# Patient Record
Sex: MALE | Race: WHITE | Employment: OTHER | URBAN - METROPOLITAN AREA
[De-identification: names, ages, dates, MRNs, and addresses within clinical notes are randomized per-mention and may not be internally consistent; named-entity substitution may affect disease eponyms.]

---

## 2017-06-20 ENCOUNTER — HOSPITAL ENCOUNTER (OUTPATIENT)
Age: 77
Discharge: HOME HEALTH | End: 2017-07-08
Attending: INTERNAL MEDICINE | Admitting: INTERNAL MEDICINE

## 2017-06-20 ENCOUNTER — APPOINTMENT (OUTPATIENT)
Dept: GENERAL RADIOLOGY | Age: 77
End: 2017-06-20
Attending: INTERNAL MEDICINE

## 2017-06-20 LAB
ALBUMIN SERPL BCP-MCNC: 3.2 G/DL (ref 3.2–4.6)
ALBUMIN/GLOB SERPL: 0.6 {RATIO} (ref 1.2–3.5)
ALP SERPL-CCNC: 116 U/L (ref 50–136)
ALT SERPL-CCNC: 62 U/L (ref 12–65)
ANION GAP BLD CALC-SCNC: 8 MMOL/L (ref 7–16)
AST SERPL W P-5'-P-CCNC: 54 U/L (ref 15–37)
BASOPHILS # BLD AUTO: 0.1 K/UL (ref 0–0.2)
BASOPHILS # BLD: 1 % (ref 0–2)
BILIRUB SERPL-MCNC: 0.4 MG/DL (ref 0.2–1.1)
BUN SERPL-MCNC: 24 MG/DL (ref 8–23)
CALCIUM SERPL-MCNC: 10.2 MG/DL (ref 8.3–10.4)
CHLORIDE SERPL-SCNC: 95 MMOL/L (ref 98–107)
CO2 SERPL-SCNC: 37 MMOL/L (ref 21–32)
CREAT SERPL-MCNC: 1.2 MG/DL (ref 0.8–1.5)
DIFFERENTIAL METHOD BLD: ABNORMAL
EOSINOPHIL # BLD: 0.2 K/UL (ref 0–0.8)
EOSINOPHIL NFR BLD: 2 % (ref 0.5–7.8)
ERYTHROCYTE [DISTWIDTH] IN BLOOD BY AUTOMATED COUNT: 13.8 % (ref 11.9–14.6)
EST. AVERAGE GLUCOSE BLD GHB EST-MCNC: 143 MG/DL
FOLATE SERPL-MCNC: 10.9 NG/ML (ref 3.1–17.5)
GLOBULIN SER CALC-MCNC: 5.2 G/DL (ref 2.3–3.5)
GLUCOSE SERPL-MCNC: 101 MG/DL (ref 65–100)
HBA1C MFR BLD: 6.6 % (ref 4.8–6)
HCT VFR BLD AUTO: 50.4 % (ref 41.1–50.3)
HGB BLD-MCNC: 16.6 G/DL (ref 13.6–17.2)
IMM GRANULOCYTES # BLD: 0 K/UL (ref 0–0.5)
IMM GRANULOCYTES NFR BLD AUTO: 0.3 % (ref 0–5)
INR PPP: 1.1 (ref 0.9–1.2)
LYMPHOCYTES # BLD AUTO: 9 % (ref 13–44)
LYMPHOCYTES # BLD: 1 K/UL (ref 0.5–4.6)
MAGNESIUM SERPL-MCNC: 2.5 MG/DL (ref 1.8–2.4)
MCH RBC QN AUTO: 30.2 PG (ref 26.1–32.9)
MCHC RBC AUTO-ENTMCNC: 32.9 G/DL (ref 31.4–35)
MCV RBC AUTO: 91.6 FL (ref 79.6–97.8)
MONOCYTES # BLD: 1.6 K/UL (ref 0.1–1.3)
MONOCYTES NFR BLD AUTO: 14 % (ref 4–12)
NEUTS SEG # BLD: 8.7 K/UL (ref 1.7–8.2)
NEUTS SEG NFR BLD AUTO: 74 % (ref 43–78)
PLATELET # BLD AUTO: 417 K/UL (ref 150–450)
PMV BLD AUTO: 11.5 FL (ref 10.8–14.1)
POTASSIUM SERPL-SCNC: 4.5 MMOL/L (ref 3.5–5.1)
PROT SERPL-MCNC: 8.4 G/DL (ref 6.3–8.2)
PROTHROMBIN TIME: 12.1 SEC (ref 9.6–12)
RBC # BLD AUTO: 5.5 M/UL (ref 4.23–5.67)
SODIUM SERPL-SCNC: 140 MMOL/L (ref 136–145)
T4 SERPL-MCNC: 13.6 UG/DL (ref 4.8–13.9)
TSH SERPL DL<=0.005 MIU/L-ACNC: 4.49 UIU/ML (ref 0.36–3.74)
VIT B12 SERPL-MCNC: 1067 PG/ML (ref 193–986)
WBC # BLD AUTO: 11.7 K/UL (ref 4.3–11.1)

## 2017-06-20 PROCEDURE — 83735 ASSAY OF MAGNESIUM: CPT | Performed by: INTERNAL MEDICINE

## 2017-06-20 PROCEDURE — 85025 COMPLETE CBC W/AUTO DIFF WBC: CPT | Performed by: INTERNAL MEDICINE

## 2017-06-20 PROCEDURE — 71010 XR CHEST PORT: CPT

## 2017-06-20 PROCEDURE — 84436 ASSAY OF TOTAL THYROXINE: CPT | Performed by: INTERNAL MEDICINE

## 2017-06-20 PROCEDURE — 36415 COLL VENOUS BLD VENIPUNCTURE: CPT | Performed by: INTERNAL MEDICINE

## 2017-06-20 PROCEDURE — 82607 VITAMIN B-12: CPT | Performed by: INTERNAL MEDICINE

## 2017-06-20 PROCEDURE — 80053 COMPREHEN METABOLIC PANEL: CPT | Performed by: INTERNAL MEDICINE

## 2017-06-20 PROCEDURE — 83036 HEMOGLOBIN GLYCOSYLATED A1C: CPT | Performed by: INTERNAL MEDICINE

## 2017-06-20 PROCEDURE — 85610 PROTHROMBIN TIME: CPT | Performed by: INTERNAL MEDICINE

## 2017-06-20 PROCEDURE — 84443 ASSAY THYROID STIM HORMONE: CPT | Performed by: INTERNAL MEDICINE

## 2017-06-20 PROCEDURE — 82746 ASSAY OF FOLIC ACID SERUM: CPT | Performed by: INTERNAL MEDICINE

## 2017-06-21 ENCOUNTER — HOSPITAL ENCOUNTER (OUTPATIENT)
Dept: CT IMAGING | Age: 77
Discharge: HOME | End: 2017-06-21
Attending: INTERNAL MEDICINE
Payer: MEDICARE

## 2017-06-21 PROCEDURE — 84165 PROTEIN E-PHORESIS SERUM: CPT | Performed by: INTERNAL MEDICINE

## 2017-06-21 PROCEDURE — 70450 CT HEAD/BRAIN W/O DYE: CPT

## 2017-06-21 PROCEDURE — 36415 COLL VENOUS BLD VENIPUNCTURE: CPT | Performed by: INTERNAL MEDICINE

## 2017-06-21 PROCEDURE — 86334 IMMUNOFIX E-PHORESIS SERUM: CPT | Performed by: INTERNAL MEDICINE

## 2017-06-22 LAB
EST. AVERAGE GLUCOSE BLD GHB EST-MCNC: 143 MG/DL
HBA1C MFR BLD: 6.6 % (ref 4.8–6)
INR PPP: 1.2 (ref 0.9–1.2)
PROTHROMBIN TIME: 12.6 SEC (ref 9.6–12)

## 2017-06-22 PROCEDURE — 85610 PROTHROMBIN TIME: CPT | Performed by: INTERNAL MEDICINE

## 2017-06-22 PROCEDURE — 36415 COLL VENOUS BLD VENIPUNCTURE: CPT | Performed by: INTERNAL MEDICINE

## 2017-06-22 PROCEDURE — 83036 HEMOGLOBIN GLYCOSYLATED A1C: CPT | Performed by: INTERNAL MEDICINE

## 2017-06-23 LAB
ALBUMIN SERPL BCP-MCNC: 2.9 G/DL (ref 3.2–4.6)
ALBUMIN SERPL ELPH-MCNC: 3.22 G/DL (ref 3.2–5.6)
ALBUMIN/GLOB SERPL: 0.6 {RATIO} (ref 1.2–3.5)
ALBUMIN/GLOB SERPL: 0.9 {RATIO}
ALP SERPL-CCNC: 117 U/L (ref 50–136)
ALPHA1 GLOB SERPL ELPH-MCNC: 0.31 G/DL (ref 0.1–0.4)
ALPHA2 GLOB SERPL ELPH-MCNC: 1.18 G/DL (ref 0.4–1.2)
ALT SERPL-CCNC: 58 U/L (ref 12–65)
ANION GAP BLD CALC-SCNC: 9 MMOL/L (ref 7–16)
AST SERPL W P-5'-P-CCNC: 56 U/L (ref 15–37)
B-GLOBULIN SERPL QL ELPH: 1.18 G/DL (ref 0.6–1.3)
BASOPHILS # BLD AUTO: 0.1 K/UL (ref 0–0.2)
BASOPHILS # BLD: 1 % (ref 0–2)
BILIRUB SERPL-MCNC: 0.4 MG/DL (ref 0.2–1.1)
BUN SERPL-MCNC: 28 MG/DL (ref 8–23)
CALCIUM SERPL-MCNC: 9.5 MG/DL (ref 8.3–10.4)
CHLORIDE SERPL-SCNC: 100 MMOL/L (ref 98–107)
CO2 SERPL-SCNC: 31 MMOL/L (ref 21–32)
CREAT SERPL-MCNC: 1.24 MG/DL (ref 0.8–1.5)
DIFFERENTIAL METHOD BLD: ABNORMAL
EOSINOPHIL # BLD: 0.2 K/UL (ref 0–0.8)
EOSINOPHIL NFR BLD: 2 % (ref 0.5–7.8)
ERYTHROCYTE [DISTWIDTH] IN BLOOD BY AUTOMATED COUNT: 13.9 % (ref 11.9–14.6)
GAMMA GLOB MFR SERPL ELPH: 1.11 G/DL (ref 0.5–1.6)
GLOBULIN SER CALC-MCNC: 4.5 G/DL (ref 2.3–3.5)
GLUCOSE SERPL-MCNC: 133 MG/DL (ref 65–100)
HCT VFR BLD AUTO: 46.7 % (ref 41.1–50.3)
HGB BLD-MCNC: 15.4 G/DL (ref 13.6–17.2)
IGA SERPL-MCNC: 424 MG/DL (ref 85–499)
IGG SERPL-MCNC: 1065 MG/DL (ref 610–1616)
IGM SERPL-MCNC: 115 MG/DL (ref 35–242)
IMM GRANULOCYTES # BLD: 0.1 K/UL (ref 0–0.5)
IMM GRANULOCYTES NFR BLD AUTO: 0.5 % (ref 0–5)
LYMPHOCYTES # BLD AUTO: 14 % (ref 13–44)
LYMPHOCYTES # BLD: 1.3 K/UL (ref 0.5–4.6)
M PROTEIN SERPL ELPH-MCNC: NORMAL G/DL
MCH RBC QN AUTO: 29.9 PG (ref 26.1–32.9)
MCHC RBC AUTO-ENTMCNC: 33 G/DL (ref 31.4–35)
MCV RBC AUTO: 90.7 FL (ref 79.6–97.8)
MONOCYTES # BLD: 1.4 K/UL (ref 0.1–1.3)
MONOCYTES NFR BLD AUTO: 15 % (ref 4–12)
NEUTS SEG # BLD: 6.3 K/UL (ref 1.7–8.2)
NEUTS SEG NFR BLD AUTO: 68 % (ref 43–78)
PLATELET # BLD AUTO: 454 K/UL (ref 150–450)
PMV BLD AUTO: 11.7 FL (ref 10.8–14.1)
POTASSIUM SERPL-SCNC: 5.2 MMOL/L (ref 3.5–5.1)
PROT PATTERN SERPL ELPH-IMP: NORMAL
PROT PATTERN SPEC IFE-IMP: NORMAL
PROT SERPL-MCNC: 7 G/DL (ref 6.3–8.2)
PROT SERPL-MCNC: 7.4 G/DL (ref 6.3–8.2)
RBC # BLD AUTO: 5.15 M/UL (ref 4.23–5.67)
SODIUM SERPL-SCNC: 140 MMOL/L (ref 136–145)
WBC # BLD AUTO: 9.3 K/UL (ref 4.3–11.1)

## 2017-06-23 PROCEDURE — 85025 COMPLETE CBC W/AUTO DIFF WBC: CPT | Performed by: INTERNAL MEDICINE

## 2017-06-23 PROCEDURE — 36415 COLL VENOUS BLD VENIPUNCTURE: CPT | Performed by: INTERNAL MEDICINE

## 2017-06-23 PROCEDURE — 80053 COMPREHEN METABOLIC PANEL: CPT | Performed by: INTERNAL MEDICINE

## 2017-06-24 LAB
INR PPP: 1.5 (ref 0.9–1.2)
POTASSIUM SERPL-SCNC: 5.3 MMOL/L (ref 3.5–5.1)
PROTHROMBIN TIME: 16.7 SEC (ref 9.6–12)

## 2017-06-24 PROCEDURE — 36415 COLL VENOUS BLD VENIPUNCTURE: CPT | Performed by: INTERNAL MEDICINE

## 2017-06-24 PROCEDURE — 84132 ASSAY OF SERUM POTASSIUM: CPT | Performed by: INTERNAL MEDICINE

## 2017-06-24 PROCEDURE — 85610 PROTHROMBIN TIME: CPT | Performed by: INTERNAL MEDICINE

## 2017-06-25 LAB
INR PPP: 1.9 (ref 0.9–1.2)
PROTHROMBIN TIME: 20.7 SEC (ref 9.6–12)

## 2017-06-25 PROCEDURE — 85610 PROTHROMBIN TIME: CPT | Performed by: INTERNAL MEDICINE

## 2017-06-25 PROCEDURE — 36415 COLL VENOUS BLD VENIPUNCTURE: CPT | Performed by: INTERNAL MEDICINE

## 2017-06-26 LAB
ALBUMIN SERPL BCP-MCNC: 2.7 G/DL (ref 3.2–4.6)
ALBUMIN/GLOB SERPL: 0.6 {RATIO} (ref 1.2–3.5)
ALP SERPL-CCNC: 113 U/L (ref 50–136)
ALT SERPL-CCNC: 48 U/L (ref 12–65)
ANION GAP BLD CALC-SCNC: 5 MMOL/L (ref 7–16)
AST SERPL W P-5'-P-CCNC: 35 U/L (ref 15–37)
BASOPHILS # BLD AUTO: 0.2 K/UL (ref 0–0.2)
BASOPHILS # BLD: 2 % (ref 0–2)
BILIRUB SERPL-MCNC: 0.2 MG/DL (ref 0.2–1.1)
BUN SERPL-MCNC: 17 MG/DL (ref 8–23)
CALCIUM SERPL-MCNC: 9 MG/DL (ref 8.3–10.4)
CHLORIDE SERPL-SCNC: 100 MMOL/L (ref 98–107)
CO2 SERPL-SCNC: 35 MMOL/L (ref 21–32)
CREAT SERPL-MCNC: 0.82 MG/DL (ref 0.8–1.5)
DIFFERENTIAL METHOD BLD: NORMAL
EOSINOPHIL # BLD: 0.3 K/UL (ref 0–0.8)
EOSINOPHIL NFR BLD: 3 % (ref 0.5–7.8)
ERYTHROCYTE [DISTWIDTH] IN BLOOD BY AUTOMATED COUNT: 13.9 % (ref 11.9–14.6)
GLOBULIN SER CALC-MCNC: 4.8 G/DL (ref 2.3–3.5)
GLUCOSE SERPL-MCNC: 110 MG/DL (ref 65–100)
HCT VFR BLD AUTO: 45 % (ref 41.1–50.3)
HGB BLD-MCNC: 14.9 G/DL (ref 13.6–17.2)
IMM GRANULOCYTES # BLD: 0 K/UL (ref 0–0.5)
IMM GRANULOCYTES NFR BLD AUTO: 0.4 % (ref 0–5)
INR PPP: 2.5 (ref 0.9–1.2)
LYMPHOCYTES # BLD AUTO: 16 % (ref 13–44)
LYMPHOCYTES # BLD: 1.3 K/UL (ref 0.5–4.6)
MCH RBC QN AUTO: 30 PG (ref 26.1–32.9)
MCHC RBC AUTO-ENTMCNC: 33.1 G/DL (ref 31.4–35)
MCV RBC AUTO: 90.7 FL (ref 79.6–97.8)
MONOCYTES # BLD: 0.9 K/UL (ref 0.1–1.3)
MONOCYTES NFR BLD AUTO: 11 % (ref 4–12)
NEUTS SEG # BLD: 5.3 K/UL (ref 1.7–8.2)
NEUTS SEG NFR BLD AUTO: 68 % (ref 43–78)
PLATELET # BLD AUTO: 447 K/UL (ref 150–450)
PMV BLD AUTO: 11.5 FL (ref 10.8–14.1)
POTASSIUM SERPL-SCNC: 4.2 MMOL/L (ref 3.5–5.1)
PROT SERPL-MCNC: 7.5 G/DL (ref 6.3–8.2)
PROTHROMBIN TIME: 27.4 SEC (ref 9.6–12)
RBC # BLD AUTO: 4.96 M/UL (ref 4.23–5.67)
SODIUM SERPL-SCNC: 140 MMOL/L (ref 136–145)
WBC # BLD AUTO: 7.8 K/UL (ref 4.3–11.1)

## 2017-06-26 PROCEDURE — 85025 COMPLETE CBC W/AUTO DIFF WBC: CPT | Performed by: INTERNAL MEDICINE

## 2017-06-26 PROCEDURE — 80053 COMPREHEN METABOLIC PANEL: CPT | Performed by: INTERNAL MEDICINE

## 2017-06-26 PROCEDURE — 85610 PROTHROMBIN TIME: CPT | Performed by: INTERNAL MEDICINE

## 2017-06-28 LAB
INR PPP: 3.9 (ref 0.9–1.2)
PROTHROMBIN TIME: 42.5 SEC (ref 9.6–12)

## 2017-06-28 PROCEDURE — 85610 PROTHROMBIN TIME: CPT | Performed by: INTERNAL MEDICINE

## 2017-06-29 ENCOUNTER — HOSPITAL ENCOUNTER (OUTPATIENT)
Dept: CT IMAGING | Age: 77
Discharge: HOME | End: 2017-06-29
Attending: INTERNAL MEDICINE
Payer: MEDICARE

## 2017-06-29 LAB
ANION GAP BLD CALC-SCNC: 6 MMOL/L (ref 7–16)
BUN SERPL-MCNC: 20 MG/DL (ref 8–23)
CALCIUM SERPL-MCNC: 9.5 MG/DL (ref 8.3–10.4)
CHLORIDE SERPL-SCNC: 99 MMOL/L (ref 98–107)
CO2 SERPL-SCNC: 35 MMOL/L (ref 21–32)
CREAT SERPL-MCNC: 0.91 MG/DL (ref 0.8–1.5)
GLUCOSE SERPL-MCNC: 200 MG/DL (ref 65–100)
INR PPP: 4.3 (ref 0.9–1.2)
POTASSIUM SERPL-SCNC: 4.6 MMOL/L (ref 3.5–5.1)
PROTHROMBIN TIME: 46.8 SEC (ref 9.6–12)
SODIUM SERPL-SCNC: 140 MMOL/L (ref 136–145)

## 2017-06-29 PROCEDURE — 70486 CT MAXILLOFACIAL W/O DYE: CPT

## 2017-06-29 PROCEDURE — 80048 BASIC METABOLIC PNL TOTAL CA: CPT | Performed by: INTERNAL MEDICINE

## 2017-06-29 PROCEDURE — 85610 PROTHROMBIN TIME: CPT | Performed by: INTERNAL MEDICINE

## 2017-06-30 LAB
INR PPP: 3.5 (ref 0.9–1.2)
PROTHROMBIN TIME: 39.7 SEC (ref 9.6–12)

## 2017-06-30 PROCEDURE — 85610 PROTHROMBIN TIME: CPT | Performed by: INTERNAL MEDICINE

## 2017-07-01 LAB
INR PPP: 2.5 (ref 0.9–1.2)
PROTHROMBIN TIME: 27.2 SEC (ref 9.6–12)

## 2017-07-01 PROCEDURE — 85610 PROTHROMBIN TIME: CPT | Performed by: INTERNAL MEDICINE

## 2017-07-01 PROCEDURE — 36415 COLL VENOUS BLD VENIPUNCTURE: CPT | Performed by: INTERNAL MEDICINE

## 2017-07-02 LAB — VANCOMYCIN SERPL-MCNC: <0.8 UG/ML

## 2017-07-02 PROCEDURE — 80202 ASSAY OF VANCOMYCIN: CPT | Performed by: INTERNAL MEDICINE

## 2017-07-02 PROCEDURE — 36415 COLL VENOUS BLD VENIPUNCTURE: CPT | Performed by: INTERNAL MEDICINE

## 2017-07-03 LAB
ALBUMIN SERPL BCP-MCNC: 3.1 G/DL (ref 3.2–4.6)
ALBUMIN/GLOB SERPL: 0.8 {RATIO} (ref 1.2–3.5)
ALP SERPL-CCNC: 124 U/L (ref 50–136)
ALT SERPL-CCNC: 99 U/L (ref 12–65)
ANION GAP BLD CALC-SCNC: 7 MMOL/L (ref 7–16)
AST SERPL W P-5'-P-CCNC: 60 U/L (ref 15–37)
BASOPHILS # BLD AUTO: 0.1 K/UL (ref 0–0.2)
BASOPHILS # BLD: 1 % (ref 0–2)
BILIRUB SERPL-MCNC: 0.3 MG/DL (ref 0.2–1.1)
BUN SERPL-MCNC: 18 MG/DL (ref 8–23)
CALCIUM SERPL-MCNC: 9.8 MG/DL (ref 8.3–10.4)
CHLORIDE SERPL-SCNC: 103 MMOL/L (ref 98–107)
CO2 SERPL-SCNC: 30 MMOL/L (ref 21–32)
CREAT SERPL-MCNC: 0.84 MG/DL (ref 0.8–1.5)
DIFFERENTIAL METHOD BLD: NORMAL
EOSINOPHIL # BLD: 0.2 K/UL (ref 0–0.8)
EOSINOPHIL NFR BLD: 3 % (ref 0.5–7.8)
ERYTHROCYTE [DISTWIDTH] IN BLOOD BY AUTOMATED COUNT: 14.2 % (ref 11.9–14.6)
GLOBULIN SER CALC-MCNC: 4 G/DL (ref 2.3–3.5)
GLUCOSE SERPL-MCNC: 131 MG/DL (ref 65–100)
HCT VFR BLD AUTO: 48 % (ref 41.1–50.3)
HGB BLD-MCNC: 15.9 G/DL (ref 13.6–17.2)
IMM GRANULOCYTES # BLD: 0 K/UL (ref 0–0.5)
IMM GRANULOCYTES NFR BLD AUTO: 0.3 % (ref 0–5)
INR PPP: 1.7 (ref 0.9–1.2)
LYMPHOCYTES # BLD AUTO: 16 % (ref 13–44)
LYMPHOCYTES # BLD: 1.2 K/UL (ref 0.5–4.6)
MCH RBC QN AUTO: 29.5 PG (ref 26.1–32.9)
MCHC RBC AUTO-ENTMCNC: 33.1 G/DL (ref 31.4–35)
MCV RBC AUTO: 89.1 FL (ref 79.6–97.8)
MONOCYTES # BLD: 0.6 K/UL (ref 0.1–1.3)
MONOCYTES NFR BLD AUTO: 8 % (ref 4–12)
NEUTS SEG # BLD: 5.4 K/UL (ref 1.7–8.2)
NEUTS SEG NFR BLD AUTO: 72 % (ref 43–78)
PLATELET # BLD AUTO: 348 K/UL (ref 150–450)
PMV BLD AUTO: 11.6 FL (ref 10.8–14.1)
POTASSIUM SERPL-SCNC: 4.6 MMOL/L (ref 3.5–5.1)
PROT SERPL-MCNC: 7.1 G/DL (ref 6.3–8.2)
PROTHROMBIN TIME: 18.5 SEC (ref 9.6–12)
RBC # BLD AUTO: 5.39 M/UL (ref 4.23–5.67)
SODIUM SERPL-SCNC: 140 MMOL/L (ref 136–145)
WBC # BLD AUTO: 7.6 K/UL (ref 4.3–11.1)

## 2017-07-03 PROCEDURE — 80053 COMPREHEN METABOLIC PANEL: CPT | Performed by: INTERNAL MEDICINE

## 2017-07-03 PROCEDURE — 85610 PROTHROMBIN TIME: CPT | Performed by: INTERNAL MEDICINE

## 2017-07-03 PROCEDURE — 85025 COMPLETE CBC W/AUTO DIFF WBC: CPT | Performed by: INTERNAL MEDICINE

## 2017-07-05 LAB
INR PPP: 2.1 (ref 0.9–1.2)
PROTHROMBIN TIME: 23.2 SEC (ref 9.6–12)

## 2017-07-05 PROCEDURE — 85610 PROTHROMBIN TIME: CPT | Performed by: INTERNAL MEDICINE

## 2017-07-06 LAB
INR PPP: 2.3 (ref 0.9–1.2)
PROTHROMBIN TIME: 25.4 SEC (ref 9.6–12)

## 2017-07-06 PROCEDURE — 85610 PROTHROMBIN TIME: CPT | Performed by: INTERNAL MEDICINE

## 2017-07-07 LAB
INR PPP: 2.1 (ref 0.9–1.2)
PROTHROMBIN TIME: 22.8 SEC (ref 9.6–12)

## 2017-07-07 PROCEDURE — 85610 PROTHROMBIN TIME: CPT | Performed by: INTERNAL MEDICINE

## 2017-08-22 ENCOUNTER — HOSPITAL ENCOUNTER (EMERGENCY)
Age: 77
Discharge: HOME | End: 2017-08-22
Attending: EMERGENCY MEDICINE
Payer: MEDICARE

## 2017-08-22 ENCOUNTER — HOSPITAL ENCOUNTER (OUTPATIENT)
Dept: GENERAL RADIOLOGY | Age: 77
Discharge: HOME | End: 2017-08-22
Attending: PHYSICIAN ASSISTANT
Payer: MEDICARE

## 2017-08-22 ENCOUNTER — APPOINTMENT (OUTPATIENT)
Dept: GENERAL RADIOLOGY | Age: 77
End: 2017-08-22
Attending: EMERGENCY MEDICINE
Payer: MEDICARE

## 2017-08-22 VITALS
DIASTOLIC BLOOD PRESSURE: 74 MMHG | HEIGHT: 72 IN | TEMPERATURE: 98.3 F | WEIGHT: 230 LBS | OXYGEN SATURATION: 90 % | RESPIRATION RATE: 15 BRPM | BODY MASS INDEX: 31.15 KG/M2 | HEART RATE: 66 BPM | SYSTOLIC BLOOD PRESSURE: 184 MMHG

## 2017-08-22 DIAGNOSIS — R09.02 HYPOXIA: ICD-10-CM

## 2017-08-22 DIAGNOSIS — R53.1 WEAKNESS: Primary | ICD-10-CM

## 2017-08-22 DIAGNOSIS — G47.33 OSA (OBSTRUCTIVE SLEEP APNEA): ICD-10-CM

## 2017-08-22 DIAGNOSIS — J96.20 ACUTE ON CHRONIC RESPIRATORY FAILURE, UNSPECIFIED WHETHER WITH HYPOXIA OR HYPERCAPNIA (HCC): ICD-10-CM

## 2017-08-22 LAB
ALBUMIN SERPL-MCNC: 3.5 G/DL (ref 3.2–4.6)
ALBUMIN/GLOB SERPL: 0.8 {RATIO} (ref 1.2–3.5)
ALP SERPL-CCNC: 115 U/L (ref 50–136)
ALT SERPL-CCNC: 39 U/L (ref 12–65)
ANION GAP SERPL CALC-SCNC: 7 MMOL/L (ref 7–16)
AST SERPL-CCNC: 30 U/L (ref 15–37)
BASOPHILS # BLD: 0.2 K/UL (ref 0–0.2)
BASOPHILS NFR BLD: 2 % (ref 0–2)
BILIRUB SERPL-MCNC: 0.4 MG/DL (ref 0.2–1.1)
BNP SERPL-MCNC: 187 PG/ML
BUN SERPL-MCNC: 13 MG/DL (ref 8–23)
CALCIUM SERPL-MCNC: 8.7 MG/DL (ref 8.3–10.4)
CHLORIDE SERPL-SCNC: 103 MMOL/L (ref 98–107)
CO2 SERPL-SCNC: 33 MMOL/L (ref 21–32)
CREAT SERPL-MCNC: 0.85 MG/DL (ref 0.8–1.5)
DIFFERENTIAL METHOD BLD: ABNORMAL
EOSINOPHIL # BLD: 0.2 K/UL (ref 0–0.8)
EOSINOPHIL NFR BLD: 3 % (ref 0.5–7.8)
ERYTHROCYTE [DISTWIDTH] IN BLOOD BY AUTOMATED COUNT: 16.2 % (ref 11.9–14.6)
GLOBULIN SER CALC-MCNC: 4.2 G/DL (ref 2.3–3.5)
GLUCOSE BLD STRIP.AUTO-MCNC: 88 MG/DL (ref 65–100)
GLUCOSE BLD STRIP.AUTO-MCNC: 89 MG/DL (ref 65–100)
GLUCOSE SERPL-MCNC: 93 MG/DL (ref 65–100)
HCT VFR BLD AUTO: 42.7 % (ref 41.1–50.3)
HGB BLD-MCNC: 14.2 G/DL (ref 13.6–17.2)
IMM GRANULOCYTES # BLD: 0.1 K/UL (ref 0–0.5)
IMM GRANULOCYTES NFR BLD: 0.9 % (ref 0–5)
INR PPP: 2.1 (ref 0.9–1.2)
LYMPHOCYTES # BLD: 1.3 K/UL (ref 0.5–4.6)
LYMPHOCYTES NFR BLD: 14 % (ref 13–44)
MCH RBC QN AUTO: 30.5 PG (ref 26.1–32.9)
MCHC RBC AUTO-ENTMCNC: 33.3 G/DL (ref 31.4–35)
MCV RBC AUTO: 91.8 FL (ref 79.6–97.8)
MONOCYTES # BLD: 0.8 K/UL (ref 0.1–1.3)
MONOCYTES NFR BLD: 9 % (ref 4–12)
NEUTS SEG # BLD: 6.8 K/UL (ref 1.7–8.2)
NEUTS SEG NFR BLD: 71 % (ref 43–78)
PLATELET # BLD AUTO: 345 K/UL (ref 150–450)
PMV BLD AUTO: 10.8 FL (ref 10.8–14.1)
POTASSIUM SERPL-SCNC: 4 MMOL/L (ref 3.5–5.1)
PROT SERPL-MCNC: 7.7 G/DL (ref 6.3–8.2)
PROTHROMBIN TIME: 23.1 SEC (ref 9.6–12)
RBC # BLD AUTO: 4.65 M/UL (ref 4.23–5.67)
SODIUM SERPL-SCNC: 143 MMOL/L (ref 136–145)
TROPONIN I BLD-MCNC: 0.01 NG/ML (ref 0–0.08)
TROPONIN I BLD-MCNC: 0.03 NG/ML (ref 0–0.08)
TROPONIN I SERPL-MCNC: <0.02 NG/ML (ref 0.02–0.05)
TSH SERPL DL<=0.005 MIU/L-ACNC: 3.47 UIU/ML (ref 0.36–3.74)
WBC # BLD AUTO: 9.4 K/UL (ref 4.3–11.1)

## 2017-08-22 PROCEDURE — 85610 PROTHROMBIN TIME: CPT | Performed by: EMERGENCY MEDICINE

## 2017-08-22 PROCEDURE — 84484 ASSAY OF TROPONIN QUANT: CPT

## 2017-08-22 PROCEDURE — 80053 COMPREHEN METABOLIC PANEL: CPT | Performed by: EMERGENCY MEDICINE

## 2017-08-22 PROCEDURE — 82962 GLUCOSE BLOOD TEST: CPT

## 2017-08-22 PROCEDURE — 85025 COMPLETE CBC W/AUTO DIFF WBC: CPT | Performed by: EMERGENCY MEDICINE

## 2017-08-22 PROCEDURE — 83880 ASSAY OF NATRIURETIC PEPTIDE: CPT | Performed by: EMERGENCY MEDICINE

## 2017-08-22 PROCEDURE — 84484 ASSAY OF TROPONIN QUANT: CPT | Performed by: EMERGENCY MEDICINE

## 2017-08-22 PROCEDURE — 84443 ASSAY THYROID STIM HORMONE: CPT | Performed by: EMERGENCY MEDICINE

## 2017-08-22 PROCEDURE — 99285 EMERGENCY DEPT VISIT HI MDM: CPT | Performed by: EMERGENCY MEDICINE

## 2017-08-22 PROCEDURE — 93005 ELECTROCARDIOGRAM TRACING: CPT | Performed by: EMERGENCY MEDICINE

## 2017-08-22 NOTE — ED TRIAGE NOTES
Pt was at pulmonary office for check up and felt weak and sweaty. Pt stated he had not eaten and thinks his sugar was low.

## 2017-08-22 NOTE — DISCHARGE INSTRUCTIONS
Weakness: Care Instructions  Your Care Instructions  Weakness is a lack of physical or muscle strength. You may feel that you need to make extra effort to move your arms, legs, or other muscles. Generalized weakness means that you feel weak in most areas of your body. Another type of weakness may affect just one muscle or group of muscles. You may feel weak and tired after you have done too much activity, such as taking an extra-long hike. This is not a serious problem. It often goes away on its own. Feeling weak can also be caused by medical conditions like thyroid problems, depression, or a virus. Sometimes the cause can be serious. Your doctor may want to do more tests to try to find the cause of the weakness. The doctor has checked you carefully, but problems can develop later. If you notice any problems or new symptoms, get medical treatment right away. Follow-up care is a key part of your treatment and safety. Be sure to make and go to all appointments, and call your doctor if you are having problems. It's also a good idea to know your test results and keep a list of the medicines you take. How can you care for yourself at home? · Rest when you feel tired. · Be safe with medicines. If your doctor prescribed medicine, take it exactly as prescribed. Call your doctor if you think you are having a problem with your medicine. You will get more details on the specific medicines your doctor prescribes. · Do not skip meals. Eating a balanced diet may increase your energy level. · Get some physical activity every day, but do not get too tired. When should you call for help? Call your doctor now or seek immediate medical care if:  · You have new or worse weakness. · You are dizzy or lightheaded, or you feel like you may faint. Watch closely for changes in your health, and be sure to contact your doctor if:  · You do not get better as expected. Where can you learn more?   Go to http://reji.info/. Enter [id number] in the search box to learn more about \"Weakness: Care Instructions. \"  Current as of: March 20, 2017  Content Version: 11.3  © 3801-2934 [employer]. Care instructions adapted under license by [employer] (which disclaims liability or warranty for this information). If you have questions about a medical condition or this instruction, always ask your healthcare professional. [address] any warranty or liability for your use of this information.

## 2017-08-22 NOTE — ED PROVIDER NOTES
HPI Comments: ppatient was here for a pulmonary office checkup. Was having some low oxygen saturation but was improved with some incentive spirometer. He was discharged going home and on check out he started to feel weak and diaphoretic. They sent him immediately down here. Patient has a history of diabetes and took his insulin today but did not eat very much and thinks his sugar dropped. He feels much better resting down here in the ER. Patient is a 65 y.o. male presenting with fatigue. The history is provided by the patient. No  was used. Fatigue   This is a new problem. The current episode started 1 to 2 hours ago. The problem has been gradually improving. There was no focality noted. Pertinent negatives include no focal weakness, no loss of sensation, no loss of balance, no slurred speech, no speech difficulty, no movement disorder, no mental status change, no unresponsiveness and no disorientation. There has been no fever. Associated symptoms include shortness of breath. Pertinent negatives include no chest pain, no vomiting, no altered mental status, no confusion, no headaches, no nausea, no bowel incontinence and no bladder incontinence. Past Medical History:   Diagnosis Date    Arrhythmia     afib    Arthritis     CAD (coronary artery disease) 2009    CABG, 3 vessel. No MI    Chronic pain     legs \"R especially\"    Diabetes ([facility] [address] 2009    insulin controlled, avg fbs 107, recognizes hypo at 87, unsure last Ha1C    GERD (gastroesophageal reflux disease)     controlled with omeprazole twice daily    Heart failure ([facility] [address]     echo 9/27/13:  EF 70%, Mild regurg mitral and tricuspid valve.     Hyperlipidemia     controlled with med    Hypertension     controlled with meds    Moderate aortic stenosis echo 9/27/13    aortic valve area 1.2 cm2    Psychiatric disorder     anxiety, depression, controlled with Venlafaxine    PUD (peptic ulcer disease) 1970's    Unspecified sleep apnea     wears cpap       Past Surgical History:   Procedure Laterality Date    ABDOMEN SURGERY PROC UNLISTED  2003    cholecystectomy    CARDIAC SURG PROCEDURE UNLIST  2009    CABG 3V    HX APPENDECTOMY      HX GI      heller myotomy with toupee wrap    HX HEART VALVE SURGERY  2011    HX HEENT Bilateral 2004    ptosis    HX HEENT      to have cataract surgery 2014    HX KNEE ARTHROSCOPY      bilateral    HX ORTHOPAEDIC Right 2002    bone spur    HX OTHER SURGICAL  2011    prostate surgery    HX PROSTATECTOMY  2011    TURP         Family History:   Problem Relation Age of Onset    Heart Disease Mother        Social History     Social History    Marital status:      Spouse name: N/A    Number of children: N/A    Years of education: N/A     Occupational History     Retired     Social History Main Topics    Smoking status: Never Smoker    Smokeless tobacco: Never Used    Alcohol use Yes      Comment: socially occasional--1-2 times per year    Drug use: No    Sexual activity: Not on file     Other Topics Concern    Not on file     Social History Narrative    lives with wife. Has 2 children. Retired . ALLERGIES: Review of patient's allergies indicates no known allergies. Review of Systems   Constitutional: Positive for diaphoresis and fatigue. Negative for chills and fever. HENT: Negative for rhinorrhea and sore throat. Eyes: Negative for pain and redness. Respiratory: Positive for shortness of breath. Negative for chest tightness and wheezing. Cardiovascular: Negative for chest pain and leg swelling. Gastrointestinal: Negative for abdominal pain, bowel incontinence, diarrhea, nausea and vomiting. Genitourinary: Negative for bladder incontinence, dysuria and hematuria. Musculoskeletal: Negative for back pain, gait problem, neck pain and neck stiffness. Skin: Negative for color change and rash. Neurological: Positive for weakness.  Negative for focal weakness, speech difficulty, numbness, headaches and loss of balance. Psychiatric/Behavioral: Negative for confusion. Vitals:    08/22/17 1447   BP: 152/64   Pulse: 64   Resp: 18   Temp: 98.3 °F (36.8 °C)   SpO2: 97%   Weight: 104.3 kg (230 lb)   Height: 6' (1.829 m)            Physical Exam   Constitutional: He is oriented to person, place, and time. He appears well-developed and well-nourished. HENT:   Head: Normocephalic and atraumatic. Neck: Normal range of motion. Neck supple. Cardiovascular: Normal rate and regular rhythm. No murmur heard. Pulmonary/Chest: Effort normal and breath sounds normal. He has no wheezes. Abdominal: Soft. Bowel sounds are normal. There is no tenderness. Musculoskeletal: Normal range of motion. He exhibits no edema. Neurological: He is alert and oriented to person, place, and time. Skin: Skin is warm. He is diaphoretic. Nursing note and vitals reviewed. MDM  Number of Diagnoses or Management Options  Diagnosis management comments: Two negative trops. No chest pain earlier or now. Feels much better. Will discharge. Amount and/or Complexity of Data Reviewed  Clinical lab tests: ordered and reviewed  Tests in the radiology section of CPT®: reviewed  Tests in the medicine section of CPT®: ordered and reviewed    Patient Progress  Patient progress: stable    ED Course       Procedures        EKG: normal sinus rhythm, nonspecific ST and T waves changes, LBBB. Rate 68. Final result (Exam End: 8/22/2017 12:39 PM) Open    Study Result   Chest X-ray     INDICATION:  Shortness of breath, chronic respiratory failure     PA and lateral views of the chest were obtained.     FINDINGS: There are stable infiltrate and effusion in the left lung base. The  right lung remains clear. There is stable cardiomegaly.   Sternotomy changes are  present.       IMPRESSION  IMPRESSION: Stable left lower lobe infiltrate and effusion             Results Include:    Recent Results (from the past 24 hour(s))   AMB POC SPIROMETRY    Collection Time: 08/22/17  1:25 PM   Result Value Ref Range    FEV1  L    FEV1 % Pred  %    FVC  L    FVC % Pred  %    FEV1/FVC  %   GLUCOSE, POC    Collection Time: 08/22/17  2:52 PM   Result Value Ref Range    Glucose (POC) 88 65 - 100 mg/dL   CBC WITH AUTOMATED DIFF    Collection Time: 08/22/17  2:58 PM   Result Value Ref Range    WBC 9.4 4.3 - 11.1 K/uL    RBC 4.65 4.23 - 5.67 M/uL    HGB 14.2 13.6 - 17.2 g/dL    HCT 42.7 41.1 - 50.3 %    MCV 91.8 79.6 - 97.8 FL    MCH 30.5 26.1 - 32.9 PG    MCHC 33.3 31.4 - 35.0 g/dL    RDW 16.2 (H) 11.9 - 14.6 %    PLATELET 311 697 - 791 K/uL    MPV 10.8 10.8 - 14.1 FL    DF AUTOMATED      NEUTROPHILS 71 43 - 78 %    LYMPHOCYTES 14 13 - 44 %    MONOCYTES 9 4.0 - 12.0 %    EOSINOPHILS 3 0.5 - 7.8 %    BASOPHILS 2 0.0 - 2.0 %    IMMATURE GRANULOCYTES 0.9 0.0 - 5.0 %    ABS. NEUTROPHILS 6.8 1.7 - 8.2 K/UL    ABS. LYMPHOCYTES 1.3 0.5 - 4.6 K/UL    ABS. MONOCYTES 0.8 0.1 - 1.3 K/UL    ABS. EOSINOPHILS 0.2 0.0 - 0.8 K/UL    ABS. BASOPHILS 0.2 0.0 - 0.2 K/UL    ABS. IMM. GRANS. 0.1 0.0 - 0.5 K/UL   METABOLIC PANEL, COMPREHENSIVE    Collection Time: 08/22/17  2:58 PM   Result Value Ref Range    Sodium 143 136 - 145 mmol/L    Potassium 4.0 3.5 - 5.1 mmol/L    Chloride 103 98 - 107 mmol/L    CO2 33 (H) 21 - 32 mmol/L    Anion gap 7 7 - 16 mmol/L    Glucose 93 65 - 100 mg/dL    BUN 13 8 - 23 MG/DL    Creatinine 0.85 0.8 - 1.5 MG/DL    GFR est AA >60 >60 ml/min/1.73m2    GFR est non-AA >60 >60 ml/min/1.73m2    Calcium 8.7 8.3 - 10.4 MG/DL    Bilirubin, total 0.4 0.2 - 1.1 MG/DL    ALT (SGPT) 39 12 - 65 U/L    AST (SGOT) 30 15 - 37 U/L    Alk.  phosphatase 115 50 - 136 U/L    Protein, total 7.7 6.3 - 8.2 g/dL    Albumin 3.5 3.2 - 4.6 g/dL    Globulin 4.2 (H) 2.3 - 3.5 g/dL    A-G Ratio 0.8 (L) 1.2 - 3.5     TROPONIN I    Collection Time: 08/22/17  2:58 PM   Result Value Ref Range    Troponin-I, Qt. <0.02 (L) 0.02 - 0.05 NG/ML   BNP    Collection Time: 08/22/17  2:58 PM   Result Value Ref Range     pg/mL   GLUCOSE, POC    Collection Time: 08/22/17  3:43 PM   Result Value Ref Range    Glucose (POC) 89 65 - 100 mg/dL   POC TROPONIN-I    Collection Time: 08/22/17  4:29 PM   Result Value Ref Range    Troponin-I (POC) 0.03 0.0 - 0.08 ng/ml   TSH 3RD GENERATION    Collection Time: 08/22/17  4:30 PM   Result Value Ref Range    TSH 3.470 0.358 - 3.740 uIU/mL   PROTHROMBIN TIME + INR    Collection Time: 08/22/17  4:30 PM   Result Value Ref Range    Prothrombin time 23.1 (H) 9.6 - 12.0 sec    INR 2.1 (H) 0.9 - 1.2     POC TROPONIN-I    Collection Time: 08/22/17  6:17 PM   Result Value Ref Range    Troponin-I (POC) 0.01 0.0 - 0.08 ng/ml

## 2017-08-23 LAB
ATRIAL RATE: 68 BPM
CALCULATED P AXIS, ECG09: 109 DEGREES
CALCULATED R AXIS, ECG10: 22 DEGREES
CALCULATED T AXIS, ECG11: 73 DEGREES
DIAGNOSIS, 93000: NORMAL
P-R INTERVAL, ECG05: 194 MS
Q-T INTERVAL, ECG07: 484 MS
QRS DURATION, ECG06: 154 MS
QTC CALCULATION (BEZET), ECG08: 514 MS
VENTRICULAR RATE, ECG03: 68 BPM

## 2017-09-08 ENCOUNTER — HOSPITAL ENCOUNTER (OUTPATIENT)
Dept: ULTRASOUND IMAGING | Age: 77
Discharge: HOME | End: 2017-09-08
Attending: INTERNAL MEDICINE
Payer: MEDICARE

## 2017-09-08 DIAGNOSIS — J96.20 ACUTE ON CHRONIC RESPIRATORY FAILURE, UNSPECIFIED WHETHER WITH HYPOXIA OR HYPERCAPNIA (HCC): ICD-10-CM

## 2017-09-08 DIAGNOSIS — G47.33 OSA (OBSTRUCTIVE SLEEP APNEA): ICD-10-CM

## 2017-09-08 DIAGNOSIS — I50.31 ACUTE DIASTOLIC HEART FAILURE (HCC): ICD-10-CM

## 2017-09-08 DIAGNOSIS — I48.91 ATRIAL FIBRILLATION, UNSPECIFIED TYPE (HCC): ICD-10-CM

## 2017-09-08 DIAGNOSIS — E78.5 DYSLIPIDEMIA: ICD-10-CM

## 2017-09-08 PROCEDURE — 93925 LOWER EXTREMITY STUDY: CPT

## 2017-09-25 ENCOUNTER — HOSPITAL ENCOUNTER (OUTPATIENT)
Dept: GENERAL RADIOLOGY | Age: 77
Discharge: HOME | End: 2017-09-25
Attending: NURSE PRACTITIONER
Payer: MEDICARE

## 2017-09-25 DIAGNOSIS — R06.02 SOB (SHORTNESS OF BREATH): ICD-10-CM

## 2017-09-25 PROCEDURE — 71020 XR CHEST PA LAT: CPT

## 2017-10-03 PROBLEM — Z51.5 PALLIATIVE CARE PATIENT: Status: ACTIVE | Noted: 2017-10-03

## 2017-10-03 PROBLEM — J98.4 RESTRICTIVE LUNG DISEASE: Status: ACTIVE | Noted: 2017-10-03

## 2017-10-25 ENCOUNTER — HOSPITAL ENCOUNTER (OUTPATIENT)
Dept: CT IMAGING | Age: 77
Discharge: HOME | End: 2017-10-25
Attending: NURSE PRACTITIONER
Payer: MEDICARE

## 2017-10-25 DIAGNOSIS — G47.33 OSA (OBSTRUCTIVE SLEEP APNEA): ICD-10-CM

## 2017-10-25 DIAGNOSIS — R93.89 ABNORMAL CXR: ICD-10-CM

## 2017-10-25 DIAGNOSIS — R09.02 HYPOXIA: ICD-10-CM

## 2017-10-25 LAB — CREAT BLD-MCNC: 1 MG/DL (ref 0.8–1.5)

## 2017-10-25 PROCEDURE — 82565 ASSAY OF CREATININE: CPT

## 2017-10-25 PROCEDURE — 74011636320 HC RX REV CODE- 636/320: Performed by: NURSE PRACTITIONER

## 2017-10-25 PROCEDURE — 74011000258 HC RX REV CODE- 258: Performed by: NURSE PRACTITIONER

## 2017-10-25 PROCEDURE — 71260 CT THORAX DX C+: CPT

## 2017-10-25 RX ORDER — SODIUM CHLORIDE 0.9 % (FLUSH) 0.9 %
10 SYRINGE (ML) INJECTION
Status: COMPLETED | OUTPATIENT
Start: 2017-10-25 | End: 2017-10-25

## 2017-10-25 RX ADMIN — SODIUM CHLORIDE 100 ML: 900 INJECTION, SOLUTION INTRAVENOUS at 15:58

## 2017-10-25 RX ADMIN — IOPAMIDOL 80 ML: 755 INJECTION, SOLUTION INTRAVENOUS at 15:58

## 2017-10-25 RX ADMIN — Medication 10 ML: at 15:58

## 2017-11-07 ENCOUNTER — HOSPITAL ENCOUNTER (OUTPATIENT)
Dept: GENERAL RADIOLOGY | Age: 77
Discharge: HOME | End: 2017-11-07
Payer: MEDICARE

## 2017-11-07 DIAGNOSIS — R00.1 BRADYCARDIA: ICD-10-CM

## 2017-11-07 DIAGNOSIS — J96.20 ACUTE ON CHRONIC RESPIRATORY FAILURE, UNSPECIFIED WHETHER WITH HYPOXIA OR HYPERCAPNIA (HCC): ICD-10-CM

## 2017-11-07 DIAGNOSIS — Z51.5 PALLIATIVE CARE PATIENT: ICD-10-CM

## 2017-11-07 DIAGNOSIS — J98.4 RESTRICTIVE LUNG DISEASE: ICD-10-CM

## 2017-11-07 DIAGNOSIS — G47.33 OSA (OBSTRUCTIVE SLEEP APNEA): ICD-10-CM

## 2017-11-07 PROCEDURE — 71020 XR CHEST PA LAT: CPT

## 2017-11-21 ENCOUNTER — HOSPITAL ENCOUNTER (OUTPATIENT)
Dept: GENERAL RADIOLOGY | Age: 77
Discharge: HOME | End: 2017-11-21
Payer: MEDICARE

## 2017-11-21 DIAGNOSIS — R06.02 SOB (SHORTNESS OF BREATH): ICD-10-CM

## 2017-11-21 PROCEDURE — 71020 XR CHEST PA LAT: CPT

## 2017-12-07 PROBLEM — F33.41 RECURRENT MAJOR DEPRESSIVE DISORDER, IN PARTIAL REMISSION (HCC): Chronic | Status: RESOLVED | Noted: 2017-12-07 | Resolved: 2017-12-07

## 2017-12-07 PROBLEM — F33.41 RECURRENT MAJOR DEPRESSIVE DISORDER, IN PARTIAL REMISSION (HCC): Chronic | Status: ACTIVE | Noted: 2017-12-07

## 2018-01-23 ENCOUNTER — HOSPITAL ENCOUNTER (OUTPATIENT)
Dept: GENERAL RADIOLOGY | Age: 78
Discharge: HOME | End: 2018-01-23
Payer: MEDICARE

## 2018-01-23 DIAGNOSIS — R06.02 SOB (SHORTNESS OF BREATH): ICD-10-CM

## 2018-01-23 PROCEDURE — 71046 X-RAY EXAM CHEST 2 VIEWS: CPT

## 2018-03-09 ENCOUNTER — HOSPITAL ENCOUNTER (OUTPATIENT)
Dept: LAB | Age: 78
Discharge: HOME | End: 2018-03-09
Payer: MEDICARE

## 2018-03-09 ENCOUNTER — HOSPITAL ENCOUNTER (OUTPATIENT)
Dept: GENERAL RADIOLOGY | Age: 78
Discharge: HOME | End: 2018-03-09
Payer: MEDICARE

## 2018-03-09 DIAGNOSIS — R06.02 SHORTNESS OF BREATH: ICD-10-CM

## 2018-03-09 LAB — BNP SERPL-MCNC: 568 PG/ML

## 2018-03-09 PROCEDURE — 36415 COLL VENOUS BLD VENIPUNCTURE: CPT | Performed by: INTERNAL MEDICINE

## 2018-03-09 PROCEDURE — 83880 ASSAY OF NATRIURETIC PEPTIDE: CPT | Performed by: INTERNAL MEDICINE

## 2018-03-09 PROCEDURE — 71046 X-RAY EXAM CHEST 2 VIEWS: CPT

## 2018-05-21 PROBLEM — Z79.01 LONG TERM (CURRENT) USE OF ANTICOAGULANTS: Status: ACTIVE | Noted: 2018-05-21

## 2018-06-01 ENCOUNTER — HOSPITAL ENCOUNTER (INPATIENT)
Age: 78
LOS: 5 days | Discharge: NURSING, REHAB OR LONG-TERM CARE | DRG: 293 | End: 2018-06-06
Attending: EMERGENCY MEDICINE | Admitting: INTERNAL MEDICINE
Payer: MEDICARE

## 2018-06-01 ENCOUNTER — HOSPITAL ENCOUNTER (OUTPATIENT)
Dept: LAB | Age: 78
Discharge: HOME | DRG: 293 | End: 2018-06-01
Payer: MEDICARE

## 2018-06-01 ENCOUNTER — HOSPITAL ENCOUNTER (OUTPATIENT)
Dept: GENERAL RADIOLOGY | Age: 78
Discharge: HOME | DRG: 293 | End: 2018-06-01
Payer: MEDICARE

## 2018-06-01 DIAGNOSIS — R06.02 SOB (SHORTNESS OF BREATH): ICD-10-CM

## 2018-06-01 DIAGNOSIS — I50.9 CONGESTIVE HEART FAILURE, UNSPECIFIED HF CHRONICITY, UNSPECIFIED HEART FAILURE TYPE (HCC): ICD-10-CM

## 2018-06-01 DIAGNOSIS — J90 PLEURAL EFFUSION: Primary | ICD-10-CM

## 2018-06-01 DIAGNOSIS — I50.813 ACUTE ON CHRONIC RIGHT-SIDED CONGESTIVE HEART FAILURE (HCC): ICD-10-CM

## 2018-06-01 LAB
ALBUMIN SERPL-MCNC: 3.2 G/DL (ref 3.2–4.6)
ALBUMIN/GLOB SERPL: 0.7 {RATIO} (ref 1.2–3.5)
ALP SERPL-CCNC: 156 U/L (ref 50–136)
ALT SERPL-CCNC: 41 U/L (ref 12–65)
ANION GAP SERPL CALC-SCNC: 7 MMOL/L (ref 7–16)
ANION GAP SERPL CALC-SCNC: 9 MMOL/L (ref 7–16)
AST SERPL-CCNC: 30 U/L (ref 15–37)
BASOPHILS # BLD: 0.1 K/UL (ref 0–0.2)
BASOPHILS NFR BLD: 1 % (ref 0–2)
BILIRUB SERPL-MCNC: 0.5 MG/DL (ref 0.2–1.1)
BNP SERPL-MCNC: 436 PG/ML
BNP SERPL-MCNC: 561 PG/ML
BUN SERPL-MCNC: 21 MG/DL (ref 8–23)
BUN SERPL-MCNC: 22 MG/DL (ref 8–23)
CALCIUM SERPL-MCNC: 8.4 MG/DL (ref 8.3–10.4)
CALCIUM SERPL-MCNC: 8.7 MG/DL (ref 8.3–10.4)
CHLORIDE SERPL-SCNC: 96 MMOL/L (ref 98–107)
CHLORIDE SERPL-SCNC: 96 MMOL/L (ref 98–107)
CO2 SERPL-SCNC: 37 MMOL/L (ref 21–32)
CO2 SERPL-SCNC: 38 MMOL/L (ref 21–32)
CREAT SERPL-MCNC: 1.22 MG/DL (ref 0.8–1.5)
CREAT SERPL-MCNC: 1.26 MG/DL (ref 0.8–1.5)
DIFFERENTIAL METHOD BLD: ABNORMAL
EOSINOPHIL # BLD: 0.2 K/UL (ref 0–0.8)
EOSINOPHIL NFR BLD: 3 % (ref 0.5–7.8)
ERYTHROCYTE [DISTWIDTH] IN BLOOD BY AUTOMATED COUNT: 15.2 % (ref 11.9–14.6)
GLOBULIN SER CALC-MCNC: 4.3 G/DL (ref 2.3–3.5)
GLUCOSE BLD STRIP.AUTO-MCNC: 77 MG/DL (ref 65–100)
GLUCOSE SERPL-MCNC: 127 MG/DL (ref 65–100)
GLUCOSE SERPL-MCNC: 60 MG/DL (ref 65–100)
HCT VFR BLD AUTO: 44.4 % (ref 41.1–50.3)
HGB BLD-MCNC: 14.2 G/DL (ref 13.6–17.2)
IMM GRANULOCYTES # BLD: 0 K/UL (ref 0–0.5)
IMM GRANULOCYTES NFR BLD AUTO: 0 % (ref 0–5)
LYMPHOCYTES # BLD: 1.2 K/UL (ref 0.5–4.6)
LYMPHOCYTES NFR BLD: 14 % (ref 13–44)
MAGNESIUM SERPL-MCNC: 1.8 MG/DL (ref 1.8–2.4)
MCH RBC QN AUTO: 27.4 PG (ref 26.1–32.9)
MCHC RBC AUTO-ENTMCNC: 32 G/DL (ref 31.4–35)
MCV RBC AUTO: 85.7 FL (ref 79.6–97.8)
MONOCYTES # BLD: 1.1 K/UL (ref 0.1–1.3)
MONOCYTES NFR BLD: 12 % (ref 4–12)
NEUTS SEG # BLD: 6.1 K/UL (ref 1.7–8.2)
NEUTS SEG NFR BLD: 70 % (ref 43–78)
PLATELET # BLD AUTO: 307 K/UL (ref 150–450)
PMV BLD AUTO: 11.4 FL (ref 10.8–14.1)
POTASSIUM SERPL-SCNC: 2.8 MMOL/L (ref 3.5–5.1)
POTASSIUM SERPL-SCNC: 3.6 MMOL/L (ref 3.5–5.1)
PROT SERPL-MCNC: 7.5 G/DL (ref 6.3–8.2)
RBC # BLD AUTO: 5.18 M/UL (ref 4.23–5.67)
SODIUM SERPL-SCNC: 140 MMOL/L (ref 136–145)
SODIUM SERPL-SCNC: 143 MMOL/L (ref 136–145)
WBC # BLD AUTO: 8.6 K/UL (ref 4.3–11.1)

## 2018-06-01 PROCEDURE — 83880 ASSAY OF NATRIURETIC PEPTIDE: CPT | Performed by: EMERGENCY MEDICINE

## 2018-06-01 PROCEDURE — 74011000250 HC RX REV CODE- 250: Performed by: INTERNAL MEDICINE

## 2018-06-01 PROCEDURE — 80053 COMPREHEN METABOLIC PANEL: CPT | Performed by: EMERGENCY MEDICINE

## 2018-06-01 PROCEDURE — 74011250636 HC RX REV CODE- 250/636: Performed by: INTERNAL MEDICINE

## 2018-06-01 PROCEDURE — 65270000029 HC RM PRIVATE

## 2018-06-01 PROCEDURE — 85025 COMPLETE CBC W/AUTO DIFF WBC: CPT | Performed by: EMERGENCY MEDICINE

## 2018-06-01 PROCEDURE — 93005 ELECTROCARDIOGRAM TRACING: CPT | Performed by: EMERGENCY MEDICINE

## 2018-06-01 PROCEDURE — 99285 EMERGENCY DEPT VISIT HI MDM: CPT | Performed by: EMERGENCY MEDICINE

## 2018-06-01 PROCEDURE — 71046 X-RAY EXAM CHEST 2 VIEWS: CPT

## 2018-06-01 PROCEDURE — 83735 ASSAY OF MAGNESIUM: CPT | Performed by: EMERGENCY MEDICINE

## 2018-06-01 PROCEDURE — 80048 BASIC METABOLIC PNL TOTAL CA: CPT | Performed by: NURSE PRACTITIONER

## 2018-06-01 PROCEDURE — 83880 ASSAY OF NATRIURETIC PEPTIDE: CPT | Performed by: NURSE PRACTITIONER

## 2018-06-01 PROCEDURE — 94640 AIRWAY INHALATION TREATMENT: CPT

## 2018-06-01 PROCEDURE — 74011250636 HC RX REV CODE- 250/636: Performed by: EMERGENCY MEDICINE

## 2018-06-01 PROCEDURE — 36415 COLL VENOUS BLD VENIPUNCTURE: CPT | Performed by: NURSE PRACTITIONER

## 2018-06-01 PROCEDURE — 74011250637 HC RX REV CODE- 250/637: Performed by: INTERNAL MEDICINE

## 2018-06-01 PROCEDURE — 82962 GLUCOSE BLOOD TEST: CPT

## 2018-06-01 PROCEDURE — 96374 THER/PROPH/DIAG INJ IV PUSH: CPT | Performed by: EMERGENCY MEDICINE

## 2018-06-01 PROCEDURE — 94760 N-INVAS EAR/PLS OXIMETRY 1: CPT

## 2018-06-01 RX ORDER — HYDRALAZINE HYDROCHLORIDE 10 MG/1
10 TABLET, FILM COATED ORAL 3 TIMES DAILY
Status: DISCONTINUED | OUTPATIENT
Start: 2018-06-01 | End: 2018-06-06 | Stop reason: HOSPADM

## 2018-06-01 RX ORDER — ALBUTEROL SULFATE 90 UG/1
1 AEROSOL, METERED RESPIRATORY (INHALATION)
Status: DISCONTINUED | OUTPATIENT
Start: 2018-06-01 | End: 2018-06-06 | Stop reason: HOSPADM

## 2018-06-01 RX ORDER — PANTOPRAZOLE SODIUM 40 MG/1
40 TABLET, DELAYED RELEASE ORAL
Status: DISCONTINUED | OUTPATIENT
Start: 2018-06-02 | End: 2018-06-06 | Stop reason: HOSPADM

## 2018-06-01 RX ORDER — MEMANTINE HYDROCHLORIDE 5 MG/1
10 TABLET ORAL 2 TIMES DAILY
Status: DISCONTINUED | OUTPATIENT
Start: 2018-06-01 | End: 2018-06-06 | Stop reason: HOSPADM

## 2018-06-01 RX ORDER — WARFARIN SODIUM 5 MG/1
5 TABLET ORAL
Status: DISCONTINUED | OUTPATIENT
Start: 2018-06-01 | End: 2018-06-03

## 2018-06-01 RX ORDER — ASPIRIN 81 MG/1
81 TABLET ORAL DAILY
Status: DISCONTINUED | OUTPATIENT
Start: 2018-06-02 | End: 2018-06-06 | Stop reason: HOSPADM

## 2018-06-01 RX ORDER — FUROSEMIDE 10 MG/ML
60 INJECTION INTRAMUSCULAR; INTRAVENOUS
Status: COMPLETED | OUTPATIENT
Start: 2018-06-01 | End: 2018-06-01

## 2018-06-01 RX ORDER — ALBUTEROL SULFATE 0.83 MG/ML
2.5 SOLUTION RESPIRATORY (INHALATION)
Status: DISCONTINUED | OUTPATIENT
Start: 2018-06-01 | End: 2018-06-06 | Stop reason: HOSPADM

## 2018-06-01 RX ORDER — BUDESONIDE 0.5 MG/2ML
500 INHALANT ORAL
Status: DISCONTINUED | OUTPATIENT
Start: 2018-06-01 | End: 2018-06-06 | Stop reason: HOSPADM

## 2018-06-01 RX ORDER — NITROGLYCERIN 0.4 MG/1
0.4 TABLET SUBLINGUAL
Status: DISCONTINUED | OUTPATIENT
Start: 2018-06-01 | End: 2018-06-06 | Stop reason: HOSPADM

## 2018-06-01 RX ORDER — INSULIN LISPRO 100 [IU]/ML
INJECTION, SOLUTION INTRAVENOUS; SUBCUTANEOUS
Status: DISCONTINUED | OUTPATIENT
Start: 2018-06-01 | End: 2018-06-06 | Stop reason: HOSPADM

## 2018-06-01 RX ORDER — SERTRALINE HYDROCHLORIDE 50 MG/1
75 TABLET, FILM COATED ORAL DAILY
Status: DISCONTINUED | OUTPATIENT
Start: 2018-06-02 | End: 2018-06-06 | Stop reason: HOSPADM

## 2018-06-01 RX ORDER — CARVEDILOL 12.5 MG/1
12.5 TABLET ORAL 2 TIMES DAILY
Status: DISCONTINUED | OUTPATIENT
Start: 2018-06-01 | End: 2018-06-06 | Stop reason: HOSPADM

## 2018-06-01 RX ORDER — ASCORBIC ACID 500 MG
500 TABLET ORAL 2 TIMES DAILY
Status: DISCONTINUED | OUTPATIENT
Start: 2018-06-01 | End: 2018-06-06 | Stop reason: HOSPADM

## 2018-06-01 RX ORDER — BACLOFEN 10 MG/1
10 TABLET ORAL 3 TIMES DAILY
Status: DISCONTINUED | OUTPATIENT
Start: 2018-06-01 | End: 2018-06-06 | Stop reason: HOSPADM

## 2018-06-01 RX ORDER — LEVOTHYROXINE SODIUM 75 UG/1
75 TABLET ORAL
Status: DISCONTINUED | OUTPATIENT
Start: 2018-06-02 | End: 2018-06-06 | Stop reason: HOSPADM

## 2018-06-01 RX ORDER — ATORVASTATIN CALCIUM 10 MG/1
20 TABLET, FILM COATED ORAL
Status: DISCONTINUED | OUTPATIENT
Start: 2018-06-01 | End: 2018-06-06 | Stop reason: HOSPADM

## 2018-06-01 RX ORDER — LISINOPRIL 20 MG/1
40 TABLET ORAL DAILY
Status: DISCONTINUED | OUTPATIENT
Start: 2018-06-02 | End: 2018-06-06 | Stop reason: HOSPADM

## 2018-06-01 RX ORDER — AMIODARONE HYDROCHLORIDE 200 MG/1
200 TABLET ORAL DAILY
Status: DISCONTINUED | OUTPATIENT
Start: 2018-06-02 | End: 2018-06-02

## 2018-06-01 RX ORDER — FUROSEMIDE 10 MG/ML
40 INJECTION INTRAMUSCULAR; INTRAVENOUS 2 TIMES DAILY
Status: DISCONTINUED | OUTPATIENT
Start: 2018-06-01 | End: 2018-06-03

## 2018-06-01 RX ADMIN — MEMANTINE HYDROCHLORIDE 10 MG: 5 TABLET ORAL at 23:37

## 2018-06-01 RX ADMIN — BACLOFEN 10 MG: 10 TABLET ORAL at 23:37

## 2018-06-01 RX ADMIN — WARFARIN SODIUM 5 MG: 5 TABLET ORAL at 23:37

## 2018-06-01 RX ADMIN — ALBUTEROL SULFATE 2.5 MG: 2.5 SOLUTION RESPIRATORY (INHALATION) at 23:50

## 2018-06-01 RX ADMIN — FUROSEMIDE 60 MG: 10 INJECTION, SOLUTION INTRAMUSCULAR; INTRAVENOUS at 19:14

## 2018-06-01 RX ADMIN — CARVEDILOL 12.5 MG: 12.5 TABLET, FILM COATED ORAL at 23:37

## 2018-06-01 RX ADMIN — OXYCODONE HYDROCHLORIDE AND ACETAMINOPHEN 500 MG: 500 TABLET ORAL at 23:37

## 2018-06-01 RX ADMIN — FUROSEMIDE 40 MG: 10 INJECTION, SOLUTION INTRAMUSCULAR; INTRAVENOUS at 23:38

## 2018-06-01 RX ADMIN — ATORVASTATIN CALCIUM 20 MG: 10 TABLET, FILM COATED ORAL at 23:37

## 2018-06-01 NOTE — ED TRIAGE NOTES
Pt states he is shob and has fluid on his lungs as well as low potassium. Pt sent from his pulmonary doctor. Pt states he has been feeling shob for months, as well as swelling in his legs, he is on Lasix. Pt wears 4L/O2 all the time. Sats are 100% in triage, pt is not working to breathe. Pt denies chest pain, denies abd pain.

## 2018-06-01 NOTE — ED PROVIDER NOTES
HPI Comments: 27-year-old gentleman presents with concerns about feeling sick and weak and having shortness of breath. Patient went to go see his pulmonary specialist today who sent him here for further evaluation. Patient does have a medically fragile wife who gets routine dialysis and has some dementia. A friend of the family was here and he informed me that they have arty been in touch with DSS to help with the wife for the next couple of days if the patient ends up needing to be admitted. Patient has been very fatigued and he feels like he can't get any air in. Patient had an x-ray that showed bilateral pleural effusions. Pulmonary edema. Elements of this note were created using speech recognition software. As such, errors of speech recognition may be present. Patient is a 77 y.o. male presenting with abnormal lab results. The history is provided by the patient. Abnormal Lab Results   Associated symptoms include shortness of breath. Pertinent negatives include no chest pain, no abdominal pain and no headaches. Past Medical History:   Diagnosis Date    Arrhythmia     afib    Arthritis     CAD (coronary artery disease) 2009    CABG, 3 vessel. No MI    Chronic pain     legs \"R especially\"    Diabetes ([address] 2009    insulin controlled, avg fbs 107, recognizes hypo at 87, unsure last Ha1C    GERD (gastroesophageal reflux disease)     controlled with omeprazole twice daily    Heart failure ([address]     echo 9/27/13:  EF 70%, Mild regurg mitral and tricuspid valve.     Hyperlipidemia     controlled with med    Hypertension     controlled with meds    Moderate aortic stenosis echo 9/27/13    aortic valve area 1.2 cm2    Psychiatric disorder     anxiety, depression, controlled with Venlafaxine    PUD (peptic ulcer disease) 1970's    Unspecified sleep apnea     wears cpap       Past Surgical History:   Procedure Laterality Date    ABDOMEN SURGERY PROC UNLISTED  2003    cholecystectomy    CARDIAC SURG PROCEDURE UNLIST  2009    CABG 3V    HX APPENDECTOMY      HX GI      heller myotomy with toupee wrap    HX HEART VALVE SURGERY  2011    HX HEENT Bilateral 2004    ptosis    HX HEENT      to have cataract surgery 2014    HX KNEE ARTHROSCOPY      bilateral    HX ORTHOPAEDIC Right 2002    bone spur    HX OTHER SURGICAL  2011    prostate surgery    HX PROSTATECTOMY  2011    TURP         Family History:   Problem Relation Age of Onset    Heart Disease Mother        Social History     Social History    Marital status:      Spouse name: N/A    Number of children: N/A    Years of education: N/A     Occupational History     Retired     Social History Main Topics    Smoking status: Never Smoker    Smokeless tobacco: Never Used    Alcohol use Yes      Comment: socially occasional--1-2 times per year    Drug use: No    Sexual activity: Not on file     Other Topics Concern    Not on file     Social History Narrative    lives with wife. Has 2 children. Retired . ALLERGIES: Review of patient's allergies indicates no known allergies. Review of Systems   Constitutional: Positive for activity change and fatigue. Negative for chills, diaphoresis and fever. HENT: Negative for congestion, rhinorrhea and sore throat. Eyes: Negative for redness and visual disturbance. Respiratory: Positive for shortness of breath. Negative for cough, chest tightness and wheezing. Cardiovascular: Negative for chest pain and palpitations. Gastrointestinal: Negative for abdominal pain, blood in stool, diarrhea, nausea and vomiting. Endocrine: Negative for polydipsia and polyuria. Genitourinary: Negative for dysuria and hematuria. Musculoskeletal: Negative for arthralgias, myalgias and neck stiffness. Skin: Negative for rash. Allergic/Immunologic: Negative for environmental allergies and food allergies. Neurological: Negative for dizziness, weakness and headaches. Hematological: Negative for adenopathy. Does not bruise/bleed easily. Psychiatric/Behavioral: Negative for confusion and sleep disturbance. The patient is not nervous/anxious. Vitals:    06/01/18 1646   BP: 125/80   Pulse: 69   Resp: 18   Temp: 97.5 °F (36.4 °C)   SpO2: 98%   Weight: 107 kg (236 lb)   Height: 5' 8\" (1.727 m)            Physical Exam   Constitutional: He is oriented to person, place, and time. He appears well-developed and well-nourished. HENT:   Head: Normocephalic and atraumatic. Eyes: Conjunctivae and EOM are normal. Pupils are equal, round, and reactive to light. Neck: Normal range of motion. Cardiovascular: Normal rate and regular rhythm. Pulmonary/Chest: Effort normal. No respiratory distress. He has no wheezes. He has rales. He exhibits no tenderness. Bilateral coarse breath sounds   Abdominal: Soft. Bowel sounds are normal. There is no rebound and no guarding. Musculoskeletal: Normal range of motion. He exhibits no edema or tenderness. Lymphadenopathy:     He has no cervical adenopathy. Neurological: He is alert and oriented to person, place, and time. Skin: Skin is warm and dry. Psychiatric: He has a normal mood and affect. Nursing note and vitals reviewed. MDM  Number of Diagnoses or Management Options  Diagnosis management comments: Given his increasing O2 needs and his abnormal chest x-ray I think he'll need to come in for further diuresis. I will give him a dose of Lasix here numerous department and then asked the hospitalist to see him.         ED Course       Procedures

## 2018-06-02 LAB
ALBUMIN SERPL-MCNC: 2.8 G/DL (ref 3.2–4.6)
ALBUMIN/GLOB SERPL: 0.8 {RATIO} (ref 1.2–3.5)
ALP SERPL-CCNC: 137 U/L (ref 50–136)
ALT SERPL-CCNC: 33 U/L (ref 12–65)
ANION GAP SERPL CALC-SCNC: 11 MMOL/L (ref 7–16)
AST SERPL-CCNC: 33 U/L (ref 15–37)
ATRIAL RATE: 267 BPM
ATRIAL RATE: 68 BPM
BILIRUB SERPL-MCNC: 0.4 MG/DL (ref 0.2–1.1)
BUN SERPL-MCNC: 20 MG/DL (ref 8–23)
CALCIUM SERPL-MCNC: 8.5 MG/DL (ref 8.3–10.4)
CALCULATED R AXIS, ECG10: 118 DEGREES
CALCULATED R AXIS, ECG10: 119 DEGREES
CALCULATED T AXIS, ECG11: -59 DEGREES
CALCULATED T AXIS, ECG11: -84 DEGREES
CHLORIDE SERPL-SCNC: 99 MMOL/L (ref 98–107)
CO2 SERPL-SCNC: 35 MMOL/L (ref 21–32)
CREAT SERPL-MCNC: 0.84 MG/DL (ref 0.8–1.5)
DIAGNOSIS, 93000: NORMAL
DIAGNOSIS, 93000: NORMAL
ERYTHROCYTE [DISTWIDTH] IN BLOOD BY AUTOMATED COUNT: 15.3 % (ref 11.9–14.6)
EST. AVERAGE GLUCOSE BLD GHB EST-MCNC: 137 MG/DL
GLOBULIN SER CALC-MCNC: 3.3 G/DL (ref 2.3–3.5)
GLUCOSE BLD STRIP.AUTO-MCNC: 115 MG/DL (ref 65–100)
GLUCOSE BLD STRIP.AUTO-MCNC: 120 MG/DL (ref 65–100)
GLUCOSE BLD STRIP.AUTO-MCNC: 143 MG/DL (ref 65–100)
GLUCOSE BLD STRIP.AUTO-MCNC: 91 MG/DL (ref 65–100)
GLUCOSE SERPL-MCNC: 79 MG/DL (ref 65–100)
HBA1C MFR BLD: 6.4 % (ref 4.8–6)
HCT VFR BLD AUTO: 40.2 % (ref 41.1–50.3)
HGB BLD-MCNC: 12.5 G/DL (ref 13.6–17.2)
INR PPP: 1.6
MCH RBC QN AUTO: 27 PG (ref 26.1–32.9)
MCHC RBC AUTO-ENTMCNC: 31.1 G/DL (ref 31.4–35)
MCV RBC AUTO: 86.8 FL (ref 79.6–97.8)
PLATELET # BLD AUTO: 244 K/UL (ref 150–450)
PMV BLD AUTO: 11.9 FL (ref 10.8–14.1)
POTASSIUM SERPL-SCNC: 3.5 MMOL/L (ref 3.5–5.1)
PROT SERPL-MCNC: 6.1 G/DL (ref 6.3–8.2)
PROTHROMBIN TIME: 18.2 SEC (ref 11.5–14.5)
Q-T INTERVAL, ECG07: 470 MS
Q-T INTERVAL, ECG07: 490 MS
QRS DURATION, ECG06: 156 MS
QRS DURATION, ECG06: 162 MS
QTC CALCULATION (BEZET), ECG08: 514 MS
QTC CALCULATION (BEZET), ECG08: 529 MS
RBC # BLD AUTO: 4.63 M/UL (ref 4.23–5.67)
SODIUM SERPL-SCNC: 145 MMOL/L (ref 136–145)
TROPONIN I SERPL-MCNC: <0.02 NG/ML (ref 0.02–0.05)
TSH SERPL DL<=0.005 MIU/L-ACNC: 2.56 UIU/ML (ref 0.36–3.74)
VENTRICULAR RATE, ECG03: 70 BPM
VENTRICULAR RATE, ECG03: 72 BPM
WBC # BLD AUTO: 7.2 K/UL (ref 4.3–11.1)

## 2018-06-02 PROCEDURE — 93005 ELECTROCARDIOGRAM TRACING: CPT | Performed by: NURSE PRACTITIONER

## 2018-06-02 PROCEDURE — 74011000302 HC RX REV CODE- 302: Performed by: HOSPITALIST

## 2018-06-02 PROCEDURE — 74011250637 HC RX REV CODE- 250/637: Performed by: HOSPITALIST

## 2018-06-02 PROCEDURE — 74011000250 HC RX REV CODE- 250: Performed by: INTERNAL MEDICINE

## 2018-06-02 PROCEDURE — 82962 GLUCOSE BLOOD TEST: CPT

## 2018-06-02 PROCEDURE — 94640 AIRWAY INHALATION TREATMENT: CPT

## 2018-06-02 PROCEDURE — 83036 HEMOGLOBIN GLYCOSYLATED A1C: CPT | Performed by: INTERNAL MEDICINE

## 2018-06-02 PROCEDURE — C8929 TTE W OR WO FOL WCON,DOPPLER: HCPCS

## 2018-06-02 PROCEDURE — 94760 N-INVAS EAR/PLS OXIMETRY 1: CPT

## 2018-06-02 PROCEDURE — 77010033678 HC OXYGEN DAILY

## 2018-06-02 PROCEDURE — 84484 ASSAY OF TROPONIN QUANT: CPT | Performed by: INTERNAL MEDICINE

## 2018-06-02 PROCEDURE — 74011250637 HC RX REV CODE- 250/637: Performed by: INTERNAL MEDICINE

## 2018-06-02 PROCEDURE — 84443 ASSAY THYROID STIM HORMONE: CPT | Performed by: HOSPITALIST

## 2018-06-02 PROCEDURE — 80053 COMPREHEN METABOLIC PANEL: CPT | Performed by: INTERNAL MEDICINE

## 2018-06-02 PROCEDURE — 85027 COMPLETE CBC AUTOMATED: CPT | Performed by: INTERNAL MEDICINE

## 2018-06-02 PROCEDURE — 86580 TB INTRADERMAL TEST: CPT | Performed by: HOSPITALIST

## 2018-06-02 PROCEDURE — 85610 PROTHROMBIN TIME: CPT | Performed by: INTERNAL MEDICINE

## 2018-06-02 PROCEDURE — 74011250636 HC RX REV CODE- 250/636: Performed by: INTERNAL MEDICINE

## 2018-06-02 PROCEDURE — 65660000000 HC RM CCU STEPDOWN

## 2018-06-02 PROCEDURE — 36415 COLL VENOUS BLD VENIPUNCTURE: CPT | Performed by: INTERNAL MEDICINE

## 2018-06-02 RX ORDER — POTASSIUM CHLORIDE 20 MEQ/1
20 TABLET, EXTENDED RELEASE ORAL DAILY
Status: DISCONTINUED | OUTPATIENT
Start: 2018-06-02 | End: 2018-06-06 | Stop reason: HOSPADM

## 2018-06-02 RX ADMIN — ALBUTEROL SULFATE 2.5 MG: 2.5 SOLUTION RESPIRATORY (INHALATION) at 19:20

## 2018-06-02 RX ADMIN — ALBUTEROL SULFATE 2.5 MG: 2.5 SOLUTION RESPIRATORY (INHALATION) at 13:51

## 2018-06-02 RX ADMIN — TUBERCULIN PURIFIED PROTEIN DERIVATIVE 5 UNITS: 5 INJECTION, SOLUTION INTRADERMAL at 14:24

## 2018-06-02 RX ADMIN — POTASSIUM CHLORIDE 20 MEQ: 1500 TABLET, EXTENDED RELEASE ORAL at 16:56

## 2018-06-02 RX ADMIN — WARFARIN SODIUM 5 MG: 5 TABLET ORAL at 21:50

## 2018-06-02 RX ADMIN — MEMANTINE HYDROCHLORIDE 10 MG: 5 TABLET ORAL at 16:57

## 2018-06-02 RX ADMIN — BACLOFEN 10 MG: 10 TABLET ORAL at 21:50

## 2018-06-02 RX ADMIN — CARVEDILOL 12.5 MG: 12.5 TABLET, FILM COATED ORAL at 16:56

## 2018-06-02 RX ADMIN — CARVEDILOL 12.5 MG: 12.5 TABLET, FILM COATED ORAL at 08:54

## 2018-06-02 RX ADMIN — MEMANTINE HYDROCHLORIDE 10 MG: 5 TABLET ORAL at 08:55

## 2018-06-02 RX ADMIN — LEVOTHYROXINE SODIUM 75 MCG: 75 TABLET ORAL at 06:02

## 2018-06-02 RX ADMIN — OXYCODONE HYDROCHLORIDE AND ACETAMINOPHEN 500 MG: 500 TABLET ORAL at 08:55

## 2018-06-02 RX ADMIN — HYDRALAZINE HYDROCHLORIDE 10 MG: 10 TABLET, FILM COATED ORAL at 21:50

## 2018-06-02 RX ADMIN — BUDESONIDE 500 MCG: 0.5 INHALANT RESPIRATORY (INHALATION) at 19:20

## 2018-06-02 RX ADMIN — ALBUTEROL SULFATE 2.5 MG: 2.5 SOLUTION RESPIRATORY (INHALATION) at 07:23

## 2018-06-02 RX ADMIN — LISINOPRIL 40 MG: 20 TABLET ORAL at 08:54

## 2018-06-02 RX ADMIN — PERFLUTREN 1 ML: 6.52 INJECTION, SUSPENSION INTRAVENOUS at 14:00

## 2018-06-02 RX ADMIN — FUROSEMIDE 40 MG: 10 INJECTION, SOLUTION INTRAMUSCULAR; INTRAVENOUS at 16:59

## 2018-06-02 RX ADMIN — BUDESONIDE 500 MCG: 0.5 INHALANT RESPIRATORY (INHALATION) at 07:23

## 2018-06-02 RX ADMIN — ATORVASTATIN CALCIUM 20 MG: 10 TABLET, FILM COATED ORAL at 21:50

## 2018-06-02 RX ADMIN — BACLOFEN 10 MG: 10 TABLET ORAL at 08:55

## 2018-06-02 RX ADMIN — FUROSEMIDE 40 MG: 10 INJECTION, SOLUTION INTRAMUSCULAR; INTRAVENOUS at 08:54

## 2018-06-02 RX ADMIN — ASPIRIN 81 MG: 81 TABLET, COATED ORAL at 08:55

## 2018-06-02 RX ADMIN — OXYCODONE HYDROCHLORIDE AND ACETAMINOPHEN 500 MG: 500 TABLET ORAL at 16:57

## 2018-06-02 RX ADMIN — PANTOPRAZOLE SODIUM 40 MG: 40 TABLET, DELAYED RELEASE ORAL at 06:03

## 2018-06-02 RX ADMIN — SERTRALINE HYDROCHLORIDE 75 MG: 50 TABLET ORAL at 08:54

## 2018-06-02 RX ADMIN — BACLOFEN 10 MG: 10 TABLET ORAL at 16:56

## 2018-06-02 RX ADMIN — AMIODARONE HYDROCHLORIDE 200 MG: 200 TABLET ORAL at 08:55

## 2018-06-02 NOTE — PROGRESS NOTES
Hospitalist Progress Note    Subjective:   Daily Progress Note: 6/2/2018 11:23 AM    Patient is a 78/M with PMH AFIB, CAD with hx of chronic diastolic heart failure, COPD, IDDM, PUD, HTN, HLD who was sent here from his Pulmonologist's office for signs of heart failure exacerbation with CXR findings indicative of bilateral effusions and pulmonary edema. Patient also reported worsening bilateral LE edema. His BNP is elevated at 436. He also complains of 3/10 chest pain and SOB. Patient is on 4L continuous O2 at home. Pt was admitted and started on IV lasix with over 1 liter diuresed so far. Pt laying in bed in bed - appears sleepy. States he doesn't feel well but no specific complaints. Denies chest pain. No n/v.  Tolerated breakfast.    Current Facility-Administered Medications   Medication Dose Route Frequency    furosemide (LASIX) injection 40 mg  40 mg IntraVENous BID    insulin lispro (HUMALOG) injection   SubCUTAneous AC&HS    albuterol (PROVENTIL HFA, VENTOLIN HFA, PROAIR HFA) inhaler 1 Puff  1 Puff Inhalation Q4H PRN    amiodarone (CORDARONE) tablet 200 mg  200 mg Oral DAILY    ascorbic acid (vitamin C) (VITAMIN C) tablet 500 mg  500 mg Oral BID    aspirin delayed-release tablet 81 mg  81 mg Oral DAILY    atorvastatin (LIPITOR) tablet 20 mg  20 mg Oral QHS    baclofen (LIORESAL) tablet 10 mg  10 mg Oral TID    carvedilol (COREG) tablet 12.5 mg  12.5 mg Oral BID    hydrALAZINE (APRESOLINE) tablet 10 mg  10 mg Oral TID    levothyroxine (SYNTHROID) tablet 75 mcg  75 mcg Oral ACB    lisinopril (PRINIVIL, ZESTRIL) tablet 40 mg  40 mg Oral DAILY    memantine (NAMENDA) tablet 10 mg  10 mg Oral BID    nitroglycerin (NITROSTAT) tablet 0.4 mg  0.4 mg SubLINGual Q5MIN PRN    pantoprazole (PROTONIX) tablet 40 mg  40 mg Oral ACB    sertraline (ZOLOFT) tablet 75 mg  75 mg Oral DAILY    warfarin (COUMADIN) tablet 5 mg  5 mg Oral QHS    budesonide (PULMICORT) 500 mcg/2 ml nebulizer suspension  500 mcg Nebulization BID RT    And    albuterol (PROVENTIL VENTOLIN) nebulizer solution 2.5 mg  2.5 mg Nebulization Q6HWA RT        Review of Systems  A comprehensive review of systems was negative except for that written in the HPI. Objective:     Visit Vitals    /72 (BP 1 Location: Left arm, BP Patient Position: At rest)    Pulse 74    Temp 98 °F (36.7 °C)    Resp 16    Ht 5' 8\" (1.727 m)    Wt 104.7 kg (230 lb 14.4 oz)    SpO2 99%    BMI 35.11 kg/m2    O2 Flow Rate (L/min): 4 l/min O2 Device: Nasal cannula    Temp (24hrs), Av °F (36.7 °C), Min:97.3 °F (36.3 °C), Max:98.5 °F (36.9 °C)      701 -  1900  In: 240 [P.O.:240]  Out: 225 [Urine:225]  1901 -  0700  In: 400 [P.O.:400]  Out: 925 [Urine:925]    Visit Vitals    /72 (BP 1 Location: Left arm, BP Patient Position: At rest)    Pulse 74    Temp 98 °F (36.7 °C)    Resp 16    Ht 5' 8\" (1.727 m)    Wt 104.7 kg (230 lb 14.4 oz)    SpO2 99%    BMI 35.11 kg/m2     General appearance: fatigued, cooperative, no distress, appears stated age  Lungs: rales bilaterally  Heart: irregularly irregular rhythm, S1, S2 normal  Abdomen: soft, non-tender. Bowel sounds normal. No masses,  no organomegaly  Extremities: extremities normal, atraumatic, no cyanosis. 2+edema  Psych: A+Ox3    Additional comments:I reviewed the patient's new clinical lab test results.  .    Data Review    Recent Results (from the past 24 hour(s))   METABOLIC PANEL, BASIC    Collection Time: 18  2:30 PM   Result Value Ref Range    Sodium 140 136 - 145 mmol/L    Potassium 2.8 (LL) 3.5 - 5.1 mmol/L    Chloride 96 (L) 98 - 107 mmol/L    CO2 37 (H) 21 - 32 mmol/L    Anion gap 7 7 - 16 mmol/L    Glucose 127 (H) 65 - 100 mg/dL    BUN 22 8 - 23 MG/DL    Creatinine 1.26 0.8 - 1.5 MG/DL    GFR est AA >60 >60 ml/min/1.73m2    GFR est non-AA 59 (L) >60 ml/min/1.73m2    Calcium 8.4 8.3 - 10.4 MG/DL   BNP    Collection Time: 18  2:30 PM   Result Value Ref Range     pg/mL   CBC WITH AUTOMATED DIFF    Collection Time: 06/01/18  4:51 PM   Result Value Ref Range    WBC 8.6 4.3 - 11.1 K/uL    RBC 5.18 4.23 - 5.67 M/uL    HGB 14.2 13.6 - 17.2 g/dL    HCT 44.4 41.1 - 50.3 %    MCV 85.7 79.6 - 97.8 FL    MCH 27.4 26.1 - 32.9 PG    MCHC 32.0 31.4 - 35.0 g/dL    RDW 15.2 (H) 11.9 - 14.6 %    PLATELET 440 291 - 699 K/uL    MPV 11.4 10.8 - 14.1 FL    DF AUTOMATED      NEUTROPHILS 70 43 - 78 %    LYMPHOCYTES 14 13 - 44 %    MONOCYTES 12 4.0 - 12.0 %    EOSINOPHILS 3 0.5 - 7.8 %    BASOPHILS 1 0.0 - 2.0 %    IMMATURE GRANULOCYTES 0 0.0 - 5.0 %    ABS. NEUTROPHILS 6.1 1.7 - 8.2 K/UL    ABS. LYMPHOCYTES 1.2 0.5 - 4.6 K/UL    ABS. MONOCYTES 1.1 0.1 - 1.3 K/UL    ABS. EOSINOPHILS 0.2 0.0 - 0.8 K/UL    ABS. BASOPHILS 0.1 0.0 - 0.2 K/UL    ABS. IMM. GRANS. 0.0 0.0 - 0.5 K/UL   METABOLIC PANEL, COMPREHENSIVE    Collection Time: 06/01/18  4:51 PM   Result Value Ref Range    Sodium 143 136 - 145 mmol/L    Potassium 3.6 3.5 - 5.1 mmol/L    Chloride 96 (L) 98 - 107 mmol/L    CO2 38 (H) 21 - 32 mmol/L    Anion gap 9 7 - 16 mmol/L    Glucose 60 (L) 65 - 100 mg/dL    BUN 21 8 - 23 MG/DL    Creatinine 1.22 0.8 - 1.5 MG/DL    GFR est AA >60 >60 ml/min/1.73m2    GFR est non-AA >60 >60 ml/min/1.73m2    Calcium 8.7 8.3 - 10.4 MG/DL    Bilirubin, total 0.5 0.2 - 1.1 MG/DL    ALT (SGPT) 41 12 - 65 U/L    AST (SGOT) 30 15 - 37 U/L    Alk.  phosphatase 156 (H) 50 - 136 U/L    Protein, total 7.5 6.3 - 8.2 g/dL    Albumin 3.2 3.2 - 4.6 g/dL    Globulin 4.3 (H) 2.3 - 3.5 g/dL    A-G Ratio 0.7 (L) 1.2 - 3.5     MAGNESIUM    Collection Time: 06/01/18  4:51 PM   Result Value Ref Range    Magnesium 1.8 1.8 - 2.4 mg/dL   BNP    Collection Time: 06/01/18  4:51 PM   Result Value Ref Range     pg/mL   EKG, 12 LEAD, INITIAL    Collection Time: 06/01/18  4:51 PM   Result Value Ref Range    Ventricular Rate 70 BPM    Atrial Rate 267 BPM    QRS Duration 162 ms    Q-T Interval 490 ms    QTC Calculation (Bezet) 529 ms    Calculated R Axis 118 degrees    Calculated T Axis -84 degrees    Diagnosis       Undetermined rhythm  Right axis deviation  Left bundle branch block  Abnormal ECG  When compared with ECG of 22-AUG-2017 15:48,  Current undetermined rhythm precludes rhythm comparison, needs review  QRS axis Shifted right  T wave inversion now evident in Inferior leads     GLUCOSE, POC    Collection Time: 06/01/18  8:53 PM   Result Value Ref Range    Glucose (POC) 77 65 - 100 mg/dL   GLUCOSE, POC    Collection Time: 06/02/18  5:20 AM   Result Value Ref Range    Glucose (POC) 91 65 - 100 mg/dL   HEMOGLOBIN A1C WITH EAG    Collection Time: 06/02/18  6:58 AM   Result Value Ref Range    Hemoglobin A1c 6.4 (H) 4.8 - 6.0 %    Est. average glucose 137 mg/dL   CBC W/O DIFF    Collection Time: 06/02/18  6:58 AM   Result Value Ref Range    WBC 7.2 4.3 - 11.1 K/uL    RBC 4.63 4.23 - 5.67 M/uL    HGB 12.5 (L) 13.6 - 17.2 g/dL    HCT 40.2 (L) 41.1 - 50.3 %    MCV 86.8 79.6 - 97.8 FL    MCH 27.0 26.1 - 32.9 PG    MCHC 31.1 (L) 31.4 - 35.0 g/dL    RDW 15.3 (H) 11.9 - 14.6 %    PLATELET 760 782 - 714 K/uL    MPV 11.9 10.8 - 22.6 FL   METABOLIC PANEL, COMPREHENSIVE    Collection Time: 06/02/18  6:58 AM   Result Value Ref Range    Sodium 145 136 - 145 mmol/L    Potassium 3.5 3.5 - 5.1 mmol/L    Chloride 99 98 - 107 mmol/L    CO2 35 (H) 21 - 32 mmol/L    Anion gap 11 7 - 16 mmol/L    Glucose 79 65 - 100 mg/dL    BUN 20 8 - 23 MG/DL    Creatinine 0.84 0.8 - 1.5 MG/DL    GFR est AA >60 >60 ml/min/1.73m2    GFR est non-AA >60 >60 ml/min/1.73m2    Calcium 8.5 8.3 - 10.4 MG/DL    Bilirubin, total 0.4 0.2 - 1.1 MG/DL    ALT (SGPT) 33 12 - 65 U/L    AST (SGOT) 33 15 - 37 U/L    Alk.  phosphatase 137 (H) 50 - 136 U/L    Protein, total 6.1 (L) 6.3 - 8.2 g/dL    Albumin 2.8 (L) 3.2 - 4.6 g/dL    Globulin 3.3 2.3 - 3.5 g/dL    A-G Ratio 0.8 (L) 1.2 - 3.5           Assessment/Plan:     Principal Problem:    CHF exacerbation (HCC) (6/1/2018)    Active Problems:    Atrial fibrillation ([facility] (9/27/2013)      Type 2 diabetes mellitus ([facility] (9/27/2013)      CAD (coronary artery disease) (9/27/2013)      Dyslipidemia (9/27/2013)      MONICA (obstructive sleep apnea) (12/16/2013)    plan:  Continue IV lasix since good diuresis. Follow up echo. Replete low K since on diuretics. Check TSH  Cardiology following - appreciate their input. Pt appears very debilitated so will get PT      Care Plan discussed with: Patient/Family    Total time spent with patient: 20 minutes.     Signed By: Rosita Terry MD     June 2, 2018

## 2018-06-02 NOTE — PROGRESS NOTES
Received bedside shift report from Pradeep Christine RN. Pt lying in bed. No apparent distress. Respirations even and unlabored. Instructed to call for assistance with needs, as they arise. Pt voiced understanding.

## 2018-06-02 NOTE — ED NOTES
TRANSFER - OUT REPORT:    Verbal report given to Kiya Koenig RN on Richie Duggan  being transferred to 5 Pulmonary/Renal for routine progression of care       Report consisted of patients Situation, Background, Assessment and   Recommendations(SBAR). Information from the following report(s) SBAR, ED Summary, Intake/Output, MAR and Recent Results was reviewed with the receiving nurse. Lines:   Peripheral IV 06/01/18 Right Antecubital (Active)   Site Assessment Clean, dry, & intact 6/1/2018  7:15 PM   Phlebitis Assessment 0 6/1/2018  7:15 PM   Infiltration Assessment 0 6/1/2018  7:15 PM   Dressing Status Clean, dry, & intact 6/1/2018  7:15 PM        Opportunity for questions and clarification was provided.       Patient transported with:   Transport

## 2018-06-02 NOTE — PROGRESS NOTES
Patient resting in bed with no complaints a this time. Patient is alert and orientated with no distress noted. IV Intact and patent with no s/s of infection noted. Respirations even and unlabored with heart rate regular. Patient unable to ambulate independently without assistance; needs x1 r/t weakness. Bed in low locked position with call light within reach. Patient instructed to call if assistance is needed. Will continue to monitor.

## 2018-06-02 NOTE — CONSULTS
[address] Cardiology Consult                Date of  Admission: 6/1/2018  5:20 PM     Primary Care Physician: Dr. Brooklynn Gaffney  Primary Cardiologist:  [address] [facility]  Referring Physician: Hospitalist   Consulting Physician: Dr. Gladys Dye    CC/Reason for consult: diastolic heart failure exacerbation      Sena Swanson is a 77 y.o. male with prior h/o CAD s/p CABG x3 in 2009 at [facility] (no records available for anatomy), PAF, HTN, COPD with home O2, DM, and HLP . Last echo 2013 with preserved LV function and mod AS. Patient presented to Ed at [facility] after seeing his Pulmonologist for s/sx heart failure. He reported worsening SOB and edema. BNP was 561. Labs in ED showed Cr 1.26 and repeat this am at .84, He was admitted by hospitalist and started on IV lasix. He diuresed 500 ml over night. Cardiology was consulted for diastolic HF exacerbation. Denies any recent weight gain or chest pain just increased edema and SOB over last 1-2 weeks. Currently remains with SOB. Diagnosis    Atrial fibrillation ([address]    Coronary atherosclerosis of artery bypass graft    Type 2 diabetes mellitus ([address]    Pulmonary edema    CAD (coronary artery disease)    Dyslipidemia    Hypoxia    Acute diastolic heart failure (HCC)    Acute-on-chronic respiratory failure (HCC)    MONICA (obstructive sleep apnea)    Primary central sleep apnea    Palliative care patient    Restrictive lung disease    Long term (current) use of anticoagulants    CHF exacerbation (HCC)       Past Medical History:   Diagnosis Date    Arrhythmia     afib    Arthritis     CAD (coronary artery disease) 2009    CABG, 3 vessel. No MI    Chronic pain     legs \"R especially\"    Diabetes ([address] 2009    insulin controlled, avg fbs 107, recognizes hypo at 87, unsure last Ha1C    GERD (gastroesophageal reflux disease)     controlled with omeprazole twice daily    Heart failure ([address]     echo 9/27/13:  EF 70%, Mild regurg mitral and tricuspid valve.     Hyperlipidemia controlled with med    Hypertension     controlled with meds    Moderate aortic stenosis echo 9/27/13    aortic valve area 1.2 cm2    Psychiatric disorder     anxiety, depression, controlled with Venlafaxine    PUD (peptic ulcer disease) 1970's    Unspecified sleep apnea     wears cpap      Past Surgical History:   Procedure Laterality Date    ABDOMEN SURGERY PROC UNLISTED  2003    cholecystectomy    CARDIAC SURG PROCEDURE UNLIST  2009    CABG 3V    HX APPENDECTOMY      HX GI      heller myotomy with toupee wrap    HX HEART VALVE SURGERY  2011    HX HEENT Bilateral 2004    ptosis    HX HEENT      to have cataract surgery 2014    HX KNEE ARTHROSCOPY      bilateral    HX ORTHOPAEDIC Right 2002    bone spur    HX OTHER SURGICAL  2011    prostate surgery    HX PROSTATECTOMY  2011    TURP     No Known Allergies   Family History   Problem Relation Age of Onset    Heart Disease Mother         Current Facility-Administered Medications   Medication Dose Route Frequency    furosemide (LASIX) injection 40 mg  40 mg IntraVENous BID    insulin lispro (HUMALOG) injection   SubCUTAneous AC&HS    albuterol (PROVENTIL HFA, VENTOLIN HFA, PROAIR HFA) inhaler 1 Puff  1 Puff Inhalation Q4H PRN    amiodarone (CORDARONE) tablet 200 mg  200 mg Oral DAILY    ascorbic acid (vitamin C) (VITAMIN C) tablet 500 mg  500 mg Oral BID    aspirin delayed-release tablet 81 mg  81 mg Oral DAILY    atorvastatin (LIPITOR) tablet 20 mg  20 mg Oral QHS    baclofen (LIORESAL) tablet 10 mg  10 mg Oral TID    carvedilol (COREG) tablet 12.5 mg  12.5 mg Oral BID    hydrALAZINE (APRESOLINE) tablet 10 mg  10 mg Oral TID    levothyroxine (SYNTHROID) tablet 75 mcg  75 mcg Oral ACB    lisinopril (PRINIVIL, ZESTRIL) tablet 40 mg  40 mg Oral DAILY    memantine (NAMENDA) tablet 10 mg  10 mg Oral BID    nitroglycerin (NITROSTAT) tablet 0.4 mg  0.4 mg SubLINGual Q5MIN PRN    pantoprazole (PROTONIX) tablet 40 mg  40 mg Oral ACB    sertraline (ZOLOFT) tablet 75 mg  75 mg Oral DAILY    warfarin (COUMADIN) tablet 5 mg  5 mg Oral QHS    budesonide (PULMICORT) 500 mcg/2 ml nebulizer suspension  500 mcg Nebulization BID RT    And    albuterol (PROVENTIL VENTOLIN) nebulizer solution 2.5 mg  2.5 mg Nebulization Q6HWA RT       Review of Systems   Constitution: Negative for diaphoresis, weakness and malaise/fatigue. HENT: Negative for congestion. Cardiovascular: Positive for dyspnea on exertion and leg swelling. Negative for chest pain, claudication, cyanosis, irregular heartbeat, near-syncope, orthopnea, palpitations, paroxysmal nocturnal dyspnea and syncope. Respiratory: Positive for shortness of breath. Negative for cough and wheezing. Endocrine: Negative for cold intolerance and heat intolerance. Hematologic/Lymphatic: Does not bruise/bleed easily. Skin: Negative for nail changes. Neurological: Negative for dizziness and headaches.         Physical Exam  Vitals:    06/02/18 0251 06/02/18 0314 06/02/18 0711 06/02/18 0825   BP: 102/63  127/72    Pulse: 76  74    Resp: 16  16    Temp: 98.5 °F (36.9 °C)  98 °F (36.7 °C)    SpO2: 90%  96% 99%   Weight:  104.7 kg (230 lb 14.4 oz)     Height:           Physical Exam:  General: Well Developed, Well Nourished, No Acute Distress  HEENT: pupils equal and round, no abnormalities noted  Neck: supple, no JVD, no carotid bruits  Heart: S1S2 with RRR without murmurs or gallops  Lungs: diminished in base   Abd: soft, nontender, nondistended, with good bowel sounds  Ext: warm, 1+ edema with chronic venous changes, calves supple/nontender, pulses 2+ bilaterally  Skin: warm and dry  Neurologic: Alert and oriented X 3 but sleepy    Cardiographics    Telemetry: add remote tele  ECG: ?SR  Echocardiogram: ordered     Labs:   Recent Labs      06/02/18   0658  06/01/18   1651   NA  145  143   K  3.5  3.6   MG   --   1.8   BUN  20  21   CREA  0.84  1.22   GLU  79  60*   WBC  7.2  8.6   HGB  12.5*  14.2 HCT  40.2*  44.4   PLT  244  307        Assessment/Plan:     Assessment:      Principal Problem:    CHF exacerbation, uncontrolled ([facility] (6/1/2018)- will continue IV lasix with daily labs, strict I&Os and daily weights. Continue ACE-I, and coreg. Update echo ordered. Further recommendations pending clinical course. Active Problems:    Atrial fibrillation, uncontrolled ([facility] (9/27/2013)- PAF per history. EKG poor tracing, will repeat today. Continue amiodarone, Coreg and coumadin. Add remote tele. Type 2 diabetes mellitus ([facility] (9/27/2013)      CAD (coronary artery disease) (9/27/2013)- prior CABG in 2009 at [facility] (no records available for anatomy); continue asa, statin, Coreg and ACE-I, check troponin today      Dyslipidemia (9/27/2013)- on statin      MONICA (obstructive sleep apnea) (12/16/2013)    Thank you very much for this referral. We appreciate the opportunity to participate in this patient's care. We will follow along with above stated plan. Jaycee Doe NP  Consulting MD: Dr. Pavel Sweet    Attending Addendum    Patient independently seen and examined by me. Agree with above note by physician extender with the following additions and exceptions: 79yo with above medical history presenting with SOB and ECG revealing afib    Key findings are:  No CP, currently SOB  CV- RRR without murmur  Lungs- decreased BS, mild crackles at bases  Ext- chronic KELLI    Plan: IV lasix, strict I and Os, daily weights       --check troponin today, echo ordered       --ECG with afib, will stop amiodarone as rates are controlled in AF       --check INR today, cont warfarin       --further plan as noted above and pending clinical course    Jj HUI  [address] [facility] Cardiology

## 2018-06-02 NOTE — H&P
HOSPITALIST H&P/CONSULT  NAME:  Tessa Trujillo   Age:  77 y.o.  :   1940   MRN:   [id number]  PCP: Ann-Marie Corona MD  Consulting MD:  Treatment Team: Attending Provider: Kevin Stout MD  HPI:   Patient is a 78/M with PMH AFIB, CAD s/p CABG, COPD, IDDM, PUD, HTN, HLD who was sent here from his Pulmonologist's office for signs of heart failure exacerbation with CXR findings indicative of bilateral effusions and pulmonary edema. Patient also states he has been experiencing worsening bilateral LE edema. His BNP is elevated at 436. He also complains of 3/10 chest pain and SOB. Patient is on 4L continuous O2 at home. Complete ROS done and is as stated in HPI or otherwise negative  Past Medical History:   Diagnosis Date    Arrhythmia     afib    Arthritis     CAD (coronary artery disease)     CABG, 3 vessel. No MI    Chronic pain     legs \"R especially\"    Diabetes ([facility] [facility] 2009    insulin controlled, avg fbs 107, recognizes hypo at 87, unsure last Ha1C    GERD (gastroesophageal reflux disease)     controlled with omeprazole twice daily    Heart failure ([facility] [address]     echo 13:  EF 70%, Mild regurg mitral and tricuspid valve.     Hyperlipidemia     controlled with med    Hypertension     controlled with meds    Moderate aortic stenosis echo 13    aortic valve area 1.2 cm2    Psychiatric disorder     anxiety, depression, controlled with Venlafaxine    PUD (peptic ulcer disease) 's    Unspecified sleep apnea     wears cpap      Past Surgical History:   Procedure Laterality Date    ABDOMEN SURGERY PROC UNLISTED      cholecystectomy    CARDIAC SURG PROCEDURE UNLIST      CABG 3V    HX APPENDECTOMY      HX GI      heller myotomy with toupee wrap    HX HEART VALVE SURGERY  2011    HX HEENT Bilateral 2004    ptosis    HX HEENT      to have cataract surgery 2014    HX KNEE ARTHROSCOPY      bilateral    HX ORTHOPAEDIC Right 2002    bone spur    HX OTHER SURGICAL      prostate surgery    HX PROSTATECTOMY      TURP      Prior to Admission Medications   Prescriptions Last Dose Informant Patient Reported? Taking? Oxygen 2018 at Unknown time  Yes Yes   Sig: 3-4 Liters prn   albuterol (PROVENTIL HFA, VENTOLIN HFA, PROAIR HFA) 90 mcg/actuation inhaler 2018 at Unknown time  No Yes   Sig: Take 1 Puff by inhalation every four (4) hours as needed for Wheezing. amiodarone (CORDARONE) 200 mg tablet 2018 at Unknown time  No Yes   Sig: Take 1 Tab by mouth daily. ascorbic acid, vitamin C, (VITAMIN C) 500 mg tablet 2018 at Unknown time  Yes Yes   Sig: Take 500 mg by mouth two (2) times a day. aspirin delayed-release 81 mg tablet 2018 at Unknown time  Yes Yes   Sig: Take  by mouth daily. atorvastatin (LIPITOR) 40 mg tablet 2018 at Unknown time  Yes Yes   Sig: Take 20 mg by mouth as needed. baclofen (LIORESAL) 10 mg tablet Unknown at Unknown time  Yes No   Sig: Take 10 mg by mouth three (3) times daily. carvedilol (COREG) 25 mg tablet 2018 at Unknown time  Yes Yes   Sig: Take 12.5 mg by mouth two (2) times a day. Indications: hypertension   cyanocobalamin (VITAMIN B-12) 1,000 mcg/mL injection 2018 at Unknown time  Yes Yes   Si,000 mcg by IntraMUSCular route every thirty (30) days. ferrous sulfate 324 mg (65 mg iron) tablet 2018 at Unknown time  Yes Yes   Sig: Take 324 mg by mouth two (2) times daily (with meals). fluticasone-vilanterol (BREO ELLIPTA) 100-25 mcg/dose inhaler 2018 at Unknown time  No Yes   Si inhalation daily, rinse mouth after use   furosemide (LASIX) 40 mg tablet 2018 at Unknown time  Yes Yes   Sig: Take 80 mg by mouth two (2) times a day. hydrALAZINE (APRESOLINE) 10 mg tablet 2018 at Unknown time  Yes Yes   Sig: Take 10 mg by mouth three (3) times daily.    levothyroxine (SYNTHROID) 75 mcg tablet 2018 at Unknown time  Yes Yes   Sig: Take 75 mcg by mouth Daily (before breakfast). lisinopril (PRINIVIL, ZESTRIL) 40 mg tablet 2018 at Unknown time  Yes Yes   Sig: Take 40 mg by mouth daily. meloxicam (MOBIC) 7.5 mg tablet 2018 at Unknown time  Yes Yes   Sig: Take  by mouth daily. memantine (NAMENDA) 10 mg tablet 2018 at Unknown time  Yes Yes   Sig: Take 10 mg by mouth two (2) times a day. metOLazone (ZAROXOLYN) 5 mg tablet 2018 at Unknown time  No Yes   Sig: Take 5 mg prior to lasix dose 15 minutes before   multivitamin (ONE A DAY) tablet 2018 at Unknown time  Yes Yes   Sig: Take 1 Tab by mouth daily. nitroglycerin (NITROSTAT) 0.4 mg SL tablet Unknown at Unknown time  Yes No   Si.4 mg by SubLINGual route every five (5) minutes as needed for Chest Pain. omeprazole (PRILOSEC) 20 mg capsule 2018 at Unknown time  Yes Yes   Sig: Take 20 mg by mouth daily. oxyCODONE-acetaminophen (PERCOCET 10)  mg per tablet 2018 at Unknown time  Yes Yes   Sig: Take 1 Tab by mouth every eight (8) hours as needed for Pain. promethazine (PHENERGAN) 25 mg tablet 2018 at Unknown time  Yes Yes   Sig: Take 25 mg by mouth every six (6) hours as needed for Nausea. sertraline (ZOLOFT) 50 mg tablet 2018 at Unknown time  Yes Yes   Sig: Take 75 mg by mouth daily.    warfarin (JANTOVEN) 5 mg tablet 2018 at Unknown time  No Yes   Sig: Take 0.5-1 tab every day or as directed      Facility-Administered Medications: None     No Known Allergies   Social History   Substance Use Topics    Smoking status: Never Smoker    Smokeless tobacco: Never Used    Alcohol use Yes      Comment: socially occasional--1-2 times per year      Family History   Problem Relation Age of Onset    Heart Disease Mother       Objective:     Visit Vitals    /72    Pulse 70    Temp 98.2 °F (36.8 °C)    Resp 18    Ht 5' 8\" (1.727 m)    Wt 107 kg (236 lb)    SpO2 96%    BMI 35.88 kg/m2      Temp (24hrs), Av.8 °F (36.6 °C), Min:97.3 °F (36.3 °C), Max:98.3 °F (36.8 °C)    Oxygen Therapy  O2 Sat (%): 96 % (06/01/18 2350)  Pulse via Oximetry: 70 beats per minute (06/01/18 2350)  O2 Device: Nasal cannula (06/01/18 2350)  O2 Flow Rate (L/min): 4 l/min (06/01/18 2350)     Physical Exam:  General:    Alert, cooperative, no distress, appears stated age. Head:   Normocephalic, without obvious abnormality, atraumatic. Nose:  Nares normal. No drainage or sinus tenderness. Lungs:   Coarse breath sounds bilaterally Heart: Irregular rhythm, no murmur, rub or gallop. Abdomen:   Soft, non-tender. Not distended. Bowel sounds normal.   Extremities: No cyanosis. 2+ pitting edema bilaterally   Skin:     Texture, turgor normal. No rashes or lesions. Not Jaundiced  Neurologic: Alert and oriented x 3, no focal deficits     Data Review:   Recent Results (from the past 24 hour(s))   METABOLIC PANEL, BASIC    Collection Time: 06/01/18  2:30 PM   Result Value Ref Range    Sodium 140 136 - 145 mmol/L    Potassium 2.8 (LL) 3.5 - 5.1 mmol/L    Chloride 96 (L) 98 - 107 mmol/L    CO2 37 (H) 21 - 32 mmol/L    Anion gap 7 7 - 16 mmol/L    Glucose 127 (H) 65 - 100 mg/dL    BUN 22 8 - 23 MG/DL    Creatinine 1.26 0.8 - 1.5 MG/DL    GFR est AA >60 >60 ml/min/1.73m2    GFR est non-AA 59 (L) >60 ml/min/1.73m2    Calcium 8.4 8.3 - 10.4 MG/DL   BNP    Collection Time: 06/01/18  2:30 PM   Result Value Ref Range     pg/mL   CBC WITH AUTOMATED DIFF    Collection Time: 06/01/18  4:51 PM   Result Value Ref Range    WBC 8.6 4.3 - 11.1 K/uL    RBC 5.18 4.23 - 5.67 M/uL    HGB 14.2 13.6 - 17.2 g/dL    HCT 44.4 41.1 - 50.3 %    MCV 85.7 79.6 - 97.8 FL    MCH 27.4 26.1 - 32.9 PG    MCHC 32.0 31.4 - 35.0 g/dL    RDW 15.2 (H) 11.9 - 14.6 %    PLATELET 214 767 - 794 K/uL    MPV 11.4 10.8 - 14.1 FL    DF AUTOMATED      NEUTROPHILS 70 43 - 78 %    LYMPHOCYTES 14 13 - 44 %    MONOCYTES 12 4.0 - 12.0 %    EOSINOPHILS 3 0.5 - 7.8 %    BASOPHILS 1 0.0 - 2.0 %    IMMATURE GRANULOCYTES 0 0.0 - 5.0 %    ABS.  NEUTROPHILS 6.1 1.7 - 8.2 K/UL    ABS. LYMPHOCYTES 1.2 0.5 - 4.6 K/UL    ABS. MONOCYTES 1.1 0.1 - 1.3 K/UL    ABS. EOSINOPHILS 0.2 0.0 - 0.8 K/UL    ABS. BASOPHILS 0.1 0.0 - 0.2 K/UL    ABS. IMM. GRANS. 0.0 0.0 - 0.5 K/UL   METABOLIC PANEL, COMPREHENSIVE    Collection Time: 06/01/18  4:51 PM   Result Value Ref Range    Sodium 143 136 - 145 mmol/L    Potassium 3.6 3.5 - 5.1 mmol/L    Chloride 96 (L) 98 - 107 mmol/L    CO2 38 (H) 21 - 32 mmol/L    Anion gap 9 7 - 16 mmol/L    Glucose 60 (L) 65 - 100 mg/dL    BUN 21 8 - 23 MG/DL    Creatinine 1.22 0.8 - 1.5 MG/DL    GFR est AA >60 >60 ml/min/1.73m2    GFR est non-AA >60 >60 ml/min/1.73m2    Calcium 8.7 8.3 - 10.4 MG/DL    Bilirubin, total 0.5 0.2 - 1.1 MG/DL    ALT (SGPT) 41 12 - 65 U/L    AST (SGOT) 30 15 - 37 U/L    Alk.  phosphatase 156 (H) 50 - 136 U/L    Protein, total 7.5 6.3 - 8.2 g/dL    Albumin 3.2 3.2 - 4.6 g/dL    Globulin 4.3 (H) 2.3 - 3.5 g/dL    A-G Ratio 0.7 (L) 1.2 - 3.5     MAGNESIUM    Collection Time: 06/01/18  4:51 PM   Result Value Ref Range    Magnesium 1.8 1.8 - 2.4 mg/dL   BNP    Collection Time: 06/01/18  4:51 PM   Result Value Ref Range     pg/mL   EKG, 12 LEAD, INITIAL    Collection Time: 06/01/18  4:51 PM   Result Value Ref Range    Ventricular Rate 70 BPM    Atrial Rate 267 BPM    QRS Duration 162 ms    Q-T Interval 490 ms    QTC Calculation (Bezet) 529 ms    Calculated R Axis 118 degrees    Calculated T Axis -84 degrees    Diagnosis       Undetermined rhythm  Right axis deviation  Left bundle branch block  Abnormal ECG  When compared with ECG of 22-AUG-2017 15:48,  Current undetermined rhythm precludes rhythm comparison, needs review  QRS axis Shifted right  T wave inversion now evident in Inferior leads     GLUCOSE, POC    Collection Time: 06/01/18  8:53 PM   Result Value Ref Range    Glucose (POC) 77 65 - 100 mg/dL     Imaging Daniels Harada /Studies       Final result (Exam End: 6/1/2018  3:00 PM) Reviewed        Study Result      EXAM:  XR CHEST PA LAT     INDICATION:   SOB     COMPARISON: 3/9/2018.     FINDINGS: PA and lateral radiographs of the chest demonstrate persistent small  bilateral pleural effusions and increased interstitial markings. Aortic valvular  replacement. The cardiac and mediastinal contours and pulmonary vascularity are  normal.  Median sternotomy.      IMPRESSION  IMPRESSION: Interstitial pulmonary edema and small bilateral pleural effusions  unchanged in the interval.             Assessment and Plan:      Active Hospital Problems    Diagnosis Date Noted    CHF exacerbation ([facility] 06/01/2018    MONICA (obstructive sleep apnea) 12/16/2013    Atrial fibrillation ([facility] 09/27/2013    Type 2 diabetes mellitus ([facility] 09/27/2013    CAD (coronary artery disease) 09/27/2013    Dyslipidemia 09/27/2013       PLAN  ·  Diastolic Heart Failure exacerbation- IV Lasix, repeat echo, cardiology consult, strict intake/output  ·  MONICA- CPAP at night  ·  AFIB- continue with Coumadin, rate control, Amiodarone  ·  CAD- continue home medications  ·  Dyslipidemia- continue statin  ·  DM- insulin correction scale  ·  DVT ppx- Coumadin    Code Status: Full Code  Anticipated discharge: 2-3 midnights      Signed By: Priya Mcelroy MD     June 2, 2018

## 2018-06-02 NOTE — PROGRESS NOTES
Very pleasant man   Calm  Resting in bed  No family present  Encouraged with presence and assurance of prayers    Afshin Aguilera, staff [address], [address] [facility]  /   [email or web address]

## 2018-06-02 NOTE — PROGRESS NOTES
Pt resting in bed, sleeping. Respirations present. No visual distress. Safety measures in place. Call light in reach.

## 2018-06-03 LAB
GLUCOSE BLD STRIP.AUTO-MCNC: 104 MG/DL (ref 65–100)
GLUCOSE BLD STRIP.AUTO-MCNC: 127 MG/DL (ref 65–100)
GLUCOSE BLD STRIP.AUTO-MCNC: 168 MG/DL (ref 65–100)
GLUCOSE BLD STRIP.AUTO-MCNC: 209 MG/DL (ref 65–100)
INR PPP: 1.8
MM INDURATION POC: NORMAL MM (ref 0–5)
PPD POC: NORMAL NEGATIVE
PROTHROMBIN TIME: 20.3 SEC (ref 11.5–14.5)

## 2018-06-03 PROCEDURE — 74011250637 HC RX REV CODE- 250/637: Performed by: INTERNAL MEDICINE

## 2018-06-03 PROCEDURE — 94640 AIRWAY INHALATION TREATMENT: CPT

## 2018-06-03 PROCEDURE — 65660000000 HC RM CCU STEPDOWN

## 2018-06-03 PROCEDURE — 82962 GLUCOSE BLOOD TEST: CPT

## 2018-06-03 PROCEDURE — 85610 PROTHROMBIN TIME: CPT | Performed by: INTERNAL MEDICINE

## 2018-06-03 PROCEDURE — 36415 COLL VENOUS BLD VENIPUNCTURE: CPT | Performed by: INTERNAL MEDICINE

## 2018-06-03 PROCEDURE — 74011250636 HC RX REV CODE- 250/636: Performed by: INTERNAL MEDICINE

## 2018-06-03 PROCEDURE — 94760 N-INVAS EAR/PLS OXIMETRY 1: CPT

## 2018-06-03 PROCEDURE — 97530 THERAPEUTIC ACTIVITIES: CPT

## 2018-06-03 PROCEDURE — 74011636637 HC RX REV CODE- 636/637: Performed by: INTERNAL MEDICINE

## 2018-06-03 PROCEDURE — 77010033678 HC OXYGEN DAILY

## 2018-06-03 PROCEDURE — 74011250637 HC RX REV CODE- 250/637: Performed by: HOSPITALIST

## 2018-06-03 PROCEDURE — 74011000250 HC RX REV CODE- 250: Performed by: INTERNAL MEDICINE

## 2018-06-03 PROCEDURE — 97161 PT EVAL LOW COMPLEX 20 MIN: CPT

## 2018-06-03 RX ORDER — ENOXAPARIN SODIUM 100 MG/ML
100 INJECTION SUBCUTANEOUS EVERY 12 HOURS
Status: DISCONTINUED | OUTPATIENT
Start: 2018-06-03 | End: 2018-06-05

## 2018-06-03 RX ORDER — FUROSEMIDE 10 MG/ML
60 INJECTION INTRAMUSCULAR; INTRAVENOUS 2 TIMES DAILY
Status: DISCONTINUED | OUTPATIENT
Start: 2018-06-03 | End: 2018-06-05

## 2018-06-03 RX ADMIN — INSULIN LISPRO 2 UNITS: 100 INJECTION, SOLUTION INTRAVENOUS; SUBCUTANEOUS at 22:41

## 2018-06-03 RX ADMIN — WARFARIN SODIUM 6 MG: 5 TABLET ORAL at 17:01

## 2018-06-03 RX ADMIN — ALBUTEROL SULFATE 2.5 MG: 2.5 SOLUTION RESPIRATORY (INHALATION) at 07:08

## 2018-06-03 RX ADMIN — OXYCODONE HYDROCHLORIDE AND ACETAMINOPHEN 500 MG: 500 TABLET ORAL at 08:28

## 2018-06-03 RX ADMIN — ASPIRIN 81 MG: 81 TABLET, COATED ORAL at 08:28

## 2018-06-03 RX ADMIN — HYDRALAZINE HYDROCHLORIDE 10 MG: 10 TABLET, FILM COATED ORAL at 08:28

## 2018-06-03 RX ADMIN — SERTRALINE HYDROCHLORIDE 75 MG: 50 TABLET ORAL at 08:28

## 2018-06-03 RX ADMIN — ENOXAPARIN SODIUM 100 MG: 100 INJECTION SUBCUTANEOUS at 12:03

## 2018-06-03 RX ADMIN — ALBUTEROL SULFATE 2.5 MG: 2.5 SOLUTION RESPIRATORY (INHALATION) at 19:18

## 2018-06-03 RX ADMIN — BUDESONIDE 500 MCG: 0.5 INHALANT RESPIRATORY (INHALATION) at 19:18

## 2018-06-03 RX ADMIN — CARVEDILOL 12.5 MG: 12.5 TABLET, FILM COATED ORAL at 17:01

## 2018-06-03 RX ADMIN — LEVOTHYROXINE SODIUM 75 MCG: 75 TABLET ORAL at 05:37

## 2018-06-03 RX ADMIN — OXYCODONE HYDROCHLORIDE AND ACETAMINOPHEN 500 MG: 500 TABLET ORAL at 17:00

## 2018-06-03 RX ADMIN — BACLOFEN 10 MG: 10 TABLET ORAL at 22:41

## 2018-06-03 RX ADMIN — ALBUTEROL SULFATE 2.5 MG: 2.5 SOLUTION RESPIRATORY (INHALATION) at 14:40

## 2018-06-03 RX ADMIN — MEMANTINE HYDROCHLORIDE 10 MG: 5 TABLET ORAL at 08:27

## 2018-06-03 RX ADMIN — ATORVASTATIN CALCIUM 20 MG: 10 TABLET, FILM COATED ORAL at 22:41

## 2018-06-03 RX ADMIN — FUROSEMIDE 40 MG: 10 INJECTION, SOLUTION INTRAMUSCULAR; INTRAVENOUS at 08:28

## 2018-06-03 RX ADMIN — HYDRALAZINE HYDROCHLORIDE 10 MG: 10 TABLET, FILM COATED ORAL at 22:42

## 2018-06-03 RX ADMIN — HYDRALAZINE HYDROCHLORIDE 10 MG: 10 TABLET, FILM COATED ORAL at 17:01

## 2018-06-03 RX ADMIN — FUROSEMIDE 60 MG: 10 INJECTION, SOLUTION INTRAMUSCULAR; INTRAVENOUS at 17:01

## 2018-06-03 RX ADMIN — CARVEDILOL 12.5 MG: 12.5 TABLET, FILM COATED ORAL at 08:28

## 2018-06-03 RX ADMIN — MEMANTINE HYDROCHLORIDE 10 MG: 5 TABLET ORAL at 17:00

## 2018-06-03 RX ADMIN — LISINOPRIL 40 MG: 20 TABLET ORAL at 08:28

## 2018-06-03 RX ADMIN — BACLOFEN 10 MG: 10 TABLET ORAL at 17:01

## 2018-06-03 RX ADMIN — PANTOPRAZOLE SODIUM 40 MG: 40 TABLET, DELAYED RELEASE ORAL at 05:37

## 2018-06-03 RX ADMIN — BACLOFEN 10 MG: 10 TABLET ORAL at 08:28

## 2018-06-03 RX ADMIN — POTASSIUM CHLORIDE 20 MEQ: 1500 TABLET, EXTENDED RELEASE ORAL at 08:27

## 2018-06-03 RX ADMIN — INSULIN LISPRO 4 UNITS: 100 INJECTION, SOLUTION INTRAVENOUS; SUBCUTANEOUS at 12:03

## 2018-06-03 RX ADMIN — BUDESONIDE 500 MCG: 0.5 INHALANT RESPIRATORY (INHALATION) at 07:08

## 2018-06-03 RX ADMIN — ENOXAPARIN SODIUM 100 MG: 100 INJECTION SUBCUTANEOUS at 22:41

## 2018-06-03 NOTE — PROGRESS NOTES
Problem: Mobility Impaired (Adult and Pediatric)  Goal: *Acute Goals and Plan of Care (Insert Text)  LTG:  (1.)Mr. Kit Pruitt will scoot up and down in bed with MINIMAL ASSIST within 7 treatment day(s). (2.)Mr. Kit Pruitt will transfer from bed to chair and chair to bed with STAND BY ASSIST using the least restrictive device within 7 treatment day(s). (3.)Mr. Kit Pruitt will ambulate with CONTACT GUARD ASSIST for 250 feet with the least restrictive device within 7 treatment day(s). ________________________________________________________________________________________________       PHYSICAL THERAPY: Initial Assessment, Treatment Day: Day of Assessment, AM 6/3/2018  INPATIENT: Hospital Day: 3  [place] / Product Type: Medicare /      NAME/AGE/GENDER: Lady Vazquez is a 77 y.o. male   PRIMARY DIAGNOSIS: CHF exacerbation ([facility] [address] CHF exacerbation (HCC) CHF exacerbation (HCC)        ICD-10: Treatment Diagnosis:    · Generalized Muscle Weakness (M62.81)  · Difficulty in walking, Not elsewhere classified (R26.2)   Precaution/Allergies:  Review of patient's allergies indicates no known allergies. ASSESSMENT:     Mr. Kit Pruitt is a 77 y.o male admitted with CHF exacerbation. Pt is supine in bed upon contact and is agreeable to initial PT evaluation after encouragement. Pt lives in a two story home home where he is independent with ADLs, on 4L O2 at all times, uses a RW for ambulatory purposes, and serves as the primary caregiver for his chronically ill wife. Pt performed bed mobility and supine to sit with SBA. Pt performed sit to stand with Cici and RW. Pt ambulated in Critical access hospital x 80ft with RW and CGA on 4L O2. Sats monitored throughout, measuring between 92-97%. Pt ambulated with slow pace and decreased B step length. Pt required 2 standing rest breaks throughout ambulatory efforts.  Pt returned to room and positioned to comfort supine in bed, all needs within reach, placed on 4L O2, and RN notified at PT exit. Good mobility efforts this session. Pt reports performing below his PLOF will benefit from skilled PT (medically necessary) to address his decreased strength, decreased balance, decreased functional tolerance, and decreased cardiopulmonary endurance affecting his participation in basic ADLs and functional tasks. Will follow pt throughout acute care stay. This section established at most recent assessment   PROBLEM LIST (Impairments causing functional limitations):  1. Decreased Strength  2. Decreased Ambulation Ability/Technique  3. Decreased Balance  4. Decreased Activity Tolerance  5. Increased Fatigue  6. Increased Shortness of Breath   INTERVENTIONS PLANNED: (Benefits and precautions of physical therapy have been discussed with the patient.)  1. Balance Exercise  2. Family Education  3. Gait Training  4. Neuromuscular Re-education/Strengthening  5. Range of Motion (ROM)  6. Therapeutic Activites  7. Therapeutic Exercise/Strengthening  8. Transfer Training  9. Group Therapy     TREATMENT PLAN: Frequency/Duration: 3 times a week for duration of hospital stay  Rehabilitation Potential For Stated Goals: Good     RECOMMENDED REHABILITATION/EQUIPMENT: (at time of discharge pending progress): Due to the probability of continued deficits (see above) this patient will likely need continued skilled physical therapy after discharge. Equipment:    None at this time              HISTORY:   History of Present Injury/Illness (Reason for Referral):  Patient is a 78/M with PMH AFIB, CAD with hx of chronic diastolic heart failure, COPD, IDDM, PUD, HTN, HLD who was sent here from his Pulmonologist's office for signs of heart failure exacerbation with CXR findings indicative of bilateral effusions and pulmonary edema.  Patient also reported worsening bilateral LE edema.  His BNP is elevated at 436.  He also complains of 3/10 chest pain and SOB.  Patient is on 4L continuous O2 at home.   Pt was admitted and started on IV lasix with over 1 liter diuresed so far. Past Medical History/Comorbidities:   Mr. Yao Heller  has a past medical history of Arrhythmia; Arthritis; CAD (coronary artery disease) (2009); Chronic pain; Diabetes ([address] (2009); GERD (gastroesophageal reflux disease); Heart failure ([address] Hyperlipidemia; Hypertension; Moderate aortic stenosis (echo 9/27/13); Psychiatric disorder; PUD (peptic ulcer disease) (1970's); and Unspecified sleep apnea. He also has no past medical history of Aneurysm ([address] Asthma; Autoimmune disease ([address] Cancer ([address] Chronic kidney disease; Chronic obstructive pulmonary disease ([address] Coagulation disorder ([address] Liver disease; Seizures ([address] Stroke [facility]); Thromboembolus ([facility] [address] or Thyroid disease. Mr. Yao Heller  has a past surgical history that includes pr cardiac surg procedure unlist (2009); pr abdomen surgery proc unlisted (2003); hx other surgical (2011); hx knee arthroscopy; hx orthopaedic (Right, 2002); hx heent (Bilateral, 2004); hx heent; hx prostatectomy (2011); hx gi; hx appendectomy; and hx heart valve surgery (2011).   Social History/Living Environment:   Home Environment: Private residence  # Steps to Enter: 3  Wheelchair Ramp: Yes  One/Two Story Residence: Two story  # of Interior Steps: 14  Height of Each Step (in): 8 inches  Interior Rails: Both  Lift Chair Available: No  Living Alone: No (Wife is chronically ill)  Support Systems: Spouse/Significant Other/Partner, Friends \ neighbors  Patient Expects to be Discharged to[de-identified] Private residence  Current DME Used/Available at Home: Walker, rolling, Shower chair, Oxygen, portable  Tub or Shower Type: Tub/Shower combination  Prior Level of Function/Work/Activity:  Independent      Number of Personal Factors/Comorbidities that affect the Plan of Care: 0: LOW COMPLEXITY   EXAMINATION:   Most Recent Physical Functioning:   Gross Assessment:  AROM: Generally decreased, functional  Strength: Generally decreased, functional  Coordination: Generally decreased, functional               Posture:  Posture (WDL): Exceptions to WDL  Posture Assessment: Forward head, Rounded shoulders, Trunk flexion  Balance:  Sitting: Intact  Standing: Impaired  Standing - Static: Fair  Standing - Dynamic : Fair Bed Mobility:  Rolling: Stand-by assistance  Supine to Sit: Stand-by assistance  Sit to Supine: Stand-by assistance  Scooting: Maximum assistance  Wheelchair Mobility:     Transfers:  Sit to Stand: Minimum assistance  Stand to Sit: Minimum assistance  Gait:     Base of Support: Narrowed  Speed/Lidia: Pace decreased (<100 feet/min); Slow  Step Length: Right shortened;Left shortened  Gait Abnormalities: Decreased step clearance; Path deviations  Distance (ft): 80 Feet (ft)  Assistive Device: Walker, rolling  Ambulation - Level of Assistance: Contact guard assistance      Body Structures Involved:  1. Heart  2. Lungs  3. Joints  4. Muscles Body Functions Affected:  1. Cardio  2. Respiratory  3. Neuromusculoskeletal  4. Movement Related Activities and Participation Affected:  1. General Tasks and Demands  2. Mobility  3. Self Care  4. Domestic Life  5. Community, Social and [place]   Number of elements that affect the Plan of Care: 4+: HIGH COMPLEXITY   CLINICAL PRESENTATION:   Presentation: Stable and uncomplicated: LOW COMPLEXITY   CLINICAL DECISION MAKIN [facility] Inpatient Short Form  How much difficulty does the patient currently have. .. Unable A Lot A Little None   1. Turning over in bed (including adjusting bedclothes, sheets and blankets)? [] 1   [] 2   [] 3   [x] 4   2. Sitting down on and standing up from a chair with arms ( e.g., wheelchair, bedside commode, etc.)   [] 1   [] 2   [x] 3   [] 4   3. Moving from lying on back to sitting on the side of the bed? [] 1   [] 2   [x] 3   [] 4   How much help from another person does the patient currently need. ..  Total A Lot A Little None   4. Moving to and from a bed to a chair (including a wheelchair)? [] 1   [] 2   [x] 3   [] 4   5. Need to walk in hospital room? [] 1   [] 2   [x] 3   [] 4   6. Climbing 3-5 steps with a railing? [] 1   [x] 2   [] 3   [] 4   © 2007, Trustees of [address] [place] [address] [address] [id number], under license to [facility]. All rights reserved      Score:  Initial: 18 Most Recent: X (Date: -- )    Interpretation of Tool:  Represents activities that are increasingly more difficult (i.e. Bed mobility, Transfers, Gait). Score 24 23 22-20 19-15 14-10 9-7 6     Modifier CH CI CJ CK CL CM CN      ? Mobility - Walking and Moving Around:     - CURRENT STATUS: CK - 40%-59% impaired, limited or restricted    - GOAL STATUS: CJ - 20%-39% impaired, limited or restricted    - D/C STATUS:  ---------------To be determined---------------  [place] / [place] / Product Type: Medicare /      Medical Necessity:     · Patient demonstrates good rehab potential due to higher previous functional level. Reason for Services/Other Comments:  · Patient continues to require modification of therapeutic interventions to increase complexity of exercises. Use of outcome tool(s) and clinical judgement create a POC that gives a: Clear prediction of patient's progress: LOW COMPLEXITY            TREATMENT:   (In addition to Assessment/Re-Assessment sessions the following treatments were rendered)   Pre-treatment Symptoms/Complaints:  \"I'm more tired than normal.\"  Pain: Initial: No number reported     Post Session:  No number reported      Therapeutic Activity: (    15 minutes): Therapeutic activities including Bed transfers and Ambulation on level ground to improve mobility, strength, balance and coordination. Required moderate verbal, visual, manual, and tactile cutes   to promote static and dynamic balance in standing.      Braces/Orthotics/Lines/Etc:   · O2 Device: Nasal cannula  Treatment/Session Assessment: · Response to Treatment:  Pt ambulated x 80ft with RW and CGA on 4L O2. Pt reports performing below his PLOF, as he normally can ambulate a lot farther before having to rest.   · Interdisciplinary Collaboration:   o Physical Therapist  o Registered Nurse  · After treatment position/precautions:   o Supine in bed  o Bed alarm/tab alert on  o Bed in low position  o Call light within reach  o RN notified   · Compliance with Program/Exercises: Will assess as treatment progresses. · Recommendations/Intent for next treatment session: \"Next visit will focus on advancements to more challenging activities and reduction in assistance provided\".   Total Treatment Duration:  PT Patient Time In/Time Out  Time In: 1056  Time Out: 7[address] [facility], [facility]

## 2018-06-03 NOTE — PROGRESS NOTES
Problem: Pressure Injury - Risk of  Goal: *Prevention of pressure injury  Document Elvis Scale and appropriate interventions in the flowsheet.    Outcome: Progressing Towards Goal  Pressure Injury Interventions:  Sensory Interventions: Assess changes in LOC, Check visual cues for pain, Discuss PT/OT consult with provider, Float heels, Keep linens dry and wrinkle-free, Maintain/enhance activity level, Minimize linen layers, Sit a 90-degree angle/use footstool if needed    Moisture Interventions: Absorbent underpads, Apply protective barrier, creams and emollients, Check for incontinence Q2 hours and as needed, Limit adult briefs, Maintain skin hydration (lotion/cream), Moisture barrier, Offer toileting Q_hr, Minimize layers    Activity Interventions: Increase time out of bed, PT/OT evaluation    Mobility Interventions: PT/OT evaluation    Nutrition Interventions: Document food/fluid/supplement intake, Offer support with meals,snacks and hydration    Friction and Shear Interventions: Apply protective barrier, creams and emollients, Foam dressings/transparent film/skin sealants, Minimize layers

## 2018-06-03 NOTE — PROGRESS NOTES
Patient is resting quietly in bed. Assessment completed. Respirations are even and unlabored. Bowel sounds are active in all quadrates. Patient denies any pain. O2 at 4lpm NC. No distress at this time. Bed is low, locked and call light within reach.

## 2018-06-03 NOTE — PROGRESS NOTES
[facility] CARDIOLOGY PROGRESS NOTE           6/3/2018 10:12 AM    Admit Date: 6/1/2018    Admit Diagnosis: CHF exacerbation (HCC)      Subjective:   No complaints this AM, no chest pain or shortness of breath    Interval History: (History of pertinent interval events obtained from nursing staff)  No events overnight    ROS:  GEN:  No fever or chills  Cardiovascular:  As noted above  Pulmonary:  As noted above  Neuro:  No new focal motor or sensory loss      Objective:     Vitals:    06/03/18 0003 06/03/18 0358 06/03/18 0708 06/03/18 0711   BP: 114/67 123/71  120/76   Pulse: 67 65  73   Resp: 17 18  18   Temp: 97.3 °F (36.3 °C) 97.7 °F (36.5 °C)  97.5 °F (36.4 °C)   SpO2: 94% 94% 94% 96%   Weight:  102.5 kg (226 lb)     Height:           Physical Exam:  General- sitting up in chair, O2 in place, NAD  Neck- supple, elevated JVP  CV- irreg irreg, distant S1, S2, +SM  Lung- bibasilar crackles  Abd- soft, nontender, nondistended  Ext- trace to 1+ edema bilaterally.   Skin- warm and dry    Current Facility-Administered Medications   Medication Dose Route Frequency    potassium chloride (K-DUR, KLOR-CON) SR tablet 20 mEq  20 mEq Oral DAILY    tuberculin injection 5 Units  5 Units IntraDERMal ONCE    furosemide (LASIX) injection 40 mg  40 mg IntraVENous BID    insulin lispro (HUMALOG) injection   SubCUTAneous AC&HS    albuterol (PROVENTIL HFA, VENTOLIN HFA, PROAIR HFA) inhaler 1 Puff  1 Puff Inhalation Q4H PRN    ascorbic acid (vitamin C) (VITAMIN C) tablet 500 mg  500 mg Oral BID    aspirin delayed-release tablet 81 mg  81 mg Oral DAILY    atorvastatin (LIPITOR) tablet 20 mg  20 mg Oral QHS    baclofen (LIORESAL) tablet 10 mg  10 mg Oral TID    carvedilol (COREG) tablet 12.5 mg  12.5 mg Oral BID    hydrALAZINE (APRESOLINE) tablet 10 mg  10 mg Oral TID    levothyroxine (SYNTHROID) tablet 75 mcg  75 mcg Oral ACB    lisinopril (PRINIVIL, ZESTRIL) tablet 40 mg  40 mg Oral DAILY    memantine [facility]) tablet 10 mg  10 mg Oral BID    nitroglycerin (NITROSTAT) tablet 0.4 mg  0.4 mg SubLINGual Q5MIN PRN    pantoprazole (PROTONIX) tablet 40 mg  40 mg Oral ACB    sertraline (ZOLOFT) tablet 75 mg  75 mg Oral DAILY    warfarin (COUMADIN) tablet 5 mg  5 mg Oral QHS    budesonide (PULMICORT) 500 mcg/2 ml nebulizer suspension  500 mcg Nebulization BID RT    And    albuterol (PROVENTIL VENTOLIN) nebulizer solution 2.5 mg  2.5 mg Nebulization Q6HWA RT     Data Review:   Recent Results (from the past 24 hour(s))   GLUCOSE, POC    Collection Time: 06/02/18 11:42 AM   Result Value Ref Range    Glucose (POC) 120 (H) 65 - 100 mg/dL   TSH 3RD GENERATION    Collection Time: 06/02/18 11:45 AM   Result Value Ref Range    TSH 2.560 0.358 - 3.740 uIU/mL   EKG, 12 LEAD, SUBSEQUENT    Collection Time: 06/02/18 12:11 PM   Result Value Ref Range    Ventricular Rate 72 BPM    Atrial Rate 68 BPM    QRS Duration 156 ms    Q-T Interval 470 ms    QTC Calculation (Bezet) 514 ms    Calculated R Axis 119 degrees    Calculated T Axis -59 degrees    Diagnosis       Atrial fibrillation with premature ventricular or aberrantly conducted   complexes  Right axis deviation  Non-specific intra-ventricular conduction block  Abnormal ECG  When compared with ECG of 01-JUN-2018 16:51,  probably no change  Confirmed by HECTOR CROOKS ([id number]) on 6/2/2018 7:38:00 PM     TROPONIN I    Collection Time: 06/02/18  1:07 PM   Result Value Ref Range    Troponin-I, Qt. <0.02 (L) 0.02 - 0.05 NG/ML   PROTHROMBIN TIME + INR    Collection Time: 06/02/18  1:07 PM   Result Value Ref Range    Prothrombin time 18.2 (H) 11.5 - 14.5 sec    INR 1.6     GLUCOSE, POC    Collection Time: 06/02/18  4:21 PM   Result Value Ref Range    Glucose (POC) 143 (H) 65 - 100 mg/dL   GLUCOSE, POC    Collection Time: 06/02/18  8:15 PM   Result Value Ref Range    Glucose (POC) 115 (H) 65 - 100 mg/dL   GLUCOSE, POC    Collection Time: 06/03/18  5:20 AM   Result Value Ref Range Glucose (POC) 104 (H) 65 - 100 mg/dL       EKG:  (EKG has been independently visualized by me with interpretation below)  Assessment:     Principal Problem:    CHF exacerbation ([facility] (6/1/2018)    Active Problems:    Atrial fibrillation ([facility] (9/27/2013)      Type 2 diabetes mellitus ([facility] (9/27/2013)      CAD (coronary artery disease) (9/27/2013)      Dyslipidemia (9/27/2013)      MONICA (obstructive sleep apnea) (12/16/2013)      Plan:   1. CHF exacerbation, uncontrolled: normal LV function, marked RV dilation with reduced RV function with at least moderate PHTN, poor diuresis yesterday, increase lasix to 60mg BID, strict I and Os, daily weights, cont OMT  2. Afib: appears to be in rate controlled afib, stop amiodarone in setting of PHTN and failure to maintain NSR, pursue rate control strategy at this point  3. CAD: no active chest pain, cont meds  4. MONICA: needs compliance with CPAP given PHTN  5. CVA protection, uncontrolled: INR subtherapeutic, will start lovenox and consult pharmacy for warfarin dosing    Darylene Hugh B. Sellers MD  Cardiology/Electrophysiology

## 2018-06-03 NOTE — PROGRESS NOTES
Patient is resting with  eyes closed. Respirations are even and unlabored. No distress at this time. Posey alarm is on. Bed is low and locked.

## 2018-06-03 NOTE — PROGRESS NOTES
Mr. Schneider Alpha in bed with [facility] elevated. Worked with therapy and sat in chair. Uneventful shift. Remains on 4 lpm NC and denies dyspnea. No pain or complaints. Bed exit alarm in place and door open.

## 2018-06-03 NOTE — PROGRESS NOTES
Warfarin Dosing - Pharmacy Consult    Yoselin Martinez is a 77 y.o. male. Height: 5' 8\" (172.7 cm)    Weight: 102.5 kg (226 lb)    Indication:  afib    Goal INR:  2-3    Home dose:  2.5 mg Tue; 5 mg all other days    Risk factors or significant drug interactions:  PTA med amiodarone has been stopped    Other anticoagulants:  Enoxaparin bridge    Daily Monitoring  Date  INR     Warfarin dose HGB              Notes  6/1  ---  5 mg  14.2  6/2  1.6  5 mg  12.5  6/3  1.8  6 mg  --     Pharmacy consulted to manage warfarin for Mr. Pancho Agrawal during this admission. INR to 1.8 today. Anticipate pt will require a higher dose of warfarin now that amiodarone has been stopped. Will increase to warfarin 6 mg tonight. Daily INR. Continue enoxaparin bridge while INR is subtherapeutic. Pharmacy will continue to follow. Please call with any questions.      Thank you,  Jonathan Angel, PharmD  Clinical Pharmacist  [phone]

## 2018-06-03 NOTE — PROGRESS NOTES
Mr. Flakito Ortiz assisted to bathroom and to sit in chair at this time. weak and leaning to right side with ambulation. Lung fields diminished with Course crackles in left posterior fields. 4 lpm Nc in place and denies dyspnea or cough. 1+ pitting edema noted to bilateral legs. Telemetry monitor in place with atrial fibrillation and PVCs with rate 85. Denies pain or needs. Bed/chair exit alarm in place for safety. Call light in lap and door open.

## 2018-06-03 NOTE — PROGRESS NOTES
Hospitalist Progress Note    Subjective:   Daily Progress Note: 6/3/2018 08:20 AM    Patient is a 78/M with PMH AFIB, CAD with hx of chronic diastolic heart failure, COPD, IDDM, PUD, HTN, HLD who was sent here from his Pulmonologist's office for signs of heart failure exacerbation with CXR findings indicative of bilateral effusions and pulmonary edema. Patient also reported worsening bilateral LE edema. His BNP was elevated. He also complains of 3/10 chest pain and SOB but cardiac enzymes are negative. Patient is on 4L continuous O2 at home without increased hypoxia here. Pt was admitted decompensated CHF and started on IV lasix with good diuresis so far. Cardiology following. Pt sitting up chair - appears much more alert and interactive. Looked acutely ill yesterday but appearance is much improved today. States he is urinating a lot.   Nurse states pt is very weak and unsteady when pt ambulated for BM earlier this AM.      Current Facility-Administered Medications   Medication Dose Route Frequency    potassium chloride (K-DUR, KLOR-CON) SR tablet 20 mEq  20 mEq Oral DAILY    tuberculin injection 5 Units  5 Units IntraDERMal ONCE    furosemide (LASIX) injection 40 mg  40 mg IntraVENous BID    insulin lispro (HUMALOG) injection   SubCUTAneous AC&HS    albuterol (PROVENTIL HFA, VENTOLIN HFA, PROAIR HFA) inhaler 1 Puff  1 Puff Inhalation Q4H PRN    ascorbic acid (vitamin C) (VITAMIN C) tablet 500 mg  500 mg Oral BID    aspirin delayed-release tablet 81 mg  81 mg Oral DAILY    atorvastatin (LIPITOR) tablet 20 mg  20 mg Oral QHS    baclofen (LIORESAL) tablet 10 mg  10 mg Oral TID    carvedilol (COREG) tablet 12.5 mg  12.5 mg Oral BID    hydrALAZINE (APRESOLINE) tablet 10 mg  10 mg Oral TID    levothyroxine (SYNTHROID) tablet 75 mcg  75 mcg Oral ACB    lisinopril (PRINIVIL, ZESTRIL) tablet 40 mg  40 mg Oral DAILY    memantine (NAMENDA) tablet 10 mg  10 mg Oral BID    nitroglycerin (NITROSTAT) tablet 0.4 mg  0.4 mg SubLINGual Q5MIN PRN    pantoprazole (PROTONIX) tablet 40 mg  40 mg Oral ACB    sertraline (ZOLOFT) tablet 75 mg  75 mg Oral DAILY    warfarin (COUMADIN) tablet 5 mg  5 mg Oral QHS    budesonide (PULMICORT) 500 mcg/2 ml nebulizer suspension  500 mcg Nebulization BID RT    And    albuterol (PROVENTIL VENTOLIN) nebulizer solution 2.5 mg  2.5 mg Nebulization Q6HWA RT        Review of Systems  A comprehensive review of systems was negative except for that written in the HPI. Objective:     Visit Vitals    /76    Pulse 73    Temp 97.5 °F (36.4 °C)    Resp 18    Ht 5' 8\" (1.727 m)    Wt 102.5 kg (226 lb)    SpO2 96%    BMI 34.36 kg/m2    O2 Flow Rate (L/min): 4 l/min O2 Device: Nasal cannula    Temp (24hrs), Av.7 °F (36.5 °C), Min:97.3 °F (36.3 °C), Max:98.9 °F (37.2 °C)          1901 -  0700  In: 880 [P.O.:880]  Out: 1400 [Urine:1400]    Visit Vitals    /76    Pulse 73    Temp 97.5 °F (36.4 °C)    Resp 18    Ht 5' 8\" (1.727 m)    Wt 102.5 kg (226 lb)    SpO2 96%    BMI 34.36 kg/m2     General appearance: more alert, cooperative, no distress on 4 liters NC, appears stated age  Lungs: rales bilaterally but improved  Heart: irregularly irregular rhythm, S1, S2 normal  Abdomen: soft, non-tender. Bowel sounds normal. No masses,  no organomegaly  Extremities: extremities normal, atraumatic, no cyanosis. 1+edema  Psych: A+Ox3    Additional comments:I reviewed the patient's new clinical lab test results.  .    Data Review    Recent Results (from the past 24 hour(s))   GLUCOSE, POC    Collection Time: 18 11:42 AM   Result Value Ref Range    Glucose (POC) 120 (H) 65 - 100 mg/dL   TSH 3RD GENERATION    Collection Time: 18 11:45 AM   Result Value Ref Range    TSH 2.560 0.358 - 3.740 uIU/mL   EKG, 12 LEAD, SUBSEQUENT    Collection Time: 18 12:11 PM   Result Value Ref Range    Ventricular Rate 72 BPM    Atrial Rate 68 BPM    QRS Duration 156 ms Q-T Interval 470 ms    QTC Calculation (Bezet) 514 ms    Calculated R Axis 119 degrees    Calculated T Axis -59 degrees    Diagnosis       Atrial fibrillation with premature ventricular or aberrantly conducted   complexes  Right axis deviation  Non-specific intra-ventricular conduction block  Abnormal ECG  When compared with ECG of 01-JUN-2018 16:51,  probably no change  Confirmed by HECTOR CROOKS ([id number]) on 6/2/2018 7:38:00 PM     TROPONIN I    Collection Time: 06/02/18  1:07 PM   Result Value Ref Range    Troponin-I, Qt. <0.02 (L) 0.02 - 0.05 NG/ML   PROTHROMBIN TIME + INR    Collection Time: 06/02/18  1:07 PM   Result Value Ref Range    Prothrombin time 18.2 (H) 11.5 - 14.5 sec    INR 1.6     GLUCOSE, POC    Collection Time: 06/02/18  4:21 PM   Result Value Ref Range    Glucose (POC) 143 (H) 65 - 100 mg/dL   GLUCOSE, POC    Collection Time: 06/02/18  8:15 PM   Result Value Ref Range    Glucose (POC) 115 (H) 65 - 100 mg/dL   GLUCOSE, POC    Collection Time: 06/03/18  5:20 AM   Result Value Ref Range    Glucose (POC) 104 (H) 65 - 100 mg/dL     Echo:     SUMMARY:    -  Left ventricle: Systolic function was at the lower limits of normal.  Ejection fraction was estimated in the range of 50 % to 55 %. This study was  inadequate for the evaluation of regional wall motion. Wall thickness was  mildly increased. -  Right ventricle: The ventricle was dilated. Systolic function was reduced. Estimated peak pressure was in the range of 60-65 mmHg. There is septal  flattening on the parasternal short axis views suggestive of pressure   overload. -  Left atrium: The atrium was moderately to markedly dilated. -  Mitral valve: There was mild annular calcification. There was mild to  moderate regurgitation. -  Tricuspid valve: There was mild to moderate regurgitation.     Assessment/Plan:     Principal Problem:    CHF exacerbation ([facility] (6/1/2018)    Active Problems:    Atrial fibrillation ([facility] (9/27/2013) Type 2 diabetes mellitus ([facility] (9/27/2013)      CAD (coronary artery disease) (9/27/2013)      Dyslipidemia (9/27/2013)      MONICA (obstructive sleep apnea) (12/16/2013)    plan:  Continue IV lasix since good diuresis. Echo shows EF of 50-55%. Follow up labs in the AM.  TSH w/in normal range. Cardiology following - appreciate their input. Pt appears very debilitated so consult PT/OT yesterday. Will place PPD and have SW eval for rehab. Care Plan discussed with: Patient/Family and nurse. Total time spent with patient: 20 minutes.     Signed By: Dana Castro MD     Mary 3, 2018

## 2018-06-04 LAB
ANION GAP SERPL CALC-SCNC: 9 MMOL/L (ref 7–16)
BUN SERPL-MCNC: 16 MG/DL (ref 8–23)
CALCIUM SERPL-MCNC: 8.8 MG/DL (ref 8.3–10.4)
CHLORIDE SERPL-SCNC: 97 MMOL/L (ref 98–107)
CO2 SERPL-SCNC: 39 MMOL/L (ref 21–32)
CREAT SERPL-MCNC: 0.77 MG/DL (ref 0.8–1.5)
ERYTHROCYTE [DISTWIDTH] IN BLOOD BY AUTOMATED COUNT: 15.3 % (ref 11.9–14.6)
GLUCOSE BLD STRIP.AUTO-MCNC: 113 MG/DL (ref 65–100)
GLUCOSE BLD STRIP.AUTO-MCNC: 154 MG/DL (ref 65–100)
GLUCOSE BLD STRIP.AUTO-MCNC: 163 MG/DL (ref 65–100)
GLUCOSE BLD STRIP.AUTO-MCNC: 89 MG/DL (ref 65–100)
GLUCOSE BLD STRIP.AUTO-MCNC: 90 MG/DL (ref 65–100)
GLUCOSE SERPL-MCNC: 96 MG/DL (ref 65–100)
HCT VFR BLD AUTO: 42.1 % (ref 41.1–50.3)
HGB BLD-MCNC: 12.9 G/DL (ref 13.6–17.2)
INR PPP: 1.9
MAGNESIUM SERPL-MCNC: 1.8 MG/DL (ref 1.8–2.4)
MCH RBC QN AUTO: 26.9 PG (ref 26.1–32.9)
MCHC RBC AUTO-ENTMCNC: 30.6 G/DL (ref 31.4–35)
MCV RBC AUTO: 87.7 FL (ref 79.6–97.8)
MM INDURATION POC: NORMAL MM (ref 0–5)
PLATELET # BLD AUTO: 293 K/UL (ref 150–450)
PMV BLD AUTO: 11.8 FL (ref 10.8–14.1)
POTASSIUM SERPL-SCNC: 3.2 MMOL/L (ref 3.5–5.1)
PPD POC: NORMAL NEGATIVE
PROTHROMBIN TIME: 21.5 SEC (ref 11.5–14.5)
RBC # BLD AUTO: 4.8 M/UL (ref 4.23–5.67)
SODIUM SERPL-SCNC: 145 MMOL/L (ref 136–145)
WBC # BLD AUTO: 11.5 K/UL (ref 4.3–11.1)

## 2018-06-04 PROCEDURE — 74011636637 HC RX REV CODE- 636/637: Performed by: INTERNAL MEDICINE

## 2018-06-04 PROCEDURE — 74011250636 HC RX REV CODE- 250/636: Performed by: INTERNAL MEDICINE

## 2018-06-04 PROCEDURE — 74011250637 HC RX REV CODE- 250/637: Performed by: INTERNAL MEDICINE

## 2018-06-04 PROCEDURE — 65660000000 HC RM CCU STEPDOWN

## 2018-06-04 PROCEDURE — 94640 AIRWAY INHALATION TREATMENT: CPT

## 2018-06-04 PROCEDURE — 80048 BASIC METABOLIC PNL TOTAL CA: CPT | Performed by: HOSPITALIST

## 2018-06-04 PROCEDURE — 82962 GLUCOSE BLOOD TEST: CPT

## 2018-06-04 PROCEDURE — 83735 ASSAY OF MAGNESIUM: CPT | Performed by: INTERNAL MEDICINE

## 2018-06-04 PROCEDURE — 97530 THERAPEUTIC ACTIVITIES: CPT

## 2018-06-04 PROCEDURE — 97165 OT EVAL LOW COMPLEX 30 MIN: CPT

## 2018-06-04 PROCEDURE — 94760 N-INVAS EAR/PLS OXIMETRY 1: CPT

## 2018-06-04 PROCEDURE — 77030020263 HC SOL INJ SOD CL0.9% LFCR 1000ML

## 2018-06-04 PROCEDURE — 74011250637 HC RX REV CODE- 250/637: Performed by: HOSPITALIST

## 2018-06-04 PROCEDURE — 85610 PROTHROMBIN TIME: CPT | Performed by: HOSPITALIST

## 2018-06-04 PROCEDURE — 36415 COLL VENOUS BLD VENIPUNCTURE: CPT | Performed by: HOSPITALIST

## 2018-06-04 PROCEDURE — 85027 COMPLETE CBC AUTOMATED: CPT | Performed by: HOSPITALIST

## 2018-06-04 PROCEDURE — 77010033678 HC OXYGEN DAILY

## 2018-06-04 PROCEDURE — 74011000250 HC RX REV CODE- 250: Performed by: INTERNAL MEDICINE

## 2018-06-04 RX ORDER — METOLAZONE 5 MG/1
5 TABLET ORAL DAILY
Status: DISCONTINUED | OUTPATIENT
Start: 2018-06-04 | End: 2018-06-06 | Stop reason: HOSPADM

## 2018-06-04 RX ORDER — POTASSIUM CHLORIDE 14.9 MG/ML
20 INJECTION INTRAVENOUS
Status: COMPLETED | OUTPATIENT
Start: 2018-06-04 | End: 2018-06-04

## 2018-06-04 RX ADMIN — OXYCODONE HYDROCHLORIDE AND ACETAMINOPHEN 500 MG: 500 TABLET ORAL at 08:59

## 2018-06-04 RX ADMIN — WARFARIN SODIUM 6 MG: 5 TABLET ORAL at 16:34

## 2018-06-04 RX ADMIN — ALBUTEROL SULFATE 2.5 MG: 2.5 SOLUTION RESPIRATORY (INHALATION) at 07:15

## 2018-06-04 RX ADMIN — HYDRALAZINE HYDROCHLORIDE 10 MG: 10 TABLET, FILM COATED ORAL at 09:00

## 2018-06-04 RX ADMIN — POTASSIUM CHLORIDE 20 MEQ: 200 INJECTION, SOLUTION INTRAVENOUS at 11:07

## 2018-06-04 RX ADMIN — INSULIN LISPRO 2 UNITS: 100 INJECTION, SOLUTION INTRAVENOUS; SUBCUTANEOUS at 22:57

## 2018-06-04 RX ADMIN — BUDESONIDE 500 MCG: 0.5 INHALANT RESPIRATORY (INHALATION) at 19:16

## 2018-06-04 RX ADMIN — POTASSIUM CHLORIDE 20 MEQ: 1500 TABLET, EXTENDED RELEASE ORAL at 08:59

## 2018-06-04 RX ADMIN — MEMANTINE HYDROCHLORIDE 10 MG: 5 TABLET ORAL at 16:34

## 2018-06-04 RX ADMIN — BACLOFEN 10 MG: 10 TABLET ORAL at 09:00

## 2018-06-04 RX ADMIN — SERTRALINE HYDROCHLORIDE 75 MG: 50 TABLET ORAL at 08:59

## 2018-06-04 RX ADMIN — BACLOFEN 10 MG: 10 TABLET ORAL at 16:35

## 2018-06-04 RX ADMIN — ATORVASTATIN CALCIUM 20 MG: 10 TABLET, FILM COATED ORAL at 22:57

## 2018-06-04 RX ADMIN — OXYCODONE HYDROCHLORIDE AND ACETAMINOPHEN 500 MG: 500 TABLET ORAL at 16:35

## 2018-06-04 RX ADMIN — ALBUTEROL SULFATE 2.5 MG: 2.5 SOLUTION RESPIRATORY (INHALATION) at 13:51

## 2018-06-04 RX ADMIN — ENOXAPARIN SODIUM 100 MG: 100 INJECTION SUBCUTANEOUS at 12:00

## 2018-06-04 RX ADMIN — LEVOTHYROXINE SODIUM 75 MCG: 75 TABLET ORAL at 05:35

## 2018-06-04 RX ADMIN — ALBUTEROL SULFATE 2.5 MG: 2.5 SOLUTION RESPIRATORY (INHALATION) at 19:16

## 2018-06-04 RX ADMIN — ASPIRIN 81 MG: 81 TABLET, COATED ORAL at 09:00

## 2018-06-04 RX ADMIN — NITROGLYCERIN 0.4 MG: 0.4 TABLET SUBLINGUAL at 06:41

## 2018-06-04 RX ADMIN — BUDESONIDE 500 MCG: 0.5 INHALANT RESPIRATORY (INHALATION) at 07:15

## 2018-06-04 RX ADMIN — POTASSIUM CHLORIDE 20 MEQ: 200 INJECTION, SOLUTION INTRAVENOUS at 13:00

## 2018-06-04 RX ADMIN — CARVEDILOL 12.5 MG: 12.5 TABLET, FILM COATED ORAL at 09:00

## 2018-06-04 RX ADMIN — LISINOPRIL 40 MG: 20 TABLET ORAL at 09:00

## 2018-06-04 RX ADMIN — MEMANTINE HYDROCHLORIDE 10 MG: 5 TABLET ORAL at 08:59

## 2018-06-04 RX ADMIN — HYDRALAZINE HYDROCHLORIDE 10 MG: 10 TABLET, FILM COATED ORAL at 22:57

## 2018-06-04 RX ADMIN — ENOXAPARIN SODIUM 100 MG: 100 INJECTION SUBCUTANEOUS at 22:57

## 2018-06-04 RX ADMIN — FUROSEMIDE 60 MG: 10 INJECTION, SOLUTION INTRAMUSCULAR; INTRAVENOUS at 16:24

## 2018-06-04 RX ADMIN — FUROSEMIDE 60 MG: 10 INJECTION, SOLUTION INTRAMUSCULAR; INTRAVENOUS at 09:00

## 2018-06-04 RX ADMIN — METOLAZONE 5 MG: 5 TABLET ORAL at 09:00

## 2018-06-04 RX ADMIN — HYDRALAZINE HYDROCHLORIDE 10 MG: 10 TABLET, FILM COATED ORAL at 16:35

## 2018-06-04 RX ADMIN — PANTOPRAZOLE SODIUM 40 MG: 40 TABLET, DELAYED RELEASE ORAL at 05:35

## 2018-06-04 RX ADMIN — CARVEDILOL 12.5 MG: 12.5 TABLET, FILM COATED ORAL at 16:35

## 2018-06-04 RX ADMIN — BACLOFEN 10 MG: 10 TABLET ORAL at 22:57

## 2018-06-04 NOTE — PROGRESS NOTES
Patient stable with no complaints at this time. Patient is resting in bed with eyes closed. Patient appears comfortable and pain free. Call light within reach and patient instructed to call if assistance is needed.   Report to be given to oncoming RN 7p-7a

## 2018-06-04 NOTE — PROGRESS NOTES
Problem: Interdisciplinary Rounds  Goal: Interdisciplinary Rounds  Interdisciplinary team rounds were held 6/4/2018 with the following team members:Care Management, Physician and Clinical Coordinator and the patient. Plan of care discussed. See clinical pathway and/or care plan for interventions and desired outcomes.

## 2018-06-04 NOTE — PROGRESS NOTES
[facility] CARDIOLOGY PROGRESS NOTE           6/4/2018 7:35 AM    Admit Date: 6/1/2018      Subjective:   Short of breath this morning around 5 am, improved with nebulizer therapy. Modest diuresis on IV lasix, weight is down 10 lbs over the last 3 days, he estimates his total weight gain has been 30+ lbs. ROS:  Cardiovascular:  As noted above    Objective:      Vitals:    06/03/18 2020 06/03/18 2208 06/04/18 0446 06/04/18 0716   BP:  110/68 137/77 141/68   Pulse:  90 90 86   Resp:  18 19 18   Temp:  97.6 °F (36.4 °C) 98.2 °F (36.8 °C) 98.1 °F (36.7 °C)   SpO2: 93% 92% 93% 96%   Weight:   226 lb 9.6 oz (102.8 kg)    Height:           Physical Exam:  General-No Acute Distress  Neck- supple, with patient at 45 degrees there is JVD to mandible with prominent V wave  CV- irregular  Rhythm, rate controlled, 2/6 hsm LLSB and apex, RV heave  Lung- bi basilar rales  Abd- soft, nontender, nondistended  Ext- 1+ pitting edema bilaterally with chronic venous stasis changes. Skin- warm and dry    Data Review:   Recent Labs      06/04/18   0654  06/03/18   1027  06/02/18   1307   06/02/18   0658  06/01/18   1651   NA   --    --    --    --   145  143   K   --    --    --    --   3.5  3.6   MG   --    --    --    --    --   1.8   BUN   --    --    --    --   20  21   CREA   --    --    --    --   0.84  1.22   GLU   --    --    --    --   79  60*   WBC   --    --    --    --   7.2  8.6   HGB   --    --    --    --   12.5*  14.2   HCT   --    --    --    --   40.2*  44.4   PLT   --    --    --    --   244  307   INR  1.9  1.8  1.6   < >   --    --    TROIQ   --    --   <0.02*   --    --    --     < > = values in this interval not displayed. Morning labs pending    Echo - preserved EF, dilated, hypokinetic RV with RVSP 60-65, normal bioprosthetic AVR    Assessment/Plan:     Principal Problem:    CHF exacerbation ([address] (6/1/2018)    Acute on chronic diastolic HF with right heart failure.  Diuresing slowly. Renal function normal 2 days ago, morning labs pending. Add metolazone, continue daily weights, I/O's     Active Problems:    Atrial fibrillation (HCC) (9/27/2013)    Amiodarone stopped in face of recurrent afib and pulmonary HTN. Rate control strategy. INR 1.8 yesterday, being dosed by pharmacy, on lovenox until therapeutic      Type 2 diabetes mellitus ([facility] (9/27/2013)    Primary team      CAD (coronary artery disease) (9/27/2013)    No angina, continue current meds      Dyslipidemia (9/27/2013)    Statin therapy      MONICA (obstructive sleep apnea) (12/16/2013)    Patient reports he has never worn CPAP and only wears oxygen at home. This might be worth looking at again in future, history limited, is followed by pulmonary.             Katharine Chance MD  6/4/2018 7:35 AM

## 2018-06-04 NOTE — PROGRESS NOTES
C/o midsternal chest pain. SpO2 89%. Coached in pursed lip breathing. SpO2 increased to 91%. Continues to c/o chest pain. 1 tablet SL nitro given.   Will monitor

## 2018-06-04 NOTE — PROGRESS NOTES
Received bedside shift report from Bro Augustin RN. Pt lying in bed. No apparent distress. Respirations even and unlabored. Instructed to call for assistance with needs, as they arise. Pt voiced understanding.

## 2018-06-04 NOTE — PROGRESS NOTES
Hospitalist Progress Note    Subjective:   Daily Progress Note: 6/4/2018 11:05 AM    Mr. Obinna Ventura is a 78 yo WM, with a pmh of CoPD, CAD, atrial fibrillation and chronic diastolic chf, who presented 6/1 from outpatient pulmonary office for shortness of breath, pulmonary edema and lower extremity edema and is being treated for an acute diastolic chf exacerbation. Cardiology following. Lasix increased to 60 mg IV BID yesterday and today metolazone was added. Cardiac enzymes negative. On 6 liters NC currently and wears 4L NC at baseline. Amiodarone stopped this admission by cards as he is still in afib and thus rate control the goal. Has been working with PT, is very weak and cannot ambulate without assistance. He endorses orthopnea, TORRE. No current chest pain.      Current Facility-Administered Medications   Medication Dose Route Frequency    metOLazone (ZAROXOLYN) tablet 5 mg  5 mg Oral DAILY    potassium chloride 20 mEq in 100 ml IVPB  20 mEq IntraVENous Q2H    furosemide (LASIX) injection 60 mg  60 mg IntraVENous BID    enoxaparin (LOVENOX) injection 100 mg  100 mg SubCUTAneous Q12H    warfarin (COUMADIN) tablet 6 mg - Rx dosing  6 mg Oral QPM    potassium chloride (K-DUR, KLOR-CON) SR tablet 20 mEq  20 mEq Oral DAILY    insulin lispro (HUMALOG) injection   SubCUTAneous AC&HS    albuterol (PROVENTIL HFA, VENTOLIN HFA, PROAIR HFA) inhaler 1 Puff  1 Puff Inhalation Q4H PRN    ascorbic acid (vitamin C) (VITAMIN C) tablet 500 mg  500 mg Oral BID    aspirin delayed-release tablet 81 mg  81 mg Oral DAILY    atorvastatin (LIPITOR) tablet 20 mg  20 mg Oral QHS    baclofen (LIORESAL) tablet 10 mg  10 mg Oral TID    carvedilol (COREG) tablet 12.5 mg  12.5 mg Oral BID    hydrALAZINE (APRESOLINE) tablet 10 mg  10 mg Oral TID    levothyroxine (SYNTHROID) tablet 75 mcg  75 mcg Oral ACB    lisinopril (PRINIVIL, ZESTRIL) tablet 40 mg  40 mg Oral DAILY    memantine (NAMENDA) tablet 10 mg  10 mg Oral BID    nitroglycerin (NITROSTAT) tablet 0.4 mg  0.4 mg SubLINGual Q5MIN PRN    pantoprazole (PROTONIX) tablet 40 mg  40 mg Oral ACB    sertraline (ZOLOFT) tablet 75 mg  75 mg Oral DAILY    budesonide (PULMICORT) 500 mcg/2 ml nebulizer suspension  500 mcg Nebulization BID RT    And    albuterol (PROVENTIL VENTOLIN) nebulizer solution 2.5 mg  2.5 mg Nebulization Q6HWA RT        Review of Systems  A comprehensive review of systems was negative except for that written in the HPI.     Objective:     Visit Vitals    /76    Pulse 76    Temp 98.4 °F (36.9 °C)    Resp 18    Ht 5' 8\" (1.727 m)    Wt 102.8 kg (226 lb 9.6 oz)    SpO2 (!) 86%    BMI 34.45 kg/m2    O2 Flow Rate (L/min): 5 l/min (increased to 5 L; pt complaining of SOB ) O2 Device: Nasal cannula    Temp (24hrs), Av °F (36.7 °C), Min:97.6 °F (36.4 °C), Max:98.4 °F (36.9 °C)          1901 -  0700  In: 720 [P.O.:720]  Out: 975 [Urine:975]    General: awake, alert, oriented, weak appearing  Eyes; non icteric, EOMI  Neck; supple  CV: IRIR, normal rate  Pulm; diminished in bases, mild crackles, no accessory muscle use  Abd; soft, non tender, active BS  Ext: trace LE pitting edema    Additional comments:I reviewed the patient's new clinical lab test results. j    Data Review    Recent Results (from the past 24 hour(s))   GLUCOSE, POC    Collection Time: 18 11:32 AM   Result Value Ref Range    Glucose (POC) 209 (H) 65 - 100 mg/dL   PLEASE READ & DOCUMENT PPD TEST IN 24 HRS    Collection Time: 18  2:00 PM   Result Value Ref Range    PPD  Negative    mm Induration  0 mm   GLUCOSE, POC    Collection Time: 18  3:51 PM   Result Value Ref Range    Glucose (POC) 127 (H) 65 - 100 mg/dL   GLUCOSE, POC    Collection Time: 18  9:13 PM   Result Value Ref Range    Glucose (POC) 168 (H) 65 - 100 mg/dL   GLUCOSE, POC    Collection Time: 18  5:36 AM   Result Value Ref Range    Glucose (POC) 90 65 - 100 mg/dL   CBC W/O DIFF Collection Time: 06/04/18  6:54 AM   Result Value Ref Range    WBC 11.5 (H) 4.3 - 11.1 K/uL    RBC 4.80 4.23 - 5.67 M/uL    HGB 12.9 (L) 13.6 - 17.2 g/dL    HCT 42.1 41.1 - 50.3 %    MCV 87.7 79.6 - 97.8 FL    MCH 26.9 26.1 - 32.9 PG    MCHC 30.6 (L) 31.4 - 35.0 g/dL    RDW 15.3 (H) 11.9 - 14.6 %    PLATELET 955 174 - 174 K/uL    MPV 11.8 10.8 - 84.3 FL   METABOLIC PANEL, BASIC    Collection Time: 06/04/18  6:54 AM   Result Value Ref Range    Sodium 145 136 - 145 mmol/L    Potassium 3.2 (L) 3.5 - 5.1 mmol/L    Chloride 97 (L) 98 - 107 mmol/L    CO2 39 (H) 21 - 32 mmol/L    Anion gap 9 7 - 16 mmol/L    Glucose 96 65 - 100 mg/dL    BUN 16 8 - 23 MG/DL    Creatinine 0.77 (L) 0.8 - 1.5 MG/DL    GFR est AA >60 >60 ml/min/1.73m2    GFR est non-AA >60 >60 ml/min/1.73m2    Calcium 8.8 8.3 - 10.4 MG/DL   PROTHROMBIN TIME + INR    Collection Time: 06/04/18  6:54 AM   Result Value Ref Range    Prothrombin time 21.5 (H) 11.5 - 14.5 sec    INR 1.9     MAGNESIUM    Collection Time: 06/04/18  6:54 AM   Result Value Ref Range    Magnesium 1.8 1.8 - 2.4 mg/dL   GLUCOSE, POC    Collection Time: 06/04/18  7:29 AM   Result Value Ref Range    Glucose (POC) 89 65 - 100 mg/dL         Assessment/Plan:     Principal Problem:    CHF exacerbation (HCC) (6/1/2018)    Active Problems:    Atrial fibrillation (HCC) (9/27/2013)      Type 2 diabetes mellitus ([facility] (9/27/2013)      CAD (coronary artery disease) (9/27/2013)      Dyslipidemia (9/27/2013)      MONICA (obstructive sleep apnea) (12/16/2013)    continue lasix 60 iv BID and metolazine 5 mg po daily. Daily weights, ins/outs. Replace potassium. Wean oxygen as tolerated to his baseline 4 liters NC. May benefit from outpatient sleep study. Currently rate controlled afib. Discontinue cardiac monitoring. Needs STR at SNF, case management made aware  Hopefully dc mid week?     Care Plan discussed with: Patient/Family and     Signed By: Rusty Rolle MD     June 4, 2018

## 2018-06-04 NOTE — PROGRESS NOTES
Problem: Self Care Deficits Care Plan (Adult)  Goal: *Acute Goals and Plan of Care (Insert Text)  1. Pt will toilet with SBA   2. Pt will complete functional mobility for ADLs with SBA  3. Pt will complete lower body dressing with SBA using AE as needed  4. Pt will complete grooming and hygiene at sink with SBA  5. Pt will demonstrate independence with HEP to promote increased BUE strength and functional use for ADLs  6. Pt will tolerate 23 minutes functional activity with one or fewer rest breaks to promote increased endurance for ADLs    Timeframe: 7 days      OCCUPATIONAL THERAPY: Initial Assessment and PM 6/4/2018  INPATIENT: Hospital Day: 4  [place] / Product Type: Medicare /      NAME/AGE/GENDER: Valerio Rios is a 77 y.o. male   PRIMARY DIAGNOSIS:  CHF exacerbation ([facility] [address] CHF exacerbation (HCC) CHF exacerbation (HCC)        ICD-10: Treatment Diagnosis:    · Generalized Muscle Weakness (M62.81)   Precautions/Allergies:    falls Review of patient's allergies indicates no known allergies. ASSESSMENT:     Mr. Rebecca Montano was admitted with CHF exacerbation. Pt lives with his wife in a one level house with a tub shower, a shower chair, and grab bars and was independent with ADLs at baseline, uses a RW for mobility. This session, pt presented seated in chair, pleasant and agreeable to OT session. Pt demonstrated deficits in endurance, mobility, and balance impacting ADLs. Pt stood with min assist with extra time to rise and and completed mobility in room with min assist using RW. Pt demonstrated decreased safety awareness and problem solving and required min to mod assistance for safe use of RW for overall use and positioning and to navigate environmental obstacles. Pt washed face at sink with CGA, required min assist to doff brief and to don new brief. Pt fatigued quickly and was unable to tolerate standing longer than ~4 minutes.  At the end of the session, pt returned to chair with needs in reach, alarm set, and lap belt in place. Pt presented below his functional baseline and would benefit from skilled OT services to address deficits in this setting as well as post d/c. This section established at most recent assessment   PROBLEM LIST (Impairments causing functional limitations):  1. Decreased Strength  2. Decreased ADL/Functional Activities  3. Decreased Balance  4. Decreased Activity Tolerance  5. Increased Shortness of Breath  6. Decreased Cognition   INTERVENTIONS PLANNED: (Benefits and precautions of occupational therapy have been discussed with the patient.)  1. Activities of daily living training  2. Adaptive equipment training  3. Therapeutic activity  4. Therapeutic exercise     TREATMENT PLAN: Frequency/Duration: Follow patient 3 to address above goals. Rehabilitation Potential For Stated Goals: Good     RECOMMENDED REHABILITATION/EQUIPMENT: (at time of discharge pending progress): Due to the probability of continued deficits (see above) this patient will likely need continued skilled occupational therapy after discharge. Equipment:    None at this time              OCCUPATIONAL PROFILE AND HISTORY:   History of Present Injury/Illness (Reason for Referral):  CHF exacerbation, SOB, fatigue (see H&P)  Past Medical History/Comorbidities:   Mr. Sebas Christopher  has a past medical history of Arrhythmia; Arthritis; CAD (coronary artery disease) (2009); Chronic pain; Diabetes ([address] (2009); GERD (gastroesophageal reflux disease); Heart failure ([address] Hyperlipidemia; Hypertension; Moderate aortic stenosis (echo 9/27/13); Psychiatric disorder; PUD (peptic ulcer disease) (1970's); and Unspecified sleep apnea. He also has no past medical history of Aneurysm ([address] Asthma; Autoimmune disease ([address] Cancer ([address] Chronic kidney disease; Chronic obstructive pulmonary disease ([address] Coagulation disorder ([address] Liver disease; Seizures ([address] Stroke [facility]);  Thromboembolus [facility]); or Thyroid disease. Mr. Shelley Muir  has a past surgical history that includes pr cardiac surg procedure unlist (2009); pr abdomen surgery proc unlisted (2003); hx other surgical (2011); hx knee arthroscopy; hx orthopaedic (Right, 2002); hx heent (Bilateral, 2004); hx heent; hx prostatectomy (2011); hx gi; hx appendectomy; and hx heart valve surgery (2011). Social History/Living Environment:   Home Environment: Private residence  # Steps to Enter: 3  Wheelchair Ramp: Yes  One/Two Story Residence: Two story  # of Interior Steps: 14  Height of Each Step (in): 8 inches  Interior Rails: Both  Lift Chair Available: No  Living Alone: No (Wife is chronically ill)  Support Systems: Spouse/Significant Other/Partner  Patient Expects to be Discharged to[de-identified] Private residence  Current DME Used/Available at Home: Walker, rolling, Grab bars, Shower chair  Tub or Shower Type: Tub/Shower combination  Prior Level of Function/Work/Activity:  Independent with ADLs, uses a RW, lives with wife     Number of Personal Factors/Comorbidities that affect the Plan of Care: Brief history (0):  LOW COMPLEXITY   ASSESSMENT OF OCCUPATIONAL PERFORMANCE[de-identified]   Activities of Daily Living:   Basic ADLs (From Assessment) Complex ADLs (From Assessment)   Feeding: Independent  Oral Facial Hygiene/Grooming: Contact guard assistance  Bathing: Contact guard assistance  Upper Body Dressing: Setup  Lower Body Dressing: Minimum assistance  Toileting: Contact guard assistance Instrumental ADL  Meal Preparation: Moderate assistance  Homemaking:  Moderate assistance  Medication Management: Stand-by assistance   Grooming/Bathing/Dressing Activities of Daily Living     Cognitive Retraining  Safety/Judgement: Fall prevention                       Bed/Mat Mobility  Sit to Stand: Minimum assistance  Bed to Chair: Minimum assistance       Most Recent Physical Functioning:   Gross Assessment:  AROM: Within functional limits  Strength: Generally decreased, functional  Coordination: Within functional limits               Posture:  Posture (WDL): Exceptions to WDL  Posture Assessment: Forward head, Rounded shoulders, Trunk flexion  Balance:  Sitting: Intact  Standing: Impaired  Standing - Static: Fair  Standing - Dynamic : Fair Bed Mobility:     Wheelchair Mobility:     Transfers:  Sit to Stand: Minimum assistance  Stand to Sit: Minimum assistance  Bed to Chair: Minimum assistance            Patient Vitals for the past 6 hrs:   BP BP Patient Position SpO2 O2 Flow Rate (L/min) Pulse   18 1057 137/76 At rest 95 % - 91   18 1350 - - 95 % 5 l/min -       Mental Status  Neurologic State: Alert  Orientation Level: Oriented X4  Cognition: Follows commands  Perception: Appears intact  Perseveration: No perseveration noted  Safety/Judgement: Fall prevention                          Physical Skills Involved:  1. Balance  2. Strength  3. Activity Tolerance Cognitive Skills Affected (resulting in the inability to perform in a timely and safe manner):  1. Executive Function  2. Sustained Attention  3. Divided Attention Psychosocial Skills Affected:  1. Habits/Routines  2. Environmental Adaptation   Number of elements that affect the Plan of Care: 3-5:  MODERATE COMPLEXITY   CLINICAL DECISION MAKIN [facility] [address] [address] AM-PAC 6 Clicks   Daily Activity Inpatient Short Form  How much help from another person does the patient currently need. .. Total A Lot A Little None   1. Putting on and taking off regular lower body clothing? [] 1   [] 2   [x] 3   [] 4   2. Bathing (including washing, rinsing, drying)? [] 1   [] 2   [x] 3   [] 4   3. Toileting, which includes using toilet, bedpan or urinal?   [] 1   [] 2   [x] 3   [] 4   4. Putting on and taking off regular upper body clothing? [] 1   [] 2   [] 3   [x] 4   5. Taking care of personal grooming such as brushing teeth? [] 1   [] 2   [] 3   [x] 4   6. Eating meals?    [] 1   [] 2   [] 3   [x] 4   © 2007, Trustees of [address] [place] [address] [address] [address], under license to [employer]. All rights reserved      Score:  Initial: 21 Most Recent: X (Date: -- )    Interpretation of Tool:  Represents activities that are increasingly more difficult (i.e. Bed mobility, Transfers, Gait). Score 24 23 22-20 19-15 14-10 9-7 6     Modifier CH CI CJ CK CL CM CN      ? Self Care:     - CURRENT STATUS: CJ - 20%-39% impaired, limited or restricted    - GOAL STATUS: CI - 1%-19% impaired, limited or restricted    - D/C STATUS:  ---------------To be determined---------------  [place] / Product Type: Medicare /      Medical Necessity:     · Patient is expected to demonstrate progress in strength, balance and functional technique to decrease assistance required with ADLs. Reason for Services/Other Comments:  · Patient continues to require present interventions due to patient's inability to independently complete ADLs. Use of outcome tool(s) and clinical judgement create a POC that gives a: LOW COMPLEXITY         TREATMENT:   (In addition to Assessment/Re-Assessment sessions the following treatments were rendered)     Pre-treatment Symptoms/Complaints:    Pain: Initial:   Pain Intensity 1: 0  Post Session:  0     Assessment/Reassessment only, no treatment provided today    Braces/Orthotics/Lines/Etc:   · O2 Device: Nasal cannula, Humidifier  Treatment/Session Assessment:    · Response to Treatment:  Fatigue  · Interdisciplinary Collaboration:   o Occupational Therapist  o Registered Nurse  · After treatment position/precautions:   o Up in chair  o Bed alarm/tab alert on  o Bed/Chair-wheels locked  o Call light within reach  o RN notified   · Compliance with Program/Exercises: Will assess as treatment progresses. · Recommendations/Intent for next treatment session: \"Next visit will focus on advancements to more challenging activities and reduction in assistance provided\".   Total Treatment Duration:  OT Patient Time In/Time Out  Time In: 4028  Time Out: [address] Radha Christine

## 2018-06-04 NOTE — PROGRESS NOTES
Problem: Falls - Risk of  Goal: *Absence of Falls  Document Toby Fall Risk and appropriate interventions in the flowsheet. Outcome: Progressing Towards Goal  Fall Risk Interventions:  Mobility Interventions: Assess mobility with egress test, OT consult for ADLs, Strengthening exercises (ROM-active/passive), Patient to call before getting OOB, PT Consult for mobility concerns, PT Consult for assist device competence, Communicate number of staff needed for ambulation/transfer         Medication Interventions: Evaluate medications/consider consulting pharmacy    Elimination Interventions: Patient to call for help with toileting needs, Call light in reach    History of Falls Interventions: Bed/chair exit alarm, Investigate reason for fall, Consult care management for discharge planning, Door open when patient unattended, Evaluate medications/consider consulting pharmacy        Problem: Pressure Injury - Risk of  Goal: *Prevention of pressure injury  Document Elvis Scale and appropriate interventions in the flowsheet.    Outcome: Progressing Towards Goal  Pressure Injury Interventions:  Sensory Interventions: Assess changes in LOC    Moisture Interventions: Absorbent underpads    Activity Interventions: Increase time out of bed, Pressure redistribution bed/mattress(bed type), PT/OT evaluation    Mobility Interventions: HOB 30 degrees or less, PT/OT evaluation, Pressure redistribution bed/mattress (bed type)    Nutrition Interventions: Document food/fluid/supplement intake, Discuss nutritional consult with provider, Offer support with meals,snacks and hydration    Friction and Shear Interventions: Apply protective barrier, creams and emollients

## 2018-06-04 NOTE — PROGRESS NOTES
Increased FIO2; pt complaining of SOB and trouble breathing; no wheezing; BS were clear; SpO2 of 93% on 5 L NC; pt seemed anxious; RN notified

## 2018-06-04 NOTE — PROGRESS NOTES
End of shift report given to Luis Landon RN. In bed, resting quietly, NAD. Pt safety maintained throughout shift.

## 2018-06-04 NOTE — PROGRESS NOTES
SpO2 88% on 5L HiFlo NC. Increased O2 to 8L NC. Coached again in pursed lip breathing. Pt reports Nitro was effective in relieving chest pain. SpO2 increased to 91%. Will continue to monitor.

## 2018-06-04 NOTE — PROGRESS NOTES
Problem: Mobility Impaired (Adult and Pediatric)  Goal: *Acute Goals and Plan of Care (Insert Text)  LTG:  (1.)Mr. Winifred Bruce will scoot up and down in bed with MINIMAL ASSIST within 7 treatment day(s). (2.)Mr. Winifred Bruce will transfer from bed to chair and chair to bed with STAND BY ASSIST using the least restrictive device within 7 treatment day(s). (3.)Mr. Winifred Bruce will ambulate with CONTACT GUARD ASSIST for 250 feet with the least restrictive device within 7 treatment day(s). ________________________________________________________________________________________________       PHYSICAL THERAPY: Daily Note, Treatment Day: 1st, PM 6/4/2018  INPATIENT: Hospital Day: 4  [place] / Product Type: Medicare /      NAME/AGE/GENDER: Janeen Salinas is a 77 y.o. male   PRIMARY DIAGNOSIS: CHF exacerbation ([facility] [address] CHF exacerbation (HCC) CHF exacerbation (HCC)        ICD-10: Treatment Diagnosis:    · Generalized Muscle Weakness (M62.81)  · Difficulty in walking, Not elsewhere classified (R26.2)   Precaution/Allergies:  Review of patient's allergies indicates no known allergies. ASSESSMENT:     Mr. Winifred Bruce is sitting up in the chair on 5L with sats in the mid 90's with a lap belt and happy to walk. He stood with CGA and was able to increase his gait distance some and was fatigued upon return. Some progress noted, would benefit from a rehab stay to increase his strength. sats in mid 80's upon return from walk on 5L. Increased quickly    This section established at most recent assessment   PROBLEM LIST (Impairments causing functional limitations):  1. Decreased Strength  2. Decreased Ambulation Ability/Technique  3. Decreased Balance  4. Decreased Activity Tolerance  5. Increased Fatigue  6. Increased Shortness of Breath   INTERVENTIONS PLANNED: (Benefits and precautions of physical therapy have been discussed with the patient.)  1. Balance Exercise  2. Family Education  3.  Gait Training  4. Neuromuscular Re-education/Strengthening  5. Range of Motion (ROM)  6. Therapeutic Activites  7. Therapeutic Exercise/Strengthening  8. Transfer Training  9. Group Therapy     TREATMENT PLAN: Frequency/Duration: 3 times a week for duration of hospital stay  Rehabilitation Potential For Stated Goals: Good     RECOMMENDED REHABILITATION/EQUIPMENT: (at time of discharge pending progress): Due to the probability of continued deficits (see above) this patient will likely need continued skilled physical therapy after discharge. Equipment:    None at this time              HISTORY:   History of Present Injury/Illness (Reason for Referral):  Patient is a 78/M with PMH AFIB, CAD with hx of chronic diastolic heart failure, COPD, IDDM, PUD, HTN, HLD who was sent here from his Pulmonologist's office for signs of heart failure exacerbation with CXR findings indicative of bilateral effusions and pulmonary edema.  Patient also reported worsening bilateral LE edema.  His BNP is elevated at 436.  He also complains of 3/10 chest pain and SOB.  Patient is on 4L continuous O2 at home. Pt was admitted and started on IV lasix with over 1 liter diuresed so far. Past Medical History/Comorbidities:   Mr. Duncan Chaparor  has a past medical history of Arrhythmia; Arthritis; CAD (coronary artery disease) (2009); Chronic pain; Diabetes ([address] (2009); GERD (gastroesophageal reflux disease); Heart failure ([address] Hyperlipidemia; Hypertension; Moderate aortic stenosis (echo 9/27/13); Psychiatric disorder; PUD (peptic ulcer disease) (1970's); and Unspecified sleep apnea. He also has no past medical history of Aneurysm ([address] Asthma; Autoimmune disease ([address] Cancer ([address] Chronic kidney disease; Chronic obstructive pulmonary disease ([address] Coagulation disorder ([address] Liver disease; Seizures ([address] Stroke [facility]); Thromboembolus ([address] or Thyroid disease.   Mr. Duncan Chaparro  has a past surgical history that includes pr cardiac surg procedure unlist (2009); pr abdomen surgery proc unlisted (2003); hx other surgical (2011); hx knee arthroscopy; hx orthopaedic (Right, 2002); hx heent (Bilateral, 2004); hx heent; hx prostatectomy (2011); hx gi; hx appendectomy; and hx heart valve surgery (2011). Social History/Living Environment:   Home Environment: Private residence  # Steps to Enter: 3  Wheelchair Ramp: Yes  One/Two Story Residence: Two story  # of Interior Steps: 14  Height of Each Step (in): 8 inches  Interior Rails: Both  Lift Chair Available: No  Living Alone: No (Wife is chronically ill)  Support Systems: Spouse/Significant Other/Partner, Friends \ neighbors  Patient Expects to be Discharged to[de-identified] Private residence  Current DME Used/Available at Home: [address], rolling, Shower chair, Oxygen, portable  Tub or Shower Type: Tub/Shower combination  Prior Level of Function/Work/Activity:  Independent      Number of Personal Factors/Comorbidities that affect the Plan of Care: 0: LOW COMPLEXITY   EXAMINATION:   Most Recent Physical Functioning:   Gross Assessment:                  Posture:     Balance:    Bed Mobility:     Wheelchair Mobility:     Transfers:  Sit to Stand: Contact guard assistance  Stand to Sit: Contact guard assistance  Gait:     Speed/Lidia: Shuffled; Slow  Step Length: Left shortened;Right shortened  Gait Abnormalities: Decreased step clearance;Shuffling gait; Steppage gait  Distance (ft): 100 Feet (ft)  Assistive Device: Walker, rolling  Ambulation - Level of Assistance: Contact guard assistance      Body Structures Involved:  1. Heart  2. Lungs  3. Joints  4. Muscles Body Functions Affected:  1. Cardio  2. Respiratory  3. Neuromusculoskeletal  4. Movement Related Activities and Participation Affected:  1. General Tasks and Demands  2. Mobility  3. Self Care  4. Domestic Life  5.  Community, Social and [place]   Number of elements that affect the Plan of Care: 4+: HIGH COMPLEXITY   CLINICAL PRESENTATION:   Presentation: Stable and uncomplicated: LOW COMPLEXITY   CLINICAL DECISION MAKIN[address] [place] [id number] AM-PAC 6 Clicks   Basic Mobility Inpatient Short Form  How much difficulty does the patient currently have. .. Unable A Lot A Little None   1. Turning over in bed (including adjusting bedclothes, sheets and blankets)? [] 1   [] 2   [] 3   [x] 4   2. Sitting down on and standing up from a chair with arms ( e.g., wheelchair, bedside commode, etc.)   [] 1   [] 2   [x] 3   [] 4   3. Moving from lying on back to sitting on the side of the bed? [] 1   [] 2   [x] 3   [] 4   How much help from another person does the patient currently need. .. Total A Lot A Little None   4. Moving to and from a bed to a chair (including a wheelchair)? [] 1   [] 2   [x] 3   [] 4   5. Need to walk in hospital room? [] 1   [] 2   [x] 3   [] 4   6. Climbing 3-5 steps with a railing? [] 1   [x] 2   [] 3   [] 4   © 2007, Trustees of [address] [place] [address] [address] [address], under license to [employer]. All rights reserved      Score:  Initial: 18 Most Recent: X (Date: -- )    Interpretation of Tool:  Represents activities that are increasingly more difficult (i.e. Bed mobility, Transfers, Gait). Score 24 23 22-20 19-15 14-10 9-7 6     Modifier CH CI CJ CK CL CM CN      ? Mobility - Walking and Moving Around:     - CURRENT STATUS: CK - 40%-59% impaired, limited or restricted    - GOAL STATUS: CJ - 20%-39% impaired, limited or restricted    - D/C STATUS:  ---------------To be determined---------------  [place] / Product Type: Medicare /      Medical Necessity:     · Patient demonstrates good rehab potential due to higher previous functional level. Reason for Services/Other Comments:  · Patient continues to require modification of therapeutic interventions to increase complexity of exercises.    Use of outcome tool(s) and clinical judgement create a POC that gives a: Clear prediction of patient's progress: LOW COMPLEXITY            TREATMENT:   (In addition to Assessment/Re-Assessment sessions the following treatments were rendered)   Pre-treatment Symptoms/Complaints:  \"I need help to fix my wife's back. \"  Pain: Initial: No number reported  Pain Intensity 1: 0  Post Session:  No number reported      Therapeutic Activity: (    15 minutes): Therapeutic activities including chair transfers and Ambulation on level ground to improve mobility, strength, balance and coordination. Required moderate verbal, visual, manual, and tactile cutes   to promote static and dynamic balance in standing. Braces/Orthotics/Lines/Etc:   · O2 Device: Nasal cannula  Treatment/Session Assessment:    · Response to Treatment:  Some decrease in sats  · Interdisciplinary Collaboration:   o Physical Therapy Assistant  o Registered Nurse  · After treatment position/precautions:   o Up in chair  o Bed alarm/tab alert on  o Bed/Chair-wheels locked  o Call light within reach  o RN notified   · Compliance with Program/Exercises: good  · Recommendations/Intent for next treatment session: \"Next visit will focus on advancements to more challenging activities and reduction in assistance provided\".   Total Treatment Duration:  PT Patient Time In/Time Out  Time In: 1420  Time Out: 1435    Vicente Vázquez PTA

## 2018-06-04 NOTE — PROGRESS NOTES
Warfarin Dosing - Pharmacy Consult    Paul Cid is a 77 y.o. male. Height: 5' 8\" (172.7 cm)    Weight: 102.8 kg (226 lb 9.6 oz)    Indication:  afib    Goal INR:  2-3    Home dose:  2.5 mg Tue; 5 mg all other days    Risk factors or significant drug interactions:  PTA med amiodarone has been stopped this admission--now removed from PTA medlist    Other anticoagulants:  Enoxaparin bridge    Daily Monitoring  Date  INR     Warfarin dose HGB              Notes  6/1  ---  5 mg  14.2  6/2  1.6  5 mg  12.5  6/3  1.8  6 mg  --   6/4  1.9  6 mg     Pharmacy consulted to manage warfarin for Mr. Obinna Ventura during this admission. INR trending up today. Anticipate that a higher dose of warfarin will eventually be needed in setting of amiodarone being discontinued. Based on amiodarone half-life elimination, likely will not see full effect of stopping medication during this admission, and outpatient follow up with dose adjustment will be necessary. Daily INR. Continue enoxaparin bridge while INR is subtherapeutic. Pharmacy will continue to follow. Please call with any questions. Thank you,  Jodee Nielsen.  Angelica Dotson, PharmD  Clinical Pharmacist  [phone]

## 2018-06-05 LAB
ANION GAP SERPL CALC-SCNC: 13 MMOL/L (ref 7–16)
BASOPHILS # BLD: 0.1 K/UL (ref 0–0.2)
BASOPHILS NFR BLD: 1 % (ref 0–2)
BUN SERPL-MCNC: 15 MG/DL (ref 8–23)
CALCIUM SERPL-MCNC: 9 MG/DL (ref 8.3–10.4)
CHLORIDE SERPL-SCNC: 94 MMOL/L (ref 98–107)
CO2 SERPL-SCNC: 38 MMOL/L (ref 21–32)
CREAT SERPL-MCNC: 0.76 MG/DL (ref 0.8–1.5)
DIFFERENTIAL METHOD BLD: ABNORMAL
EOSINOPHIL # BLD: 0.3 K/UL (ref 0–0.8)
EOSINOPHIL NFR BLD: 2 % (ref 0.5–7.8)
ERYTHROCYTE [DISTWIDTH] IN BLOOD BY AUTOMATED COUNT: 15.6 % (ref 11.9–14.6)
GLUCOSE BLD STRIP.AUTO-MCNC: 112 MG/DL (ref 65–100)
GLUCOSE BLD STRIP.AUTO-MCNC: 156 MG/DL (ref 65–100)
GLUCOSE BLD STRIP.AUTO-MCNC: 158 MG/DL (ref 65–100)
GLUCOSE BLD STRIP.AUTO-MCNC: 89 MG/DL (ref 65–100)
GLUCOSE SERPL-MCNC: 86 MG/DL (ref 65–100)
HCT VFR BLD AUTO: 43.4 % (ref 41.1–50.3)
HGB BLD-MCNC: 13.4 G/DL (ref 13.6–17.2)
IMM GRANULOCYTES # BLD: 0.1 K/UL (ref 0–0.5)
IMM GRANULOCYTES NFR BLD AUTO: 0 % (ref 0–5)
INR PPP: 2.1
LYMPHOCYTES # BLD: 1.6 K/UL (ref 0.5–4.6)
LYMPHOCYTES NFR BLD: 14 % (ref 13–44)
MCH RBC QN AUTO: 27 PG (ref 26.1–32.9)
MCHC RBC AUTO-ENTMCNC: 30.9 G/DL (ref 31.4–35)
MCV RBC AUTO: 87.3 FL (ref 79.6–97.8)
MM INDURATION POC: NORMAL MM (ref 0–5)
MONOCYTES # BLD: 0.8 K/UL (ref 0.1–1.3)
MONOCYTES NFR BLD: 7 % (ref 4–12)
NEUTS SEG # BLD: 9 K/UL (ref 1.7–8.2)
NEUTS SEG NFR BLD: 76 % (ref 43–78)
PHOSPHATE SERPL-MCNC: 2.1 MG/DL (ref 2.3–3.7)
PLATELET # BLD AUTO: 285 K/UL (ref 150–450)
PMV BLD AUTO: 12.2 FL (ref 10.8–14.1)
POTASSIUM SERPL-SCNC: 3.3 MMOL/L (ref 3.5–5.1)
PPD POC: NORMAL NEGATIVE
PROTHROMBIN TIME: 23.5 SEC (ref 11.5–14.5)
RBC # BLD AUTO: 4.97 M/UL (ref 4.23–5.67)
SODIUM SERPL-SCNC: 145 MMOL/L (ref 136–145)
WBC # BLD AUTO: 11.7 K/UL (ref 4.3–11.1)

## 2018-06-05 PROCEDURE — 74011250637 HC RX REV CODE- 250/637: Performed by: INTERNAL MEDICINE

## 2018-06-05 PROCEDURE — 74011250637 HC RX REV CODE- 250/637: Performed by: HOSPITALIST

## 2018-06-05 PROCEDURE — 85025 COMPLETE CBC W/AUTO DIFF WBC: CPT | Performed by: HOSPITALIST

## 2018-06-05 PROCEDURE — 74011636637 HC RX REV CODE- 636/637: Performed by: INTERNAL MEDICINE

## 2018-06-05 PROCEDURE — 94640 AIRWAY INHALATION TREATMENT: CPT

## 2018-06-05 PROCEDURE — 97530 THERAPEUTIC ACTIVITIES: CPT

## 2018-06-05 PROCEDURE — 74011250636 HC RX REV CODE- 250/636: Performed by: INTERNAL MEDICINE

## 2018-06-05 PROCEDURE — 84100 ASSAY OF PHOSPHORUS: CPT | Performed by: HOSPITALIST

## 2018-06-05 PROCEDURE — 97110 THERAPEUTIC EXERCISES: CPT

## 2018-06-05 PROCEDURE — 74011000250 HC RX REV CODE- 250: Performed by: INTERNAL MEDICINE

## 2018-06-05 PROCEDURE — 85610 PROTHROMBIN TIME: CPT | Performed by: HOSPITALIST

## 2018-06-05 PROCEDURE — 80048 BASIC METABOLIC PNL TOTAL CA: CPT | Performed by: HOSPITALIST

## 2018-06-05 PROCEDURE — 82962 GLUCOSE BLOOD TEST: CPT

## 2018-06-05 PROCEDURE — 77010033678 HC OXYGEN DAILY

## 2018-06-05 PROCEDURE — 36415 COLL VENOUS BLD VENIPUNCTURE: CPT | Performed by: HOSPITALIST

## 2018-06-05 PROCEDURE — 77030020263 HC SOL INJ SOD CL0.9% LFCR 1000ML

## 2018-06-05 PROCEDURE — 65660000000 HC RM CCU STEPDOWN

## 2018-06-05 PROCEDURE — 94760 N-INVAS EAR/PLS OXIMETRY 1: CPT

## 2018-06-05 RX ORDER — ONDANSETRON 2 MG/ML
6 INJECTION INTRAMUSCULAR; INTRAVENOUS
Status: DISCONTINUED | OUTPATIENT
Start: 2018-06-05 | End: 2018-06-06 | Stop reason: HOSPADM

## 2018-06-05 RX ORDER — POTASSIUM CHLORIDE 14.9 MG/ML
20 INJECTION INTRAVENOUS
Status: ACTIVE | OUTPATIENT
Start: 2018-06-05 | End: 2018-06-05

## 2018-06-05 RX ORDER — ACETAMINOPHEN 500 MG
500 TABLET ORAL
Status: DISCONTINUED | OUTPATIENT
Start: 2018-06-05 | End: 2018-06-06 | Stop reason: HOSPADM

## 2018-06-05 RX ORDER — POTASSIUM CHLORIDE 20 MEQ/1
40 TABLET, EXTENDED RELEASE ORAL
Status: COMPLETED | OUTPATIENT
Start: 2018-06-05 | End: 2018-06-05

## 2018-06-05 RX ORDER — POTASSIUM CHLORIDE 14.9 MG/ML
20 INJECTION INTRAVENOUS
Status: COMPLETED | OUTPATIENT
Start: 2018-06-05 | End: 2018-06-06

## 2018-06-05 RX ORDER — BUMETANIDE 0.25 MG/ML
2 INJECTION INTRAMUSCULAR; INTRAVENOUS 2 TIMES DAILY
Status: DISCONTINUED | OUTPATIENT
Start: 2018-06-05 | End: 2018-06-06

## 2018-06-05 RX ADMIN — ALBUTEROL SULFATE 2.5 MG: 2.5 SOLUTION RESPIRATORY (INHALATION) at 13:19

## 2018-06-05 RX ADMIN — OXYCODONE HYDROCHLORIDE AND ACETAMINOPHEN 500 MG: 500 TABLET ORAL at 16:52

## 2018-06-05 RX ADMIN — METOLAZONE 5 MG: 5 TABLET ORAL at 10:08

## 2018-06-05 RX ADMIN — POTASSIUM CHLORIDE 20 MEQ: 200 INJECTION, SOLUTION INTRAVENOUS at 22:32

## 2018-06-05 RX ADMIN — BACLOFEN 10 MG: 10 TABLET ORAL at 22:33

## 2018-06-05 RX ADMIN — ALBUTEROL SULFATE 2.5 MG: 2.5 SOLUTION RESPIRATORY (INHALATION) at 07:16

## 2018-06-05 RX ADMIN — BUDESONIDE 500 MCG: 0.5 INHALANT RESPIRATORY (INHALATION) at 19:20

## 2018-06-05 RX ADMIN — MEMANTINE HYDROCHLORIDE 10 MG: 5 TABLET ORAL at 16:52

## 2018-06-05 RX ADMIN — POTASSIUM CHLORIDE 20 MEQ: 1500 TABLET, EXTENDED RELEASE ORAL at 10:14

## 2018-06-05 RX ADMIN — ATORVASTATIN CALCIUM 20 MG: 10 TABLET, FILM COATED ORAL at 22:33

## 2018-06-05 RX ADMIN — PANTOPRAZOLE SODIUM 40 MG: 40 TABLET, DELAYED RELEASE ORAL at 05:55

## 2018-06-05 RX ADMIN — SERTRALINE HYDROCHLORIDE 75 MG: 50 TABLET ORAL at 10:14

## 2018-06-05 RX ADMIN — CARVEDILOL 12.5 MG: 12.5 TABLET, FILM COATED ORAL at 10:07

## 2018-06-05 RX ADMIN — ASPIRIN 81 MG: 81 TABLET, COATED ORAL at 10:06

## 2018-06-05 RX ADMIN — CARVEDILOL 12.5 MG: 12.5 TABLET, FILM COATED ORAL at 16:52

## 2018-06-05 RX ADMIN — LISINOPRIL 40 MG: 20 TABLET ORAL at 10:14

## 2018-06-05 RX ADMIN — ALBUTEROL SULFATE 2.5 MG: 2.5 SOLUTION RESPIRATORY (INHALATION) at 19:20

## 2018-06-05 RX ADMIN — LEVOTHYROXINE SODIUM 75 MCG: 75 TABLET ORAL at 05:55

## 2018-06-05 RX ADMIN — OXYCODONE HYDROCHLORIDE AND ACETAMINOPHEN 500 MG: 500 TABLET ORAL at 10:05

## 2018-06-05 RX ADMIN — BACLOFEN 10 MG: 10 TABLET ORAL at 16:52

## 2018-06-05 RX ADMIN — MEMANTINE HYDROCHLORIDE 10 MG: 5 TABLET ORAL at 10:13

## 2018-06-05 RX ADMIN — WARFARIN SODIUM 6 MG: 5 TABLET ORAL at 16:52

## 2018-06-05 RX ADMIN — HYDRALAZINE HYDROCHLORIDE 10 MG: 10 TABLET, FILM COATED ORAL at 16:52

## 2018-06-05 RX ADMIN — HYDRALAZINE HYDROCHLORIDE 10 MG: 10 TABLET, FILM COATED ORAL at 10:19

## 2018-06-05 RX ADMIN — HYDRALAZINE HYDROCHLORIDE 10 MG: 10 TABLET, FILM COATED ORAL at 22:33

## 2018-06-05 RX ADMIN — FUROSEMIDE 100 MG: 40 TABLET ORAL at 16:52

## 2018-06-05 RX ADMIN — POTASSIUM CHLORIDE 40 MEQ: 1500 TABLET, EXTENDED RELEASE ORAL at 18:16

## 2018-06-05 RX ADMIN — BACLOFEN 10 MG: 10 TABLET ORAL at 10:07

## 2018-06-05 RX ADMIN — BUDESONIDE 500 MCG: 0.5 INHALANT RESPIRATORY (INHALATION) at 07:16

## 2018-06-05 RX ADMIN — INSULIN LISPRO 2 UNITS: 100 INJECTION, SOLUTION INTRAVENOUS; SUBCUTANEOUS at 22:33

## 2018-06-05 RX ADMIN — FUROSEMIDE 100 MG: 40 TABLET ORAL at 10:09

## 2018-06-05 RX ADMIN — POTASSIUM CHLORIDE 20 MEQ: 200 INJECTION, SOLUTION INTRAVENOUS at 18:16

## 2018-06-05 RX ADMIN — BUMETANIDE 2 MG: 0.25 INJECTION INTRAMUSCULAR; INTRAVENOUS at 22:32

## 2018-06-05 NOTE — PROGRESS NOTES
Problem: Mobility Impaired (Adult and Pediatric)  Goal: *Acute Goals and Plan of Care (Insert Text)  LTG:  (1.)Mr. Jayce Templeton will scoot up and down in bed with MINIMAL ASSIST within 7 treatment day(s). (2.)Mr. Jayce Templeton will transfer from bed to chair and chair to bed with STAND BY ASSIST using the least restrictive device within 7 treatment day(s). (3.)Mr. Jayce Templeton will ambulate with CONTACT GUARD ASSIST for 250 feet with the least restrictive device within 7 treatment day(s). ________________________________________________________________________________________________       PHYSICAL THERAPY: Daily Note, Treatment Day: 2nd, PM 6/5/2018  INPATIENT: Hospital Day: 5  [place] / [place] / Product Type: Medicare /      NAME/AGE/GENDER: Elin Severin is a 77 y.o. male   PRIMARY DIAGNOSIS: CHF exacerbation ([facility] Acute diastolic heart failure (HCC) Acute diastolic heart failure (HCC)        ICD-10: Treatment Diagnosis:    · Generalized Muscle Weakness (M62.81)  · Difficulty in walking, Not elsewhere classified (R26.2)   Precaution/Allergies:  Review of patient's allergies indicates no known allergies. ASSESSMENT:     Mr. Jayce Templeton is sitting up in bedside chair upon contact and agreeable to PT treatment this afternoon. Pt requested to use bathroom. Pt performed STS with CGA-Cici to RW with VC for ease and safety of transfer. Pt ambulated 8 ft to bathroom with RW and CGA. Pt required Cici for walker management into bathroom secondary to it being such a small space. Pt voided and required total A from PT for toilet hygiene. Pt requested to return to bed. Pt ambulated another 8 ft with RW and CGA. Pt ambulates with narrowed ANABELLA and shuffling gait pattern. Pt required VC again for walker management and processing through mobility and safety. Pt stood at EOB to allow PT to francis clean brief.  Pt returned to sitting and performed exercises and then assisted back to supine requiring min-modA. Pt continued performing exercises while in supine and tolerate well. Pt with O2 sat at 97% following activity on O2 NC. Pt left supine in bed with all needs met and within reach. Pt is making slow steady progress towards goals. Will continue efforts. This section established at most recent assessment   PROBLEM LIST (Impairments causing functional limitations):  1. Decreased Strength  2. Decreased Ambulation Ability/Technique  3. Decreased Balance  4. Decreased Activity Tolerance  5. Increased Fatigue  6. Increased Shortness of Breath   INTERVENTIONS PLANNED: (Benefits and precautions of physical therapy have been discussed with the patient.)  1. Balance Exercise  2. Family Education  3. Gait Training  4. Neuromuscular Re-education/Strengthening  5. Range of Motion (ROM)  6. Therapeutic Activites  7. Therapeutic Exercise/Strengthening  8. Transfer Training  9. Group Therapy     TREATMENT PLAN: Frequency/Duration: 3 times a week for duration of hospital stay  Rehabilitation Potential For Stated Goals: Good     RECOMMENDED REHABILITATION/EQUIPMENT: (at time of discharge pending progress): Due to the probability of continued deficits (see above) this patient will likely need continued skilled physical therapy after discharge. Equipment:    None at this time              HISTORY:   History of Present Injury/Illness (Reason for Referral):  Patient is a 78/M with PMH AFIB, CAD with hx of chronic diastolic heart failure, COPD, IDDM, PUD, HTN, HLD who was sent here from his Pulmonologist's office for signs of heart failure exacerbation with CXR findings indicative of bilateral effusions and pulmonary edema.  Patient also reported worsening bilateral LE edema.  His BNP is elevated at 436.  He also complains of 3/10 chest pain and SOB.  Patient is on 4L continuous O2 at home. Pt was admitted and started on IV lasix with over 1 liter diuresed so far.   Past Medical History/Comorbidities:   Mr. Celena Hilton  has a past medical history of Arrhythmia; Arthritis; CAD (coronary artery disease) (2009); Chronic pain; Diabetes ([address] (2009); GERD (gastroesophageal reflux disease); Heart failure ([address] Hyperlipidemia; Hypertension; Moderate aortic stenosis (echo 9/27/13); Psychiatric disorder; PUD (peptic ulcer disease) (1970's); and Unspecified sleep apnea. He also has no past medical history of Aneurysm ([address] Asthma; Autoimmune disease ([address] Cancer ([address] Chronic kidney disease; Chronic obstructive pulmonary disease ([address] Coagulation disorder ([facility] [facility] Liver disease; Seizures ([facility] [address] Stroke [facility]); Thromboembolus ([facility] [address] or Thyroid disease. Mr. Yao Heller  has a past surgical history that includes pr cardiac surg procedure unlist (2009); pr abdomen surgery proc unlisted (2003); hx other surgical (2011); hx knee arthroscopy; hx orthopaedic (Right, 2002); hx heent (Bilateral, 2004); hx heent; hx prostatectomy (2011); hx gi; hx appendectomy; and hx heart valve surgery (2011).   Social History/Living Environment:   Home Environment: Private residence  # Steps to Enter: 3  Wheelchair Ramp: Yes  One/Two Story Residence: Two story  # of Interior Steps: 14  Height of Each Step (in): 8 inches  Interior Rails: Both  Lift Chair Available: No  Living Alone: No (Wife is chronically ill)  Support Systems: Spouse/Significant Other/Partner  Patient Expects to be Discharged to[de-identified] Private residence  Current DME Used/Available at Home: Amedeo Grave, rolling, Grab bars, Shower chair  Tub or Shower Type: Tub/Shower combination  Prior Level of Function/Work/Activity:  Independent      Number of Personal Factors/Comorbidities that affect the Plan of Care: 0: LOW COMPLEXITY   EXAMINATION:   Most Recent Physical Functioning:   Gross Assessment:                  Posture:     Balance:  Standing - Static: Fair;Constant support  Standing - Dynamic : Fair Bed Mobility:  Sit to Supine: Minimum assistance  Wheelchair Mobility:     Transfers:  Sit to Stand: Contact guard assistance;Minimum assistance  Stand to Sit: Contact guard assistance;Minimum assistance  Gait:     Base of Support: Narrowed  Speed/Lidia: Shuffled; Slow  Step Length: Left shortened;Right shortened  Gait Abnormalities: Decreased step clearance;Shuffling gait  Distance (ft): 8 Feet (ft) (X2)  Assistive Device: Walker, rolling  Ambulation - Level of Assistance: Contact guard assistance  Interventions: Safety awareness training; Tactile cues; Verbal cues      Body Structures Involved:  1. Heart  2. Lungs  3. Joints  4. Muscles Body Functions Affected:  1. Cardio  2. Respiratory  3. Neuromusculoskeletal  4. Movement Related Activities and Participation Affected:  1. General Tasks and Demands  2. Mobility  3. Self Care  4. Domestic Life  5. Community, Social and [place]   Number of elements that affect the Plan of Care: 4+: HIGH COMPLEXITY   CLINICAL PRESENTATION:   Presentation: Stable and uncomplicated: LOW COMPLEXITY   CLINICAL DECISION MAKIN [facility] Mobility Inpatient Short Form  How much difficulty does the patient currently have. .. Unable A Lot A Little None   1. Turning over in bed (including adjusting bedclothes, sheets and blankets)? [] 1   [] 2   [] 3   [x] 4   2. Sitting down on and standing up from a chair with arms ( e.g., wheelchair, bedside commode, etc.)   [] 1   [] 2   [x] 3   [] 4   3. Moving from lying on back to sitting on the side of the bed? [] 1   [] 2   [x] 3   [] 4   How much help from another person does the patient currently need. .. Total A Lot A Little None   4. Moving to and from a bed to a chair (including a wheelchair)? [] 1   [] 2   [x] 3   [] 4   5. Need to walk in hospital room? [] 1   [] 2   [x] 3   [] 4   6. Climbing 3-5 steps with a railing? [] 1   [x] 2   [] 3   [] 4   © , Trustees of [address] [place] [address] [address] [id number], under license to Jaco Solarsi.  All rights reserved      Score:  Initial: 18 Most Recent: X (Date: -- ) Interpretation of Tool:  Represents activities that are increasingly more difficult (i.e. Bed mobility, Transfers, Gait). Score 24 23 22-20 19-15 14-10 9-7 6     Modifier CH CI CJ CK CL CM CN      ? Mobility - Walking and Moving Around:     - CURRENT STATUS: CK - 40%-59% impaired, limited or restricted    - GOAL STATUS: CJ - 20%-39% impaired, limited or restricted    - D/C STATUS:  ---------------To be determined---------------  [place] / [place] / Product Type: Medicare /      Medical Necessity:     · Patient demonstrates good rehab potential due to higher previous functional level. Reason for Services/Other Comments:  · Patient continues to require modification of therapeutic interventions to increase complexity of exercises. Use of outcome tool(s) and clinical judgement create a POC that gives a: Clear prediction of patient's progress: LOW COMPLEXITY            TREATMENT:   (In addition to Assessment/Re-Assessment sessions the following treatments were rendered)   Pre-treatment Symptoms/Complaints: \"I need to use bathroom\"  Pain: Initial:   Pain Intensity 1: 0  Post Session:  0/10, fatigued     Therapeutic Activity: (    14 minutes): Therapeutic activities including chair transfers and Ambulation on level ground,and toilet transfers to improve mobility, strength, balance and coordination. Required moderate verbal, visual, manual, and tactile cutes Safety awareness training; Tactile cues; Verbal cues to promote static and dynamic balance in standing. Therapeutic Exercise: (  10 minutes):  Exercises per grid below to improve mobility and strength. Required minimal visual, verbal and tactile cues to promote proper body alignment, promote proper body posture and promote proper body mechanics. Progressed range, repetitions and complexity of movement as indicated.      Date:  6/5/18 Date:   Date:     Activity/Exercise Parameters Parameters Parameters   LAQ 10 X B      Alt marches 10 X B      Ankle pumps 10 X B      Quad set 10 X B      Glute set 10 X      Heel slides 10 X B      Hip abduction 10 X B           Braces/Orthotics/Lines/Etc:   · O2 Device: Nasal cannula  Treatment/Session Assessment:    · Response to Treatment:  Ambulated 8 ft X 2 with RW and CGA  · Interdisciplinary Collaboration:   o Physical Therapist  o Registered Nurse  · After treatment position/precautions:   o Supine in bed  o Bed/Chair-wheels locked  o Bed in low position  o Call light within reach   · Compliance with Program/Exercises: good  · Recommendations/Intent for next treatment session: \"Next visit will focus on advancements to more challenging activities and reduction in assistance provided\".   Total Treatment Duration:  PT Patient Time In/Time Out  Time In: 8311  Time Out: Mathew

## 2018-06-05 NOTE — PROGRESS NOTES
Received report from Gardenia Cheng RN. Patient resting in bed. RR present and chest expansion symmetrical.  AxO x4. S1 and S2 noted. Radial and pedal pulses noted. AxO x3. Bed low, locked, and side rails x3. Call light within reach. Will continue to monitor.

## 2018-06-05 NOTE — ROUTINE PROCESS
CHF started to pt/spouse via phone (mail box full).   Aware of CHF dx. emphasis to report worsening dyspnea,/Planner @ BS and scale @ home, will follow: 10 mins  Serena Valente Spouse [phone]       FR  Palliative are: RATT 18, none

## 2018-06-05 NOTE — PROGRESS NOTES
Patient resting well in bed. Patient slept throughout the night. RR present and chest expansion symmetrical.  Bed low, locked, and side rails x2. Call light within reach. Gave report to on-coming RNLeni.

## 2018-06-05 NOTE — PROGRESS NOTES
Patient accepted for short-term rehab at [facility] when ready for discharge. Case Management will continue to follow.     Care Management Interventions  Transition of Care Consult (CM Consult): Discharge Planning  Discharge Durable Medical Equipment: No  Physical Therapy Consult: Yes  Occupational Therapy Consult: Yes  Speech Therapy Consult: No  Current Support Network: Lives with Spouse, Own Home  Confirm Follow Up Transport: Family  Plan discussed with Pt/Family/Caregiver: Yes  Freedom of Choice Offered: Yes  Discharge Location  Discharge Placement: Rehab Unit Subacute

## 2018-06-05 NOTE — PROGRESS NOTES
Met with patient regarding discharge planning. At baseline, patient lives with his wife in their own home. He is independent in all ADLs and is ordinarily the caregiver for his wife, who has a BKA and needs assistance. After discussion, patient states that he recognizes that he is weaker than his baseline and he is interested in going to short-term rehab prior to return home. Initially, patient requested [facility], but after speaking with his wife, patient would now like to request placement at [facility] for short-term rehab. Bed request placed. Case Management will continue to follow.     Care Management Interventions  Transition of Care Consult (CM Consult): Discharge Planning  Discharge Durable Medical Equipment: No  Physical Therapy Consult: Yes  Occupational Therapy Consult: Yes  Speech Therapy Consult: No  Current Support Network: Lives with Spouse, Own Home  Confirm Follow Up Transport: Family  Plan discussed with Pt/Family/Caregiver: Yes  Freedom of Choice Offered: Yes  Discharge Location  Discharge Placement: Rehab Unit Subacute

## 2018-06-05 NOTE — PROGRESS NOTES
Warfarin Dosing - Pharmacy Consult    Cass Fabian is a 77 y.o. male. Height: 5' 8\" (172.7 cm)    Weight: 104.8 kg (231 lb 1.6 oz)    Indication:  afib    Goal INR:  2-3    Home dose:  2.5 mg Tue; 5 mg all other days    Risk factors or significant drug interactions:  PTA med amiodarone has been stopped this admission--now removed from PTA medlist    Other anticoagulants:  Enoxaparin bridge (stopped 6/5)    Daily Monitoring  Date  INR     Warfarin dose HGB              Notes  6/1  ---  5 mg  14.2  6/2  1.6  5 mg  12.5  6/3  1.8  6 mg  --   6/4  1.9  6 mg   6/5  2.1  6 mg  Pharmacy consulted to manage warfarin for Mr. Tricia Bonilla during this admission. INR trending up today. Anticipate that a higher dose of warfarin will eventually be needed in setting of amiodarone being discontinued. Based on amiodarone half-life elimination, likely will not see full effect of stopping medication during this admission, and outpatient follow up with dose adjustment will be necessary. INR therapeutic today. Will discontinue Lovenox (discussed with SHARLA Singh)  Continue warfarin 6 mg nightly. Pharmacy will continue to follow. Please call with any questions.      Thank you,  Shaheed Garcia, PharmD

## 2018-06-05 NOTE — PROGRESS NOTES
Patient voices no concerns at this time. Patient is relaxing in recliner with no complaints at this time. Patients wife called and has been updated on plan by CM. Call light within reach and patient instructed to call if assistance is needed. Will continue to monitor.

## 2018-06-05 NOTE — PROGRESS NOTES
Patient stable with no complaints at this time. Patient has sat in recliner for most of the day and tolerated it very well. Patient is now in bed resting watching TV. Call light within reach and patient instructed to call if assistance is needed.   Report to be given to oncoming RN 7p-7a

## 2018-06-05 NOTE — PROGRESS NOTES
Problem: Interdisciplinary Rounds  Goal: Interdisciplinary Rounds  Interdisciplinary team rounds were held 6/5/2018 with the following team members:Care Management, Physical Therapy, Physician and Clinical Coordinator and the patient. Plan of care discussed. See clinical pathway and/or care plan for interventions and desired outcomes.

## 2018-06-05 NOTE — PROGRESS NOTES
Hospitalist Progress Note    Subjective:   Daily Progress Note: 6/5/2018 10:45 AM    Mr. Rockey Cranker is a 76 yo WM, with a pmh of CoPD, CAD, atrial fibrillation and chronic diastolic chf, who presented 6/1 from outpatient pulmonary office for shortness of breath, pulmonary edema and lower extremity edema and is being treated for an acute diastolic chf exacerbation. Cardiology following. Lasix increased to 60 mg IV BID on admission and metolazone continued. Cardiac enzymes negative. On 6 liters NC initially and wears 4L NC at baseline, currently 95% on 5L. Amiodarone stopped this admission by cards as he is still in afib and thus rate control the goal. Has been working with PT, is very weak and cannot ambulate without assistance. He endorses orthopnea, TORRE but states he is feeling much better from a few days ago. No current chest pain or sob at rest, still with TORRE.     Current Facility-Administered Medications   Medication Dose Route Frequency    potassium chloride 20 mEq in 100 ml IVPB  20 mEq IntraVENous Q2H    acetaminophen (TYLENOL) tablet 500 mg  500 mg Oral Q6H PRN    furosemide (LASIX) tablet 100 mg  100 mg Oral ACB&D    ondansetron (ZOFRAN) injection 6 mg  6 mg IntraVENous Q6H PRN    metOLazone (ZAROXOLYN) tablet 5 mg  5 mg Oral DAILY    enoxaparin (LOVENOX) injection 100 mg  100 mg SubCUTAneous Q12H    warfarin (COUMADIN) tablet 6 mg - Rx dosing  6 mg Oral QPM    potassium chloride (K-DUR, KLOR-CON) SR tablet 20 mEq  20 mEq Oral DAILY    insulin lispro (HUMALOG) injection   SubCUTAneous AC&HS    albuterol (PROVENTIL HFA, VENTOLIN HFA, PROAIR HFA) inhaler 1 Puff  1 Puff Inhalation Q4H PRN    ascorbic acid (vitamin C) (VITAMIN C) tablet 500 mg  500 mg Oral BID    aspirin delayed-release tablet 81 mg  81 mg Oral DAILY    atorvastatin (LIPITOR) tablet 20 mg  20 mg Oral QHS    baclofen (LIORESAL) tablet 10 mg  10 mg Oral TID    carvedilol (COREG) tablet 12.5 mg  12.5 mg Oral BID    hydrALAZINE (APRESOLINE) tablet 10 mg  10 mg Oral TID    levothyroxine (SYNTHROID) tablet 75 mcg  75 mcg Oral ACB    lisinopril (PRINIVIL, ZESTRIL) tablet 40 mg  40 mg Oral DAILY    memantine (NAMENDA) tablet 10 mg  10 mg Oral BID    nitroglycerin (NITROSTAT) tablet 0.4 mg  0.4 mg SubLINGual Q5MIN PRN    pantoprazole (PROTONIX) tablet 40 mg  40 mg Oral ACB    sertraline (ZOLOFT) tablet 75 mg  75 mg Oral DAILY    budesonide (PULMICORT) 500 mcg/2 ml nebulizer suspension  500 mcg Nebulization BID RT    And    albuterol (PROVENTIL VENTOLIN) nebulizer solution 2.5 mg  2.5 mg Nebulization Q6HWA RT        Review of Systems  A comprehensive review of systems was negative except for that written in the HPI.     Objective:     Visit Vitals    /84    Pulse 83    Temp 97.3 °F (36.3 °C)    Resp 18    Ht 5' 8\" (1.727 m)    Wt 104.8 kg (231 lb 1.6 oz)    SpO2 95%    BMI 35.14 kg/m2    O2 Flow Rate (L/min): 5 l/min O2 Device: Nasal cannula    Temp (24hrs), Av.1 °F (36.7 °C), Min:97.3 °F (36.3 °C), Max:98.4 °F (36.9 °C)      701 -  190  In: 118 [P.O.:118]  Out: -    190 -  0700  In: 2080 [P.O.:1080; I.V.:1000]  Out: 500 [Urine:500]    General: awake, alert, oriented  Eyes; non icteric, EOMI  Neck; supple  CV: IRIR, normal rate  Pulm; diminished in bases, no wheezing nor crackles  Abd; soft, non tender  Ext: warm, dry, no obvious rashes nor lesions    Additional comments:I reviewed the patient's new clinical lab test results. j    Data Review    Recent Results (from the past 24 hour(s))   GLUCOSE, POC    Collection Time: 18 11:25 AM   Result Value Ref Range    Glucose (POC) 163 (H) 65 - 100 mg/dL   PLEASE READ & DOCUMENT PPD TEST IN 48 HRS    Collection Time: 18  2:22 PM   Result Value Ref Range    PPD  0 Negative    mm Induration  0 mm   GLUCOSE, POC    Collection Time: 18  3:35 PM   Result Value Ref Range    Glucose (POC) 113 (H) 65 - 100 mg/dL   GLUCOSE, POC    Collection Time: 06/04/18  8:38 PM   Result Value Ref Range    Glucose (POC) 154 (H) 65 - 100 mg/dL   GLUCOSE, POC    Collection Time: 06/05/18  5:08 AM   Result Value Ref Range    Glucose (POC) 89 65 - 649 mg/dL   METABOLIC PANEL, BASIC    Collection Time: 06/05/18  6:15 AM   Result Value Ref Range    Sodium 145 136 - 145 mmol/L    Potassium 3.3 (L) 3.5 - 5.1 mmol/L    Chloride 94 (L) 98 - 107 mmol/L    CO2 38 (H) 21 - 32 mmol/L    Anion gap 13 7 - 16 mmol/L    Glucose 86 65 - 100 mg/dL    BUN 15 8 - 23 MG/DL    Creatinine 0.76 (L) 0.8 - 1.5 MG/DL    GFR est AA >60 >60 ml/min/1.73m2    GFR est non-AA >60 >60 ml/min/1.73m2    Calcium 9.0 8.3 - 10.4 MG/DL   PROTHROMBIN TIME + INR    Collection Time: 06/05/18  6:15 AM   Result Value Ref Range    Prothrombin time 23.5 (H) 11.5 - 14.5 sec    INR 2.1     CBC WITH AUTOMATED DIFF    Collection Time: 06/05/18  6:15 AM   Result Value Ref Range    WBC 11.7 (H) 4.3 - 11.1 K/uL    RBC 4.97 4.23 - 5.67 M/uL    HGB 13.4 (L) 13.6 - 17.2 g/dL    HCT 43.4 41.1 - 50.3 %    MCV 87.3 79.6 - 97.8 FL    MCH 27.0 26.1 - 32.9 PG    MCHC 30.9 (L) 31.4 - 35.0 g/dL    RDW 15.6 (H) 11.9 - 14.6 %    PLATELET 234 731 - 935 K/uL    MPV 12.2 10.8 - 14.1 FL    DF AUTOMATED      NEUTROPHILS 76 43 - 78 %    LYMPHOCYTES 14 13 - 44 %    MONOCYTES 7 4.0 - 12.0 %    EOSINOPHILS 2 0.5 - 7.8 %    BASOPHILS 1 0.0 - 2.0 %    IMMATURE GRANULOCYTES 0 0.0 - 5.0 %    ABS. NEUTROPHILS 9.0 (H) 1.7 - 8.2 K/UL    ABS. LYMPHOCYTES 1.6 0.5 - 4.6 K/UL    ABS. MONOCYTES 0.8 0.1 - 1.3 K/UL    ABS. EOSINOPHILS 0.3 0.0 - 0.8 K/UL    ABS. BASOPHILS 0.1 0.0 - 0.2 K/UL    ABS. IMM.  GRANS. 0.1 0.0 - 0.5 K/UL   PHOSPHORUS    Collection Time: 06/05/18  6:15 AM   Result Value Ref Range    Phosphorus 2.1 (L) 2.3 - 3.7 MG/DL         Assessment/Plan:     Principal Problem:    Acute diastolic heart failure ([facility] (9/30/2013)    Active Problems:    Atrial fibrillation ([facility] (9/27/2013)      Type 2 diabetes mellitus ([facility] (9/27/2013)      CAD (coronary artery disease) (9/27/2013)      Dyslipidemia (9/27/2013)      MONICA (obstructive sleep apnea) (12/16/2013)    change lasix to 100 mg po BID and continue metolazone. Accurate ins/outs not recorded. Symptomatically improving but still needs further diuresis. Will place on 1.5 liter fluid restriction. Replace potassium. Wean oxygen to keep sats >90%. Continue PT. Needs STR at SNF.     Would benefit from outpatient MONICA eval/sleep study    Care Plan discussed with: Patient/Family    Signed By: Navneet Salazar MD     June 5, 2018

## 2018-06-05 NOTE — PROGRESS NOTES
[facility] CARDIOLOGY PROGRESS NOTE           6/5/2018 10:19 AM    Admit Date: 6/1/2018      Subjective:     Pt is feeling better today but still SOB. Appears reported 30# wt gain but review of weights over the last year shows weights charted ~104-106 Kg with outpt f/u. Some confusion at baseline. On 4L NC at baseline    ROS:  Cardiovascular:  As noted above    Objective:      Vitals:    06/04/18 2236 06/05/18 0402 06/05/18 0710 06/05/18 0717   BP: 123/73 121/71 134/84    Pulse: 88 82 83    Resp: 18 19 18    Temp: 98.2 °F (36.8 °C) 98.4 °F (36.9 °C) 97.3 °F (36.3 °C)    SpO2: 97% 98% 94% 95%   Weight:  104.8 kg (231 lb 1.6 oz)     Height:           Physical Exam:  General-No Acute Distress  Neck- supple, CV waves  CV- irregular rate and rhythm no MRG  Lung- bibasilar rales. Abd- soft, nontender, nondistended  Ext- 1+ edema bilaterally. Skin- warm and dry    Data Review:   Recent Labs      06/05/18   0615  06/04/18   0654   06/02/18   1307   NA  145  145   --    --    K  3.3*  3.2*   --    --    MG   --   1.8   --    --    BUN  15  16   --    --    CREA  0.76*  0.77*   --    --    GLU  86  96   --    --    WBC  11.7*  11.5*   --    --    HGB  13.4*  12.9*   --    --    HCT  43.4  42.1   --    --    PLT  285  293   --    --    INR  2.1  1.9   < >  1.6   TROIQ   --    --    --   <0.02*    < > = values in this interval not displayed. Assessment/Plan:     Principal Problem:    Acute diastolic heart failure ([facility] (9/30/2013) Pt was on IV lasix 60 mg BID but was switched to PO. Implement strict  I/O. Overall Volume status +2.7 L but uncertain regarding accuracy    Active Problems:    Atrial fibrillation ([facility] (9/27/2013) Currently Rate controled on coreg, INR therapeutic 2.1 today.       Type 2 diabetes mellitus ([facility] (9/27/2013) Hospital Medicine       CAD (coronary artery disease) (9/27/2013) No angina continue meds      Dyslipidemia (9/27/2013) Lipitor      MONICA (obstructive sleep apnea) (12/16/2013)   Patient reports he has never worn CPAP and only wears oxygen at home. This might be worth looking at again in future, history limited, is followed by pulmonary. CARMITA Cheng  6/5/2018 10:19 AM      I have personally seen and examined patient and agree with above assessment. I agree and confirm with findings with additional details/exceptions as listed below:    Exam still consistent with volume overload with likely predominant component of RHF (severely elevated RVSP/RV dysfunction); was switched to po diuresis by primary team earlier today (100mg po bid lasix); unlikely to have good response until more euvolemic with right sided failure. Change to IV and long term, plan switch to bumex with better bioavailability with noted hypoalbuminemia. Has been off amiodarone with controlled rates and noted lung dz. INR therapeutic.  Uncertain etiology of severely elevated RVSP/RV dysfn but likely multifactorial with underlying chronic lung dz/untreated MONICA (was prior on amiodarone but doubt related)    Maribel Garcia MD  6/5/2018

## 2018-06-06 VITALS
HEIGHT: 68 IN | SYSTOLIC BLOOD PRESSURE: 95 MMHG | DIASTOLIC BLOOD PRESSURE: 63 MMHG | RESPIRATION RATE: 18 BRPM | WEIGHT: 216.6 LBS | HEART RATE: 77 BPM | TEMPERATURE: 98 F | BODY MASS INDEX: 32.83 KG/M2 | OXYGEN SATURATION: 98 %

## 2018-06-06 LAB
ANION GAP SERPL CALC-SCNC: 6 MMOL/L (ref 7–16)
BASOPHILS # BLD: 0.1 K/UL (ref 0–0.2)
BASOPHILS NFR BLD: 1 % (ref 0–2)
BUN SERPL-MCNC: 16 MG/DL (ref 8–23)
CALCIUM SERPL-MCNC: 9.1 MG/DL (ref 8.3–10.4)
CHLORIDE SERPL-SCNC: 93 MMOL/L (ref 98–107)
CO2 SERPL-SCNC: 43 MMOL/L (ref 21–32)
CREAT SERPL-MCNC: 0.88 MG/DL (ref 0.8–1.5)
DIFFERENTIAL METHOD BLD: ABNORMAL
EOSINOPHIL # BLD: 0.3 K/UL (ref 0–0.8)
EOSINOPHIL NFR BLD: 3 % (ref 0.5–7.8)
ERYTHROCYTE [DISTWIDTH] IN BLOOD BY AUTOMATED COUNT: 15.2 % (ref 11.9–14.6)
GLUCOSE BLD STRIP.AUTO-MCNC: 167 MG/DL (ref 65–100)
GLUCOSE BLD STRIP.AUTO-MCNC: 89 MG/DL (ref 65–100)
GLUCOSE SERPL-MCNC: 94 MG/DL (ref 65–100)
HCT VFR BLD AUTO: 41 % (ref 41.1–50.3)
HGB BLD-MCNC: 12.8 G/DL (ref 13.6–17.2)
IMM GRANULOCYTES # BLD: 0 K/UL (ref 0–0.5)
IMM GRANULOCYTES NFR BLD AUTO: 0 % (ref 0–5)
INR PPP: 2.5
LYMPHOCYTES # BLD: 0.8 K/UL (ref 0.5–4.6)
LYMPHOCYTES NFR BLD: 9 % (ref 13–44)
MAGNESIUM SERPL-MCNC: 1.7 MG/DL (ref 1.8–2.4)
MCH RBC QN AUTO: 26.9 PG (ref 26.1–32.9)
MCHC RBC AUTO-ENTMCNC: 31.2 G/DL (ref 31.4–35)
MCV RBC AUTO: 86.3 FL (ref 79.6–97.8)
MONOCYTES # BLD: 0.9 K/UL (ref 0.1–1.3)
MONOCYTES NFR BLD: 10 % (ref 4–12)
NEUTS SEG # BLD: 7.2 K/UL (ref 1.7–8.2)
NEUTS SEG NFR BLD: 77 % (ref 43–78)
PHOSPHATE SERPL-MCNC: 3.2 MG/DL (ref 2.3–3.7)
PLATELET # BLD AUTO: 297 K/UL (ref 150–450)
PMV BLD AUTO: 12.2 FL (ref 10.8–14.1)
POTASSIUM SERPL-SCNC: 3.5 MMOL/L (ref 3.5–5.1)
PROTHROMBIN TIME: 26.7 SEC (ref 11.5–14.5)
RBC # BLD AUTO: 4.75 M/UL (ref 4.23–5.67)
SODIUM SERPL-SCNC: 142 MMOL/L (ref 136–145)
WBC # BLD AUTO: 9.3 K/UL (ref 4.3–11.1)

## 2018-06-06 PROCEDURE — 74011000250 HC RX REV CODE- 250: Performed by: INTERNAL MEDICINE

## 2018-06-06 PROCEDURE — 74011250637 HC RX REV CODE- 250/637: Performed by: HOSPITALIST

## 2018-06-06 PROCEDURE — 84100 ASSAY OF PHOSPHORUS: CPT | Performed by: HOSPITALIST

## 2018-06-06 PROCEDURE — 85025 COMPLETE CBC W/AUTO DIFF WBC: CPT | Performed by: HOSPITALIST

## 2018-06-06 PROCEDURE — 74011250637 HC RX REV CODE- 250/637: Performed by: INTERNAL MEDICINE

## 2018-06-06 PROCEDURE — 85610 PROTHROMBIN TIME: CPT | Performed by: HOSPITALIST

## 2018-06-06 PROCEDURE — 94760 N-INVAS EAR/PLS OXIMETRY 1: CPT

## 2018-06-06 PROCEDURE — 82962 GLUCOSE BLOOD TEST: CPT

## 2018-06-06 PROCEDURE — 36415 COLL VENOUS BLD VENIPUNCTURE: CPT | Performed by: HOSPITALIST

## 2018-06-06 PROCEDURE — 80048 BASIC METABOLIC PNL TOTAL CA: CPT | Performed by: HOSPITALIST

## 2018-06-06 PROCEDURE — 94640 AIRWAY INHALATION TREATMENT: CPT

## 2018-06-06 PROCEDURE — 83735 ASSAY OF MAGNESIUM: CPT | Performed by: HOSPITALIST

## 2018-06-06 PROCEDURE — 77010033678 HC OXYGEN DAILY

## 2018-06-06 RX ORDER — BUMETANIDE 1 MG/1
1 TABLET ORAL 2 TIMES DAILY
Qty: 60 TAB | Refills: 0 | Status: SHIPPED
Start: 2018-06-06 | End: 2018-11-06 | Stop reason: DRUGHIGH

## 2018-06-06 RX ORDER — BUMETANIDE 1 MG/1
1 TABLET ORAL 2 TIMES DAILY
Status: DISCONTINUED | OUTPATIENT
Start: 2018-06-06 | End: 2018-06-06 | Stop reason: HOSPADM

## 2018-06-06 RX ORDER — POTASSIUM CHLORIDE 20 MEQ/1
20 TABLET, EXTENDED RELEASE ORAL DAILY
Qty: 30 TAB | Refills: 0 | Status: SHIPPED
Start: 2018-06-06

## 2018-06-06 RX ORDER — FUROSEMIDE 40 MG/1
100 TABLET ORAL 2 TIMES DAILY
Qty: 60 TAB | Refills: 0 | Status: SHIPPED
Start: 2018-06-06 | End: 2018-06-06

## 2018-06-06 RX ORDER — ATORVASTATIN CALCIUM 20 MG/1
20 TABLET, FILM COATED ORAL DAILY
Qty: 30 TAB | Refills: 0 | Status: SHIPPED
Start: 2018-06-06 | End: 2020-01-01 | Stop reason: DRUGHIGH

## 2018-06-06 RX ORDER — WARFARIN SODIUM 5 MG/1
5 TABLET ORAL EVERY EVENING
Status: DISCONTINUED | OUTPATIENT
Start: 2018-06-06 | End: 2018-06-06 | Stop reason: HOSPADM

## 2018-06-06 RX ORDER — GLIPIZIDE 10 MG/1
10 TABLET ORAL DAILY
COMMUNITY
End: 2019-05-15 | Stop reason: ALTCHOICE

## 2018-06-06 RX ORDER — WARFARIN 6 MG/1
6 TABLET ORAL DAILY
Qty: 30 TAB | Refills: 0 | Status: SHIPPED
Start: 2018-06-06 | End: 2018-08-01 | Stop reason: DRUGHIGH

## 2018-06-06 RX ADMIN — HYDRALAZINE HYDROCHLORIDE 10 MG: 10 TABLET, FILM COATED ORAL at 09:29

## 2018-06-06 RX ADMIN — BACLOFEN 10 MG: 10 TABLET ORAL at 09:29

## 2018-06-06 RX ADMIN — MEMANTINE HYDROCHLORIDE 10 MG: 5 TABLET ORAL at 09:29

## 2018-06-06 RX ADMIN — BUMETANIDE 2 MG: 0.25 INJECTION INTRAMUSCULAR; INTRAVENOUS at 09:38

## 2018-06-06 RX ADMIN — LEVOTHYROXINE SODIUM 75 MCG: 75 TABLET ORAL at 05:50

## 2018-06-06 RX ADMIN — POTASSIUM CHLORIDE 20 MEQ: 1500 TABLET, EXTENDED RELEASE ORAL at 09:29

## 2018-06-06 RX ADMIN — PANTOPRAZOLE SODIUM 40 MG: 40 TABLET, DELAYED RELEASE ORAL at 05:50

## 2018-06-06 RX ADMIN — OXYCODONE HYDROCHLORIDE AND ACETAMINOPHEN 500 MG: 500 TABLET ORAL at 09:29

## 2018-06-06 RX ADMIN — ASPIRIN 81 MG: 81 TABLET, COATED ORAL at 09:29

## 2018-06-06 RX ADMIN — SERTRALINE HYDROCHLORIDE 75 MG: 50 TABLET ORAL at 09:29

## 2018-06-06 RX ADMIN — METOLAZONE 5 MG: 5 TABLET ORAL at 09:35

## 2018-06-06 RX ADMIN — ALBUTEROL SULFATE 2.5 MG: 2.5 SOLUTION RESPIRATORY (INHALATION) at 07:13

## 2018-06-06 RX ADMIN — CARVEDILOL 12.5 MG: 12.5 TABLET, FILM COATED ORAL at 09:28

## 2018-06-06 RX ADMIN — LISINOPRIL 40 MG: 20 TABLET ORAL at 09:29

## 2018-06-06 RX ADMIN — BUDESONIDE 500 MCG: 0.5 INHALANT RESPIRATORY (INHALATION) at 07:13

## 2018-06-06 NOTE — ROUTINE PROCESS
CHF teaching started post introduction to pt/family; aware of diagnosis. Planner/scale @ BS and will follow. Smoking/ ETOH/Illicit drug use cessation covered. Pt/family aware that I cannot prescribe nor adjust  medications: 15mins  Palliative Care score:  Start 2 Liter Fluid Restriction  CHF teaching continues to pt/family. Emphasis on taking prescription meds as ordered, to keep F/U appoinments and  maintain healthy weight , to call MD STAT. CHF teaching completed, verbalize emphasis on monitoring self and report to MD:   If you gain 2 lbs in one day or 5 lbs in a week, and short of breath.  If you can not lay flat without developing short of breath or rapid breathing at night; or if it wakes you up. Develop a cough or wheezing.  If you notice swollen hands/feet/ankles or stomach with a bloated/ full feeling.  If you become confused or mentally fuzzy or dizzy.  If you notice a rapid or change in your heart rate.  If you become more exhausted all the time and unable to do the same level of activity without stopping to catch your breath. Drink no more than 8 cups a day in 8 oz. cups. Limit Cola Drinks. Your Heart can not handle any more. Stay away from salt (limit anything with salt or sodium in it). Limit to 250mg per serving. Exercise needs to be started with your Doctors approval.  Reduce stress, call your Doctor or myself if concerned  Pass post test via teach back, will make self available post DC ,if an questions arise. Diabetic teaching completed.  Planner/scale @ BS:  60 mins total

## 2018-06-06 NOTE — DIABETES MGMT
Patient admitted with CHF, seen by diabetes educator. Patient states he has a positive family history of diabetes and he was diagnosed \"years ago. \" Patient states he was taking 70/30 insulin at one time, but \"it got too much for me so they scaled me way back to the pill. \" Patient reports he was taking glipizide 10 mg daily at home and checks his blood glucose daily. HgA1c 6.4 (eA). Patient reports he does not think he ever experiences hypoglycemia on his current regimen. Blood glucose 127 on admission. Blood glucose ranged  with patient receiving Humalog 2 units. Blood glucose 94 this morning. Reviewed with patient. Patient voices no questions regarding diabetes at this time.

## 2018-06-06 NOTE — PROGRESS NOTES
6/6/2018 10:03 AM    Admit Date: 6/1/2018        Subjective:     Brett Nowak reports less SOB and less edema Up in a chair Has chronioc lung disease on 4 l at home Has nl EF and pulm HTN .             Objective:      Visit Vitals    /74    Pulse 91    Temp 97.9 °F (36.6 °C)    Resp 19    Ht 5' 8\" (1.727 m)    Wt 98.2 kg (216 lb 9.6 oz)    SpO2 94%    BMI 32.93 kg/m2       Physical Exam:  Heart: irregularly irregular rhythm  Lungs: clear to auscultation bilaterally  Extremities: venous stasis dermatitis noted    Data Review:   Labs:    Recent Results (from the past 24 hour(s))   GLUCOSE, POC    Collection Time: 06/05/18 11:22 AM   Result Value Ref Range    Glucose (POC) 112 (H) 65 - 100 mg/dL   PLEASE READ & DOCUMENT PPD TEST IN 72 HRS    Collection Time: 06/05/18  2:45 PM   Result Value Ref Range    PPD  0 Negative    mm Induration  0 mm   GLUCOSE, POC    Collection Time: 06/05/18  3:51 PM   Result Value Ref Range    Glucose (POC) 158 (H) 65 - 100 mg/dL   GLUCOSE, POC    Collection Time: 06/05/18  8:32 PM   Result Value Ref Range    Glucose (POC) 156 (H) 65 - 100 mg/dL   GLUCOSE, POC    Collection Time: 06/06/18  5:10 AM   Result Value Ref Range    Glucose (POC) 89 65 - 051 mg/dL   METABOLIC PANEL, BASIC    Collection Time: 06/06/18  6:12 AM   Result Value Ref Range    Sodium 142 136 - 145 mmol/L    Potassium 3.5 3.5 - 5.1 mmol/L    Chloride 93 (L) 98 - 107 mmol/L    CO2 43 (HH) 21 - 32 mmol/L    Anion gap 6 (L) 7 - 16 mmol/L    Glucose 94 65 - 100 mg/dL    BUN 16 8 - 23 MG/DL    Creatinine 0.88 0.8 - 1.5 MG/DL    GFR est AA >60 >60 ml/min/1.73m2    GFR est non-AA >60 >60 ml/min/1.73m2    Calcium 9.1 8.3 - 10.4 MG/DL   PROTHROMBIN TIME + INR    Collection Time: 06/06/18  6:12 AM   Result Value Ref Range    Prothrombin time 26.7 (H) 11.5 - 14.5 sec    INR 2.5     CBC WITH AUTOMATED DIFF    Collection Time: 06/06/18  6:12 AM   Result Value Ref Range    WBC 9.3 4.3 - 11.1 K/uL    RBC 4.75 4.23 - 5.67 M/uL    HGB 12.8 (L) 13.6 - 17.2 g/dL    HCT 41.0 (L) 41.1 - 50.3 %    MCV 86.3 79.6 - 97.8 FL    MCH 26.9 26.1 - 32.9 PG    MCHC 31.2 (L) 31.4 - 35.0 g/dL    RDW 15.2 (H) 11.9 - 14.6 %    PLATELET 668 281 - 997 K/uL    MPV 12.2 10.8 - 14.1 FL    DF AUTOMATED      NEUTROPHILS 77 43 - 78 %    LYMPHOCYTES 9 (L) 13 - 44 %    MONOCYTES 10 4.0 - 12.0 %    EOSINOPHILS 3 0.5 - 7.8 %    BASOPHILS 1 0.0 - 2.0 %    IMMATURE GRANULOCYTES 0 0.0 - 5.0 %    ABS. NEUTROPHILS 7.2 1.7 - 8.2 K/UL    ABS. LYMPHOCYTES 0.8 0.5 - 4.6 K/UL    ABS. MONOCYTES 0.9 0.1 - 1.3 K/UL    ABS. EOSINOPHILS 0.3 0.0 - 0.8 K/UL    ABS. BASOPHILS 0.1 0.0 - 0.2 K/UL    ABS. IMM.  GRANS. 0.0 0.0 - 0.5 K/UL   MAGNESIUM    Collection Time: 06/06/18  6:12 AM   Result Value Ref Range    Magnesium 1.7 (L) 1.8 - 2.4 mg/dL   PHOSPHORUS    Collection Time: 06/06/18  6:12 AM   Result Value Ref Range    Phosphorus 3.2 2.3 - 3.7 MG/DL          ECHO:LV nl EF RVE pulm HTN      Assessment:     Patient Active Problem List    Diagnosis Date Noted    Long term (current) use of anticoagulants continue 05/21/2018    Palliative care patient 10/03/2017    Restrictive lung disease 10/03/2017    MONICA (obstructive sleep apnea) 12/16/2013    Primary central sleep apnea 12/16/2013    Hypoxia 09/30/2013    Acute diastolic heart failure ([address] he has more rhf with soig pulm HTN chronic lung disease with swelling Can change to POP bumex now 09/30/2013    Acute-on-chronic respiratory failure ([address] 09/30/2013    Atrial fibrillation ([address] amio off control rate 09/27/2013    Coronary atherosclerosis of artery bypass graft 09/27/2013    Type 2 diabetes mellitus ([facility] [address] [address] 09/27/2013    Pulmonary edema 09/27/2013    CAD (coronary artery disease) no angina med rx best option 09/27/2013    Dyslipidemia 09/27/2013       Plan:   Change to PO bumex

## 2018-06-06 NOTE — ROUTINE PROCESS
CHF teaching held; on phone. REHAB sent CHF teaching materials, will follow. Leyda @ BS. Message left on family vm.

## 2018-06-06 NOTE — PROGRESS NOTES
Following for CHF - 30 days. RRAT- 18       Will monitor patients progression at Rehab; [facility] through weekly care coordination. Expected LOS of 17 days.  Will assist with transition from rehab to home, continue follow up visits in the home post discharge from SNF.   Srini Odonnell [address], [address]   [phone]

## 2018-06-06 NOTE — PROGRESS NOTES
Warfarin Dosing - Pharmacy Consult    Gerhard Call is a 77 y.o. male. Height: 5' 8\" (172.7 cm)    Weight: 98.2 kg (216 lb 9.6 oz)    Indication:  afib    Goal INR:  2-3    Home dose:  2.5 mg Tue; 5 mg all other days    Risk factors or significant drug interactions:  PTA med amiodarone has been stopped this admission--now removed from PTA medlist    Other anticoagulants:  Enoxaparin bridge (stopped 6/5)    Daily Monitoring  Date  INR     Warfarin dose HGB              Notes  6/1  ---  5 mg  14.2  6/2  1.6  5 mg  12.5  6/3  1.8  6 mg  --   6/4  1.9  6 mg   6/5  2.1  6 mg    6/6  2.5  5 mg  12.8     Pharmacy consulted to manage warfarin for Mr. Richie Wiggins during this admission. INR trending up today. Anticipate that a higher dose of warfarin will eventually be needed in setting of amiodarone being discontinued. Based on amiodarone half-life elimination, likely will not see full effect of stopping medication during this admission, and outpatient follow up with dose adjustment will be necessary. INR to 2.5 after steady climb. Will decrease back to 5 mg this evening. Following daily INR. Pharmacy will continue to follow. Please call with any questions. Thank you,  Matthew Morris, Pharm. D.   Clinical Pharmacist  [phone]

## 2018-06-06 NOTE — ROUTINE PROCESS
CHF teaching continues to pt/ family via VM. Emphasis to report worsening WTs daily and dyspnea, Cardiac diet/ FR; 15 mins.     May DC today, will follow

## 2018-06-06 NOTE — PROGRESS NOTES
Report called to [facility], LPN at [facility]. Time was given for questions and answers along with name and number left if questions should arise. [facility] to transport patient to facility. IV out without difficulty. Will monitor till .

## 2018-06-06 NOTE — DISCHARGE SUMMARY
Physician Discharge Summary       Patient: Cyrus Pelayo MRN: [id number]  SSN: [id number]    YOB: 1940  Age: 77 y.o. Sex: male    PCP: Teri Mello MD    Allergies: Review of patient's allergies indicates no known allergies. Admit date: 6/1/2018  Admitting Provider: Britney Keenan MD    Discharge date: 6/6/2018  Discharging Provider: Nilton Ferris MD    * Admission Diagnoses: CHF exacerbation [facility])    * Discharge Diagnoses:    Hospital Problems as of 6/6/2018  Date Reviewed: 6/1/2018          Codes Class Noted - Resolved POA    MONICA (obstructive sleep apnea) ICD-10-CM: G47.33  ICD-9-CM: 327.23  12/16/2013 - Present Yes        * (Principal)Acute diastolic heart failure ([facility] ICD-10-CM: I50.31  ICD-9-CM: 428.31  9/30/2013 - Present Yes        Atrial fibrillation ([facility] ICD-10-CM: I48.91  ICD-9-CM: 427.31  9/27/2013 - Present Yes        Type 2 diabetes mellitus ([facility] ICD-10-CM: E11.9  ICD-9-CM: 250.00  9/27/2013 - Present Yes        CAD (coronary artery disease) (Chronic) ICD-10-CM: I25.10  ICD-9-CM: 414.00  9/27/2013 - Present Yes        Dyslipidemia (Chronic) ICD-10-CM: E78.5  ICD-9-CM: 272.4  9/27/2013 - Present Yes              * Hospital Course:     Mr. Dariusz Kuo is a 76 yo WM, with a pmh of CoPD, CAD, atrial fibrillation and chronic diastolic chf, who presented 6/1 from outpatient pulmonary office for shortness of breath, pulmonary edema and lower extremity edema and is being treated for an acute diastolic chf exacerbation.  Cardiology following.  Lasix increased to 60 mg IV BID on admission and metolazone continued. Cardiac enzymes negative.  Initially on 6 liters NC initially and wears 4L NC at baseline, currently 95% on 4L.  Amiodarone stopped this admission by cards as he is still in afib and thus rate control the goal. Has been working with PT, is very weak and cannot ambulate without assistance but is steadily improving.  He is feeling much better from a few days ago.  No current chest pain or sob at rest, still with TORRE. He has been changed to BID oral bumex by cardiology and is now medically stable for discharge to [facility]. Consults: Cardiology    Significant Diagnostic Studies:     Transthoracic Echocardiogram  2D, M-mode, Doppler, and Color Doppler    Patient: Bossman Villa  MR #: [id number]  : 10-Miguel-1940  Age: 77 years  Gender: Male  Study date: 2018  Account #: [de-identified]  Height: 67.7 in  Weight: 229.5 lb  BSA: 2.16 mï¾²  Status:Routine  Location: 812  BP: 102/ 63    Allergies: NO KNOWN ALLERGIES    Sonographer: Genevieve Escalera RDCS  Group:  [facility]  Referring Physician: Geena Parsons MD  Reading Physician: Sheila Elliott. MD Kathy    INDICATIONS: Diastolic Heart Failure. PROCEDURE: This was a routine study. A transthoracic echocardiogram was  performed. The study included complete 2D imaging, M-mode, complete spectral  Doppler, and color Doppler. Intravenous contrast (Definity) was administered. Echocardiographic views were limited by poor acoustic window availability. Image quality was adequate. LEFT VENTRICLE: Size was normal. Systolic function was at the lower limits of  normal. Ejection fraction was estimated in the range of 50 % to 55 %. This  study was inadequate for the evaluation of regional wall motion. Wall   thickness  was mildly increased. The study was not technically sufficient to allow  evaluation of LV diastolic function. RIGHT VENTRICLE: The ventricle was dilated. Systolic function was reduced. Estimated peak pressure was in the range of 60-65 mmHg. There is septal  flattening on the parasternal short axis views suggestive of pressure   overload. LEFT ATRIUM: The atrium was moderately to markedly dilated. RIGHT ATRIUM: Not well visualized. SYSTEMIC VEINS: IVC: The inferior vena cava was not well visualized. AORTIC VALVE: A bioprosthesis was present.  The peak velocity was 2.32 m/s. The  mean pressure gradient was 14 mmHg. The peak pressure gradient was 21 mmHg. The  DI=0.35. The SVi=25. The AT=98.3ms. There was no evidence for stenosis. There  was no insufficiency. MITRAL VALVE: There was mild annular calcification. The peak velocity was   1.57  m/s. The mean pressure gradient was 3 mmHg. The peak pressure gradient was 10  mmHg. There was mild to moderate regurgitation. TRICUSPID VALVE: The valve structure was normal. There was no evidence for  stenosis. There was mild to moderate regurgitation. PULMONIC VALVE: Not well visualized. There was no evidence for stenosis. There  was trivial regurgitation. PERICARDIUM: There was no pericardial effusion. AORTA: The root exhibited normal size. SUMMARY:    -  Left ventricle: Systolic function was at the lower limits of normal.  Ejection fraction was estimated in the range of 50 % to 55 %. This study was  inadequate for the evaluation of regional wall motion. Wall thickness was  mildly increased. -  Right ventricle: The ventricle was dilated. Systolic function was reduced. Estimated peak pressure was in the range of 60-65 mmHg. There is septal  flattening on the parasternal short axis views suggestive of pressure   overload. -  Left atrium: The atrium was moderately to markedly dilated. -  Mitral valve: There was mild annular calcification. There was mild to  moderate regurgitation. -  Tricuspid valve: There was mild to moderate regurgitation. SYSTEM MEASUREMENT TABLES    2D mode  AoR Diam (2D): 3 cm  LA Dimension (2D): 5.1 cm  Left Atrium Systolic Volume Index; Method of Disks, Biplane; 2D mode;: 46.1  ml/m2  IVS/LVPW (2D): 1  IVSd (2D): 1.2 cm  LVIDd (2D): 5.1 cm  LVIDs (2D): 3.8 cm  LVOT Area (2D): 3.1 cm2  LVPWd (2D): 1.2 cm  RVIDd (2D): 4.4 cm    Unspecified Scan Mode  Peak Grad; Mean; Antegrade Flow: 20 mm[Hg]  Vmax; Antegrade Flow: 232 cm/s  LVOT Diam: 2 cm  Peak Grad; Mean;  Antegrade Flow: 10 mm[Hg]  Vmax; Antegrade Flow: 157 cm/s    Prepared and signed by    Clarence Casillas. Danna Garza MD  Signed 02-Jun-2018 18:46:07    Discharge Exam:    General: awake, alert, oriented, cooperative  Eyes; non icteric, EOMI  Neck; supple  CV: IRIR, normal rate  Pulm; diminished in bases, no crackles nor wheezes  Abd; soft, non tender  Ext: 1+ pitting edema of calves    * Discharge Condition: stable  * Disposition: [facility])    Discharge Medications:  Current Discharge Medication List      START taking these medications    Details   potassium chloride (K-DUR, KLOR-CON) 20 mEq tablet Take 1 Tab by mouth daily. Qty: 30 Tab, Refills: 0      bumetanide (BUMEX) 1 mg tablet Take 1 Tab by mouth two (2) times a day. Qty: 60 Tab, Refills: 0         CONTINUE these medications which have CHANGED    Details   atorvastatin (LIPITOR) 20 mg tablet Take 1 Tab by mouth daily. Qty: 30 Tab, Refills: 0      warfarin (COUMADIN) 6 mg tablet Take 1 Tab by mouth daily. Qty: 30 Tab, Refills: 0         CONTINUE these medications which have NOT CHANGED    Details   glipiZIDE (GLUCOTROL) 10 mg tablet Take 10 mg by mouth daily. fluticasone-vilanterol (BREO ELLIPTA) 100-25 mcg/dose inhaler 1 inhalation daily, rinse mouth after use  Qty: 1 Inhaler, Refills: 11      metOLazone (ZAROXOLYN) 5 mg tablet Take 5 mg prior to lasix dose 15 minutes before  Qty: 30 Tab, Refills: 0      aspirin delayed-release 81 mg tablet Take  by mouth daily. sertraline (ZOLOFT) 50 mg tablet Take 75 mg by mouth daily. albuterol (PROVENTIL HFA, VENTOLIN HFA, PROAIR HFA) 90 mcg/actuation inhaler Take 1 Puff by inhalation every four (4) hours as needed for Wheezing. Qty: 1 Inhaler, Refills: 11      levothyroxine (SYNTHROID) 75 mcg tablet Take 75 mcg by mouth Daily (before breakfast). hydrALAZINE (APRESOLINE) 10 mg tablet Take 10 mg by mouth three (3) times daily. memantine (NAMENDA) 10 mg tablet Take 10 mg by mouth two (2) times a day. ferrous sulfate 324 mg (65 mg iron) tablet Take 324 mg by mouth two (2) times daily (with meals). promethazine (PHENERGAN) 25 mg tablet Take 25 mg by mouth every six (6) hours as needed for Nausea. ascorbic acid, vitamin C, (VITAMIN C) 500 mg tablet Take 500 mg by mouth two (2) times a day. cyanocobalamin (VITAMIN B-12) 1,000 mcg/mL injection 1,000 mcg by IntraMUSCular route every thirty (30) days. Oxygen 3-4 Liters prn      multivitamin (ONE A DAY) tablet Take 1 Tab by mouth daily. lisinopril (PRINIVIL, ZESTRIL) 40 mg tablet Take 40 mg by mouth daily. omeprazole (PRILOSEC) 20 mg capsule Take 20 mg by mouth daily. carvedilol (COREG) 25 mg tablet Take 12.5 mg by mouth two (2) times a day. Indications: hypertension      baclofen (LIORESAL) 10 mg tablet Take 10 mg by mouth three (3) times daily. nitroglycerin (NITROSTAT) 0.4 mg SL tablet 0.4 mg by SubLINGual route every five (5) minutes as needed for Chest Pain. STOP taking these medications       meloxicam (MOBIC) 7.5 mg tablet Comments:   Reason for Stopping:         oxyCODONE-acetaminophen (PERCOCET 10)  mg per tablet Comments:   Reason for Stopping:         amiodarone (CORDARONE) 200 mg tablet Comments:   Reason for Stopping:         furosemide (LASIX) 40 mg tablet Comments:   Reason for Stopping:               * Follow-up Care/Patient Instructions: Activity: as directed by PT  Diet: diabetic/cardiac    Follow-up Information     Follow up With Details Comments [address] [facility]   [place]/ Suzanna Hopkins [address]  [phone]      Damari Waters MD Schedule an appointment as soon as possible for a visit in 1 week  [address] [facility],[address]Th Floor [address], [address] [facility] [address] [place] [address],First Floor  [phone]          35 minutes spent in discharge planning and coordination of care.     Signed:  Jose Alfredo Benitez Marisa Foote MD  6/6/2018  10:25 AM

## 2018-06-06 NOTE — PROGRESS NOTES
Please note that patient has had several sleep studies, PSG, CPAP titration and ASV titration. It was recommended for patient to use Adaptive Servo-ventilation  EPAP 14 Pressure Support 3-10 cmH2O with heated humidity and overnight of these settings to assess for oxygen needs.  per Dr. Terri Esteban

## 2018-06-06 NOTE — PROGRESS NOTES
Patient slept periodically throughout the night. RR present and chest expansion symmetrical.  Bed low, locked, and side rails x3. Call light within reach. Gave report to on-coming RN, [facility].

## 2018-06-06 NOTE — PROGRESS NOTES
Patient is discharging to [facility] for short-term rehab today. He will transport via [facility] at 12:30 (next available transport time). Patient and his wife voice agreement with the discharge plan. Case Management will remain available to assist as needed.     Care Management Interventions  Transition of Care Consult (CM Consult): Discharge Planning  Discharge Durable Medical Equipment: No  Physical Therapy Consult: Yes  Occupational Therapy Consult: Yes  Speech Therapy Consult: No  Current Support Network: Lives with Spouse, Own Home  Confirm Follow Up Transport: Family  Plan discussed with Pt/Family/Caregiver: Yes  Freedom of Choice Offered: Yes  Discharge Location  Discharge Placement: Rehab Unit Subacute

## 2018-06-07 ENCOUNTER — PATIENT OUTREACH (OUTPATIENT)
Dept: CASE MANAGEMENT | Age: 78
End: 2018-06-07

## 2018-06-07 NOTE — PROGRESS NOTES
This note will not be viewable in [address] Patient discharged to [facility] on 6/6/18. Patient discharged to a SNF Preferred Provider Network facility. Patient will be included in weekly care coordination calls. Patient will be followed by [facility] as well. .Information forwarded to [address] [place] [address] Manjeet RN, [facility] RN Care Manager.

## 2018-06-07 NOTE — Clinical Note
Patient discharged 6/6/18 to [facility]. Dx CHF exacerbation. Patient is bundle patient and is being followed by [facility], health  as well. Thanks

## 2018-06-12 ENCOUNTER — HOSPITAL ENCOUNTER (OUTPATIENT)
Dept: LAB | Age: 78
Discharge: HOME | End: 2018-06-12

## 2018-06-12 LAB
INR PPP: 1.4
PROTHROMBIN TIME: 16.1 SEC (ref 11.5–14.5)

## 2018-06-12 PROCEDURE — 85610 PROTHROMBIN TIME: CPT | Performed by: FAMILY MEDICINE

## 2018-06-13 ENCOUNTER — PATIENT OUTREACH (OUTPATIENT)
Dept: CASE MANAGEMENT | Age: 78
End: 2018-06-13

## 2018-06-13 NOTE — PROGRESS NOTES
SNF- [address] [facility] [employer]. Current LOS  Pt's current stay is 7 days     Expected discharge date 6-. LOS expected for CHF is 17 days. Is patient OnTrack for LOS goal: Y     Follow up: Will follow up, next week for updates and improvements with therapy.

## 2018-06-16 ENCOUNTER — HOSPITAL ENCOUNTER (OUTPATIENT)
Dept: LAB | Age: 78
Discharge: HOME | End: 2018-06-16

## 2018-06-16 LAB
INR PPP: 1.9
PROTHROMBIN TIME: 21.2 SEC (ref 11.5–14.5)

## 2018-06-16 PROCEDURE — 85610 PROTHROMBIN TIME: CPT | Performed by: FAMILY MEDICINE

## 2018-06-27 ENCOUNTER — PATIENT OUTREACH (OUTPATIENT)
Dept: CASE MANAGEMENT | Age: 78
End: 2018-06-27

## 2018-11-06 ENCOUNTER — HOSPITAL ENCOUNTER (OUTPATIENT)
Dept: GENERAL RADIOLOGY | Age: 78
Discharge: HOME | End: 2018-11-06
Payer: MEDICARE

## 2018-11-06 ENCOUNTER — HOSPITAL ENCOUNTER (OUTPATIENT)
Dept: LAB | Age: 78
Discharge: HOME | End: 2018-11-06
Payer: MEDICARE

## 2018-11-06 DIAGNOSIS — R06.02 SOB (SHORTNESS OF BREATH): ICD-10-CM

## 2018-11-06 LAB
ANION GAP SERPL CALC-SCNC: 5 MMOL/L (ref 7–16)
BNP SERPL-MCNC: 383 PG/ML
BUN SERPL-MCNC: 14 MG/DL (ref 8–23)
CALCIUM SERPL-MCNC: 8.8 MG/DL (ref 8.3–10.4)
CHLORIDE SERPL-SCNC: 103 MMOL/L (ref 98–107)
CO2 SERPL-SCNC: 33 MMOL/L (ref 21–32)
CREAT SERPL-MCNC: 1.15 MG/DL (ref 0.8–1.5)
GLUCOSE SERPL-MCNC: 89 MG/DL (ref 65–100)
POTASSIUM SERPL-SCNC: 4.4 MMOL/L (ref 3.5–5.1)
SODIUM SERPL-SCNC: 141 MMOL/L (ref 136–145)

## 2018-11-06 PROCEDURE — 83880 ASSAY OF NATRIURETIC PEPTIDE: CPT

## 2018-11-06 PROCEDURE — 80048 BASIC METABOLIC PNL TOTAL CA: CPT

## 2018-11-06 PROCEDURE — 36415 COLL VENOUS BLD VENIPUNCTURE: CPT

## 2018-11-06 PROCEDURE — 71046 X-RAY EXAM CHEST 2 VIEWS: CPT

## 2019-01-01 ENCOUNTER — HOME CARE VISIT (OUTPATIENT)
Dept: SCHEDULING | Facility: HOME HEALTH | Age: 79
End: 2019-01-01
Payer: MEDICARE

## 2019-01-01 ENCOUNTER — APPOINTMENT (OUTPATIENT)
Dept: GENERAL RADIOLOGY | Age: 79
DRG: 291 | End: 2019-01-01
Attending: FAMILY MEDICINE
Payer: MEDICARE

## 2019-01-01 ENCOUNTER — HOME CARE VISIT (OUTPATIENT)
Dept: HOME HEALTH SERVICES | Facility: HOME HEALTH | Age: 79
End: 2019-01-01
Payer: MEDICARE

## 2019-01-01 ENCOUNTER — TELEPHONE (OUTPATIENT)
Dept: HOME HEALTH SERVICES | Facility: HOME HEALTH | Age: 79
End: 2019-01-01

## 2019-01-01 ENCOUNTER — APPOINTMENT (OUTPATIENT)
Dept: GENERAL RADIOLOGY | Age: 79
DRG: 291 | End: 2019-01-01
Attending: EMERGENCY MEDICINE
Payer: MEDICARE

## 2019-01-01 ENCOUNTER — HOSPITAL ENCOUNTER (INPATIENT)
Age: 79
LOS: 3 days | Discharge: HOME HEALTH | DRG: 291 | End: 2019-10-31
Attending: EMERGENCY MEDICINE | Admitting: FAMILY MEDICINE
Payer: MEDICARE

## 2019-01-01 ENCOUNTER — PATIENT OUTREACH (OUTPATIENT)
Dept: CASE MANAGEMENT | Age: 79
End: 2019-01-01

## 2019-01-01 ENCOUNTER — HOME HEALTH ADMISSION (OUTPATIENT)
Dept: HOME HEALTH SERVICES | Facility: HOME HEALTH | Age: 79
End: 2019-01-01
Payer: MEDICARE

## 2019-01-01 VITALS
HEART RATE: 83 BPM | WEIGHT: 215.39 LBS | RESPIRATION RATE: 17 BRPM | TEMPERATURE: 97.6 F | HEIGHT: 68 IN | OXYGEN SATURATION: 97 % | SYSTOLIC BLOOD PRESSURE: 113 MMHG | BODY MASS INDEX: 32.64 KG/M2 | DIASTOLIC BLOOD PRESSURE: 63 MMHG

## 2019-01-01 VITALS
DIASTOLIC BLOOD PRESSURE: 78 MMHG | SYSTOLIC BLOOD PRESSURE: 115 MMHG | TEMPERATURE: 97.1 F | HEART RATE: 70 BPM | RESPIRATION RATE: 18 BRPM

## 2019-01-01 VITALS
RESPIRATION RATE: 14 BRPM | DIASTOLIC BLOOD PRESSURE: 88 MMHG | TEMPERATURE: 98.6 F | SYSTOLIC BLOOD PRESSURE: 132 MMHG | HEART RATE: 88 BPM

## 2019-01-01 VITALS
HEART RATE: 90 BPM | RESPIRATION RATE: 18 BRPM | OXYGEN SATURATION: 93 % | SYSTOLIC BLOOD PRESSURE: 140 MMHG | DIASTOLIC BLOOD PRESSURE: 88 MMHG | TEMPERATURE: 98.2 F

## 2019-01-01 VITALS
RESPIRATION RATE: 18 BRPM | DIASTOLIC BLOOD PRESSURE: 70 MMHG | TEMPERATURE: 98.9 F | HEART RATE: 72 BPM | OXYGEN SATURATION: 95 % | SYSTOLIC BLOOD PRESSURE: 132 MMHG

## 2019-01-01 VITALS
RESPIRATION RATE: 20 BRPM | TEMPERATURE: 99.3 F | DIASTOLIC BLOOD PRESSURE: 62 MMHG | SYSTOLIC BLOOD PRESSURE: 128 MMHG | OXYGEN SATURATION: 96 % | HEART RATE: 82 BPM

## 2019-01-01 VITALS
SYSTOLIC BLOOD PRESSURE: 140 MMHG | HEART RATE: 67 BPM | RESPIRATION RATE: 18 BRPM | DIASTOLIC BLOOD PRESSURE: 61 MMHG | TEMPERATURE: 98.8 F

## 2019-01-01 DIAGNOSIS — I50.33 DIASTOLIC CHF, ACUTE ON CHRONIC (HCC): Primary | ICD-10-CM

## 2019-01-01 DIAGNOSIS — I50.9 ACUTE ON CHRONIC CONGESTIVE HEART FAILURE, UNSPECIFIED HEART FAILURE TYPE (HCC): ICD-10-CM

## 2019-01-01 DIAGNOSIS — J44.1 ACUTE EXACERBATION OF CHRONIC OBSTRUCTIVE PULMONARY DISEASE (COPD) (HCC): Primary | ICD-10-CM

## 2019-01-01 DIAGNOSIS — R06.00 DYSPNEA, UNSPECIFIED TYPE: ICD-10-CM

## 2019-01-01 DIAGNOSIS — R11.0 NAUSEA: ICD-10-CM

## 2019-01-01 DIAGNOSIS — Z71.89 COUNSELING AND COORDINATION OF CARE: ICD-10-CM

## 2019-01-01 DIAGNOSIS — Z51.5 ENCOUNTER FOR PALLIATIVE CARE: ICD-10-CM

## 2019-01-01 DIAGNOSIS — R53.81 DEBILITY: ICD-10-CM

## 2019-01-01 LAB
ALBUMIN SERPL-MCNC: 3.4 G/DL (ref 3.2–4.6)
ALBUMIN/GLOB SERPL: 0.8 {RATIO} (ref 1.2–3.5)
ALP SERPL-CCNC: 193 U/L (ref 50–136)
ALT SERPL-CCNC: 20 U/L (ref 12–65)
ANION GAP SERPL CALC-SCNC: 2 MMOL/L (ref 7–16)
ANION GAP SERPL CALC-SCNC: 5 MMOL/L (ref 7–16)
ANION GAP SERPL CALC-SCNC: 6 MMOL/L (ref 7–16)
ANION GAP SERPL CALC-SCNC: ABNORMAL MMOL/L (ref 7–16)
ARTERIAL PATENCY WRIST A: YES
AST SERPL-CCNC: 24 U/L (ref 15–37)
ATRIAL RATE: 110 BPM
BASE EXCESS BLD CALC-SCNC: 5 MMOL/L
BASOPHILS # BLD: 0 K/UL (ref 0–0.2)
BASOPHILS # BLD: 0.1 K/UL (ref 0–0.2)
BASOPHILS NFR BLD: 0 % (ref 0–2)
BASOPHILS NFR BLD: 1 % (ref 0–2)
BDY SITE: ABNORMAL
BILIRUB SERPL-MCNC: 1 MG/DL (ref 0.2–1.1)
BNP SERPL-MCNC: 486 PG/ML
BODY TEMPERATURE: 98.6
BUN SERPL-MCNC: 12 MG/DL (ref 8–23)
BUN SERPL-MCNC: 15 MG/DL (ref 8–23)
BUN SERPL-MCNC: 25 MG/DL (ref 8–23)
BUN SERPL-MCNC: 32 MG/DL (ref 8–23)
CALCIUM SERPL-MCNC: 9.2 MG/DL (ref 8.3–10.4)
CALCIUM SERPL-MCNC: 9.2 MG/DL (ref 8.3–10.4)
CALCIUM SERPL-MCNC: 9.3 MG/DL (ref 8.3–10.4)
CALCIUM SERPL-MCNC: 9.4 MG/DL (ref 8.3–10.4)
CALCULATED R AXIS, ECG10: 74 DEGREES
CALCULATED T AXIS, ECG11: 149 DEGREES
CHLORIDE SERPL-SCNC: 101 MMOL/L (ref 98–107)
CHLORIDE SERPL-SCNC: 87 MMOL/L (ref 98–107)
CHLORIDE SERPL-SCNC: 90 MMOL/L (ref 98–107)
CHLORIDE SERPL-SCNC: 99 MMOL/L (ref 98–107)
CO2 BLD-SCNC: 33 MMOL/L
CO2 SERPL-SCNC: 33 MMOL/L (ref 21–32)
CO2 SERPL-SCNC: 36 MMOL/L (ref 21–32)
CO2 SERPL-SCNC: 45 MMOL/L (ref 21–32)
CO2 SERPL-SCNC: >45 MMOL/L (ref 21–32)
COLLECT TIME,HTIME: 1552
CREAT SERPL-MCNC: 0.7 MG/DL (ref 0.8–1.5)
CREAT SERPL-MCNC: 0.71 MG/DL (ref 0.8–1.5)
CREAT SERPL-MCNC: 0.86 MG/DL (ref 0.8–1.5)
CREAT SERPL-MCNC: 0.92 MG/DL (ref 0.8–1.5)
DIAGNOSIS, 93000: NORMAL
DIFFERENTIAL METHOD BLD: ABNORMAL
DIFFERENTIAL METHOD BLD: ABNORMAL
EOSINOPHIL # BLD: 0 K/UL (ref 0–0.8)
EOSINOPHIL # BLD: 0.1 K/UL (ref 0–0.8)
EOSINOPHIL NFR BLD: 0 % (ref 0.5–7.8)
EOSINOPHIL NFR BLD: 1 % (ref 0.5–7.8)
ERYTHROCYTE [DISTWIDTH] IN BLOOD BY AUTOMATED COUNT: 14.7 % (ref 11.9–14.6)
ERYTHROCYTE [DISTWIDTH] IN BLOOD BY AUTOMATED COUNT: 14.8 % (ref 11.9–14.6)
EST. AVERAGE GLUCOSE BLD GHB EST-MCNC: 154 MG/DL
FLOW RATE ISTAT,IFRATE: 5 L/MIN
GAS FLOW.O2 O2 DELIVERY SYS: ABNORMAL L/MIN
GLOBULIN SER CALC-MCNC: 4.5 G/DL (ref 2.3–3.5)
GLUCOSE BLD STRIP.AUTO-MCNC: 111 MG/DL (ref 65–100)
GLUCOSE BLD STRIP.AUTO-MCNC: 136 MG/DL (ref 65–100)
GLUCOSE BLD STRIP.AUTO-MCNC: 149 MG/DL (ref 65–100)
GLUCOSE BLD STRIP.AUTO-MCNC: 150 MG/DL (ref 65–100)
GLUCOSE BLD STRIP.AUTO-MCNC: 155 MG/DL (ref 65–100)
GLUCOSE BLD STRIP.AUTO-MCNC: 162 MG/DL (ref 65–100)
GLUCOSE BLD STRIP.AUTO-MCNC: 172 MG/DL (ref 65–100)
GLUCOSE BLD STRIP.AUTO-MCNC: 179 MG/DL (ref 65–100)
GLUCOSE BLD STRIP.AUTO-MCNC: 182 MG/DL (ref 65–100)
GLUCOSE BLD STRIP.AUTO-MCNC: 200 MG/DL (ref 65–100)
GLUCOSE BLD STRIP.AUTO-MCNC: 218 MG/DL (ref 65–100)
GLUCOSE BLD STRIP.AUTO-MCNC: 365 MG/DL (ref 65–100)
GLUCOSE SERPL-MCNC: 116 MG/DL (ref 65–100)
GLUCOSE SERPL-MCNC: 135 MG/DL (ref 65–100)
GLUCOSE SERPL-MCNC: 151 MG/DL (ref 65–100)
GLUCOSE SERPL-MCNC: 184 MG/DL (ref 65–100)
HBA1C MFR BLD: 7 % (ref 4.8–6)
HCO3 BLD-SCNC: 31.7 MMOL/L (ref 22–26)
HCT VFR BLD AUTO: 44.1 % (ref 41.1–50.3)
HCT VFR BLD AUTO: 48.6 % (ref 41.1–50.3)
HGB BLD-MCNC: 13.7 G/DL (ref 13.6–17.2)
HGB BLD-MCNC: 14.9 G/DL (ref 13.6–17.2)
IMM GRANULOCYTES # BLD AUTO: 0 K/UL (ref 0–0.5)
IMM GRANULOCYTES # BLD AUTO: 0 K/UL (ref 0–0.5)
IMM GRANULOCYTES NFR BLD AUTO: 0 % (ref 0–5)
IMM GRANULOCYTES NFR BLD AUTO: 0 % (ref 0–5)
INR PPP: 1.8
INR PPP: 2
INR PPP: 2.1
INR PPP: 2.2
LYMPHOCYTES # BLD: 0.5 K/UL (ref 0.5–4.6)
LYMPHOCYTES # BLD: 0.7 K/UL (ref 0.5–4.6)
LYMPHOCYTES NFR BLD: 8 % (ref 13–44)
LYMPHOCYTES NFR BLD: 9 % (ref 13–44)
MAGNESIUM SERPL-MCNC: 1.9 MG/DL (ref 1.8–2.4)
MCH RBC QN AUTO: 28.4 PG (ref 26.1–32.9)
MCH RBC QN AUTO: 29.1 PG (ref 26.1–32.9)
MCHC RBC AUTO-ENTMCNC: 30.7 G/DL (ref 31.4–35)
MCHC RBC AUTO-ENTMCNC: 31.1 G/DL (ref 31.4–35)
MCV RBC AUTO: 91.3 FL (ref 79.6–97.8)
MCV RBC AUTO: 94.9 FL (ref 79.6–97.8)
MM INDURATION POC: 0 MM (ref 0–5)
MM INDURATION POC: 0 MM (ref 0–5)
MONOCYTES # BLD: 0.3 K/UL (ref 0.1–1.3)
MONOCYTES # BLD: 0.6 K/UL (ref 0.1–1.3)
MONOCYTES NFR BLD: 6 % (ref 4–12)
MONOCYTES NFR BLD: 7 % (ref 4–12)
NEUTS SEG # BLD: 4.1 K/UL (ref 1.7–8.2)
NEUTS SEG # BLD: 7.6 K/UL (ref 1.7–8.2)
NEUTS SEG NFR BLD: 83 % (ref 43–78)
NEUTS SEG NFR BLD: 84 % (ref 43–78)
NRBC # BLD: 0 K/UL (ref 0–0.2)
NRBC # BLD: 0 K/UL (ref 0–0.2)
PCO2 BLD: 52.2 MMHG (ref 35–45)
PH BLD: 7.39 [PH] (ref 7.35–7.45)
PLATELET # BLD AUTO: 213 K/UL (ref 150–450)
PLATELET # BLD AUTO: 252 K/UL (ref 150–450)
PMV BLD AUTO: 12.1 FL (ref 9.4–12.3)
PMV BLD AUTO: 12.6 FL (ref 9.4–12.3)
PO2 BLD: 62 MMHG (ref 75–100)
POTASSIUM SERPL-SCNC: 3.4 MMOL/L (ref 3.5–5.1)
POTASSIUM SERPL-SCNC: 3.6 MMOL/L (ref 3.5–5.1)
POTASSIUM SERPL-SCNC: 3.9 MMOL/L (ref 3.5–5.1)
POTASSIUM SERPL-SCNC: 4.5 MMOL/L (ref 3.5–5.1)
PPD POC: NEGATIVE NEGATIVE
PPD POC: NEGATIVE NEGATIVE
PROT SERPL-MCNC: 7.9 G/DL (ref 6.3–8.2)
PROTHROMBIN TIME: 21.3 SEC (ref 11.7–14.5)
PROTHROMBIN TIME: 22.8 SEC (ref 11.7–14.5)
PROTHROMBIN TIME: 24.4 SEC (ref 11.7–14.5)
PROTHROMBIN TIME: 24.9 SEC (ref 11.7–14.5)
Q-T INTERVAL, ECG07: 370 MS
QRS DURATION, ECG06: 136 MS
QTC CALCULATION (BEZET), ECG08: 472 MS
RBC # BLD AUTO: 4.83 M/UL (ref 4.23–5.6)
RBC # BLD AUTO: 5.12 M/UL (ref 4.23–5.6)
SAO2 % BLD: 91 % (ref 95–98)
SERVICE CMNT-IMP: ABNORMAL
SODIUM SERPL-SCNC: 136 MMOL/L (ref 136–145)
SODIUM SERPL-SCNC: 137 MMOL/L (ref 136–145)
SODIUM SERPL-SCNC: 140 MMOL/L (ref 136–145)
SODIUM SERPL-SCNC: 140 MMOL/L (ref 136–145)
SPECIMEN TYPE: ABNORMAL
TROPONIN I SERPL-MCNC: <0.02 NG/ML (ref 0.02–0.05)
VENTRICULAR RATE, ECG03: 98 BPM
WBC # BLD AUTO: 4.9 K/UL (ref 4.3–11.1)
WBC # BLD AUTO: 9.2 K/UL (ref 4.3–11.1)

## 2019-01-01 PROCEDURE — G0299 HHS/HOSPICE OF RN EA 15 MIN: HCPCS

## 2019-01-01 PROCEDURE — 3331090002 HH PPS REVENUE DEBIT

## 2019-01-01 PROCEDURE — 65270000029 HC RM PRIVATE

## 2019-01-01 PROCEDURE — 36600 WITHDRAWAL OF ARTERIAL BLOOD: CPT

## 2019-01-01 PROCEDURE — 74011250636 HC RX REV CODE- 250/636: Performed by: EMERGENCY MEDICINE

## 2019-01-01 PROCEDURE — 77030040830 HC CATH URETH FOL MDII -A

## 2019-01-01 PROCEDURE — 3331090001 HH PPS REVENUE CREDIT

## 2019-01-01 PROCEDURE — 74011250637 HC RX REV CODE- 250/637: Performed by: FAMILY MEDICINE

## 2019-01-01 PROCEDURE — 74011636637 HC RX REV CODE- 636/637: Performed by: FAMILY MEDICINE

## 2019-01-01 PROCEDURE — 99222 1ST HOSP IP/OBS MODERATE 55: CPT | Performed by: INTERNAL MEDICINE

## 2019-01-01 PROCEDURE — 74011250636 HC RX REV CODE- 250/636: Performed by: FAMILY MEDICINE

## 2019-01-01 PROCEDURE — 400013 HH SOC

## 2019-01-01 PROCEDURE — G0152 HHCP-SERV OF OT,EA 15 MIN: HCPCS

## 2019-01-01 PROCEDURE — 74011000302 HC RX REV CODE- 302: Performed by: FAMILY MEDICINE

## 2019-01-01 PROCEDURE — 74011000250 HC RX REV CODE- 250: Performed by: FAMILY MEDICINE

## 2019-01-01 PROCEDURE — 80053 COMPREHEN METABOLIC PANEL: CPT

## 2019-01-01 PROCEDURE — 36415 COLL VENOUS BLD VENIPUNCTURE: CPT

## 2019-01-01 PROCEDURE — 84484 ASSAY OF TROPONIN QUANT: CPT

## 2019-01-01 PROCEDURE — 94640 AIRWAY INHALATION TREATMENT: CPT

## 2019-01-01 PROCEDURE — 74011250637 HC RX REV CODE- 250/637: Performed by: INTERNAL MEDICINE

## 2019-01-01 PROCEDURE — 93005 ELECTROCARDIOGRAM TRACING: CPT | Performed by: EMERGENCY MEDICINE

## 2019-01-01 PROCEDURE — C8929 TTE W OR WO FOL WCON,DOPPLER: HCPCS

## 2019-01-01 PROCEDURE — 82962 GLUCOSE BLOOD TEST: CPT

## 2019-01-01 PROCEDURE — 96374 THER/PROPH/DIAG INJ IV PUSH: CPT | Performed by: EMERGENCY MEDICINE

## 2019-01-01 PROCEDURE — G0156 HHCP-SVS OF AIDE,EA 15 MIN: HCPCS

## 2019-01-01 PROCEDURE — 97161 PT EVAL LOW COMPLEX 20 MIN: CPT

## 2019-01-01 PROCEDURE — 83036 HEMOGLOBIN GLYCOSYLATED A1C: CPT

## 2019-01-01 PROCEDURE — 85610 PROTHROMBIN TIME: CPT

## 2019-01-01 PROCEDURE — 71045 X-RAY EXAM CHEST 1 VIEW: CPT

## 2019-01-01 PROCEDURE — G0153 HHCP-SVS OF S/L PATH,EA 15MN: HCPCS

## 2019-01-01 PROCEDURE — 86580 TB INTRADERMAL TEST: CPT | Performed by: FAMILY MEDICINE

## 2019-01-01 PROCEDURE — G0155 HHCP-SVS OF CSW,EA 15 MIN: HCPCS

## 2019-01-01 PROCEDURE — 99232 SBSQ HOSP IP/OBS MODERATE 35: CPT | Performed by: NURSE PRACTITIONER

## 2019-01-01 PROCEDURE — 80048 BASIC METABOLIC PNL TOTAL CA: CPT

## 2019-01-01 PROCEDURE — 99285 EMERGENCY DEPT VISIT HI MDM: CPT | Performed by: EMERGENCY MEDICINE

## 2019-01-01 PROCEDURE — 77030012341 HC CHMB SPCR OPTC MDI VYRM -A

## 2019-01-01 PROCEDURE — 74011000250 HC RX REV CODE- 250: Performed by: EMERGENCY MEDICINE

## 2019-01-01 PROCEDURE — 83880 ASSAY OF NATRIURETIC PEPTIDE: CPT

## 2019-01-01 PROCEDURE — 74011250636 HC RX REV CODE- 250/636: Performed by: INTERNAL MEDICINE

## 2019-01-01 PROCEDURE — 94760 N-INVAS EAR/PLS OXIMETRY 1: CPT

## 2019-01-01 PROCEDURE — 71046 X-RAY EXAM CHEST 2 VIEWS: CPT

## 2019-01-01 PROCEDURE — 97166 OT EVAL MOD COMPLEX 45 MIN: CPT

## 2019-01-01 PROCEDURE — 83735 ASSAY OF MAGNESIUM: CPT

## 2019-01-01 PROCEDURE — 97530 THERAPEUTIC ACTIVITIES: CPT

## 2019-01-01 PROCEDURE — 96375 TX/PRO/DX INJ NEW DRUG ADDON: CPT | Performed by: EMERGENCY MEDICINE

## 2019-01-01 PROCEDURE — 85025 COMPLETE CBC W/AUTO DIFF WBC: CPT

## 2019-01-01 PROCEDURE — 82803 BLOOD GASES ANY COMBINATION: CPT

## 2019-01-01 PROCEDURE — 76450000000

## 2019-01-01 RX ORDER — POTASSIUM CHLORIDE 20 MEQ/1
40 TABLET, EXTENDED RELEASE ORAL
Status: COMPLETED | OUTPATIENT
Start: 2019-01-01 | End: 2019-01-01

## 2019-01-01 RX ORDER — MEMANTINE HYDROCHLORIDE 5 MG/1
10 TABLET ORAL 2 TIMES DAILY
Status: DISCONTINUED | OUTPATIENT
Start: 2019-01-01 | End: 2019-01-01 | Stop reason: HOSPADM

## 2019-01-01 RX ORDER — LEVOTHYROXINE SODIUM 75 UG/1
75 TABLET ORAL
Status: DISCONTINUED | OUTPATIENT
Start: 2019-01-01 | End: 2019-01-01 | Stop reason: HOSPADM

## 2019-01-01 RX ORDER — DIPHENHYDRAMINE HCL 25 MG
25 CAPSULE ORAL
Status: DISCONTINUED | OUTPATIENT
Start: 2019-01-01 | End: 2019-01-01 | Stop reason: HOSPADM

## 2019-01-01 RX ORDER — FUROSEMIDE 40 MG/1
80 TABLET ORAL EVERY 12 HOURS
Status: DISCONTINUED | OUTPATIENT
Start: 2019-01-01 | End: 2019-01-01 | Stop reason: HOSPADM

## 2019-01-01 RX ORDER — NITROGLYCERIN 0.4 MG/1
0.4 TABLET SUBLINGUAL
Status: DISCONTINUED | OUTPATIENT
Start: 2019-01-01 | End: 2019-01-01 | Stop reason: HOSPADM

## 2019-01-01 RX ORDER — METOLAZONE 5 MG/1
5 TABLET ORAL DAILY
Status: DISCONTINUED | OUTPATIENT
Start: 2019-01-01 | End: 2019-01-01 | Stop reason: HOSPADM

## 2019-01-01 RX ORDER — ONDANSETRON 2 MG/ML
4 INJECTION INTRAMUSCULAR; INTRAVENOUS
Status: DISCONTINUED | OUTPATIENT
Start: 2019-01-01 | End: 2019-01-01 | Stop reason: HOSPADM

## 2019-01-01 RX ORDER — ALBUTEROL SULFATE 0.83 MG/ML
2.5 SOLUTION RESPIRATORY (INHALATION)
Status: DISCONTINUED | OUTPATIENT
Start: 2019-01-01 | End: 2019-01-01 | Stop reason: HOSPADM

## 2019-01-01 RX ORDER — OXYCODONE HYDROCHLORIDE 5 MG/1
10 TABLET ORAL
Status: DISCONTINUED | OUTPATIENT
Start: 2019-01-01 | End: 2019-01-01 | Stop reason: HOSPADM

## 2019-01-01 RX ORDER — CARVEDILOL 12.5 MG/1
12.5 TABLET ORAL 2 TIMES DAILY WITH MEALS
Status: DISCONTINUED | OUTPATIENT
Start: 2019-01-01 | End: 2019-01-01 | Stop reason: HOSPADM

## 2019-01-01 RX ORDER — FUROSEMIDE 10 MG/ML
80 INJECTION INTRAMUSCULAR; INTRAVENOUS EVERY 12 HOURS
Status: COMPLETED | OUTPATIENT
Start: 2019-01-01 | End: 2019-01-01

## 2019-01-01 RX ORDER — PANTOPRAZOLE SODIUM 40 MG/1
40 TABLET, DELAYED RELEASE ORAL
Status: DISCONTINUED | OUTPATIENT
Start: 2019-01-01 | End: 2019-01-01 | Stop reason: HOSPADM

## 2019-01-01 RX ORDER — INSULIN LISPRO 100 [IU]/ML
12 INJECTION, SOLUTION INTRAVENOUS; SUBCUTANEOUS ONCE
Status: COMPLETED | OUTPATIENT
Start: 2019-01-01 | End: 2019-01-01

## 2019-01-01 RX ORDER — LABETALOL HYDROCHLORIDE 5 MG/ML
20 INJECTION, SOLUTION INTRAVENOUS
Status: DISCONTINUED | OUTPATIENT
Start: 2019-01-01 | End: 2019-01-01 | Stop reason: HOSPADM

## 2019-01-01 RX ORDER — FUROSEMIDE 40 MG/1
80 TABLET ORAL
Status: DISCONTINUED | OUTPATIENT
Start: 2019-01-01 | End: 2019-01-01

## 2019-01-01 RX ORDER — SODIUM CHLORIDE 0.9 % (FLUSH) 0.9 %
5-40 SYRINGE (ML) INJECTION AS NEEDED
Status: DISCONTINUED | OUTPATIENT
Start: 2019-01-01 | End: 2019-01-01 | Stop reason: HOSPADM

## 2019-01-01 RX ORDER — FUROSEMIDE 10 MG/ML
40 INJECTION INTRAMUSCULAR; INTRAVENOUS
Status: COMPLETED | OUTPATIENT
Start: 2019-01-01 | End: 2019-01-01

## 2019-01-01 RX ORDER — LANOLIN ALCOHOL/MO/W.PET/CERES
1 CREAM (GRAM) TOPICAL
Status: DISCONTINUED | OUTPATIENT
Start: 2019-01-01 | End: 2019-01-01 | Stop reason: HOSPADM

## 2019-01-01 RX ORDER — SODIUM CHLORIDE 0.9 % (FLUSH) 0.9 %
5-40 SYRINGE (ML) INJECTION EVERY 8 HOURS
Status: DISCONTINUED | OUTPATIENT
Start: 2019-01-01 | End: 2019-01-01 | Stop reason: HOSPADM

## 2019-01-01 RX ORDER — WARFARIN SODIUM 5 MG/1
5 TABLET ORAL
Status: DISCONTINUED | OUTPATIENT
Start: 2019-01-01 | End: 2019-01-01 | Stop reason: HOSPADM

## 2019-01-01 RX ORDER — ALBUTEROL SULFATE 0.83 MG/ML
5 SOLUTION RESPIRATORY (INHALATION)
Status: COMPLETED | OUTPATIENT
Start: 2019-01-01 | End: 2019-01-01

## 2019-01-01 RX ORDER — FUROSEMIDE 10 MG/ML
80 INJECTION INTRAMUSCULAR; INTRAVENOUS EVERY 8 HOURS
Status: DISCONTINUED | OUTPATIENT
Start: 2019-01-01 | End: 2019-01-01

## 2019-01-01 RX ORDER — FUROSEMIDE 10 MG/ML
80 INJECTION INTRAMUSCULAR; INTRAVENOUS EVERY 8 HOURS
Status: DISCONTINUED | OUTPATIENT
Start: 2019-01-01 | End: 2019-01-01 | Stop reason: SDUPTHER

## 2019-01-01 RX ORDER — INSULIN LISPRO 100 [IU]/ML
INJECTION, SOLUTION INTRAVENOUS; SUBCUTANEOUS
Status: DISCONTINUED | OUTPATIENT
Start: 2019-01-01 | End: 2019-01-01 | Stop reason: HOSPADM

## 2019-01-01 RX ORDER — POTASSIUM CHLORIDE 20 MEQ/1
20 TABLET, EXTENDED RELEASE ORAL DAILY
Status: DISCONTINUED | OUTPATIENT
Start: 2019-01-01 | End: 2019-01-01

## 2019-01-01 RX ORDER — LISINOPRIL 5 MG/1
10 TABLET ORAL DAILY
Status: DISCONTINUED | OUTPATIENT
Start: 2019-01-01 | End: 2019-01-01 | Stop reason: HOSPADM

## 2019-01-01 RX ORDER — ATORVASTATIN CALCIUM 10 MG/1
20 TABLET, FILM COATED ORAL DAILY
Status: DISCONTINUED | OUTPATIENT
Start: 2019-01-01 | End: 2019-01-01 | Stop reason: HOSPADM

## 2019-01-01 RX ORDER — POTASSIUM CHLORIDE 20 MEQ/1
40 TABLET, EXTENDED RELEASE ORAL DAILY
Status: DISCONTINUED | OUTPATIENT
Start: 2019-01-01 | End: 2019-01-01 | Stop reason: HOSPADM

## 2019-01-01 RX ADMIN — MEMANTINE HYDROCHLORIDE 10 MG: 5 TABLET ORAL at 19:36

## 2019-01-01 RX ADMIN — FUROSEMIDE 40 MG: 10 INJECTION, SOLUTION INTRAMUSCULAR; INTRAVENOUS at 16:00

## 2019-01-01 RX ADMIN — FUROSEMIDE 80 MG: 10 INJECTION, SOLUTION INTRAMUSCULAR; INTRAVENOUS at 08:16

## 2019-01-01 RX ADMIN — INSULIN LISPRO 2 UNITS: 100 INJECTION, SOLUTION INTRAVENOUS; SUBCUTANEOUS at 21:58

## 2019-01-01 RX ADMIN — OXYCODONE HYDROCHLORIDE 10 MG: 5 TABLET ORAL at 13:29

## 2019-01-01 RX ADMIN — Medication 5 ML: at 13:27

## 2019-01-01 RX ADMIN — MEMANTINE HYDROCHLORIDE 10 MG: 5 TABLET ORAL at 08:18

## 2019-01-01 RX ADMIN — FUROSEMIDE 80 MG: 10 INJECTION, SOLUTION INTRAMUSCULAR; INTRAVENOUS at 21:51

## 2019-01-01 RX ADMIN — Medication: at 08:29

## 2019-01-01 RX ADMIN — WARFARIN SODIUM 5 MG: 5 TABLET ORAL at 19:37

## 2019-01-01 RX ADMIN — CARVEDILOL 12.5 MG: 12.5 TABLET, FILM COATED ORAL at 16:27

## 2019-01-01 RX ADMIN — POTASSIUM CHLORIDE 40 MEQ: 20 TABLET, EXTENDED RELEASE ORAL at 13:26

## 2019-01-01 RX ADMIN — FUROSEMIDE 80 MG: 10 INJECTION, SOLUTION INTRAMUSCULAR; INTRAVENOUS at 23:24

## 2019-01-01 RX ADMIN — FUROSEMIDE 80 MG: 10 INJECTION, SOLUTION INTRAMUSCULAR; INTRAVENOUS at 08:17

## 2019-01-01 RX ADMIN — LEVOTHYROXINE SODIUM 75 MCG: 75 TABLET ORAL at 05:41

## 2019-01-01 RX ADMIN — INSULIN LISPRO 4 UNITS: 100 INJECTION, SOLUTION INTRAVENOUS; SUBCUTANEOUS at 04:23

## 2019-01-01 RX ADMIN — Medication 5 ML: at 05:41

## 2019-01-01 RX ADMIN — POTASSIUM CHLORIDE 40 MEQ: 20 TABLET, EXTENDED RELEASE ORAL at 08:26

## 2019-01-01 RX ADMIN — LISINOPRIL 10 MG: 5 TABLET ORAL at 08:18

## 2019-01-01 RX ADMIN — OXYCODONE HYDROCHLORIDE 10 MG: 5 TABLET ORAL at 05:44

## 2019-01-01 RX ADMIN — ATORVASTATIN CALCIUM 20 MG: 10 TABLET, FILM COATED ORAL at 08:26

## 2019-01-01 RX ADMIN — PERFLUTREN 1 ML: 6.52 INJECTION, SUSPENSION INTRAVENOUS at 17:00

## 2019-01-01 RX ADMIN — INSULIN LISPRO 2 UNITS: 100 INJECTION, SOLUTION INTRAVENOUS; SUBCUTANEOUS at 11:48

## 2019-01-01 RX ADMIN — WARFARIN SODIUM 2.5 MG: 1 TABLET ORAL at 16:27

## 2019-01-01 RX ADMIN — INSULIN LISPRO 2 UNITS: 100 INJECTION, SOLUTION INTRAVENOUS; SUBCUTANEOUS at 11:36

## 2019-01-01 RX ADMIN — Medication 5 ML: at 06:04

## 2019-01-01 RX ADMIN — Medication: at 21:59

## 2019-01-01 RX ADMIN — PANTOPRAZOLE SODIUM 40 MG: 40 TABLET, DELAYED RELEASE ORAL at 06:06

## 2019-01-01 RX ADMIN — FERROUS SULFATE TAB 325 MG (65 MG ELEMENTAL FE) 325 MG: 325 (65 FE) TAB at 08:26

## 2019-01-01 RX ADMIN — MEMANTINE HYDROCHLORIDE 10 MG: 5 TABLET ORAL at 08:19

## 2019-01-01 RX ADMIN — OXYCODONE HYDROCHLORIDE 10 MG: 5 TABLET ORAL at 05:57

## 2019-01-01 RX ADMIN — PANTOPRAZOLE SODIUM 40 MG: 40 TABLET, DELAYED RELEASE ORAL at 05:44

## 2019-01-01 RX ADMIN — METOLAZONE 5 MG: 5 TABLET ORAL at 08:18

## 2019-01-01 RX ADMIN — MEMANTINE HYDROCHLORIDE 10 MG: 5 TABLET ORAL at 08:26

## 2019-01-01 RX ADMIN — OXYCODONE HYDROCHLORIDE 10 MG: 5 TABLET ORAL at 13:10

## 2019-01-01 RX ADMIN — MEMANTINE HYDROCHLORIDE 10 MG: 5 TABLET ORAL at 16:27

## 2019-01-01 RX ADMIN — CARVEDILOL 12.5 MG: 12.5 TABLET, FILM COATED ORAL at 08:26

## 2019-01-01 RX ADMIN — Medication 10 ML: at 21:51

## 2019-01-01 RX ADMIN — FUROSEMIDE 80 MG: 10 INJECTION, SOLUTION INTRAMUSCULAR; INTRAVENOUS at 01:15

## 2019-01-01 RX ADMIN — PANTOPRAZOLE SODIUM 40 MG: 40 TABLET, DELAYED RELEASE ORAL at 05:41

## 2019-01-01 RX ADMIN — LEVOTHYROXINE SODIUM 75 MCG: 75 TABLET ORAL at 05:44

## 2019-01-01 RX ADMIN — METOLAZONE 5 MG: 5 TABLET ORAL at 08:26

## 2019-01-01 RX ADMIN — METHYLPREDNISOLONE SODIUM SUCCINATE 125 MG: 125 INJECTION, POWDER, FOR SOLUTION INTRAMUSCULAR; INTRAVENOUS at 15:43

## 2019-01-01 RX ADMIN — TUBERCULIN PURIFIED PROTEIN DERIVATIVE 5 UNITS: 5 INJECTION, SOLUTION INTRADERMAL at 19:38

## 2019-01-01 RX ADMIN — INSULIN LISPRO 2 UNITS: 100 INJECTION, SOLUTION INTRAVENOUS; SUBCUTANEOUS at 17:21

## 2019-01-01 RX ADMIN — POTASSIUM CHLORIDE 20 MEQ: 20 TABLET, EXTENDED RELEASE ORAL at 08:18

## 2019-01-01 RX ADMIN — FERROUS SULFATE TAB 325 MG (65 MG ELEMENTAL FE) 325 MG: 325 (65 FE) TAB at 08:18

## 2019-01-01 RX ADMIN — INSULIN LISPRO 2 UNITS: 100 INJECTION, SOLUTION INTRAVENOUS; SUBCUTANEOUS at 17:19

## 2019-01-01 RX ADMIN — ALBUTEROL SULFATE 2.5 MG: 2.5 SOLUTION RESPIRATORY (INHALATION) at 21:38

## 2019-01-01 RX ADMIN — POTASSIUM CHLORIDE 40 MEQ: 20 TABLET, EXTENDED RELEASE ORAL at 17:19

## 2019-01-01 RX ADMIN — ATORVASTATIN CALCIUM 20 MG: 10 TABLET, FILM COATED ORAL at 08:18

## 2019-01-01 RX ADMIN — CARVEDILOL 12.5 MG: 12.5 TABLET, FILM COATED ORAL at 08:18

## 2019-01-01 RX ADMIN — Medication 10 ML: at 05:45

## 2019-01-01 RX ADMIN — ALBUTEROL SULFATE 5 MG: 2.5 SOLUTION RESPIRATORY (INHALATION) at 15:45

## 2019-01-01 RX ADMIN — FERROUS SULFATE TAB 325 MG (65 MG ELEMENTAL FE) 325 MG: 325 (65 FE) TAB at 08:19

## 2019-01-01 RX ADMIN — OXYCODONE HYDROCHLORIDE 10 MG: 5 TABLET ORAL at 19:37

## 2019-01-01 RX ADMIN — Medication 10 ML: at 21:59

## 2019-01-01 RX ADMIN — CARVEDILOL 12.5 MG: 12.5 TABLET, FILM COATED ORAL at 19:36

## 2019-01-01 RX ADMIN — LISINOPRIL 10 MG: 5 TABLET ORAL at 08:19

## 2019-01-01 RX ADMIN — LISINOPRIL 10 MG: 5 TABLET ORAL at 08:26

## 2019-01-01 RX ADMIN — OXYCODONE HYDROCHLORIDE 10 MG: 5 TABLET ORAL at 04:32

## 2019-01-01 RX ADMIN — Medication 5 ML: at 19:39

## 2019-01-01 RX ADMIN — WARFARIN SODIUM 5 MG: 5 TABLET ORAL at 17:19

## 2019-01-01 RX ADMIN — CARVEDILOL 12.5 MG: 12.5 TABLET, FILM COATED ORAL at 08:19

## 2019-01-01 RX ADMIN — OXYCODONE HYDROCHLORIDE 10 MG: 5 TABLET ORAL at 21:51

## 2019-01-01 RX ADMIN — CARVEDILOL 12.5 MG: 12.5 TABLET, FILM COATED ORAL at 17:19

## 2019-01-01 RX ADMIN — FUROSEMIDE 80 MG: 10 INJECTION, SOLUTION INTRAMUSCULAR; INTRAVENOUS at 16:24

## 2019-01-01 RX ADMIN — Medication 5 ML: at 11:37

## 2019-01-01 RX ADMIN — METOLAZONE 5 MG: 5 TABLET ORAL at 08:19

## 2019-01-01 RX ADMIN — MEMANTINE HYDROCHLORIDE 10 MG: 5 TABLET ORAL at 17:18

## 2019-01-01 RX ADMIN — Medication: at 21:53

## 2019-01-01 RX ADMIN — Medication: at 08:18

## 2019-01-01 RX ADMIN — FUROSEMIDE 80 MG: 40 TABLET ORAL at 08:26

## 2019-01-01 RX ADMIN — LEVOTHYROXINE SODIUM 75 MCG: 75 TABLET ORAL at 06:05

## 2019-01-01 RX ADMIN — OXYCODONE HYDROCHLORIDE 10 MG: 5 TABLET ORAL at 21:58

## 2019-01-01 RX ADMIN — INSULIN LISPRO 12 UNITS: 100 INJECTION, SOLUTION INTRAVENOUS; SUBCUTANEOUS at 20:52

## 2019-01-02 ENCOUNTER — APPOINTMENT (OUTPATIENT)
Dept: GENERAL RADIOLOGY | Age: 79
DRG: 291 | End: 2019-01-02
Attending: INTERNAL MEDICINE
Payer: MEDICARE

## 2019-01-02 ENCOUNTER — HOSPITAL ENCOUNTER (INPATIENT)
Age: 79
LOS: 7 days | Discharge: NURSING, REHAB OR LONG-TERM CARE | DRG: 291 | End: 2019-01-09
Attending: INTERNAL MEDICINE | Admitting: INTERNAL MEDICINE
Payer: MEDICARE

## 2019-01-02 DIAGNOSIS — Z51.5 PALLIATIVE CARE PATIENT: Primary | ICD-10-CM

## 2019-01-02 PROBLEM — I50.9 HEART FAILURE (HCC): Status: ACTIVE | Noted: 2019-01-02

## 2019-01-02 LAB
ALBUMIN SERPL-MCNC: 3.4 G/DL (ref 3.2–4.6)
ALBUMIN/GLOB SERPL: 0.8 {RATIO} (ref 1.2–3.5)
ALP SERPL-CCNC: 109 U/L (ref 50–136)
ALT SERPL-CCNC: 20 U/L (ref 12–65)
ANION GAP SERPL CALC-SCNC: 5 MMOL/L (ref 7–16)
AST SERPL-CCNC: 18 U/L (ref 15–37)
ATRIAL RATE: 340 BPM
BASOPHILS # BLD: 0.1 K/UL (ref 0–0.2)
BASOPHILS NFR BLD: 1 % (ref 0–2)
BILIRUB SERPL-MCNC: 0.7 MG/DL (ref 0.2–1.1)
BNP SERPL-MCNC: 352 PG/ML
BUN SERPL-MCNC: 11 MG/DL (ref 8–23)
CALCIUM SERPL-MCNC: 8.9 MG/DL (ref 8.3–10.4)
CALCULATED R AXIS, ECG10: 3 DEGREES
CALCULATED T AXIS, ECG11: 144 DEGREES
CHLORIDE SERPL-SCNC: 101 MMOL/L (ref 98–107)
CO2 SERPL-SCNC: 34 MMOL/L (ref 21–32)
CREAT SERPL-MCNC: 0.82 MG/DL (ref 0.8–1.5)
DIAGNOSIS, 93000: NORMAL
DIFFERENTIAL METHOD BLD: ABNORMAL
EOSINOPHIL # BLD: 0.1 K/UL (ref 0–0.8)
EOSINOPHIL NFR BLD: 2 % (ref 0.5–7.8)
ERYTHROCYTE [DISTWIDTH] IN BLOOD BY AUTOMATED COUNT: 13.7 % (ref 11.9–14.6)
GLOBULIN SER CALC-MCNC: 4.2 G/DL (ref 2.3–3.5)
GLUCOSE SERPL-MCNC: 109 MG/DL (ref 65–100)
HCT VFR BLD AUTO: 42.5 % (ref 41.1–50.3)
HGB BLD-MCNC: 13.4 G/DL (ref 13.6–17.2)
IMM GRANULOCYTES # BLD: 0.1 K/UL (ref 0–0.5)
IMM GRANULOCYTES NFR BLD AUTO: 1 % (ref 0–5)
INR PPP: 2.5
LYMPHOCYTES # BLD: 0.8 K/UL (ref 0.5–4.6)
LYMPHOCYTES NFR BLD: 10 % (ref 13–44)
MCH RBC QN AUTO: 30.7 PG (ref 26.1–32.9)
MCHC RBC AUTO-ENTMCNC: 31.5 G/DL (ref 31.4–35)
MCV RBC AUTO: 97.3 FL (ref 79.6–97.8)
MONOCYTES # BLD: 0.7 K/UL (ref 0.1–1.3)
MONOCYTES NFR BLD: 9 % (ref 4–12)
NEUTS SEG # BLD: 6.2 K/UL (ref 1.7–8.2)
NEUTS SEG NFR BLD: 78 % (ref 43–78)
NRBC # BLD: 0 K/UL (ref 0–0.2)
PLATELET # BLD AUTO: 286 K/UL (ref 150–450)
PMV BLD AUTO: 12.3 FL (ref 9.4–12.3)
POTASSIUM SERPL-SCNC: 3.5 MMOL/L (ref 3.5–5.1)
PROT SERPL-MCNC: 7.6 G/DL (ref 6.3–8.2)
PROTHROMBIN TIME: 26.7 SEC (ref 11.7–14.5)
Q-T INTERVAL, ECG07: 408 MS
QRS DURATION, ECG06: 138 MS
QTC CALCULATION (BEZET), ECG08: 512 MS
RBC # BLD AUTO: 4.37 M/UL (ref 4.23–5.6)
SODIUM SERPL-SCNC: 140 MMOL/L (ref 136–145)
TROPONIN I BLD-MCNC: 0.02 NG/ML (ref 0.02–0.05)
TROPONIN I SERPL-MCNC: 0.03 NG/ML (ref 0.02–0.05)
TROPONIN I SERPL-MCNC: 0.03 NG/ML (ref 0.02–0.05)
VENTRICULAR RATE, ECG03: 95 BPM
WBC # BLD AUTO: 8 K/UL (ref 4.3–11.1)

## 2019-01-02 PROCEDURE — 85025 COMPLETE CBC W/AUTO DIFF WBC: CPT

## 2019-01-02 PROCEDURE — 36415 COLL VENOUS BLD VENIPUNCTURE: CPT

## 2019-01-02 PROCEDURE — 85610 PROTHROMBIN TIME: CPT

## 2019-01-02 PROCEDURE — 77010033678 HC OXYGEN DAILY

## 2019-01-02 PROCEDURE — 65660000000 HC RM CCU STEPDOWN

## 2019-01-02 PROCEDURE — 71045 X-RAY EXAM CHEST 1 VIEW: CPT

## 2019-01-02 PROCEDURE — 74011250637 HC RX REV CODE- 250/637: Performed by: INTERNAL MEDICINE

## 2019-01-02 PROCEDURE — 84484 ASSAY OF TROPONIN QUANT: CPT

## 2019-01-02 PROCEDURE — 83880 ASSAY OF NATRIURETIC PEPTIDE: CPT

## 2019-01-02 PROCEDURE — 74011250637 HC RX REV CODE- 250/637: Performed by: NURSE PRACTITIONER

## 2019-01-02 PROCEDURE — 74011250636 HC RX REV CODE- 250/636: Performed by: INTERNAL MEDICINE

## 2019-01-02 PROCEDURE — 74011000250 HC RX REV CODE- 250: Performed by: INTERNAL MEDICINE

## 2019-01-02 PROCEDURE — 74011250636 HC RX REV CODE- 250/636: Performed by: NURSE PRACTITIONER

## 2019-01-02 PROCEDURE — 96374 THER/PROPH/DIAG INJ IV PUSH: CPT | Performed by: INTERNAL MEDICINE

## 2019-01-02 PROCEDURE — 94760 N-INVAS EAR/PLS OXIMETRY 1: CPT

## 2019-01-02 PROCEDURE — 99284 EMERGENCY DEPT VISIT MOD MDM: CPT | Performed by: INTERNAL MEDICINE

## 2019-01-02 PROCEDURE — 80053 COMPREHEN METABOLIC PANEL: CPT

## 2019-01-02 PROCEDURE — 93005 ELECTROCARDIOGRAM TRACING: CPT | Performed by: INTERNAL MEDICINE

## 2019-01-02 PROCEDURE — 94640 AIRWAY INHALATION TREATMENT: CPT

## 2019-01-02 RX ORDER — BUDESONIDE 0.5 MG/2ML
500 INHALANT ORAL
Status: DISCONTINUED | OUTPATIENT
Start: 2019-01-02 | End: 2019-01-09 | Stop reason: HOSPADM

## 2019-01-02 RX ORDER — LANOLIN ALCOHOL/MO/W.PET/CERES
400 CREAM (GRAM) TOPICAL DAILY
Status: DISCONTINUED | OUTPATIENT
Start: 2019-01-03 | End: 2019-01-09 | Stop reason: HOSPADM

## 2019-01-02 RX ORDER — PANTOPRAZOLE SODIUM 40 MG/1
40 TABLET, DELAYED RELEASE ORAL
Status: DISCONTINUED | OUTPATIENT
Start: 2019-01-03 | End: 2019-01-09 | Stop reason: HOSPADM

## 2019-01-02 RX ORDER — MORPHINE SULFATE 4 MG/ML
2 INJECTION, SOLUTION INTRAMUSCULAR; INTRAVENOUS
Status: DISCONTINUED | OUTPATIENT
Start: 2019-01-02 | End: 2019-01-02 | Stop reason: SDUPTHER

## 2019-01-02 RX ORDER — TEMAZEPAM 15 MG/1
15 CAPSULE ORAL
Status: DISCONTINUED | OUTPATIENT
Start: 2019-01-02 | End: 2019-01-09 | Stop reason: HOSPADM

## 2019-01-02 RX ORDER — ATORVASTATIN CALCIUM 40 MG/1
20 TABLET, FILM COATED ORAL DAILY
Status: DISCONTINUED | OUTPATIENT
Start: 2019-01-03 | End: 2019-01-09 | Stop reason: HOSPADM

## 2019-01-02 RX ORDER — LISINOPRIL 20 MG/1
20 TABLET ORAL DAILY
Status: DISCONTINUED | OUTPATIENT
Start: 2019-01-03 | End: 2019-01-03

## 2019-01-02 RX ORDER — SODIUM CHLORIDE 0.9 % (FLUSH) 0.9 %
5-10 SYRINGE (ML) INJECTION AS NEEDED
Status: DISCONTINUED | OUTPATIENT
Start: 2019-01-02 | End: 2019-01-09 | Stop reason: HOSPADM

## 2019-01-02 RX ORDER — ACETAMINOPHEN 500 MG
500 TABLET ORAL
Status: DISCONTINUED | OUTPATIENT
Start: 2019-01-02 | End: 2019-01-09 | Stop reason: HOSPADM

## 2019-01-02 RX ORDER — WARFARIN 2.5 MG/1
2.5 TABLET ORAL
Status: DISCONTINUED | OUTPATIENT
Start: 2019-01-03 | End: 2019-01-08

## 2019-01-02 RX ORDER — ALBUTEROL SULFATE 0.83 MG/ML
2.5 SOLUTION RESPIRATORY (INHALATION)
Status: DISCONTINUED | OUTPATIENT
Start: 2019-01-02 | End: 2019-01-09 | Stop reason: HOSPADM

## 2019-01-02 RX ORDER — GABAPENTIN 300 MG/1
300 CAPSULE ORAL
Status: DISCONTINUED | OUTPATIENT
Start: 2019-01-02 | End: 2019-01-05

## 2019-01-02 RX ORDER — WARFARIN SODIUM 5 MG/1
5 TABLET ORAL EVERY EVENING
Status: DISCONTINUED | OUTPATIENT
Start: 2019-01-02 | End: 2019-01-02 | Stop reason: DRUGHIGH

## 2019-01-02 RX ORDER — POTASSIUM CHLORIDE 20 MEQ/1
20 TABLET, EXTENDED RELEASE ORAL DAILY
Status: DISCONTINUED | OUTPATIENT
Start: 2019-01-03 | End: 2019-01-09 | Stop reason: HOSPADM

## 2019-01-02 RX ORDER — SODIUM CHLORIDE 0.9 % (FLUSH) 0.9 %
5-10 SYRINGE (ML) INJECTION EVERY 8 HOURS
Status: DISCONTINUED | OUTPATIENT
Start: 2019-01-02 | End: 2019-01-09 | Stop reason: HOSPADM

## 2019-01-02 RX ORDER — WARFARIN 2.5 MG/1
2.5 TABLET ORAL
Status: DISCONTINUED | OUTPATIENT
Start: 2019-01-02 | End: 2019-01-02 | Stop reason: CLARIF

## 2019-01-02 RX ORDER — GLIPIZIDE 5 MG/1
10 TABLET ORAL
Status: DISCONTINUED | OUTPATIENT
Start: 2019-01-03 | End: 2019-01-09 | Stop reason: HOSPADM

## 2019-01-02 RX ORDER — NITROGLYCERIN 0.4 MG/1
0.4 TABLET SUBLINGUAL
Status: DISCONTINUED | OUTPATIENT
Start: 2019-01-02 | End: 2019-01-09 | Stop reason: HOSPADM

## 2019-01-02 RX ORDER — CARVEDILOL 6.25 MG/1
12.5 TABLET ORAL 2 TIMES DAILY
Status: DISCONTINUED | OUTPATIENT
Start: 2019-01-02 | End: 2019-01-03

## 2019-01-02 RX ORDER — WARFARIN SODIUM 5 MG/1
5 TABLET ORAL
Status: DISCONTINUED | OUTPATIENT
Start: 2019-01-02 | End: 2019-01-08

## 2019-01-02 RX ORDER — LANOLIN ALCOHOL/MO/W.PET/CERES
325 CREAM (GRAM) TOPICAL 2 TIMES DAILY WITH MEALS
Status: DISCONTINUED | OUTPATIENT
Start: 2019-01-02 | End: 2019-01-09 | Stop reason: HOSPADM

## 2019-01-02 RX ORDER — FUROSEMIDE 10 MG/ML
40 INJECTION INTRAMUSCULAR; INTRAVENOUS 2 TIMES DAILY
Status: DISCONTINUED | OUTPATIENT
Start: 2019-01-02 | End: 2019-01-03

## 2019-01-02 RX ORDER — ALBUTEROL SULFATE 0.83 MG/ML
1.25 SOLUTION RESPIRATORY (INHALATION)
Status: DISCONTINUED | OUTPATIENT
Start: 2019-01-02 | End: 2019-01-09 | Stop reason: HOSPADM

## 2019-01-02 RX ORDER — MEMANTINE HYDROCHLORIDE 5 MG/1
10 TABLET ORAL 2 TIMES DAILY
Status: DISCONTINUED | OUTPATIENT
Start: 2019-01-02 | End: 2019-01-09 | Stop reason: HOSPADM

## 2019-01-02 RX ORDER — FUROSEMIDE 10 MG/ML
40 INJECTION INTRAMUSCULAR; INTRAVENOUS
Status: COMPLETED | OUTPATIENT
Start: 2019-01-02 | End: 2019-01-02

## 2019-01-02 RX ORDER — LEVOTHYROXINE SODIUM 150 UG/1
75 TABLET ORAL
Status: DISCONTINUED | OUTPATIENT
Start: 2019-01-03 | End: 2019-01-09 | Stop reason: HOSPADM

## 2019-01-02 RX ORDER — ASPIRIN 81 MG/1
81 TABLET ORAL DAILY
Status: DISCONTINUED | OUTPATIENT
Start: 2019-01-03 | End: 2019-01-09 | Stop reason: HOSPADM

## 2019-01-02 RX ORDER — WARFARIN 2.5 MG/1
2.5 TABLET ORAL
Status: DISCONTINUED | OUTPATIENT
Start: 2019-01-03 | End: 2019-01-02 | Stop reason: CLARIF

## 2019-01-02 RX ORDER — MORPHINE SULFATE 2 MG/ML
2 INJECTION, SOLUTION INTRAMUSCULAR; INTRAVENOUS
Status: DISCONTINUED | OUTPATIENT
Start: 2019-01-02 | End: 2019-01-09 | Stop reason: HOSPADM

## 2019-01-02 RX ADMIN — FUROSEMIDE 40 MG: 10 INJECTION, SOLUTION INTRAMUSCULAR; INTRAVENOUS at 11:26

## 2019-01-02 RX ADMIN — NITROGLYCERIN 1 INCH: 20 OINTMENT TOPICAL at 11:26

## 2019-01-02 RX ADMIN — NITROGLYCERIN 1 INCH: 20 OINTMENT TOPICAL at 17:37

## 2019-01-02 RX ADMIN — Medication 10 ML: at 21:23

## 2019-01-02 RX ADMIN — FUROSEMIDE 40 MG: 10 INJECTION, SOLUTION INTRAMUSCULAR; INTRAVENOUS at 17:36

## 2019-01-02 RX ADMIN — Medication 10 ML: at 17:17

## 2019-01-02 RX ADMIN — ALBUTEROL SULFATE 2.5 MG: 2.5 SOLUTION RESPIRATORY (INHALATION) at 19:44

## 2019-01-02 RX ADMIN — BUDESONIDE 500 MCG: 0.5 INHALANT RESPIRATORY (INHALATION) at 19:44

## 2019-01-02 RX ADMIN — MORPHINE SULFATE 2 MG: 2 INJECTION, SOLUTION INTRAMUSCULAR; INTRAVENOUS at 21:28

## 2019-01-02 RX ADMIN — MORPHINE SULFATE 2 MG: 2 INJECTION, SOLUTION INTRAMUSCULAR; INTRAVENOUS at 17:14

## 2019-01-02 RX ADMIN — GABAPENTIN 300 MG: 300 CAPSULE ORAL at 21:23

## 2019-01-02 RX ADMIN — WARFARIN SODIUM 5 MG: 5 TABLET ORAL at 21:28

## 2019-01-02 RX ADMIN — FERROUS SULFATE TAB 325 MG (65 MG ELEMENTAL FE) 325 MG: 325 (65 FE) TAB at 17:37

## 2019-01-02 RX ADMIN — CARVEDILOL 12.5 MG: 6.25 TABLET, FILM COATED ORAL at 17:36

## 2019-01-02 RX ADMIN — TEMAZEPAM 15 MG: 15 CAPSULE ORAL at 21:23

## 2019-01-02 RX ADMIN — MEMANTINE 10 MG: 5 TABLET ORAL at 21:22

## 2019-01-02 NOTE — ED NOTES
Attempted to call report, states that no charge nurse available to take report and will call report when available as nurse is with critical pt. ED charge nurse aware.

## 2019-01-02 NOTE — ED TRIAGE NOTES
Patient brought in via EMS for possible STEMI. Cardiologist to room for triage. States increased sob and cp. Hx CHF, no STEMI or cath lab. EKG unchanged from previous. 3 Nitro given, 324 ASA, 40 mg lasix, 100ml NS. Chest pain started 2100 last night, pt wears 3L O2 at home, found at 80%.

## 2019-01-02 NOTE — H&P
[facility]inor[facility] [address] HISTORY AND PHYSICAL Michelle Ron 
MR#: [id number] : 1940 ACCOUNT #: [de-identified] ADMIT DATE: 2019 CHIEF COMPLAINT:  Chest pain, shortness of breath. HISTORY OF PRESENT ILLNESS:  The patient is a 24-year-old gentleman with COPD, paroxysmal atrial fibrillation, chronic diastolic congestive heart failure, coronary artery disease with a previous CABG and aortic valve replacement, who presents with a several day history of worsening chest pain. The chest pain has been intermittent. There have been no aggravating symptoms. It has been alleviated with nitroglycerin on the way to the emergency department. He has chronic respiratory failure as well, is on chronic home O2. He presents with chest pain intermittently from last night, lasting 5-10 minutes at a time as noted above. The pain radiates through to the back, is a sharp pain. He also notes increased lower extremity edema as well as increasing abdominal girth. He has chronic orthopnea, but this has worsened and he is now sleeping in a chair. Oxygen saturations when EMS arrived were 83% on his home 3 L oxygen. PAST MEDICAL HISTORY:  As noted above. SOCIAL HISTORY:  He does not smoke. FAMILY HISTORY:  Negative for premature coronary disease. REVIEW OF SYSTEMS: 
GENERAL:  No fevers or chills. HEAD:  No headache. NOSE:  No rhinorrhea. RESPIRATORY:  No cough. GASTROINTESTINAL:  No nausea, vomiting or diarrhea. GENITOURINARY:  No dysuria,. but he has had increased urinary frequency. ENDOCRINE:  No temperature intolerance. MUSCULOSKELETAL:  No myalgias or arthralgias. SKIN:  No rash. EYES:  No visual changes. NEUROLOGIC:  No CVA symptoms.  
 
MEDICATIONS:  Include Lipitor 20 mg a day, Coumadin 6 mg a day, potassium, Glucotrol 10 mg a day, Bumex 1 mg twice daily, Breo inhaler, Zaroxolyn 5 mg a day as needed, aspirin 81 mg a day, Zoloft 50 mg a day, albuterol inhaler, Synthroid 75 mcg a day, hydralazine 10 mg 3 times a day, Namenda 10 mg twice a day, Zestril 40 mg a day. PHYSICAL EXAMINATION: 
VITAL SIGNS:  Blood pressure initially 185/95, heart rate 92. GENERAL:  A well-developed, well-nourished male in moderate respiratory distress. Alert and oriented x3 with appropriate mood and affect. HEENT:  Sclerae clear. CARDIOVASCULAR:  S1, S2 is regular. LUNGS:  Have rales at the base. ABDOMEN:  Soft. EXTREMITIES:  2+ pitting edema. SKIN:  Warm and dry. NECK:  Supple. Trachea midline. LABORATORY DATA:  Pending at the time of this dictation. EKG shows an intraventricular conduction delay, unchanged ST segments from previous EKGs. CONCLUSION:  A 76-year-old gentleman with chest pain, likely secondary to myocardial stretch with volume overload with acute diastolic congestive heart failure. We will recheck an echocardiogram, admit him, increase his Lasix to maintain proper saturation. Treat with IV Lasix and add nitroglycerin paste to help with his blood pressure. MD NORMA Lindsay/PN 
D: 01/02/2019 11:43    
T: 01/02/2019 12:25 
JOB #: [id number]

## 2019-01-02 NOTE — PROGRESS NOTES
Warfarin dosing per pharmacist 
 
Sena Swanson is a 77 y.o. male. Height: 6' (182.9 cm)    Weight: 95.3 kg (210 lb) Indication:  afib Goal INR:  2-3 Home dose:  2.5 mg Sun, Tues, Thurs; 5 mg all other days Risk factors or significant drug interactions:  none Other anticoagulants:  none Daily Monitoring Date  INR     Warfarin dose HGB              Notes 1/2  2.5  5 mg  13.4 Pharmacy is consulted to manage warfarin for Mr. Ashley Lai during this admission. He presents with therapeutic INR. No new interacting medications have been ordered. Will continue home dose of warfarin. Daily INR. Pharmacy will continue to follow. Please call with any questions. Thank you, Mireya Sullivan, PharmD Clinical Pharmacist 
[phone]

## 2019-01-02 NOTE — PROGRESS NOTES
TRANSFER - OUT REPORT: 
 
Verbal report given to RN (name) on Rona Ramiro  being transferred to [phone] (unit) for routine progression of care Report consisted of patients Situation, Background, Assessment and  
Recommendations(SBAR). Information from the following report(s) SBAR was reviewed with the receiving nurse. Lines:  
Peripheral IV 01/02/19 Left Antecubital (Active) Site Assessment Clean, dry, & intact 1/2/2019 12:31 PM  
Phlebitis Assessment 0 1/2/2019 12:31 PM  
Infiltration Assessment 0 1/2/2019 12:31 PM  
Dressing Status Clean, dry, & intact 1/2/2019 12:31 PM  
Dressing Type Transparent 1/2/2019 12:31 PM  
   
Peripheral IV 01/02/19 Left Hand (Active) Site Assessment Clean, dry, & intact 1/2/2019 12:31 PM  
Phlebitis Assessment 0 1/2/2019 12:31 PM  
Infiltration Assessment 0 1/2/2019 12:31 PM  
Dressing Status Clean, dry, & intact 1/2/2019 12:31 PM  
Dressing Type Transparent 1/2/2019 12:31 PM  
  
 
Opportunity for questions and clarification was provided. Patient transported with: 
 Monitor

## 2019-01-02 NOTE — PROGRESS NOTES
TRANSFER - IN REPORT: 
 
Verbal report received from ER RN Janet Whalen (name) on Cyrus Pelayo  being received from ER (unit) for routine progression of care Report consisted of patients Situation, Background, Assessment and  
Recommendations(SBAR). Information from the following report(s)  was reviewed with the receiving nurse. Opportunity for questions and clarification was provided. Assessment completed upon patients arrival to unit and care assumed.

## 2019-01-02 NOTE — PROGRESS NOTES
Bedside and Verbal shift change report given to self (oncoming nurse) by Geoffrey Wilson (offgoing nurse). Report included the following information SBAR, Kardex, Intake/Output, MAR and Recent Results.

## 2019-01-03 LAB
ANION GAP SERPL CALC-SCNC: 7 MMOL/L (ref 7–16)
BUN SERPL-MCNC: 11 MG/DL (ref 8–23)
CALCIUM SERPL-MCNC: 8.9 MG/DL (ref 8.3–10.4)
CHLORIDE SERPL-SCNC: 100 MMOL/L (ref 98–107)
CO2 SERPL-SCNC: 35 MMOL/L (ref 21–32)
CREAT SERPL-MCNC: 0.77 MG/DL (ref 0.8–1.5)
GLUCOSE SERPL-MCNC: 156 MG/DL (ref 65–100)
INR PPP: 2.2
MAGNESIUM SERPL-MCNC: 1.9 MG/DL (ref 1.8–2.4)
MAGNESIUM SERPL-MCNC: 2.1 MG/DL (ref 1.8–2.4)
POTASSIUM SERPL-SCNC: 3.4 MMOL/L (ref 3.5–5.1)
POTASSIUM SERPL-SCNC: 3.8 MMOL/L (ref 3.5–5.1)
PROTHROMBIN TIME: 24 SEC (ref 11.7–14.5)
SODIUM SERPL-SCNC: 142 MMOL/L (ref 136–145)

## 2019-01-03 PROCEDURE — 65660000000 HC RM CCU STEPDOWN

## 2019-01-03 PROCEDURE — 83735 ASSAY OF MAGNESIUM: CPT

## 2019-01-03 PROCEDURE — 74011250636 HC RX REV CODE- 250/636: Performed by: INTERNAL MEDICINE

## 2019-01-03 PROCEDURE — 74011250637 HC RX REV CODE- 250/637: Performed by: NURSE PRACTITIONER

## 2019-01-03 PROCEDURE — 94760 N-INVAS EAR/PLS OXIMETRY 1: CPT

## 2019-01-03 PROCEDURE — 74011250637 HC RX REV CODE- 250/637: Performed by: INTERNAL MEDICINE

## 2019-01-03 PROCEDURE — 94640 AIRWAY INHALATION TREATMENT: CPT

## 2019-01-03 PROCEDURE — 77010033678 HC OXYGEN DAILY

## 2019-01-03 PROCEDURE — 85610 PROTHROMBIN TIME: CPT

## 2019-01-03 PROCEDURE — 36415 COLL VENOUS BLD VENIPUNCTURE: CPT

## 2019-01-03 PROCEDURE — 74011000250 HC RX REV CODE- 250: Performed by: INTERNAL MEDICINE

## 2019-01-03 PROCEDURE — 80048 BASIC METABOLIC PNL TOTAL CA: CPT

## 2019-01-03 PROCEDURE — C8929 TTE W OR WO FOL WCON,DOPPLER: HCPCS

## 2019-01-03 PROCEDURE — 74011250636 HC RX REV CODE- 250/636: Performed by: NURSE PRACTITIONER

## 2019-01-03 PROCEDURE — 74011000258 HC RX REV CODE- 258: Performed by: INTERNAL MEDICINE

## 2019-01-03 PROCEDURE — 84132 ASSAY OF SERUM POTASSIUM: CPT

## 2019-01-03 RX ORDER — LISINOPRIL 20 MG/1
40 TABLET ORAL DAILY
Status: DISCONTINUED | OUTPATIENT
Start: 2019-01-04 | End: 2019-01-06

## 2019-01-03 RX ORDER — CARVEDILOL 6.25 MG/1
25 TABLET ORAL 2 TIMES DAILY WITH MEALS
Status: DISCONTINUED | OUTPATIENT
Start: 2019-01-03 | End: 2019-01-06

## 2019-01-03 RX ORDER — POTASSIUM CHLORIDE 20 MEQ/1
40 TABLET, EXTENDED RELEASE ORAL
Status: COMPLETED | OUTPATIENT
Start: 2019-01-03 | End: 2019-01-03

## 2019-01-03 RX ADMIN — PANTOPRAZOLE SODIUM 40 MG: 40 TABLET, DELAYED RELEASE ORAL at 06:10

## 2019-01-03 RX ADMIN — BUDESONIDE 500 MCG: 0.5 INHALANT RESPIRATORY (INHALATION) at 19:18

## 2019-01-03 RX ADMIN — LISINOPRIL 20 MG: 20 TABLET ORAL at 07:30

## 2019-01-03 RX ADMIN — CARVEDILOL 25 MG: 6.25 TABLET, FILM COATED ORAL at 17:04

## 2019-01-03 RX ADMIN — FUROSEMIDE 10 MG/HR: 10 INJECTION, SOLUTION INTRAMUSCULAR; INTRAVENOUS at 18:23

## 2019-01-03 RX ADMIN — MEMANTINE 10 MG: 5 TABLET ORAL at 07:30

## 2019-01-03 RX ADMIN — TEMAZEPAM 15 MG: 15 CAPSULE ORAL at 21:02

## 2019-01-03 RX ADMIN — BUDESONIDE 500 MCG: 0.5 INHALANT RESPIRATORY (INHALATION) at 11:08

## 2019-01-03 RX ADMIN — NITROGLYCERIN 1 INCH: 20 OINTMENT TOPICAL at 11:08

## 2019-01-03 RX ADMIN — POTASSIUM CHLORIDE 20 MEQ: 20 TABLET, EXTENDED RELEASE ORAL at 07:30

## 2019-01-03 RX ADMIN — Medication 10 ML: at 10:00

## 2019-01-03 RX ADMIN — ATORVASTATIN CALCIUM 20 MG: 40 TABLET, FILM COATED ORAL at 07:31

## 2019-01-03 RX ADMIN — ALBUTEROL SULFATE 2.5 MG: 2.5 SOLUTION RESPIRATORY (INHALATION) at 16:25

## 2019-01-03 RX ADMIN — GABAPENTIN 300 MG: 300 CAPSULE ORAL at 21:01

## 2019-01-03 RX ADMIN — MORPHINE SULFATE 2 MG: 2 INJECTION, SOLUTION INTRAMUSCULAR; INTRAVENOUS at 22:57

## 2019-01-03 RX ADMIN — Medication 1 TABLET: at 07:30

## 2019-01-03 RX ADMIN — ALBUTEROL SULFATE 2.5 MG: 2.5 SOLUTION RESPIRATORY (INHALATION) at 19:18

## 2019-01-03 RX ADMIN — FERROUS SULFATE TAB 325 MG (65 MG ELEMENTAL FE) 325 MG: 325 (65 FE) TAB at 17:04

## 2019-01-03 RX ADMIN — WARFARIN SODIUM 2.5 MG: 2.5 TABLET ORAL at 17:10

## 2019-01-03 RX ADMIN — Medication 400 MG: at 07:30

## 2019-01-03 RX ADMIN — PERFLUTREN 1 ML: 6.52 INJECTION, SUSPENSION INTRAVENOUS at 09:00

## 2019-01-03 RX ADMIN — FUROSEMIDE 10 MG/HR: 10 INJECTION, SOLUTION INTRAMUSCULAR; INTRAVENOUS at 10:57

## 2019-01-03 RX ADMIN — MEMANTINE 10 MG: 5 TABLET ORAL at 17:04

## 2019-01-03 RX ADMIN — Medication 10 ML: at 06:11

## 2019-01-03 RX ADMIN — CARVEDILOL 12.5 MG: 6.25 TABLET, FILM COATED ORAL at 07:31

## 2019-01-03 RX ADMIN — ASPIRIN 81 MG: 81 TABLET, COATED ORAL at 07:32

## 2019-01-03 RX ADMIN — Medication 10 ML: at 21:01

## 2019-01-03 RX ADMIN — FERROUS SULFATE TAB 325 MG (65 MG ELEMENTAL FE) 325 MG: 325 (65 FE) TAB at 07:31

## 2019-01-03 RX ADMIN — ACETAMINOPHEN 500 MG: 500 TABLET, FILM COATED ORAL at 18:45

## 2019-01-03 RX ADMIN — POTASSIUM CHLORIDE 40 MEQ: 20 TABLET, EXTENDED RELEASE ORAL at 21:01

## 2019-01-03 RX ADMIN — ACETAMINOPHEN 500 MG: 500 TABLET, FILM COATED ORAL at 08:18

## 2019-01-03 RX ADMIN — NITROGLYCERIN 1 INCH: 20 OINTMENT TOPICAL at 17:05

## 2019-01-03 RX ADMIN — GLIPIZIDE 10 MG: 5 TABLET ORAL at 07:30

## 2019-01-03 RX ADMIN — LEVOTHYROXINE SODIUM 75 MCG: 150 TABLET ORAL at 06:09

## 2019-01-03 RX ADMIN — MORPHINE SULFATE 2 MG: 2 INJECTION, SOLUTION INTRAMUSCULAR; INTRAVENOUS at 09:32

## 2019-01-03 RX ADMIN — FUROSEMIDE 40 MG: 10 INJECTION, SOLUTION INTRAMUSCULAR; INTRAVENOUS at 07:35

## 2019-01-03 RX ADMIN — ALBUTEROL SULFATE 2.5 MG: 2.5 SOLUTION RESPIRATORY (INHALATION) at 11:08

## 2019-01-03 NOTE — PROGRESS NOTES
Warfarin dosing per pharmacist 
 
Cass Fabian is a 77 y.o. male. Height: 6' (182.9 cm)    Weight: 106.2 kg (234 lb 3.2 oz) Indication:  afib Goal INR:  2-3 Home dose:  2.5 mg Sun, Tues, Thurs; 5 mg all other days Risk factors or significant drug interactions:  none Other anticoagulants:  none Daily Monitoring Date  INR     Warfarin dose HGB              Notes 1/2  2.5  5 mg  13.4   
1/3  2.2  2.5 mg  --- Pharmacy is consulted to manage warfarin for Mr. Tricia Bonilla during this admission. He presents with therapeutic INR. No new interacting medications have been ordered. INR remains within the therapeutic range Will continue home dose. Warfarin 2.5 mg tonight. Daily INR. Pharmacy will continue to follow. Please call with any questions. Thank you, Rich Love, PharmD, [facility] Clinical Pharmacist 
[phone]

## 2019-01-03 NOTE — PROGRESS NOTES
Care Management Interventions PCP Verified by CM: Yes 
Palliative Care Criteria Met (RRAT>21 & CHF Dx)?: Yes(RRAT- 22) Palliative Consult Recommended?: Yes Transition of Care Consult (CM Consult): Discharge Planning Current Support Network: Lives with Spouse Confirm Follow Up Transport: Family Plan discussed with Pt/Family/Caregiver: Yes Freedom of Choice Offered: Yes Discharge Location Discharge Placement: Home This CM attempted to complete assessment with pt but upon entering room but was sleeping and not easily awakened. This CM called pts spouse, [place] Paula [phone], to complete assessment. Pts spouse was able to provide information to complete pts assessment. She verified pts PCP, insurance, home address, and contact information. She reports they have no difficulty obtaining medications for pt. He lives at home with her in [facility] with ramp to enter. She confirms his home DME includes a w/c, cane, walker, and rollator. He is on home O2 through [facility].  She confirms that at baseline he is independent with his ADLs including bathing, dressing, and driving. When discussing tentative discharge plan pts spouse reports that they are only interested in pt returning home with [facility] is appropriate. She reports only HH they would want would be [facility]. This CM will attempt to touch base with pt regarding this information prior to discharge. No additional needs at this time. CM to continue to follow.

## 2019-01-03 NOTE — PROGRESS NOTES
Pt in A-fib; spoke with NP, Lucie Bernal and received no orders at this time. Pt's HR is in the 80s and BP is 156/93. Pt is asymptomatic at this time. Will continue to monitor closely.

## 2019-01-03 NOTE — PROGRESS NOTES
Dual skin assessment - skin fragile. Bilateral lower legs purple-ally discoloration. Scattered ecchymosis.

## 2019-01-03 NOTE — PROGRESS NOTES
1/3/2019 7:36 AM 
 
Admit Date: 1/2/2019 Admit Diagnosis: Heart failure ([facility] [address] Subjective:  
 Patient looking better but needs more diuresis. Change to lasix gtt Objective:  
  
Visit Vitals /82 Pulse 90 Temp 98 °F (36.7 °C) Resp 16 Ht 6' (1.829 m) Wt 106.2 kg (234 lb 3.2 oz) SpO2 96% BMI 31.76 kg/m² ROS:  General ROS: negative for - chills Hematological and Lymphatic ROS: negative for - blood clots or jaundice Respiratory ROS: no cough, shortness of breath, or wheezing Cardiovascular ROS: no chest pain or dyspnea on exertion Gastrointestinal ROS: no abdominal pain, change in bowel habits, or black or bloody stools Neurological ROS: no TIA or stroke symptoms Physical Exam: 
 
Physical Examination: General appearance - alert, well appearing, and in no distress Mental status - alert, oriented to person, place, and time Eyes - pupils equal and reactive, extraocular eye movements intact Neck/lymph - supple, no significant adenopathy Chest/CV - clear to auscultation, no wheezes, rales or rhonchi, symmetric air entry Heart - irregularly irregular rhythm with rate 90 Abdomen/GI - soft, nontender, nondistended, no masses or organomegaly Musculoskeletal - no joint tenderness, deformity or swelling Extremities - pedal edema 2 + Skin - normal coloration and turgor, no rashes, no suspicious skin lesions noted Current Facility-Administered Medications Medication Dose Route Frequency  furosemide (LASIX) 100 mg in 0.9% sodium chloride 100 mL infusion  10 mg/hr IntraVENous CONTINUOUS  
 albuterol (PROVENTIL VENTOLIN) nebulizer solution 1.25 mg  1.25 mg Nebulization Q4H PRN  
 aspirin delayed-release tablet 81 mg  81 mg Oral DAILY  atorvastatin (LIPITOR) tablet 20 mg  20 mg Oral DAILY  carvedilol (COREG) tablet 12.5 mg  12.5 mg Oral BID  ferrous sulfate tablet 325 mg  325 mg Oral BID WITH MEALS  gabapentin (NEURONTIN) capsule 300 mg  300 mg Oral QHS  glipiZIDE (GLUCOTROL) tablet 10 mg  10 mg Oral DAILY WITH BREAKFAST  levothyroxine (SYNTHROID) tablet 75 mcg  75 mcg Oral ACB  lisinopril (PRINIVIL, ZESTRIL) tablet 20 mg  20 mg Oral DAILY  magnesium oxide (MAG-OX) tablet 400 mg  400 mg Oral DAILY  memantine (NAMENDA) tablet 10 mg  10 mg Oral BID  vit B Cmplx 3-FA-Vit C-Biotin (NEPHRO CAMMY RX) tablet 1 Tab  1 Tab Oral DAILY  nitroglycerin (NITROSTAT) tablet 0.4 mg  0.4 mg SubLINGual Q5MIN PRN  pantoprazole (PROTONIX) tablet 40 mg  40 mg Oral ACB  potassium chloride (K-DUR, KLOR-CON) SR tablet 20 mEq  20 mEq Oral DAILY  sodium chloride (NS) flush 5-10 mL  5-10 mL IntraVENous Q8H  
 sodium chloride (NS) flush 5-10 mL  5-10 mL IntraVENous PRN  
 morphine injection 2 mg  2 mg IntraVENous Q4H PRN  
 nitroglycerin (NITROBID) 2 % ointment 1 Inch  1 Inch Topical Q6H  
 acetaminophen (TYLENOL) tablet 500 mg  500 mg Oral Q4H PRN  
 budesonide (PULMICORT) 500 mcg/2 ml nebulizer suspension  500 mcg Nebulization BID RT And  
 albuterol (PROVENTIL VENTOLIN) nebulizer solution 2.5 mg  2.5 mg Nebulization Q6HWA RT  
 warfarin (COUMADIN) tablet 2.5 mg  2.5 mg Oral Once per day on Sun Tue Thu  
 And  warfarin (COUMADIN) tablet 5 mg  5 mg Oral Once per day on Mon Wed Fri Sat  temazepam (RESTORIL) capsule 15 mg  15 mg Oral QHS Data Review:  
@LABRCNT(Na,K,BUN,CREA,WBC,HGB,HCT,PLT,INR,TRP,TCHOL*,Triglyceride*,LDL*,LDLCPOC HDL*,HDL])@ TELEMETRY: AF Assessment/Plan: Active Problems: 
  Atrial fibrillation ([facility] [address] (9/27/2013)rate control and OAC. Pt has been started on [address] therapy or is currently taking [address] therapy. Pt given instructions for use and also provided access for patient education material about new medication and its uses, potential benefits and potential side effects and complications. Coronary atherosclerosis of artery bypass graft (9/27/2013) Overview: CABG x3 in 2009 Dyslipidemia (9/27/2013) Acute diastolic heart failure ([facility] [address] (9/30/2013) Acute-on-chronic respiratory failure ([facility] [address] (9/30/2013) Overview: Home O2 2L at night only Heart failure ([facility] [address] (1/2/2019)lasix gtt AVR stable by echo COPD Pul HTN Increase coreg to 25 bid for htn and rate control increase lisinopril to 40 for htn add lasix gtt reassess in am 
Mickey Burgess MD

## 2019-01-03 NOTE — ROUTINE PROCESS
Spoke with patient on admission and POC moving forward. Patient currently on lasix gtt- medications being titrated. Patient with hx copd, phtn, dHF, afib. Echo pending. Continue to follow CHF teaching started post introduction to pt/family; aware of diagnosis. Planner/scale @ BS and will follow. Smoking/ ETOH/Illicit drug use cessation and maintain a healthy weight covered. Pt/family aware that I can not prescribe nor adjust  medications:  
 
Palliative Care score: 22 Refused ACP on admission Start 2L/D Fluid restriction/ cardiac diet CHF teaching continues to pt/family. Emphasis on taking prescription meds as ordered, to keep F/U appts and to call MD STAT if any of the following occur: ? If you gain 2 lbs in one day or 5 lbs in a week, and short of breath. ? If you can not lay flat without developing short of breath or rapid breathing at night; or if it wakes you up. Develop a cough or wheezing. ? If you notice swollen hands/feet/ankles or stomach with a bloated/ full feeling. ? If you become confused or mentally fuzzy or dizzy. ? If you notice a rapid or change in your heart rate. ? If you become more exhausted all the time and unable to do the same level of activity without stopping to catch your breath. Drink no more than 8 cups a day in 8 oz. cups. Your Heart can not handle any more. Stay away from salt (limit anything with salt or sodium in it). Limit to 250mg per serving. Pt/family verbalizes understanding, will follow to reinforce teaching skills: 20 mins

## 2019-01-03 NOTE — PROGRESS NOTES
Following for potential CHF Bundle Payment patient RRAT- 22- followed by [address] [facility] Scott Ceja NP) for COPD. Has appointment on 1/22/19. Cardiology- primary Eligible for Health  to follow for 90 days. Recently followed by [facility]. Went to [address] [place] [address] in June. SW did speak with wife, wants to return home with [facility]. He is the caregiver for his wife. Went to speak with patient, and was asleep. Will follow up after CHF education. Daniel William, RN- BC, BSN Care Transition/ Bundled Payment Navigator

## 2019-01-03 NOTE — PROGRESS NOTES
Bedside and Verbal shift change report given to tavia (oncoming nurse) by self (offgoing nurse). Report included the following information SBAR, Kardex, Intake/Output, MAR and Recent Results.

## 2019-01-04 LAB
ANION GAP SERPL CALC-SCNC: 7 MMOL/L (ref 7–16)
BUN SERPL-MCNC: 11 MG/DL (ref 8–23)
CALCIUM SERPL-MCNC: 8.8 MG/DL (ref 8.3–10.4)
CHLORIDE SERPL-SCNC: 97 MMOL/L (ref 98–107)
CO2 SERPL-SCNC: 38 MMOL/L (ref 21–32)
CREAT SERPL-MCNC: 0.81 MG/DL (ref 0.8–1.5)
GLUCOSE SERPL-MCNC: 191 MG/DL (ref 65–100)
INR PPP: 2.2
MAGNESIUM SERPL-MCNC: 2 MG/DL (ref 1.8–2.4)
POTASSIUM SERPL-SCNC: 3.6 MMOL/L (ref 3.5–5.1)
PROTHROMBIN TIME: 23.8 SEC (ref 11.7–14.5)
SODIUM SERPL-SCNC: 142 MMOL/L (ref 136–145)

## 2019-01-04 PROCEDURE — 74011250637 HC RX REV CODE- 250/637: Performed by: INTERNAL MEDICINE

## 2019-01-04 PROCEDURE — 74011000250 HC RX REV CODE- 250: Performed by: NURSE PRACTITIONER

## 2019-01-04 PROCEDURE — 94760 N-INVAS EAR/PLS OXIMETRY 1: CPT

## 2019-01-04 PROCEDURE — 94640 AIRWAY INHALATION TREATMENT: CPT

## 2019-01-04 PROCEDURE — 85610 PROTHROMBIN TIME: CPT

## 2019-01-04 PROCEDURE — 74011250636 HC RX REV CODE- 250/636: Performed by: NURSE PRACTITIONER

## 2019-01-04 PROCEDURE — 77010033678 HC OXYGEN DAILY

## 2019-01-04 PROCEDURE — 65660000000 HC RM CCU STEPDOWN

## 2019-01-04 PROCEDURE — 36415 COLL VENOUS BLD VENIPUNCTURE: CPT

## 2019-01-04 PROCEDURE — 80048 BASIC METABOLIC PNL TOTAL CA: CPT

## 2019-01-04 PROCEDURE — 74011250637 HC RX REV CODE- 250/637: Performed by: NURSE PRACTITIONER

## 2019-01-04 PROCEDURE — 74011250636 HC RX REV CODE- 250/636: Performed by: INTERNAL MEDICINE

## 2019-01-04 PROCEDURE — 74011000258 HC RX REV CODE- 258: Performed by: INTERNAL MEDICINE

## 2019-01-04 PROCEDURE — 74011000250 HC RX REV CODE- 250: Performed by: INTERNAL MEDICINE

## 2019-01-04 PROCEDURE — 83735 ASSAY OF MAGNESIUM: CPT

## 2019-01-04 RX ADMIN — FUROSEMIDE 10 MG/HR: 10 INJECTION, SOLUTION INTRAMUSCULAR; INTRAVENOUS at 08:14

## 2019-01-04 RX ADMIN — MORPHINE SULFATE 2 MG: 2 INJECTION, SOLUTION INTRAMUSCULAR; INTRAVENOUS at 08:14

## 2019-01-04 RX ADMIN — TEMAZEPAM 15 MG: 15 CAPSULE ORAL at 21:08

## 2019-01-04 RX ADMIN — FUROSEMIDE 10 MG/HR: 10 INJECTION, SOLUTION INTRAMUSCULAR; INTRAVENOUS at 19:32

## 2019-01-04 RX ADMIN — NITROGLYCERIN 1 INCH: 20 OINTMENT TOPICAL at 12:12

## 2019-01-04 RX ADMIN — GABAPENTIN 300 MG: 300 CAPSULE ORAL at 21:08

## 2019-01-04 RX ADMIN — BUDESONIDE 500 MCG: 0.5 INHALANT RESPIRATORY (INHALATION) at 20:43

## 2019-01-04 RX ADMIN — ALBUTEROL SULFATE 2.5 MG: 2.5 SOLUTION RESPIRATORY (INHALATION) at 08:42

## 2019-01-04 RX ADMIN — Medication 1 TABLET: at 08:15

## 2019-01-04 RX ADMIN — CARVEDILOL 25 MG: 6.25 TABLET, FILM COATED ORAL at 08:14

## 2019-01-04 RX ADMIN — Medication 10 ML: at 21:07

## 2019-01-04 RX ADMIN — Medication 10 ML: at 05:54

## 2019-01-04 RX ADMIN — ACETAMINOPHEN 500 MG: 500 TABLET, FILM COATED ORAL at 19:32

## 2019-01-04 RX ADMIN — LEVOTHYROXINE SODIUM 75 MCG: 150 TABLET ORAL at 05:56

## 2019-01-04 RX ADMIN — MEMANTINE 10 MG: 5 TABLET ORAL at 17:54

## 2019-01-04 RX ADMIN — NITROGLYCERIN 1 INCH: 20 OINTMENT TOPICAL at 17:53

## 2019-01-04 RX ADMIN — WARFARIN SODIUM 5 MG: 5 TABLET ORAL at 17:54

## 2019-01-04 RX ADMIN — NITROGLYCERIN 1 INCH: 20 OINTMENT TOPICAL at 05:54

## 2019-01-04 RX ADMIN — NITROGLYCERIN 1 INCH: 20 OINTMENT TOPICAL at 00:02

## 2019-01-04 RX ADMIN — GLIPIZIDE 10 MG: 5 TABLET ORAL at 08:15

## 2019-01-04 RX ADMIN — Medication 400 MG: at 08:15

## 2019-01-04 RX ADMIN — FERROUS SULFATE TAB 325 MG (65 MG ELEMENTAL FE) 325 MG: 325 (65 FE) TAB at 17:54

## 2019-01-04 RX ADMIN — ALBUTEROL SULFATE 2.5 MG: 2.5 SOLUTION RESPIRATORY (INHALATION) at 14:44

## 2019-01-04 RX ADMIN — NITROGLYCERIN 1 INCH: 20 OINTMENT TOPICAL at 23:57

## 2019-01-04 RX ADMIN — POTASSIUM CHLORIDE 20 MEQ: 20 TABLET, EXTENDED RELEASE ORAL at 08:15

## 2019-01-04 RX ADMIN — MEMANTINE 10 MG: 5 TABLET ORAL at 08:15

## 2019-01-04 RX ADMIN — ASPIRIN 81 MG: 81 TABLET, COATED ORAL at 08:15

## 2019-01-04 RX ADMIN — LISINOPRIL 40 MG: 20 TABLET ORAL at 08:15

## 2019-01-04 RX ADMIN — BUDESONIDE 500 MCG: 0.5 INHALANT RESPIRATORY (INHALATION) at 08:42

## 2019-01-04 RX ADMIN — ATORVASTATIN CALCIUM 20 MG: 40 TABLET, FILM COATED ORAL at 08:15

## 2019-01-04 RX ADMIN — FERROUS SULFATE TAB 325 MG (65 MG ELEMENTAL FE) 325 MG: 325 (65 FE) TAB at 08:15

## 2019-01-04 RX ADMIN — CARVEDILOL 25 MG: 6.25 TABLET, FILM COATED ORAL at 17:54

## 2019-01-04 RX ADMIN — PANTOPRAZOLE SODIUM 40 MG: 40 TABLET, DELAYED RELEASE ORAL at 05:57

## 2019-01-04 NOTE — PROGRESS NOTES
This CM visited with pt this day. Pt is the primary caregiver for his spouse at home including driving. This CM discussed possibility of SNF STR pending PT and OT eval to determine pts current level of care needed. Pt reported that he has been to 4 STR facilities and was not satisfied with any of them in the past.  Pt wants to go home with [facility] - agreeable to [facility]. This CM presented a 'what if' situation in case PT, OT recommend STR - pt agreeable to think about it if that is what the doctor/therapists recommend but wants to table the conversation with this CM until that time. CM to continue to follow.

## 2019-01-04 NOTE — PROGRESS NOTES
Bedside and verbal shift change report received from Wang Benavides RN. Cardiac rhythm AFib rate controlled verified on monitor.

## 2019-01-04 NOTE — PROGRESS NOTES
Warfarin dosing per pharmacist 
 
Yoselin Martinez is a 77 y.o. male. Height: 6' (182.9 cm)    Weight: 104.5 kg (230 lb 6.4 oz) Indication:  afib Goal INR:  2-3 Home dose:  2.5 mg Sun, Tues, Thurs; 5 mg all other days Risk factors or significant drug interactions:  none Other anticoagulants:  none Daily Monitoring Date  INR     Warfarin dose HGB              Notes 1/2  2.5  5 mg  13.4   
1/3  2.2  2.5 mg  --- 
1/4  2.2  5 mg  --- Pharmacy is consulted to manage warfarin for Mr. Pancho Agrawal during this admission. He presents with therapeutic INR. No new interacting medications have been ordered. INR remains within the therapeutic range Will continue home dose. Warfarin 5 mg tonight. Daily INR. Pharmacy will continue to follow. Please call with any questions. Thank you, Rosetta Estrella, PharmD Clinical Pharmacist 
[phone]

## 2019-01-04 NOTE — PROGRESS NOTES
Verbal bedside report received from Selvin Menchaca [facility]. Assumed care of patient. Lasix IV drip verified at bedside with outgoing RN.

## 2019-01-04 NOTE — PROGRESS NOTES
1/4/2019 7:36 AM 
 
Admit Date: 1/2/2019 Admit Diagnosis: Heart failure ([facility] [address] Subjective:  
 Patient looking better but needs more diuresis. Change to lasix gtt. Has responded well to lasix gtt but needs prob 1-2 more days of lasix gtt. He needs sig social service help for disposition planning Objective:  
  
Visit Vitals /63 Pulse 95 Temp 98.4 °F (36.9 °C) Resp 18 Ht 6' (1.829 m) Wt 104.5 kg (230 lb 6.4 oz) SpO2 94% BMI 31.25 kg/m² ROS:  General ROS: negative for - chills Hematological and Lymphatic ROS: negative for - blood clots or jaundice Respiratory ROS: no cough, shortness of breath, or wheezing Cardiovascular ROS: no chest pain or dyspnea on exertion Gastrointestinal ROS: no abdominal pain, change in bowel habits, or black or bloody stools Neurological ROS: no TIA or stroke symptoms Physical Exam: 
 
Physical Examination: General appearance - alert, well appearing, and in no distress Mental status - alert, oriented to person, place, and time Eyes - pupils equal and reactive, extraocular eye movements intact Neck/lymph - supple, no significant adenopathy Chest/CV - clear to auscultation, no wheezes, rales or rhonchi, symmetric air entry Heart - irregularly irregular rhythm with rate 90 Abdomen/GI - soft, nontender, nondistended, no masses or organomegaly Musculoskeletal - no joint tenderness, deformity or swelling Extremities - pedal edema 2 + Skin - normal coloration and turgor, no rashes, no suspicious skin lesions noted Current Facility-Administered Medications Medication Dose Route Frequency  furosemide (LASIX) 100 mg in 0.9% sodium chloride 100 mL infusion  10 mg/hr IntraVENous CONTINUOUS  
 carvedilol (COREG) tablet 25 mg  25 mg Oral BID WITH MEALS  lisinopril (PRINIVIL, ZESTRIL) tablet 40 mg  40 mg Oral DAILY  albuterol (PROVENTIL VENTOLIN) nebulizer solution 1.25 mg  1.25 mg Nebulization Q4H PRN  
  aspirin delayed-release tablet 81 mg  81 mg Oral DAILY  atorvastatin (LIPITOR) tablet 20 mg  20 mg Oral DAILY  ferrous sulfate tablet 325 mg  325 mg Oral BID WITH MEALS  gabapentin (NEURONTIN) capsule 300 mg  300 mg Oral QHS  glipiZIDE (GLUCOTROL) tablet 10 mg  10 mg Oral DAILY WITH BREAKFAST  levothyroxine (SYNTHROID) tablet 75 mcg  75 mcg Oral ACB  magnesium oxide (MAG-OX) tablet 400 mg  400 mg Oral DAILY  memantine (NAMENDA) tablet 10 mg  10 mg Oral BID  vit B Cmplx 3-FA-Vit C-Biotin (NEPHRO CAMMY RX) tablet 1 Tab  1 Tab Oral DAILY  nitroglycerin (NITROSTAT) tablet 0.4 mg  0.4 mg SubLINGual Q5MIN PRN  pantoprazole (PROTONIX) tablet 40 mg  40 mg Oral ACB  potassium chloride (K-DUR, KLOR-CON) SR tablet 20 mEq  20 mEq Oral DAILY  sodium chloride (NS) flush 5-10 mL  5-10 mL IntraVENous Q8H  
 sodium chloride (NS) flush 5-10 mL  5-10 mL IntraVENous PRN  
 morphine injection 2 mg  2 mg IntraVENous Q4H PRN  
 nitroglycerin (NITROBID) 2 % ointment 1 Inch  1 Inch Topical Q6H  
 acetaminophen (TYLENOL) tablet 500 mg  500 mg Oral Q4H PRN  
 budesonide (PULMICORT) 500 mcg/2 ml nebulizer suspension  500 mcg Nebulization BID RT And  
 albuterol (PROVENTIL VENTOLIN) nebulizer solution 2.5 mg  2.5 mg Nebulization Q6HWA RT  
 warfarin (COUMADIN) tablet 2.5 mg  2.5 mg Oral Once per day on Sun Tue Thu  
 And  warfarin (COUMADIN) tablet 5 mg  5 mg Oral Once per day on Mon Wed Fri Sat  temazepam (RESTORIL) capsule 15 mg  15 mg Oral QHS Data Review:  
@LABRCNT(Na,K,BUN,CREA,WBC,HGB,HCT,PLT,INR,TRP,TCHOL*,Triglyceride*,LDL*,LDLCPOC HDL*,HDL])@ TELEMETRY: AF Assessment/Plan: Active Problems: 
  Atrial fibrillation ([facility] [address] (9/27/2013)rate control and OAC. Pt has been started on [address] therapy or is currently taking [address] therapy.  Pt given instructions for use and also provided access for patient education material about new medication and its uses, potential benefits and potential side effects and complications. Coronary atherosclerosis of artery bypass graft (9/27/2013) Overview: CABG x3 in 2009 Dyslipidemia (9/27/2013) Acute diastolic heart failure ([facility] [address] (9/30/2013) Acute-on-chronic respiratory failure ([address] (9/30/2013) Overview: Home O2 2L at night only Heart failure ([facility] [address] (1/2/2019)lasix gtt AVR stable by echo COPD Pul HTN Increase coreg to 25 bid for htn and rate control increase lisinopril to 40 for htn add lasix gtt reassess in am 
Radha Burkett MD

## 2019-01-04 NOTE — PROGRESS NOTES
Problem: Falls - Risk of 
Goal: *Absence of Falls Document [place] Fall Risk and appropriate interventions in the flowsheet. Outcome: Progressing Towards Goal 
Fall Risk Interventions: 
Mobility Interventions: Bed/chair exit alarm, Communicate number of staff needed for ambulation/transfer, Patient to call before getting OOB Medication Interventions: Bed/chair exit alarm, Evaluate medications/consider consulting pharmacy, Patient to call before getting OOB Elimination Interventions: Call light in reach, Bed/chair exit alarm, Patient to call for help with toileting needs, Urinal in reach History of Falls Interventions: Bed/chair exit alarm, Door open when patient unattended, Evaluate medications/consider consulting pharmacy

## 2019-01-04 NOTE — PROGRESS NOTES
Monitor room notified staff RN Gianna Wray pt had 6 beat run of VTach at [phone] Primary RN assessed patient - pt assymptomatic, MD aware, will continue to monitor closely.

## 2019-01-04 NOTE — PROGRESS NOTES
PT consulted and Case Management for discharge planning per MD request  
Pt ambulated approx 50ft with primary RN and walker - assist x 1, pt experienced dyspnea on exertion Allevyn changed, sacrum visualized, nickel size blister appearance to right inner gluteal fold - no open area, blanchable, redness noted - Allevyn applied, repositioned encouraged. Will continue to monitor closely.

## 2019-01-04 NOTE — PROGRESS NOTES
Bedside and Verbal shift change report given to Lorna Sharma RN (oncoming nurse) by self (offgoing nurse). Report included the following information SBAR, Kardex, Intake/Output, MAR, Recent Results and Med Rec Status. Iv lasix gtt verified at bedside. Hourly VS placed in chart.

## 2019-01-04 NOTE — PROGRESS NOTES
Problem: Falls - Risk of 
Goal: *Absence of Falls Document Guillermina Lau Fall Risk and appropriate interventions in the flowsheet. Outcome: Progressing Towards Goal 
Fall Risk Interventions: 
Mobility Interventions: Bed/chair exit alarm, Patient to call before getting OOB, Strengthening exercises (ROM-active/passive) Medication Interventions: Bed/chair exit alarm, Evaluate medications/consider consulting pharmacy, Patient to call before getting OOB Elimination Interventions: Call light in reach, Patient to call for help with toileting needs History of Falls Interventions: Bed/chair exit alarm, Door open when patient unattended Pt educated to call for assistance, call bell within reach, bed alarm on, pt verbalizes understanding to call for assistance

## 2019-01-04 NOTE — ROUTINE PROCESS
Continued discussion and HF educational materials. Patient needs continued reinforcement. Patient for possible hh vs rehab. Patient stated he has has decreased symptoms but not back to baseline in regards to breathing. Patient has ambulated and had \" so-so\" response to breathing while ambulating per patient. Meds reviewed and continue optimizing and lasix gtt continues. CHF teaching continues to pt/family. Emphasis on taking prescription meds as ordered, to keep F/U appts and to call MD STAT if any of the following occur: ? If you gain 2 lbs in one day or 5 lbs in a week, and short of breath. ? If you can not lay flat without developing short of breath or rapid breathing at night; or if it wakes you up. Develop a cough or wheezing. ? If you notice swollen hands/feet/ankles or stomach with a bloated/ full feeling. ? If you become confused or mentally fuzzy or dizzy. ? If you notice a rapid or change in your heart rate. ? If you become more exhausted all the time and unable to do the same level of activity without stopping to catch your breath. Drink no more than 8 cups a day in 8 oz. cups. Your Heart can not handle any more. Stay away from salt (limit anything with salt or sodium in it). Limit to 250mg per serving. Pt/family verbalizes understanding, will follow to reinforce teaching skills: 25 mins (45 mins total)

## 2019-01-05 LAB
ANION GAP SERPL CALC-SCNC: ABNORMAL MMOL/L (ref 7–16)
BUN SERPL-MCNC: 19 MG/DL (ref 8–23)
CALCIUM SERPL-MCNC: 9.3 MG/DL (ref 8.3–10.4)
CHLORIDE SERPL-SCNC: 91 MMOL/L (ref 98–107)
CO2 SERPL-SCNC: >45 MMOL/L (ref 21–32)
CREAT SERPL-MCNC: 1.03 MG/DL (ref 0.8–1.5)
GLUCOSE SERPL-MCNC: 173 MG/DL (ref 65–100)
INR PPP: 1.9
MAGNESIUM SERPL-MCNC: 2 MG/DL (ref 1.8–2.4)
POTASSIUM SERPL-SCNC: 3.5 MMOL/L (ref 3.5–5.1)
PROTHROMBIN TIME: 21.2 SEC (ref 11.7–14.5)
SODIUM SERPL-SCNC: 140 MMOL/L (ref 136–145)

## 2019-01-05 PROCEDURE — 74011250637 HC RX REV CODE- 250/637: Performed by: INTERNAL MEDICINE

## 2019-01-05 PROCEDURE — 74011000258 HC RX REV CODE- 258: Performed by: INTERNAL MEDICINE

## 2019-01-05 PROCEDURE — 83735 ASSAY OF MAGNESIUM: CPT

## 2019-01-05 PROCEDURE — 74011000250 HC RX REV CODE- 250: Performed by: INTERNAL MEDICINE

## 2019-01-05 PROCEDURE — 77010033678 HC OXYGEN DAILY

## 2019-01-05 PROCEDURE — 74011250636 HC RX REV CODE- 250/636: Performed by: NURSE PRACTITIONER

## 2019-01-05 PROCEDURE — 85610 PROTHROMBIN TIME: CPT

## 2019-01-05 PROCEDURE — 80048 BASIC METABOLIC PNL TOTAL CA: CPT

## 2019-01-05 PROCEDURE — 86580 TB INTRADERMAL TEST: CPT | Performed by: NURSE PRACTITIONER

## 2019-01-05 PROCEDURE — 74011250637 HC RX REV CODE- 250/637: Performed by: NURSE PRACTITIONER

## 2019-01-05 PROCEDURE — 65660000000 HC RM CCU STEPDOWN

## 2019-01-05 PROCEDURE — 94760 N-INVAS EAR/PLS OXIMETRY 1: CPT

## 2019-01-05 PROCEDURE — 94640 AIRWAY INHALATION TREATMENT: CPT

## 2019-01-05 PROCEDURE — 74011000302 HC RX REV CODE- 302: Performed by: NURSE PRACTITIONER

## 2019-01-05 PROCEDURE — 36415 COLL VENOUS BLD VENIPUNCTURE: CPT

## 2019-01-05 PROCEDURE — 74011250636 HC RX REV CODE- 250/636: Performed by: INTERNAL MEDICINE

## 2019-01-05 RX ADMIN — ACETAMINOPHEN 500 MG: 500 TABLET, FILM COATED ORAL at 11:29

## 2019-01-05 RX ADMIN — BUDESONIDE 500 MCG: 0.5 INHALANT RESPIRATORY (INHALATION) at 21:02

## 2019-01-05 RX ADMIN — PANTOPRAZOLE SODIUM 40 MG: 40 TABLET, DELAYED RELEASE ORAL at 06:06

## 2019-01-05 RX ADMIN — ALBUTEROL SULFATE 2.5 MG: 2.5 SOLUTION RESPIRATORY (INHALATION) at 21:02

## 2019-01-05 RX ADMIN — Medication 10 ML: at 20:44

## 2019-01-05 RX ADMIN — MORPHINE SULFATE 2 MG: 2 INJECTION, SOLUTION INTRAMUSCULAR; INTRAVENOUS at 18:59

## 2019-01-05 RX ADMIN — GLIPIZIDE 10 MG: 5 TABLET ORAL at 08:16

## 2019-01-05 RX ADMIN — NITROGLYCERIN 1 INCH: 20 OINTMENT TOPICAL at 06:06

## 2019-01-05 RX ADMIN — ATORVASTATIN CALCIUM 20 MG: 40 TABLET, FILM COATED ORAL at 08:16

## 2019-01-05 RX ADMIN — FUROSEMIDE 10 MG/HR: 10 INJECTION, SOLUTION INTRAMUSCULAR; INTRAVENOUS at 06:09

## 2019-01-05 RX ADMIN — FERROUS SULFATE TAB 325 MG (65 MG ELEMENTAL FE) 325 MG: 325 (65 FE) TAB at 18:07

## 2019-01-05 RX ADMIN — CARVEDILOL 25 MG: 6.25 TABLET, FILM COATED ORAL at 08:15

## 2019-01-05 RX ADMIN — TEMAZEPAM 15 MG: 15 CAPSULE ORAL at 20:42

## 2019-01-05 RX ADMIN — POTASSIUM CHLORIDE 20 MEQ: 20 TABLET, EXTENDED RELEASE ORAL at 08:16

## 2019-01-05 RX ADMIN — ASPIRIN 81 MG: 81 TABLET, COATED ORAL at 08:15

## 2019-01-05 RX ADMIN — ACETAMINOPHEN 500 MG: 500 TABLET, FILM COATED ORAL at 15:45

## 2019-01-05 RX ADMIN — FERROUS SULFATE TAB 325 MG (65 MG ELEMENTAL FE) 325 MG: 325 (65 FE) TAB at 08:16

## 2019-01-05 RX ADMIN — ALBUTEROL SULFATE 2.5 MG: 2.5 SOLUTION RESPIRATORY (INHALATION) at 15:25

## 2019-01-05 RX ADMIN — Medication 1 TABLET: at 08:15

## 2019-01-05 RX ADMIN — MEMANTINE 10 MG: 5 TABLET ORAL at 18:07

## 2019-01-05 RX ADMIN — WARFARIN SODIUM 5 MG: 5 TABLET ORAL at 18:09

## 2019-01-05 RX ADMIN — ACETAMINOPHEN 500 MG: 500 TABLET, FILM COATED ORAL at 20:42

## 2019-01-05 RX ADMIN — Medication 10 ML: at 14:17

## 2019-01-05 RX ADMIN — LEVOTHYROXINE SODIUM 75 MCG: 150 TABLET ORAL at 06:06

## 2019-01-05 RX ADMIN — Medication 400 MG: at 08:15

## 2019-01-05 RX ADMIN — ALBUTEROL SULFATE 2.5 MG: 2.5 SOLUTION RESPIRATORY (INHALATION) at 07:39

## 2019-01-05 RX ADMIN — BUDESONIDE 500 MCG: 0.5 INHALANT RESPIRATORY (INHALATION) at 07:39

## 2019-01-05 RX ADMIN — LISINOPRIL 40 MG: 20 TABLET ORAL at 08:16

## 2019-01-05 RX ADMIN — TUBERCULIN PURIFIED PROTEIN DERIVATIVE 5 UNITS: 5 INJECTION, SOLUTION INTRADERMAL at 11:20

## 2019-01-05 RX ADMIN — MORPHINE SULFATE 2 MG: 2 INJECTION, SOLUTION INTRAMUSCULAR; INTRAVENOUS at 14:17

## 2019-01-05 RX ADMIN — MEMANTINE 10 MG: 5 TABLET ORAL at 08:15

## 2019-01-05 RX ADMIN — CARVEDILOL 25 MG: 6.25 TABLET, FILM COATED ORAL at 18:07

## 2019-01-05 RX ADMIN — FUROSEMIDE 10 MG/HR: 10 INJECTION, SOLUTION INTRAMUSCULAR; INTRAVENOUS at 15:48

## 2019-01-05 NOTE — PROGRESS NOTES
Bedside and Verbal shift change report given to Kalyan Bowles (oncoming nurse) by self (offgoing nurse). Report included the following information SBAR, Kardex, Intake/Output, MAR, Recent Results and Med Rec Status. IV lasix gtt verified at bedside with oncoming RN.

## 2019-01-05 NOTE — PROGRESS NOTES
1/5/2019 1:55 PM 
 
Admit Date: 1/2/2019 Admit Diagnosis: Heart failure ([address] Subjective: No cp- still sob Objective:  
  
Visit Vitals /62 Pulse 85 Temp 98.9 °F (37.2 °C) Resp 20 Ht 6' (1.829 m) Wt 102.2 kg (225 lb 4.8 oz) SpO2 98% BMI 30.56 kg/m² Physical Exam: 
Hildegard Hope, Well Nourished, No Acute Distress, Alert & Oriented x 3, appropriate mood. Neck- supple, no JVD 
CV- regular rate and rhythm no MRG Lung- clear bilaterally Abd- soft, nontender, nondistended Ext- one plus edema bilaterally. Skin- warm and dry Data Review:  
Recent Labs 01/05/19 
0424   
K 3.5 BUN 19  
CREA 1.03 INR 1.9 Assessment/Plan: Active Problems: 
  Atrial fibrillation ([facility] [address] (9/27/2013)Improved with current therapy. Will continue medications Coronary atherosclerosis of artery bypass graft (9/27/2013)Stable. Continue current medical therapy. Overview: CABG x3 in 2009 Dyslipidemia (9/27/2013) Acute diastolic heart failure ([address] (9/30/2013)Improved with current therapy. Will continue medications IV lasix gtt- order am labs Acute-on-chronic respiratory failure ([address] (9/30/2013) Overview: Home O2 2L at night only Heart failure ([address] (1/2/2019)

## 2019-01-05 NOTE — PROGRESS NOTES
Warfarin dosing per pharmacist 
 
Brett Nowak is a 77 y.o. male. Height: 6' (182.9 cm)    Weight: 102.2 kg (225 lb 4.8 oz) Indication:  afib Goal INR:  2-3 Home dose:  2.5 mg Sun, Tues, Thurs; 5 mg all other days Risk factors or significant drug interactions:  none Other anticoagulants:  none Daily Monitoring Date  INR     Warfarin dose HGB              Notes 1/2  2.5  5 mg  13.4   
1/3  2.2  2.5 mg  --- 
1/4  2.2  5 mg  --- 
1/5  1.9  5 mg  --- Pharmacy is consulted to manage warfarin for Mr. Dorothy Herrmann during this admission. He presents with therapeutic INR. No new interacting medications have been ordered. INR a little below therapeutic range, but patient getting 5 mg again tonight. Will continue home dose. Warfarin 5 mg tonight. Daily INR. Pharmacy will continue to follow. Please call with any questions. Thank you, Jian Rolle, PharmD Clinical Pharmacist 
[phone]

## 2019-01-05 NOTE — PROGRESS NOTES
Problem: Heart Failure: Day 3 Goal: Activity/Safety Outcome: Progressing Towards Goal 
Pt ambulating with assist x 2 PT/OT consulted for discharge planning PPD placed for potential rehab at discharge Pt up with assist - bed and chair alarm in place and utilized, pt verbalizes understanding to call for assistance, call bell within reach

## 2019-01-05 NOTE — PROGRESS NOTES
Bedside and verbal shift change received from Nehemiah Alvarez RN. Lasix gtt rate verified per MAR, hourly BP cycling.

## 2019-01-05 NOTE — PROGRESS NOTES
Pt CO2 > 45 with AM ABGs - discussed with Kaity Pichardo NP verbal order received to monitor, no respiratory distress noted Pt states he no longer takes Neurontin but PM RN has been administering per MD orders, ok to discontinue per Kaity Pichardo NP as patient denies this as current medication PPD ordered and placed for potential Rehab placement at discharge

## 2019-01-05 NOTE — PROGRESS NOTES
Spouse at bedside. Loudly berating pt and stated \"He's not going to Rehab. He needs to take care of me. I've got to get to dialysis 3 days a week and he needs to take me. \"  
Alejandro Steel held with wife about patient being seriously ill, and that he had to take care of himself as well. Encouraged spouse to understand that the doctor desires for patient to attend rehab and build his strength. Will update SW.

## 2019-01-05 NOTE — PROGRESS NOTES
Verbal bedside report received from [facility]. Assumed care of patient. Lasix IV drip verified at bedside with outgoing RN.

## 2019-01-05 NOTE — PROGRESS NOTES
Physical Therapy Note: 
 
Orders received, chart reviewed and initial evaluation attempted. Patient just returned to bed with nursing staff and received morphine, somewhat drowsy. Requests therapy return tomorrow. Patient states, \"please bring a walker! \" Will attempt tomorrow as patient is able. Thank you, Barrera Nevarez, DPT

## 2019-01-05 NOTE — PROGRESS NOTES
Problem: Falls - Risk of 
Goal: *Absence of Falls Document Althea Cheung Fall Risk and appropriate interventions in the flowsheet. Outcome: Progressing Towards Goal 
Fall Risk Interventions: 
Mobility Interventions: Bed/chair exit alarm, Communicate number of staff needed for ambulation/transfer, Patient to call before getting OOB Medication Interventions: Bed/chair exit alarm, Evaluate medications/consider consulting pharmacy, Patient to call before getting OOB Elimination Interventions: Call light in reach, Bed/chair exit alarm, Patient to call for help with toileting needs, Urinal in reach History of Falls Interventions: Bed/chair exit alarm, Door open when patient unattended, Evaluate medications/consider consulting pharmacy, Investigate reason for fall Problem: Heart Failure: Day 2 Goal: Activity/Safety Outcome: Progressing Towards Goal 
Patient ambulate in room with assistance. Goal: Medications Outcome: Progressing Towards Goal 
Lasix gtt for edema/ Fluid overload.

## 2019-01-06 LAB
ANION GAP SERPL CALC-SCNC: 5 MMOL/L (ref 7–16)
BUN SERPL-MCNC: 23 MG/DL (ref 8–23)
CALCIUM SERPL-MCNC: 9.2 MG/DL (ref 8.3–10.4)
CHLORIDE SERPL-SCNC: 90 MMOL/L (ref 98–107)
CO2 SERPL-SCNC: 44 MMOL/L (ref 21–32)
CREAT SERPL-MCNC: 1.08 MG/DL (ref 0.8–1.5)
GLUCOSE SERPL-MCNC: 126 MG/DL (ref 65–100)
INR PPP: 1.8
MAGNESIUM SERPL-MCNC: 2.2 MG/DL (ref 1.8–2.4)
MM INDURATION POC: 0 MM (ref 0–5)
POTASSIUM SERPL-SCNC: 3.4 MMOL/L (ref 3.5–5.1)
PPD POC: NEGATIVE NEGATIVE
PROTHROMBIN TIME: 20.7 SEC (ref 11.7–14.5)
SODIUM SERPL-SCNC: 139 MMOL/L (ref 136–145)

## 2019-01-06 PROCEDURE — 74011250636 HC RX REV CODE- 250/636: Performed by: INTERNAL MEDICINE

## 2019-01-06 PROCEDURE — 77010033678 HC OXYGEN DAILY

## 2019-01-06 PROCEDURE — 97530 THERAPEUTIC ACTIVITIES: CPT

## 2019-01-06 PROCEDURE — 85610 PROTHROMBIN TIME: CPT

## 2019-01-06 PROCEDURE — 74011250636 HC RX REV CODE- 250/636: Performed by: NURSE PRACTITIONER

## 2019-01-06 PROCEDURE — 74011250637 HC RX REV CODE- 250/637: Performed by: INTERNAL MEDICINE

## 2019-01-06 PROCEDURE — 74011000250 HC RX REV CODE- 250: Performed by: INTERNAL MEDICINE

## 2019-01-06 PROCEDURE — 74011250637 HC RX REV CODE- 250/637: Performed by: NURSE PRACTITIONER

## 2019-01-06 PROCEDURE — 74011000258 HC RX REV CODE- 258: Performed by: INTERNAL MEDICINE

## 2019-01-06 PROCEDURE — 97161 PT EVAL LOW COMPLEX 20 MIN: CPT

## 2019-01-06 PROCEDURE — 83735 ASSAY OF MAGNESIUM: CPT

## 2019-01-06 PROCEDURE — 94760 N-INVAS EAR/PLS OXIMETRY 1: CPT

## 2019-01-06 PROCEDURE — 65660000000 HC RM CCU STEPDOWN

## 2019-01-06 PROCEDURE — 80048 BASIC METABOLIC PNL TOTAL CA: CPT

## 2019-01-06 PROCEDURE — 97165 OT EVAL LOW COMPLEX 30 MIN: CPT

## 2019-01-06 PROCEDURE — 94640 AIRWAY INHALATION TREATMENT: CPT

## 2019-01-06 PROCEDURE — 36415 COLL VENOUS BLD VENIPUNCTURE: CPT

## 2019-01-06 RX ORDER — POTASSIUM CHLORIDE 20 MEQ/1
40 TABLET, EXTENDED RELEASE ORAL
Status: COMPLETED | OUTPATIENT
Start: 2019-01-06 | End: 2019-01-06

## 2019-01-06 RX ORDER — FUROSEMIDE 40 MG/1
40 TABLET ORAL
Status: DISCONTINUED | OUTPATIENT
Start: 2019-01-06 | End: 2019-01-07

## 2019-01-06 RX ORDER — CARVEDILOL 6.25 MG/1
12.5 TABLET ORAL 2 TIMES DAILY WITH MEALS
Status: DISCONTINUED | OUTPATIENT
Start: 2019-01-07 | End: 2019-01-09 | Stop reason: HOSPADM

## 2019-01-06 RX ORDER — LISINOPRIL 5 MG/1
10 TABLET ORAL DAILY
Status: DISCONTINUED | OUTPATIENT
Start: 2019-01-07 | End: 2019-01-09 | Stop reason: HOSPADM

## 2019-01-06 RX ORDER — WARFARIN 2.5 MG/1
2.5 TABLET ORAL ONCE
Status: COMPLETED | OUTPATIENT
Start: 2019-01-06 | End: 2019-01-06

## 2019-01-06 RX ORDER — HYDROCODONE BITARTRATE AND ACETAMINOPHEN 5; 325 MG/1; MG/1
1 TABLET ORAL
Status: DISCONTINUED | OUTPATIENT
Start: 2019-01-06 | End: 2019-01-09 | Stop reason: HOSPADM

## 2019-01-06 RX ADMIN — BUDESONIDE 500 MCG: 0.5 INHALANT RESPIRATORY (INHALATION) at 19:29

## 2019-01-06 RX ADMIN — CARVEDILOL 25 MG: 6.25 TABLET, FILM COATED ORAL at 08:49

## 2019-01-06 RX ADMIN — ALBUTEROL SULFATE 2.5 MG: 2.5 SOLUTION RESPIRATORY (INHALATION) at 19:29

## 2019-01-06 RX ADMIN — LISINOPRIL 40 MG: 20 TABLET ORAL at 08:50

## 2019-01-06 RX ADMIN — Medication 10 ML: at 12:41

## 2019-01-06 RX ADMIN — HYDROCODONE BITARTRATE AND ACETAMINOPHEN 1 TABLET: 5; 325 TABLET ORAL at 14:33

## 2019-01-06 RX ADMIN — PANTOPRAZOLE SODIUM 40 MG: 40 TABLET, DELAYED RELEASE ORAL at 08:51

## 2019-01-06 RX ADMIN — ASPIRIN 81 MG: 81 TABLET, COATED ORAL at 08:49

## 2019-01-06 RX ADMIN — POTASSIUM CHLORIDE 40 MEQ: 20 TABLET, EXTENDED RELEASE ORAL at 12:41

## 2019-01-06 RX ADMIN — MEMANTINE 10 MG: 5 TABLET ORAL at 17:32

## 2019-01-06 RX ADMIN — HYDROCODONE BITARTRATE AND ACETAMINOPHEN 1 TABLET: 5; 325 TABLET ORAL at 08:50

## 2019-01-06 RX ADMIN — ALBUTEROL SULFATE 2.5 MG: 2.5 SOLUTION RESPIRATORY (INHALATION) at 14:58

## 2019-01-06 RX ADMIN — Medication 1 TABLET: at 08:51

## 2019-01-06 RX ADMIN — SODIUM CHLORIDE 250 ML: 900 INJECTION, SOLUTION INTRAVENOUS at 10:45

## 2019-01-06 RX ADMIN — ALBUTEROL SULFATE 2.5 MG: 2.5 SOLUTION RESPIRATORY (INHALATION) at 08:43

## 2019-01-06 RX ADMIN — FERROUS SULFATE TAB 325 MG (65 MG ELEMENTAL FE) 325 MG: 325 (65 FE) TAB at 08:49

## 2019-01-06 RX ADMIN — ATORVASTATIN CALCIUM 20 MG: 40 TABLET, FILM COATED ORAL at 08:49

## 2019-01-06 RX ADMIN — MEMANTINE 10 MG: 5 TABLET ORAL at 08:51

## 2019-01-06 RX ADMIN — GLIPIZIDE 10 MG: 5 TABLET ORAL at 08:50

## 2019-01-06 RX ADMIN — FUROSEMIDE 10 MG/HR: 10 INJECTION, SOLUTION INTRAMUSCULAR; INTRAVENOUS at 02:38

## 2019-01-06 RX ADMIN — FERROUS SULFATE TAB 325 MG (65 MG ELEMENTAL FE) 325 MG: 325 (65 FE) TAB at 17:32

## 2019-01-06 RX ADMIN — LEVOTHYROXINE SODIUM 75 MCG: 150 TABLET ORAL at 08:50

## 2019-01-06 RX ADMIN — BUDESONIDE 500 MCG: 0.5 INHALANT RESPIRATORY (INHALATION) at 08:43

## 2019-01-06 RX ADMIN — WARFARIN SODIUM 2.5 MG: 2.5 TABLET ORAL at 20:34

## 2019-01-06 RX ADMIN — Medication 400 MG: at 08:50

## 2019-01-06 RX ADMIN — Medication 10 ML: at 20:43

## 2019-01-06 RX ADMIN — TEMAZEPAM 15 MG: 15 CAPSULE ORAL at 21:34

## 2019-01-06 RX ADMIN — HYDROCODONE BITARTRATE AND ACETAMINOPHEN 1 TABLET: 5; 325 TABLET ORAL at 20:35

## 2019-01-06 RX ADMIN — ACETAMINOPHEN 500 MG: 500 TABLET, FILM COATED ORAL at 17:32

## 2019-01-06 RX ADMIN — FUROSEMIDE 40 MG: 40 TABLET ORAL at 17:32

## 2019-01-06 RX ADMIN — POTASSIUM CHLORIDE 20 MEQ: 20 TABLET, EXTENDED RELEASE ORAL at 08:51

## 2019-01-06 RX ADMIN — MORPHINE SULFATE 2 MG: 2 INJECTION, SOLUTION INTRAMUSCULAR; INTRAVENOUS at 04:41

## 2019-01-06 RX ADMIN — WARFARIN SODIUM 2.5 MG: 2.5 TABLET ORAL at 17:33

## 2019-01-06 NOTE — PROGRESS NOTES
1/6/2019 11:30 AM 
 
Admit Date: 1/2/2019 Admit Diagnosis: Heart failure ([facility] [address] Subjective: No cp or sob, fatigue Objective:  
  
Visit Vitals /63 (BP 1 Location: Right arm, BP Patient Position: At rest) Pulse 97 Temp 98 °F (36.7 °C) Resp 18 Ht 6' (1.829 m) Wt 103.9 kg (229 lb) SpO2 97% BMI 31.06 kg/m² Physical Exam: 
Donnamae Bridges, Well Nourished, No Acute Distress, Alert & Oriented x 3, appropriate mood. Neck- supple, no JVD 
CV- regular rate and rhythm no MRG Lung- clear bilaterally Abd- soft, nontender, nondistended Ext- no edema bilaterally. Skin- warm and dry Data Review:  
Recent Labs 01/06/19 
5661   
K 3.4*  
BUN 23  
CREA 1.08 INR 1.8 Assessment/Plan: Active Problems: 
  Atrial fibrillation ([facility] [address] (9/27/2013)Stable. Continue current medical therapy. Order pt in am 
 
  Coronary atherosclerosis of artery bypass graft (9/27/2013)Stable. Continue current medical therapy. Overview: CABG x3 in 2009 Dyslipidemia (9/27/2013) Acute diastolic heart failure ([facility] [address] (9/30/2013)Improved with current therapy. Will continue medications Now low bp- hold meds today- ivf- decrease lisinopril and coreg doses for the am 
Stop lasix grtt and start po lasix in am 
 
  Acute-on-chronic respiratory failure ([facility] [address] (9/30/2013) Overview: Home O2 2L at night only Heart failure ([address] (1/2/2019) Hypokalemia- worse- replete

## 2019-01-06 NOTE — PROGRESS NOTES
Problem: Self Care Deficits Care Plan (Adult) Goal: *Acute Goals and Plan of Care (Insert Text) 1. Patient will complete lower body bathing and dressing with mod I and adaptive equipment as needed. 2. Patient will complete toileting with mod I.  
3. Patient will tolerate 23 minutes of OT treatment with less than 4 rest breaks to increase activity tolerance for ADLs. 4. Patient will complete functional transfers with mod I and adaptive equipment as needed. Timeframe: 7 visits Comments: OCCUPATIONAL THERAPY: Initial Assessment, Daily Note, Treatment Day: Day of Assessment and 1st and AM 1/6/2019INPATIENT: Hospital Day: 5 [place] / [place] / Product Type: Medicare /  
  
NAME/AGE/GENDER: Radha Hubbard is a 77 y.o. male PRIMARY DIAGNOSIS:  Heart failure (HCC) <principal problem not specified> <principal problem not specified> 
 
  
ICD-10: Treatment Diagnosis:  
 · Generalized Muscle Weakness (M62.81) · History of falling (Z91.81) Precautions/Allergies: 
  fall risk Patient has no known allergies. ASSESSMENT:  
Mr. Yves Antoine presents to hospital for above. Pt lives with spouse on main level of a two-level home, where he is typically independent to mod I with ADLs/IADLs, including driving. Pt is the primary caregiver for his spouse. Pt uses a cane at baseline for functional mobility; pt endorses 2 falls in the last 6 months. Today, pt is found seated in chair upon arrival, AOx4, and agreeable to OT evaluation. Per BUE screen, AROM, strength, and coordination are generally decreased but functional. Pt completes STS x4 aimed to improve strength and activity tolerance, requiring with min A and RW for boost, demonstrating fair balance in standing. He gives great effort and is motivated to return to PLOF. Pt left with possessions in reach and all needs met.  Mr. Yves Antoine presents with functional limitations listed below and appears to be functioning below baseline. They will benefit from continued skilled OT services to maximize safety and independence with ADLs. Will follow during acute stay. This section established at most recent assessment PROBLEM LIST (Impairments causing functional limitations): 1. Decreased Strength 2. Decreased ADL/Functional Activities 3. Decreased Transfer Abilities 4. Decreased Ambulation Ability/Technique 5. Decreased Balance 6. Increased Pain 7. Decreased Activity Tolerance INTERVENTIONS PLANNED: (Benefits and precautions of occupational therapy have been discussed with the patient.) 1. Activities of daily living training 2. Adaptive equipment training 3. Balance training 4. Therapeutic activity 5. Therapeutic exercise TREATMENT PLAN: Frequency/Duration: Follow patient 3x/week to address above goals. Rehabilitation Potential For Stated Goals: Good RECOMMENDED REHABILITATION/EQUIPMENT: (at time of discharge pending progress): Due to the probability of continued deficits (see above) this patient will likely need continued skilled occupational therapy after discharge. Equipment:  
? None at this time OCCUPATIONAL PROFILE AND HISTORY:  
History of Present Injury/Illness (Reason for Referral): 
See H&P Past Medical History/Comorbidities:  
Mr. Duncan Chaparro  has a past medical history of Arrhythmia, Arthritis, CAD (coronary artery disease), Chronic pain, Diabetes ([address] GERD (gastroesophageal reflux disease), Heart failure ([address] Hyperlipidemia, Hypertension, Moderate aortic stenosis, Psychiatric disorder, PUD (peptic ulcer disease), and Unspecified sleep apnea.   Mr. Duncan Chaparro  has a past surgical history that includes pr cardiac surg procedure unlist (2009); pr abdomen surgery proc unlisted (2003); hx other surgical (2011); hx knee arthroscopy; hx orthopaedic (Right, 2002); hx heent (Bilateral, 2004); hx heent; hx prostatectomy (2011); hx gi; hx appendectomy; hx heart valve surgery (2011); ESOPHAGOGASTRODUODENOSCOPY (EGD)  BMI 34.21 (N/A, 2/3/2014); COLONOSCOPY (N/A, 2/3/2014); and ENDOSCOPIC POLYPECTOMY (N/A, 2/3/2014). Social History/Living Environment:  
Home Environment: Private residence # Steps to Enter: 5 Wheelchair Ramp: Yes One/Two Story Residence: Two story, live on 1st floor # of Interior Steps: 15 Living Alone: No 
Support Systems: Spouse/Significant Other/Partner Patient Expects to be Discharged to[de-identified] Rehabilitation facility Current DME Used/Available at Home: Harpal Efe, rolling, Walker, rollator, Wheelchair, Gali Day, straight Tub or Shower Type: (walk in shower) Prior Level of Function/Work/Activity: 
Is mod I at baseline for ADLs/IADLs, including driving and caregiving for his spouse. Personal Factors:   
      Sex:  male Age:  77 y.o. Number of Personal Factors/Comorbidities that affect the Plan of Care: Expanded review of therapy/medical records (1-2):  MODERATE COMPLEXITY ASSESSMENT OF OCCUPATIONAL PERFORMANCE[de-identified]  
Activities of Daily Living:  
Basic ADLs (From Assessment) Complex ADLs (From Assessment) Feeding: Setup Oral Facial Hygiene/Grooming: Setup Bathing: Minimum assistance Upper Body Dressing: Setup Lower Body Dressing: Moderate assistance Toileting: Minimum assistance Grooming/Bathing/Dressing Activities of Daily Living Cognitive Retraining Safety/Judgement: Awareness of environment; Fall prevention Bed/Mat Mobility Sit to Stand: Minimum assistance Most Recent Physical Functioning:  
Gross Assessment: 
AROM: Within functional limits PROM: Within functional limits Strength: Generally decreased, functional 
         
  
Posture: 
Posture (WDL): Exceptions to [facility] Posture Assessment: Forward head Balance: 
Sitting: Intact Standing: Impaired Standing - Static: Fair Standing - Dynamic : Fair Bed Mobility: 
  
Wheelchair Mobility: Transfers: 
Sit to Stand: Minimum assistance Stand to Sit: Minimum assistance Patient Vitals for the past 6 hrs: 
 BP BP Patient Position SpO2 O2 Flow Rate (L/min) Pulse 01/06/19 0553 116/58    93  
01/06/19 0653 126/58    89  
01/06/19 0700     91  
01/06/19 0701    3 l/min   
01/06/19 0715     93  
01/06/19 0730     93  
01/06/19 0745     91  
01/06/19 0800     95  
01/06/19 0805 127/63 At rest 98 %  97  
01/06/19 0843   97 % 2 l/min  Mental Status Neurologic State: Alert Orientation Level: Oriented X4 Cognition: Follows commands Perception: Appears intact Perseveration: No perseveration noted Safety/Judgement: Awareness of environment, Fall prevention Physical Skills Involved: 
1. Balance 2. Strength 3. Activity Tolerance 4. Pain (acute) Cognitive Skills Affected (resulting in the inability to perform in a timely and safe manner): 
1. n/a Psychosocial Skills Affected: 1. Habits/Routines 2. Social Roles Number of elements that affect the Plan of Care: 5+:  HIGH COMPLEXITY CLINICAL DECISION MAKING:  
[facility] AM-PAC 6 Clicks Daily Activity Inpatient Short Form How much help from another person does the patient currently need. .. Total A Lot A Little None 1. Putting on and taking off regular lower body clothing? [] 1   [x] 2   [] 3   [] 4  
2. Bathing (including washing, rinsing, drying)? [] 1   [] 2   [x] 3   [] 4  
3. Toileting, which includes using toilet, bedpan or urinal?   [] 1   [] 2   [x] 3   [] 4  
4. Putting on and taking off regular upper body clothing? [] 1   [] 2   [] 3   [x] 4  
5. Taking care of personal grooming such as brushing teeth? [] 1   [] 2   [] 3   [x] 4  
6. Eating meals? [] 1   [] 2   [] 3   [x] 4  
© 2007, Trustees of [facility], under license to [employer]. All rights reserved Score:  Initial: 20 Most Recent: X (Date: -- ) Interpretation of Tool:  Represents activities that are increasingly more difficult (i.e. Bed mobility, Transfers, Gait). Score 24 23 22-20 19-15 14-10 9-7 6 Modifier CH CI CJ CK CL CM CN   
 
? Self Care:  
  - CURRENT STATUS: CJ - 20%-39% impaired, limited or restricted  - GOAL STATUS: CI - 1%-19% impaired, limited or restricted  - D/C STATUS:  ---------------To be determined--------------- [place] / [place] / Product Type: Medicare /   
 
Medical Necessity:    
· Patient is expected to demonstrate progress in strength and balance to increase independence with ADLS. Reason for Services/Other Comments: 
· Patient continues to require skilled intervention due to patient unable to attend/participate in therapy as expected. Use of outcome tool(s) and clinical judgement create a POC that gives a: LOW COMPLEXITY  
 
 
 
TREATMENT:  
(In addition to Assessment/Re-Assessment sessions the following treatments were rendered) Pre-treatment Symptoms/Complaints:   
Pain: Initial:  
Pain Intensity 1: 0  Post Session:  same Therapeutic Activity: (    8  minutes): Therapeutic activities including Chair transfers and  
STS transfers to improve mobility, strength and balance. Required minimal Manual cues; Safety awareness training;Verbal cues; Visual/Demos to promote static balance in standing. Braces/Orthotics/Lines/Etc:  
· IV 
· O2 Device: Nasal cannula Treatment/Session Assessment:   
· Response to Treatment:  Tolerated well · Interdisciplinary Collaboration:  
o Physical Therapist 
o Occupational Therapist 
o Registered Nurse · After treatment position/precautions:  
o Up in chair 
o Bed alarm/tab alert on 
o Bed/Chair-wheels locked 
o Call light within reach 
o RN notified · Compliance with Program/Exercises: Compliant all of the time. · Recommendations/Intent for next treatment session:   \"Next visit will focus on advancements to more challenging activities and reduction in assistance provided\". Total Treatment Duration: OT Patient Time In/Time Out Time In: 6712 Time Out: 5067 [facility]

## 2019-01-06 NOTE — PROGRESS NOTES
Warfarin dosing per pharmacist 
 
Ariela Gould is a 77 y.o. male. Height: 6' (182.9 cm)    Weight: 103.9 kg (229 lb) Indication:  afib Goal INR:  2-3 Home dose:  2.5 mg Sun, Tues, Thurs; 5 mg all other days Risk factors or significant drug interactions:  none Other anticoagulants:  none Daily Monitoring Date  INR     Warfarin dose  HGB              Notes 1/2  2.5  5 mg   13.4   
1/3  2.2  2.5 mg   --- 
1/4  2.2  5 mg   --- 
1/5  1.9  5 mg   --- 
1/6  1.8  2.5 mg + 2.5 mg --- Pharmacy is consulted to manage warfarin for Mr. Breanna Austin during this admission. He presents with therapeutic INR. No new interacting medications have been ordered. INR down to 1.8. Will give total of 5 mg this evening in an effort to get INR trending back upwards. Patient already given 2.5 mg this evening, so will give an additional 2.5 mg for a total of 5 mg. Daily INR. Pharmacy will continue to follow. Please call with any questions. Thank you, 
Sergio Win, PharmD Clinical Pharmacist 
[phone]

## 2019-01-06 NOTE — PROGRESS NOTES
Problem: Mobility Impaired (Adult and Pediatric) Goal: *Acute Goals and Plan of Care (Insert Text) STG: 
(1.)Mr. Rhea Chen will move from supine to sit and sit to supine , scoot up and down and roll side to side with STAND BY ASSIST within 3 treatment day(s). (2.)Mr. Rhea Chen will transfer from bed to chair and chair to bed with CONTACT GUARD ASSIST using the least restrictive device within 3 treatment day(s). (3.)Mr. Rhea Chen will ambulate with CONTACT GUARD ASSIST for 75 feet with the least restrictive device within 3 treatment day(s). (4.)Mr. Rhea Chen will perform standing static and dynamic balance activities x 15 minutes with CONTACT GUARD ASSIST to improve safety within 3 day(s). (5.)Mr. Rhea Chen will perform LE exercises with 1 to 2 cues for form within 3 days to improve strength for functional transfers and ambulation. LTG: 
(1.)Mr. Rhea Chen will move from supine to sit and sit to supine , scoot up and down and roll side to side in bed with SUPERVISION within 7 treatment day(s). (2.)Mr. Rhea Chen will transfer from bed to chair and chair to bed with STAND BY ASSIST using the least restrictive device within 7 treatment day(s). (3.)Mr. Rhea Chen will ambulate with STAND BY ASSIST for 100 feet with the least restrictive device within 7 treatment day(s). (4.)Mr. Rhea Chen will perform standing static and dynamic balance activities x 15 minutes with STAND BY ASSIST to improve safety within 7 day(s). ________________________________________________________________________________________________ PHYSICAL THERAPY: Initial Assessment, Daily Note, AM 1/6/2019INPATIENT: Hospital Day: 5 [place] / [place] / Product Type: Medicare /  
  
NAME/AGE/GENDER: Noemí Pires is a 77 y.o. male PRIMARY DIAGNOSIS: Heart failure (HCC) <principal problem not specified> <principal problem not specified> 
 
  
ICD-10: Treatment Diagnosis: · Difficulty in walking, Not elsewhere classified (R26.2) · Repeated Falls (R29.6) · History of falling (Z91.81) Precaution/Allergies: 
Patient has no known allergies. ASSESSMENT:  
Mr. Breanna Austin is a 77 y.o. male admitted for heart failure. He lives with his wife for whom he is the caregiver and typically ambulates with a rollator walker, independent with bathing in a walk in tub and uses 3 L/min O2 at all times. He admits to 2 falls in the past 6 months while \"tripping while walking to the TV. \" He is sitting in recliner on contact, on 3 L/min O2 and agreeable to physical therapy evaluation and treatment. BLE grossly 4/5 and intact sensation L2-S1. He stood with minimal assist due to c/o back pain and ambulated within room with moderate assist with walker pushed well ahead of him and increased trunk flexion. Treatment initiated to include verbal cues for walker proximity and improved posture. Gait very slow and shuffled, difficult for patient to correct even with cueing. Returned to room to sit in recliner, verbal cues required for safe stand>sit in recliner. Ariela Gould is currently functioning below his baseline and would benefit from skilled PT during acute care stay to maximize safety and independence with functional mobility. This section established at most recent assessment PROBLEM LIST (Impairments causing functional limitations): 1. Decreased Strength 2. Decreased ADL/Functional Activities 3. Decreased Transfer Abilities 4. Decreased Ambulation Ability/Technique 5. Decreased Balance 6. Increased Pain 7. Decreased Activity Tolerance 8. Decreased Pacing Skills 9. Decreased Knowledge of Precautions 10. Decreased [place] with Home Exercise Program 
 INTERVENTIONS PLANNED: (Benefits and precautions of physical therapy have been discussed with the patient.) 1. Balance Exercise 2. Bed Mobility 3. Family Education 4. Gait Training 5. Group Therapy 6. Home Exercise Program (HEP) 7. Therapeutic Activites 8. Therapeutic Exercise/Strengthening 9. Transfer Training 10. Patient Education TREATMENT PLAN: Frequency/Duration: 3 times a week for duration of hospital stay Rehabilitation Potential For Stated Goals: Excellent RECOMMENDED REHABILITATION/EQUIPMENT: (at time of discharge pending progress): Due to the probability of continued deficits (see above) this patient will likely need continued skilled physical therapy after discharge. Equipment:  
? None at this time HISTORY:  
History of Present Injury/Illness (Reason for Referral): The patient is a 69-year-old gentleman with COPD, paroxysmal atrial fibrillation, chronic diastolic congestive heart failure, coronary artery disease with a previous CABG and aortic valve replacement, who presents with a several day history of worsening chest pain. The chest pain has been intermittent. There have been no aggravating symptoms. It has been alleviated with nitroglycerin on the way to the emergency department. He has chronic respiratory failure as well, is on chronic home O2. He presents with chest pain intermittently from last night, lasting 5-10 minutes at a time as noted above. The pain radiates through to the back, is a sharp pain. He also notes increased lower extremity edema as well as increasing abdominal girth. He has chronic orthopnea, but this has worsened and he is now sleeping in a chair. Oxygen saturations when EMS arrived were 83% on his home 3 L oxygen. Past Medical History/Comorbidities:  
Mr. Rockey Cranker  has a past medical history of Arrhythmia, Arthritis, CAD (coronary artery disease), Chronic pain, Diabetes ([address] GERD (gastroesophageal reflux disease), Heart failure ([address] Hyperlipidemia, Hypertension, Moderate aortic stenosis, Psychiatric disorder, PUD (peptic ulcer disease), and Unspecified sleep apnea.   Mr. Rockey Cranker  has a past surgical history that includes pr cardiac surg procedure unlist (2009); pr abdomen surgery proc unlisted (2003); hx other surgical (2011); hx knee arthroscopy; hx orthopaedic (Right, 2002); hx heent (Bilateral, 2004); hx heent; hx prostatectomy (2011); hx gi; hx appendectomy; hx heart valve surgery (2011); ESOPHAGOGASTRODUODENOSCOPY (EGD)  BMI 34.21 (N/A, 2/3/2014); COLONOSCOPY (N/A, 2/3/2014); and ENDOSCOPIC POLYPECTOMY (N/A, 2/3/2014). Social History/Living Environment:  
Home Environment: Private residence # Steps to Enter: 5 Wheelchair Ramp: Yes One/Two Story Residence: Two story, live on 1st floor # of Interior Steps: 15 Living Alone: No 
Support Systems: Spouse/Significant Other/Partner Patient Expects to be Discharged to[de-identified] Rehabilitation facility Current DME Used/Available at Home: Josiah Gens, rolling, Walker, rollator, Wheelchair, Giorgi Betty, straight Tub or Shower Type: (walk in shower) Prior Level of Function/Work/Activity: 
He lives with his wife for whom he is the caregiver and typically ambulates with a rollator walker, independent with bathing in a walk in tub and uses 3 L/min O2 at all times. He admits to 2 falls in the past 6 months while \"tripping while walking to the TV. \" 
  
Number of Personal Factors/Comorbidities that affect the Plan of Care: 3+: HIGH COMPLEXITY EXAMINATION:  
Most Recent Physical Functioning:  
Gross Assessment: 
AROM: Within functional limits PROM: Within functional limits Strength: Generally decreased, functional 
Coordination: Within functional limits Tone: Normal 
Sensation: Intact Posture: 
Posture (WDL): Exceptions to [facility] Posture Assessment: Forward head Balance: 
Sitting: Intact Standing: Impaired Standing - Static: Fair Standing - Dynamic : Fair Bed Mobility: 
  
Wheelchair Mobility: 
  
Transfers: 
Sit to Stand: Minimum assistance Stand to Sit: Minimum assistance Gait: 
  
Base of Support: Center of gravity altered; Widened Speed/Lidia: Slow;Shuffled;Pace decreased (<100 feet/min) Step Length: Right shortened;Left shortened Gait Abnormalities: Decreased step clearance; Path deviations; Shuffling gait;Trunk sway increased Distance (ft): 60 Feet (ft) Assistive Device: Walker, rolling Ambulation - Level of Assistance: Minimal assistance Interventions: Manual cues; Safety awareness training;Verbal cues; Visual/Demos Body Structures Involved: 1. Nerves 2. Heart 3. Lungs 4. Muscles Body Functions Affected: 1. Sensory/Pain 2. Cardio 3. Respiratory 4. Neuromusculoskeletal 
5. Movement Related Activities and Participation Affected: 1. Mobility 2. Self Care 3. Domestic Life 4. Interpersonal Interactions and Relationships 5. Community, Social and [place] Number of elements that affect the Plan of Care: 4+: HIGH COMPLEXITY CLINICAL PRESENTATION:  
Presentation: Stable and uncomplicated: LOW COMPLEXITY CLINICAL DECISION MAKIN[address] [place] [id number] AM-PAC 6 Clicks Basic Mobility Inpatient Short Form How much difficulty does the patient currently have. .. Unable A Lot A Little None 1. Turning over in bed (including adjusting bedclothes, sheets and blankets)? [] 1   [] 2   [x] 3   [] 4  
2. Sitting down on and standing up from a chair with arms ( e.g., wheelchair, bedside commode, etc.)   [] 1   [] 2   [x] 3   [] 4  
3. Moving from lying on back to sitting on the side of the bed? [] 1   [] 2   [x] 3   [] 4 How much help from another person does the patient currently need. .. Total A Lot A Little None 4. Moving to and from a bed to a chair (including a wheelchair)? [] 1   [] 2   [x] 3   [] 4  
5. Need to walk in hospital room? [] 1   [x] 2   [] 3   [] 4  
6. Climbing 3-5 steps with a railing? [] 1   [x] 2   [] 3   [] 4  
© , Trustees of [address] [place] [id number], under license to [employer]. All rights reserved Score:  Initial: 16 Most Recent: X (Date: -- ) Interpretation of Tool:  Represents activities that are increasingly more difficult (i.e. Bed mobility, Transfers, Gait). Score 24 23 22-20 19-15 14-10 9-7 6 Modifier CH CI CJ CK CL CM CN   
 
? Mobility - Walking and Moving Around:  
  - CURRENT STATUS: CK - 40%-59% impaired, limited or restricted  - GOAL STATUS: CJ - 20%-39% impaired, limited or restricted  - D/C STATUS:  ---------------To be determined--------------- [place] / [place] PART A [place] B / Product Type: Medicare /   
 
Medical Necessity:    
· Patient demonstrates excellent rehab potential due to higher previous functional level. Reason for Services/Other Comments: 
· Patient continues to require modification of therapeutic interventions to increase complexity of exercises. Use of outcome tool(s) and clinical judgement create a POC that gives a: Clear prediction of patient's progress: LOW COMPLEXITY  
  
 
 
 
TREATMENT:  
(In addition to Assessment/Re-Assessment sessions the following treatments were rendered) Pre-treatment Symptoms/Complaints:  Low back pain. Pain: Initial:  
Pain Intensity 1: 7  Post Session:  7/10 Therapeutic Activity: (    8 minutes): Therapeutic activities including Chair transfers and Ambulation on level ground to improve mobility, strength and balance. Required moderate Manual cues; Safety awareness training;Verbal cues; Visual/Demos to promote static and dynamic balance in standing. Braces/Orthotics/Lines/Etc:  
· IV 
· O2 Device: Nasal cannula Treatment/Session Assessment:   
· Response to Treatment:  Patient maintained SpO2 97% throughout mobility on 3 L/min O2. · Interdisciplinary Collaboration:  
o Physical Therapist 
o Registered Nurse 
o Respiratory Therapist 
· After treatment position/precautions:  
o Up in chair 
o Bed alarm/tab alert on 
o Bed/Chair-wheels locked 
o Bed in low position 
o Call light within reach 
o RN notified 
o Nurse at bedside · Compliance with Program/Exercises: Will assess as treatment progresses · Recommendations/Intent for next treatment session: \"Next visit will focus on advancements to more challenging activities and reduction in assistance provided\". Total Treatment Duration: PT Patient Time In/Time Out Time In: 0820 Time Out: 5949 Loan Vega DPT

## 2019-01-06 NOTE — PROGRESS NOTES
1012: BP dropped to 79/41(55) s/p PO lisinopril and carvedilol and IV furosemide. HR unchanged, atrial fibrillation on monitor. Pt c/o feeling \"tired,\" no other new complaints. Furosemide gtt stopped. Dr. Marietta Frias notified. Orders received for 250 mL 0.9%NS bolus. States cardiology team will re-evaluate pt shortly.

## 2019-01-07 LAB
ANION GAP SERPL CALC-SCNC: 8 MMOL/L (ref 7–16)
BUN SERPL-MCNC: 26 MG/DL (ref 8–23)
CALCIUM SERPL-MCNC: 9 MG/DL (ref 8.3–10.4)
CHLORIDE SERPL-SCNC: 92 MMOL/L (ref 98–107)
CO2 SERPL-SCNC: 39 MMOL/L (ref 21–32)
CREAT SERPL-MCNC: 0.98 MG/DL (ref 0.8–1.5)
GLUCOSE SERPL-MCNC: 177 MG/DL (ref 65–100)
INR PPP: 1.8
MAGNESIUM SERPL-MCNC: 2.5 MG/DL (ref 1.8–2.4)
MM INDURATION POC: NORMAL MM (ref 0–5)
POTASSIUM SERPL-SCNC: 3.8 MMOL/L (ref 3.5–5.1)
PPD POC: NORMAL NEGATIVE
PROTHROMBIN TIME: 20.9 SEC (ref 11.7–14.5)
SODIUM SERPL-SCNC: 139 MMOL/L (ref 136–145)

## 2019-01-07 PROCEDURE — 74011250637 HC RX REV CODE- 250/637: Performed by: INTERNAL MEDICINE

## 2019-01-07 PROCEDURE — 94760 N-INVAS EAR/PLS OXIMETRY 1: CPT

## 2019-01-07 PROCEDURE — 74011250636 HC RX REV CODE- 250/636: Performed by: INTERNAL MEDICINE

## 2019-01-07 PROCEDURE — 74011250636 HC RX REV CODE- 250/636: Performed by: NURSE PRACTITIONER

## 2019-01-07 PROCEDURE — 65660000000 HC RM CCU STEPDOWN

## 2019-01-07 PROCEDURE — 94640 AIRWAY INHALATION TREATMENT: CPT

## 2019-01-07 PROCEDURE — 74011250637 HC RX REV CODE- 250/637: Performed by: NURSE PRACTITIONER

## 2019-01-07 PROCEDURE — 80048 BASIC METABOLIC PNL TOTAL CA: CPT

## 2019-01-07 PROCEDURE — 83735 ASSAY OF MAGNESIUM: CPT

## 2019-01-07 PROCEDURE — 74011000250 HC RX REV CODE- 250: Performed by: INTERNAL MEDICINE

## 2019-01-07 PROCEDURE — 77010033678 HC OXYGEN DAILY

## 2019-01-07 PROCEDURE — 85610 PROTHROMBIN TIME: CPT

## 2019-01-07 PROCEDURE — 36415 COLL VENOUS BLD VENIPUNCTURE: CPT

## 2019-01-07 RX ORDER — FUROSEMIDE 10 MG/ML
40 INJECTION INTRAMUSCULAR; INTRAVENOUS 2 TIMES DAILY
Status: COMPLETED | OUTPATIENT
Start: 2019-01-07 | End: 2019-01-07

## 2019-01-07 RX ORDER — FUROSEMIDE 40 MG/1
40 TABLET ORAL
Status: DISCONTINUED | OUTPATIENT
Start: 2019-01-08 | End: 2019-01-09 | Stop reason: HOSPADM

## 2019-01-07 RX ADMIN — ACETAMINOPHEN 500 MG: 500 TABLET, FILM COATED ORAL at 16:52

## 2019-01-07 RX ADMIN — BUDESONIDE 500 MCG: 0.5 INHALANT RESPIRATORY (INHALATION) at 19:52

## 2019-01-07 RX ADMIN — MORPHINE SULFATE 2 MG: 2 INJECTION, SOLUTION INTRAMUSCULAR; INTRAVENOUS at 21:25

## 2019-01-07 RX ADMIN — LISINOPRIL 10 MG: 5 TABLET ORAL at 08:16

## 2019-01-07 RX ADMIN — MEMANTINE 10 MG: 5 TABLET ORAL at 08:16

## 2019-01-07 RX ADMIN — Medication 10 ML: at 21:22

## 2019-01-07 RX ADMIN — WARFARIN SODIUM 5 MG: 5 TABLET ORAL at 17:01

## 2019-01-07 RX ADMIN — FUROSEMIDE 40 MG: 40 TABLET ORAL at 06:23

## 2019-01-07 RX ADMIN — CARVEDILOL 12.5 MG: 6.25 TABLET, FILM COATED ORAL at 16:53

## 2019-01-07 RX ADMIN — MORPHINE SULFATE 2 MG: 2 INJECTION, SOLUTION INTRAMUSCULAR; INTRAVENOUS at 08:41

## 2019-01-07 RX ADMIN — LEVOTHYROXINE SODIUM 75 MCG: 150 TABLET ORAL at 06:23

## 2019-01-07 RX ADMIN — GLIPIZIDE 10 MG: 5 TABLET ORAL at 08:16

## 2019-01-07 RX ADMIN — FUROSEMIDE 40 MG: 10 INJECTION, SOLUTION INTRAMUSCULAR; INTRAVENOUS at 16:51

## 2019-01-07 RX ADMIN — ALBUTEROL SULFATE 2.5 MG: 2.5 SOLUTION RESPIRATORY (INHALATION) at 19:52

## 2019-01-07 RX ADMIN — HYDROCODONE BITARTRATE AND ACETAMINOPHEN 1 TABLET: 5; 325 TABLET ORAL at 12:02

## 2019-01-07 RX ADMIN — FERROUS SULFATE TAB 325 MG (65 MG ELEMENTAL FE) 325 MG: 325 (65 FE) TAB at 16:53

## 2019-01-07 RX ADMIN — ASPIRIN 81 MG: 81 TABLET, COATED ORAL at 08:16

## 2019-01-07 RX ADMIN — FERROUS SULFATE TAB 325 MG (65 MG ELEMENTAL FE) 325 MG: 325 (65 FE) TAB at 08:15

## 2019-01-07 RX ADMIN — BUDESONIDE 500 MCG: 0.5 INHALANT RESPIRATORY (INHALATION) at 07:51

## 2019-01-07 RX ADMIN — ACETAMINOPHEN 500 MG: 500 TABLET, FILM COATED ORAL at 08:10

## 2019-01-07 RX ADMIN — FUROSEMIDE 40 MG: 10 INJECTION, SOLUTION INTRAMUSCULAR; INTRAVENOUS at 08:41

## 2019-01-07 RX ADMIN — ALBUTEROL SULFATE 2.5 MG: 2.5 SOLUTION RESPIRATORY (INHALATION) at 07:51

## 2019-01-07 RX ADMIN — Medication 1 TABLET: at 08:16

## 2019-01-07 RX ADMIN — TEMAZEPAM 15 MG: 15 CAPSULE ORAL at 21:22

## 2019-01-07 RX ADMIN — MORPHINE SULFATE 2 MG: 2 INJECTION, SOLUTION INTRAMUSCULAR; INTRAVENOUS at 15:11

## 2019-01-07 RX ADMIN — PANTOPRAZOLE SODIUM 40 MG: 40 TABLET, DELAYED RELEASE ORAL at 06:21

## 2019-01-07 RX ADMIN — CARVEDILOL 12.5 MG: 6.25 TABLET, FILM COATED ORAL at 08:15

## 2019-01-07 RX ADMIN — Medication 400 MG: at 08:14

## 2019-01-07 RX ADMIN — ATORVASTATIN CALCIUM 20 MG: 40 TABLET, FILM COATED ORAL at 08:15

## 2019-01-07 RX ADMIN — HYDROCODONE BITARTRATE AND ACETAMINOPHEN 1 TABLET: 5; 325 TABLET ORAL at 19:22

## 2019-01-07 RX ADMIN — MEMANTINE 10 MG: 5 TABLET ORAL at 16:53

## 2019-01-07 RX ADMIN — POTASSIUM CHLORIDE 20 MEQ: 20 TABLET, EXTENDED RELEASE ORAL at 08:16

## 2019-01-07 RX ADMIN — ALBUTEROL SULFATE 2.5 MG: 2.5 SOLUTION RESPIRATORY (INHALATION) at 13:43

## 2019-01-07 RX ADMIN — HYDROCODONE BITARTRATE AND ACETAMINOPHEN 1 TABLET: 5; 325 TABLET ORAL at 05:16

## 2019-01-07 RX ADMIN — Medication 10 ML: at 12:06

## 2019-01-07 NOTE — PROGRESS NOTES
Warfarin dosing per pharmacist 
 
Sreekanth Malik is a 77 y.o. male. Height: 6' (182.9 cm)    Weight: 105.1 kg (231 lb 9.6 oz) Indication:  afib Goal INR:  2-3 Home dose:  2.5 mg Sun, Tues, Thurs; 5 mg all other days Risk factors or significant drug interactions:  none Other anticoagulants:  none Daily Monitoring Date  INR     Warfarin dose  HGB              Notes 1/2  2.5  5 mg   13.4   
1/3  2.2  2.5 mg   --- 
1/4  2.2  5 mg   --- 
1/5  1.9  5 mg   --- 
1/6  1.8  2.5 mg + 2.5 mg --- 
1/7  1.8  5 mg   --- Pharmacy is consulted to manage warfarin for Mr. Sebas Christopher during this admission. He presents with therapeutic INR. No new interacting medications have been ordered. INR at 1.8. Patient will receive 5 mg tonight to reflect home dose. May need to continue higher dose for a few days until INR trending upwards. Daily INR. Pharmacy will continue to follow. Please call with any questions. Thank you, Alessandro Patricia, PharmD Clinical Pharmacist 
[phone]

## 2019-01-07 NOTE — PROGRESS NOTES
[facility] CARDIOLOGY PROGRESS NOTE 
 
1/7/2019 6:48 AM 
 
Admit Date: 1/2/2019 Admit Diagnosis: Heart failure ([facility] Subjective:  
Stable overnight without angina or palpitations but still with SOB/mild orthopnea. Vitals otherwise stable and controlled. No other complaints overnight. BP low yesterday, decreased meds, higher BP now. Amrit George Tolerating meds well. Objective:  
  
Vitals:  
 01/06/19 1929 01/06/19 2025 01/06/19 2130 01/07/19 0045 BP:  114/56 122/62 130/60 Pulse:  98 84 99 Resp:  18 14 16 Temp:  98.2 °F (36.8 °C) 98 °F (36.7 °C) 98 °F (36.7 °C) SpO2: 96% 95% 94% 94% Weight:      
Height:      
 
 
Physical Exam: 
Neck- supple, 10cm JVD 
CV- regular rate and rhythm no MRG Lung- clear bilaterally apically, bibasilar crackles Abd- soft, nontender, nondistended Ext- trace LE pitting edema Skin- warm and dry Data Review:  
Recent Labs 01/06/19 
0856 01/05/19 
0424  140  
K 3.4* 3.5 MG 2.2 2.0  
BUN 23 19 CREA 1.08 1.03  
* 173* INR 1.8 1.9 Assessment and Plan: Active Problems: 
  Atrial fibrillation ([facility]- improved, continue meds, INR today and tomorrow AM 
 
  Coronary atherosclerosis of artery bypass graft - stable, continue meds Overview: CABG x3 in 2009 Dyslipidemia  - stable, continue meds Acute diastolic heart failure (HCC) stopped lasix gtt but more SOB this AM, crackles bibasilar- IV lasix BID today x 2, then po lasix tomorrow Acute-on-chronic respiratory failure ([facility]- improved, comfortable in bed, SOB with ambulation in room Overview: Home O2 2L at night only Hypotension- iatrogenic- decreased coreg from 25mg to 12.5mg and ACE-I from 40mg to 10mg yesterday, follow today and titrate meds as needed To rehab when be available in next couple of days, IV lasix today, recheck labs in AM 
  Wants to go to [facility] for rehab, second PPD reading today. Letty Baker MD 
[facility] Cardiology Pager [phone]

## 2019-01-07 NOTE — WOUND CARE
Patient seen for redness to bottom. Presently clear, no redness. Legs have slight edema to lower legs. No open wounds noted. Recommend lotion to skin after bath to help with dry skin. Signing off.

## 2019-01-07 NOTE — PROGRESS NOTES
This CM spoke with pt this day. Pt reconsidered thought of [facility] and is now agreeable to transition there once medically stable for discharge. Pt would like referral sent to [facility] - this CM sent referral via [employer]. CM to continue to follow.

## 2019-01-07 NOTE — ROUTINE PROCESS
CHF / DM teaching completed based on previous teachings. Planner/scale @ BS and pass post test: 1 hr total 
 
 
To [facility]

## 2019-01-08 LAB
ANION GAP SERPL CALC-SCNC: 7 MMOL/L (ref 7–16)
BUN SERPL-MCNC: 23 MG/DL (ref 8–23)
CALCIUM SERPL-MCNC: 9 MG/DL (ref 8.3–10.4)
CHLORIDE SERPL-SCNC: 92 MMOL/L (ref 98–107)
CO2 SERPL-SCNC: 39 MMOL/L (ref 21–32)
CREAT SERPL-MCNC: 0.91 MG/DL (ref 0.8–1.5)
ERYTHROCYTE [DISTWIDTH] IN BLOOD BY AUTOMATED COUNT: 13.2 % (ref 11.9–14.6)
GLUCOSE SERPL-MCNC: 147 MG/DL (ref 65–100)
HCT VFR BLD AUTO: 41.6 % (ref 41.1–50.3)
HGB BLD-MCNC: 13 G/DL (ref 13.6–17.2)
INR PPP: 1.9
MAGNESIUM SERPL-MCNC: 2.4 MG/DL (ref 1.8–2.4)
MCH RBC QN AUTO: 30.2 PG (ref 26.1–32.9)
MCHC RBC AUTO-ENTMCNC: 31.3 G/DL (ref 31.4–35)
MCV RBC AUTO: 96.7 FL (ref 79.6–97.8)
MM INDURATION POC: 0 MM (ref 0–5)
NRBC # BLD: 0 K/UL (ref 0–0.2)
PLATELET # BLD AUTO: 271 K/UL (ref 150–450)
PMV BLD AUTO: 11.8 FL (ref 9.4–12.3)
POTASSIUM SERPL-SCNC: 3.8 MMOL/L (ref 3.5–5.1)
PPD POC: NEGATIVE NEGATIVE
PROTHROMBIN TIME: 21.2 SEC (ref 11.7–14.5)
RBC # BLD AUTO: 4.3 M/UL (ref 4.23–5.6)
SODIUM SERPL-SCNC: 138 MMOL/L (ref 136–145)
WBC # BLD AUTO: 8.3 K/UL (ref 4.3–11.1)

## 2019-01-08 PROCEDURE — 74011000250 HC RX REV CODE- 250: Performed by: INTERNAL MEDICINE

## 2019-01-08 PROCEDURE — 77010033678 HC OXYGEN DAILY

## 2019-01-08 PROCEDURE — 85027 COMPLETE CBC AUTOMATED: CPT

## 2019-01-08 PROCEDURE — 94760 N-INVAS EAR/PLS OXIMETRY 1: CPT

## 2019-01-08 PROCEDURE — 83735 ASSAY OF MAGNESIUM: CPT

## 2019-01-08 PROCEDURE — 74011250637 HC RX REV CODE- 250/637: Performed by: NURSE PRACTITIONER

## 2019-01-08 PROCEDURE — 94640 AIRWAY INHALATION TREATMENT: CPT

## 2019-01-08 PROCEDURE — 80048 BASIC METABOLIC PNL TOTAL CA: CPT

## 2019-01-08 PROCEDURE — 65660000000 HC RM CCU STEPDOWN

## 2019-01-08 PROCEDURE — 74011250636 HC RX REV CODE- 250/636: Performed by: NURSE PRACTITIONER

## 2019-01-08 PROCEDURE — 85610 PROTHROMBIN TIME: CPT

## 2019-01-08 PROCEDURE — 74011250637 HC RX REV CODE- 250/637: Performed by: INTERNAL MEDICINE

## 2019-01-08 PROCEDURE — 36415 COLL VENOUS BLD VENIPUNCTURE: CPT

## 2019-01-08 RX ORDER — WARFARIN SODIUM 5 MG/1
5 TABLET ORAL EVERY EVENING
Status: COMPLETED | OUTPATIENT
Start: 2019-01-08 | End: 2019-01-08

## 2019-01-08 RX ORDER — WARFARIN 2.5 MG/1
2.5 TABLET ORAL
Status: DISCONTINUED | OUTPATIENT
Start: 2019-01-10 | End: 2019-01-09 | Stop reason: HOSPADM

## 2019-01-08 RX ORDER — AMOXICILLIN 250 MG
1 CAPSULE ORAL DAILY PRN
Status: DISCONTINUED | OUTPATIENT
Start: 2019-01-08 | End: 2019-01-09 | Stop reason: HOSPADM

## 2019-01-08 RX ORDER — CYCLOBENZAPRINE HCL 10 MG
10 TABLET ORAL 3 TIMES DAILY
Status: DISCONTINUED | OUTPATIENT
Start: 2019-01-08 | End: 2019-01-08

## 2019-01-08 RX ORDER — CYCLOBENZAPRINE HCL 10 MG
10 TABLET ORAL 3 TIMES DAILY
Status: DISCONTINUED | OUTPATIENT
Start: 2019-01-08 | End: 2019-01-09 | Stop reason: HOSPADM

## 2019-01-08 RX ORDER — WARFARIN SODIUM 5 MG/1
5 TABLET ORAL
Status: DISCONTINUED | OUTPATIENT
Start: 2019-01-09 | End: 2019-01-09 | Stop reason: HOSPADM

## 2019-01-08 RX ADMIN — CARVEDILOL 12.5 MG: 6.25 TABLET, FILM COATED ORAL at 17:29

## 2019-01-08 RX ADMIN — HYDROCODONE BITARTRATE AND ACETAMINOPHEN 1 TABLET: 5; 325 TABLET ORAL at 08:25

## 2019-01-08 RX ADMIN — FERROUS SULFATE TAB 325 MG (65 MG ELEMENTAL FE) 325 MG: 325 (65 FE) TAB at 08:25

## 2019-01-08 RX ADMIN — MEMANTINE 10 MG: 5 TABLET ORAL at 08:27

## 2019-01-08 RX ADMIN — POTASSIUM CHLORIDE 20 MEQ: 20 TABLET, EXTENDED RELEASE ORAL at 08:27

## 2019-01-08 RX ADMIN — MORPHINE SULFATE 2 MG: 2 INJECTION, SOLUTION INTRAMUSCULAR; INTRAVENOUS at 10:26

## 2019-01-08 RX ADMIN — CYCLOBENZAPRINE HYDROCHLORIDE 10 MG: 10 TABLET, FILM COATED ORAL at 17:29

## 2019-01-08 RX ADMIN — ALBUTEROL SULFATE 2.5 MG: 2.5 SOLUTION RESPIRATORY (INHALATION) at 08:13

## 2019-01-08 RX ADMIN — ACETAMINOPHEN 500 MG: 500 TABLET, FILM COATED ORAL at 17:29

## 2019-01-08 RX ADMIN — ALBUTEROL SULFATE 2.5 MG: 2.5 SOLUTION RESPIRATORY (INHALATION) at 20:50

## 2019-01-08 RX ADMIN — GLIPIZIDE 10 MG: 5 TABLET ORAL at 08:25

## 2019-01-08 RX ADMIN — CARVEDILOL 12.5 MG: 6.25 TABLET, FILM COATED ORAL at 08:25

## 2019-01-08 RX ADMIN — ACETAMINOPHEN 500 MG: 500 TABLET, FILM COATED ORAL at 01:27

## 2019-01-08 RX ADMIN — Medication 10 ML: at 21:08

## 2019-01-08 RX ADMIN — CYCLOBENZAPRINE HYDROCHLORIDE 10 MG: 10 TABLET, FILM COATED ORAL at 21:04

## 2019-01-08 RX ADMIN — ACETAMINOPHEN 500 MG: 500 TABLET, FILM COATED ORAL at 09:36

## 2019-01-08 RX ADMIN — ASPIRIN 81 MG: 81 TABLET, COATED ORAL at 08:24

## 2019-01-08 RX ADMIN — Medication 10 ML: at 05:38

## 2019-01-08 RX ADMIN — Medication 400 MG: at 08:27

## 2019-01-08 RX ADMIN — MORPHINE SULFATE 2 MG: 2 INJECTION, SOLUTION INTRAMUSCULAR; INTRAVENOUS at 05:31

## 2019-01-08 RX ADMIN — ATORVASTATIN CALCIUM 20 MG: 40 TABLET, FILM COATED ORAL at 08:24

## 2019-01-08 RX ADMIN — FERROUS SULFATE TAB 325 MG (65 MG ELEMENTAL FE) 325 MG: 325 (65 FE) TAB at 17:29

## 2019-01-08 RX ADMIN — LEVOTHYROXINE SODIUM 75 MCG: 150 TABLET ORAL at 05:37

## 2019-01-08 RX ADMIN — BUDESONIDE 500 MCG: 0.5 INHALANT RESPIRATORY (INHALATION) at 20:50

## 2019-01-08 RX ADMIN — HYDROCODONE BITARTRATE AND ACETAMINOPHEN 1 TABLET: 5; 325 TABLET ORAL at 00:01

## 2019-01-08 RX ADMIN — CYCLOBENZAPRINE HYDROCHLORIDE 10 MG: 10 TABLET, FILM COATED ORAL at 11:20

## 2019-01-08 RX ADMIN — LISINOPRIL 10 MG: 5 TABLET ORAL at 08:26

## 2019-01-08 RX ADMIN — STANDARDIZED SENNA CONCENTRATE AND DOCUSATE SODIUM 1 TABLET: 8.6; 5 TABLET, FILM COATED ORAL at 09:37

## 2019-01-08 RX ADMIN — HYDROCODONE BITARTRATE AND ACETAMINOPHEN 1 TABLET: 5; 325 TABLET ORAL at 14:18

## 2019-01-08 RX ADMIN — Medication 10 ML: at 14:18

## 2019-01-08 RX ADMIN — Medication 1 TABLET: at 08:27

## 2019-01-08 RX ADMIN — BUDESONIDE 500 MCG: 0.5 INHALANT RESPIRATORY (INHALATION) at 08:13

## 2019-01-08 RX ADMIN — WARFARIN SODIUM 5 MG: 5 TABLET ORAL at 17:30

## 2019-01-08 RX ADMIN — ALBUTEROL SULFATE 2.5 MG: 2.5 SOLUTION RESPIRATORY (INHALATION) at 14:33

## 2019-01-08 RX ADMIN — FUROSEMIDE 40 MG: 40 TABLET ORAL at 17:29

## 2019-01-08 RX ADMIN — MEMANTINE 10 MG: 5 TABLET ORAL at 17:30

## 2019-01-08 RX ADMIN — FUROSEMIDE 40 MG: 40 TABLET ORAL at 08:25

## 2019-01-08 RX ADMIN — PANTOPRAZOLE SODIUM 40 MG: 40 TABLET, DELAYED RELEASE ORAL at 05:37

## 2019-01-08 NOTE — PROGRESS NOTES
Bedside shift report given to João Hansen RN (oncoming nurse) by Pablito Flowers RN (offgoing nurse). Bedside shift report included the following information: SBAR, Kardex, ED Summary, MAR, and Recent Results.

## 2019-01-08 NOTE — PROGRESS NOTES
Pt accepted at [facility] for [employer] - pt accepted bed offer. When pt medically stable for discharge pt will go to facility room # 320. Report # [phone]. No other concerns/needs voiced at this time.

## 2019-01-08 NOTE — PROGRESS NOTES
[facility] CARDIOLOGY PROGRESS NOTE 
      
 
1/8/2019 10:13 AM 
 
Admit Date: 1/2/2019 Subjective:  
PAtient notes significant low back pain which has been present since ER evaluation. Appears muscular as worse with movement. BP stable. HAd 1 liter out over last 24 hours. Total 8 liters out since admission. ROS: 
Cardiovascular:  As noted above Objective:  
  
Vitals:  
 01/08/19 0338 01/08/19 9862 01/08/19 1682 01/08/19 8012 BP: 122/62  148/64 Pulse: 92  87 Resp: 18  18 Temp: 98.5 °F (36.9 °C)  99 °F (37.2 °C) SpO2: 93%  97% 96% Weight:  102.2 kg (225 lb 6.4 oz) Height:  6' (1.829 m) Physical Exam: 
General-No Acute Distress Neck- supple, no JVD 
CV- [place] Lung- clear bilaterally Abd- soft, nontender, nondistended Ext- trivial to 1+ edema bilaterally. Skin- warm and dry Data Review:  
Recent Labs 01/08/19 
0730 01/07/19 
3486  139  
K 3.8 3.8 MG 2.4 2.5*  
BUN 23 26* CREA 0.91 0.98  
* 177* WBC 8.3  --   
HGB 13.0*  --   
HCT 41.6  --   
  --   
INR 1.9 1.8 No results found for: JEANNE Francois Echo (1/2/19):  -  Left ventricle: Systolic function was at the lower limits of normal. Ejection fraction was estimated in the range of 50 % to 55 %. There were no 
regional wall motion abnormalities. There was mild concentric hypertrophy. The E/e' ratio was 24.35. There was diastolic dysfunction. -  Right ventricle: The ventricle was dilated. Systolic function was reduced. -  Left atrium: The atrium was moderately to markedly dilated. -  Right atrium: The atrium was markedly dilated. -  Inferior vena cava, hepatic veins: The inferior vena cava was dilated. The respirophasic change in diameter was less than 50%. -  Aortic valve: The aortic valve area by the continuity equation was 1.67 cm2. 
-  Mitral valve: There was mild annular calcification. There was moderate regurgitation. -  Tricuspid valve: There was moderate regurgitation. Assessment/Plan: Active Problems: 
  Atrial fibrillation ([facility] (9/27/2013) Rate controlled. ON coumadin. INR 1.9. Continue coumadin. Coronary atherosclerosis of artery bypass graft (9/27/2013) Denies any chest pain. Dyslipidemia (9/27/2013) Conitnue lipitor. Acute diastolic heart failure ([facility] (9/30/2013) Improved. Change to PO lasix today. BP stable with adjustments in medications. Acute-on-chronic respiratory failure ([facility] (9/30/2013) ON oxygen via nasal cannula. Back pain Trial of flexeril and heating pad Disp Has bed at [facility]. Likely in AM if back pain improved and respiratory status stable on PO lasix.   
 
 
 
 
 
 
 
 
Mayela Bashir MD 
1/8/2019 10:13 AM

## 2019-01-08 NOTE — PROGRESS NOTES
Problem: Falls - Risk of 
Goal: *Absence of Falls Document Fatoumata Olivas Fall Risk and appropriate interventions in the flowsheet. Outcome: Progressing Towards Goal 
Fall Risk Interventions: 
Mobility Interventions: Bed/chair exit alarm, Patient to call before getting OOB, PT Consult for mobility concerns, Strengthening exercises (ROM-active/passive) Medication Interventions: Bed/chair exit alarm, Patient to call before getting OOB, Teach patient to arise slowly Elimination Interventions: Bed/chair exit alarm, Call light in reach, Urinal in reach History of Falls Interventions: Bed/chair exit alarm

## 2019-01-08 NOTE — PROGRESS NOTES
Warfarin dosing per pharmacist 
 
Stephanie Corona is a 77 y.o. male. Height: 6' (182.9 cm)    Weight: 102.2 kg (225 lb 6.4 oz) Indication:  afib Goal INR:  2-3 Home dose:  2.5 mg Sun, Tues, Thurs; 5 mg all other days Risk factors or significant drug interactions:  none Other anticoagulants:  none Daily Monitoring Date  INR     Warfarin dose  HGB              Notes 1/2  2.5  5 mg   13.4   
1/3  2.2  2.5 mg   --- 
1/4  2.2  5 mg   --- 
1/5  1.9  5 mg   --- 
1/6  1.8  2.5 mg + 2.5 mg --- 
1/7  1.8  5 mg   --- 
1/8  1.9  5 mg Pharmacy is consulted to manage warfarin for Mr. Rio Yost during this admission. He presents with therapeutic INR. No new interacting medications have been ordered. INR at 1.9. Will give warfarin 5mg again tonight. May need to continue higher dose for a few days until INR trending upwards. Daily INR. Pharmacy will continue to follow. Please call with any questions. Thank you, Amando Benson, PharmD

## 2019-01-08 NOTE — PROGRESS NOTES
Bedside shift report received from Merari Huston RN (offgoing nurse). Report given to Michael Voss RN. Vital signs stable. No complaints present. Patient in stable condition.

## 2019-01-09 ENCOUNTER — PATIENT OUTREACH (OUTPATIENT)
Dept: CASE MANAGEMENT | Age: 79
End: 2019-01-09

## 2019-01-09 VITALS
HEIGHT: 72 IN | WEIGHT: 232.9 LBS | OXYGEN SATURATION: 96 % | DIASTOLIC BLOOD PRESSURE: 86 MMHG | TEMPERATURE: 98.2 F | RESPIRATION RATE: 20 BRPM | HEART RATE: 91 BPM | SYSTOLIC BLOOD PRESSURE: 135 MMHG | BODY MASS INDEX: 31.54 KG/M2

## 2019-01-09 LAB
ANION GAP SERPL CALC-SCNC: 8 MMOL/L (ref 7–16)
BUN SERPL-MCNC: 23 MG/DL (ref 8–23)
CALCIUM SERPL-MCNC: 9.3 MG/DL (ref 8.3–10.4)
CHLORIDE SERPL-SCNC: 95 MMOL/L (ref 98–107)
CO2 SERPL-SCNC: 36 MMOL/L (ref 21–32)
CREAT SERPL-MCNC: 0.81 MG/DL (ref 0.8–1.5)
GLUCOSE SERPL-MCNC: 152 MG/DL (ref 65–100)
INR PPP: 2.1
MAGNESIUM SERPL-MCNC: 2.6 MG/DL (ref 1.8–2.4)
POTASSIUM SERPL-SCNC: 3.9 MMOL/L (ref 3.5–5.1)
PROTHROMBIN TIME: 22.8 SEC (ref 11.7–14.5)
SODIUM SERPL-SCNC: 139 MMOL/L (ref 136–145)

## 2019-01-09 PROCEDURE — 74011250637 HC RX REV CODE- 250/637: Performed by: NURSE PRACTITIONER

## 2019-01-09 PROCEDURE — 85610 PROTHROMBIN TIME: CPT

## 2019-01-09 PROCEDURE — 94760 N-INVAS EAR/PLS OXIMETRY 1: CPT

## 2019-01-09 PROCEDURE — 74011000250 HC RX REV CODE- 250: Performed by: INTERNAL MEDICINE

## 2019-01-09 PROCEDURE — 80048 BASIC METABOLIC PNL TOTAL CA: CPT

## 2019-01-09 PROCEDURE — 74011250637 HC RX REV CODE- 250/637: Performed by: INTERNAL MEDICINE

## 2019-01-09 PROCEDURE — 83735 ASSAY OF MAGNESIUM: CPT

## 2019-01-09 PROCEDURE — 77010033678 HC OXYGEN DAILY

## 2019-01-09 PROCEDURE — 36415 COLL VENOUS BLD VENIPUNCTURE: CPT

## 2019-01-09 PROCEDURE — 94640 AIRWAY INHALATION TREATMENT: CPT

## 2019-01-09 RX ORDER — WARFARIN 2.5 MG/1
2.5 TABLET ORAL
Qty: 30 TAB | Refills: 5 | Status: SHIPPED
Start: 2019-01-10 | End: 2020-01-01

## 2019-01-09 RX ORDER — FUROSEMIDE 40 MG/1
40 TABLET ORAL 2 TIMES DAILY
Qty: 60 TAB | Refills: 5 | Status: SHIPPED
Start: 2019-01-09 | End: 2019-03-26 | Stop reason: DRUGHIGH

## 2019-01-09 RX ORDER — WARFARIN SODIUM 5 MG/1
5 TABLET ORAL
Qty: 30 TAB | Refills: 5 | Status: SHIPPED
Start: 2019-01-09 | End: 2020-01-01 | Stop reason: DRUGHIGH

## 2019-01-09 RX ORDER — TEMAZEPAM 15 MG/1
15 CAPSULE ORAL
Qty: 10 CAP | Refills: 0 | Status: SHIPPED | OUTPATIENT
Start: 2019-01-09 | End: 2019-04-30

## 2019-01-09 RX ORDER — LISINOPRIL 10 MG/1
10 TABLET ORAL DAILY
Qty: 30 TAB | Refills: 11 | Status: SHIPPED
Start: 2019-01-09 | End: 2020-01-01 | Stop reason: DRUGHIGH

## 2019-01-09 RX ORDER — CYCLOBENZAPRINE HCL 10 MG
10 TABLET ORAL 3 TIMES DAILY
Qty: 90 TAB | Refills: 0 | Status: SHIPPED
Start: 2019-01-09 | End: 2019-03-26

## 2019-01-09 RX ORDER — ALBUTEROL SULFATE 0.83 MG/ML
2.5 SOLUTION RESPIRATORY (INHALATION) EVERY 6 HOURS
Qty: 120 EACH | Refills: 0 | Status: SHIPPED
Start: 2019-01-09

## 2019-01-09 RX ORDER — BUDESONIDE 0.5 MG/2ML
500 INHALANT ORAL 2 TIMES DAILY
Qty: 120 ML | Refills: 0 | Status: SHIPPED
Start: 2019-01-09 | End: 2019-02-08

## 2019-01-09 RX ADMIN — CARVEDILOL 12.5 MG: 6.25 TABLET, FILM COATED ORAL at 08:37

## 2019-01-09 RX ADMIN — Medication 1 TABLET: at 08:37

## 2019-01-09 RX ADMIN — Medication 10 ML: at 05:36

## 2019-01-09 RX ADMIN — GLIPIZIDE 10 MG: 5 TABLET ORAL at 08:37

## 2019-01-09 RX ADMIN — FERROUS SULFATE TAB 325 MG (65 MG ELEMENTAL FE) 325 MG: 325 (65 FE) TAB at 08:37

## 2019-01-09 RX ADMIN — CYCLOBENZAPRINE HYDROCHLORIDE 10 MG: 10 TABLET, FILM COATED ORAL at 08:37

## 2019-01-09 RX ADMIN — Medication 400 MG: at 08:37

## 2019-01-09 RX ADMIN — LISINOPRIL 10 MG: 5 TABLET ORAL at 08:37

## 2019-01-09 RX ADMIN — ALBUTEROL SULFATE 2.5 MG: 2.5 SOLUTION RESPIRATORY (INHALATION) at 07:58

## 2019-01-09 RX ADMIN — PANTOPRAZOLE SODIUM 40 MG: 40 TABLET, DELAYED RELEASE ORAL at 05:37

## 2019-01-09 RX ADMIN — BUDESONIDE 500 MCG: 0.5 INHALANT RESPIRATORY (INHALATION) at 07:58

## 2019-01-09 RX ADMIN — HYDROCODONE BITARTRATE AND ACETAMINOPHEN 1 TABLET: 5; 325 TABLET ORAL at 03:31

## 2019-01-09 RX ADMIN — ASPIRIN 81 MG: 81 TABLET, COATED ORAL at 08:36

## 2019-01-09 RX ADMIN — FUROSEMIDE 40 MG: 40 TABLET ORAL at 08:37

## 2019-01-09 RX ADMIN — POTASSIUM CHLORIDE 20 MEQ: 20 TABLET, EXTENDED RELEASE ORAL at 08:37

## 2019-01-09 RX ADMIN — MEMANTINE 10 MG: 5 TABLET ORAL at 08:37

## 2019-01-09 RX ADMIN — ATORVASTATIN CALCIUM 20 MG: 40 TABLET, FILM COATED ORAL at 08:37

## 2019-01-09 RX ADMIN — LEVOTHYROXINE SODIUM 75 MCG: 150 TABLET ORAL at 05:36

## 2019-01-09 NOTE — ROUTINE PROCESS
CHF teaching reinforced to pt. Email to Donovan Silva on same, Simba arreguin Bs and SNF and scale @ BS.

## 2019-01-09 NOTE — PROGRESS NOTES
Patient Care Setting Care Setting Type: SNF Patient Care Setting: [facility] [place] Date: 01/09/19 Start of Care: 01/09/19 Expected LOS: 17 Care Coordinator conducted hand-off?: Yes Care Coordinator: Girish Rahman RN, Dominique at [facility] Patient Care Plan: On Track Comments: Patient agreed to [employer] services after rehab. Expected LOS goal discussed with patient. Will follow on weekly care coordination calls with Major Cutler. Cardiology TCM 1/15/19.

## 2019-01-09 NOTE — PROGRESS NOTES
Pt stable for discharge this day - discharge orders/summary put in. Pt to transition to [address] [place] [address] this day. CM left  for [facility]. Transport arranged for 10:00am via [employer]. This CM informed pt of time - he is aware and agreeable.

## 2019-01-09 NOTE — PROGRESS NOTES
[facility] CARDIOLOGY PROGRESS NOTE 
      
 
1/9/2019 10:13 AM 
 
Admit Date: 1/2/2019 Subjective:  
PAtient notes significant low back pain which has been present since ER evaluation. Appears muscular as worse with movement. BP stable. HAd 1 liter out over last 24 hours. Total 8 liters out since admission. Seems comfortable prob ready for disposition ROS: 
Cardiovascular:  As noted above Objective:  
  
Vitals:  
 01/08/19 2050 01/08/19 2231 01/09/19 0327 01/09/19 4576 BP:  115/56 158/77 Pulse:  80 92 Resp:  18 18 Temp:  97.8 °F (36.6 °C) 97.8 °F (36.6 °C) SpO2: 94% 93% 96% Weight:    105.6 kg (232 lb 14.4 oz) Height:    6' (1.829 m) Physical Exam: 
General-No Acute Distress Neck- supple, no JVD 
CV- Lanelle Ghee Lung- clear bilaterally Abd- soft, nontender, nondistended Ext- trivial to 1+ edema bilaterally. Skin- warm and dry Data Review:  
Recent Labs 01/09/19 
0602 01/08/19 
0541  138  
K 3.9 3.8 MG 2.6* 2.4 BUN 23 23 CREA 0.81 0.91  
* 147* WBC  --  8.3 HGB  --  13.0*  
HCT  --  41.6 PLT  --  271 INR 2.1 1.9 No results found for: Christin Mooring, TNIPOC Echo (1/2/19):  -  Left ventricle: Systolic function was at the lower limits of normal. Ejection fraction was estimated in the range of 50 % to 55 %. There were no 
regional wall motion abnormalities. There was mild concentric hypertrophy. The E/e' ratio was 24.35. There was diastolic dysfunction. -  Right ventricle: The ventricle was dilated. Systolic function was reduced. -  Left atrium: The atrium was moderately to markedly dilated. -  Right atrium: The atrium was markedly dilated. -  Inferior vena cava, hepatic veins: The inferior vena cava was dilated. The respirophasic change in diameter was less than 50%. -  Aortic valve: The aortic valve area by the continuity equation was 1.67 cm2. -  Mitral valve: There was mild annular calcification. There was moderate regurgitation. -  Tricuspid valve: There was moderate regurgitation. Assessment/Plan: Active Problems: 
  Atrial fibrillation ([address] (9/27/2013) Rate controlled. ON coumadin. INR 1.9. Continue coumadin. Coronary atherosclerosis of artery bypass graft (9/27/2013) Denies any chest pain. Dyslipidemia (9/27/2013) Conitnue lipitor. Acute diastolic heart failure ([address] (9/30/2013) Improved. Change to PO lasix today. BP stable with adjustments in medications. Acute-on-chronic respiratory failure ([address] (9/30/2013) ON oxygen via nasal cannula. Back pain Trial of flexeril and heating pad Disp Has bed at [facility]. Looks stable to go Viola Cerrato MD 
1/9/2019 10:13 AM

## 2019-01-09 NOTE — DISCHARGE SUMMARY
[facility] Cardiology Discharge Summary Patient ID: 
Selvin Celaya [id number] 
4 y.o. 
1940 Admit date: 1/2/2019 Discharge date:  1/9/19 Admitting Physician: Dagmar Fiore MD  
 
Discharge Physician: Mary Alice Schultz NP/Dr. Pam Mendez Admission Diagnoses: Heart failure ([facility] Discharge Diagnoses:  
Patient Active Problem List  
 Diagnosis Date Noted  Heart failure ([address] 01/02/2019  Long term (current) use of anticoagulants 05/21/2018  Palliative care patient 10/03/2017  Restrictive lung disease 10/03/2017  MONICA (obstructive sleep apnea) 12/16/2013  Primary central sleep apnea 12/16/2013  Hypoxia 09/30/2013  Acute diastolic heart failure ([facility] 09/30/2013  Acute-on-chronic respiratory failure ([facility] 09/30/2013  Atrial fibrillation ([facility] 09/27/2013  Coronary atherosclerosis of artery bypass graft 09/27/2013  Type 2 diabetes mellitus ([facility] 09/27/2013  Pulmonary edema 09/27/2013  CAD (coronary artery disease) 09/27/2013  Dyslipidemia 09/27/2013 Cardiology Procedures this admission:  EchoCardiogram 
Consults: None Hospital Course: Patient presented to the emergency department of [facility] with complaints of Shortness of breath and chest pain. The patient has prior h/o COPD, paroxysmal atrial fibrillation, chronic diastolic congestive heart failure, coronary artery disease with a previous CABG and aortic valve replacement. The chest pain has been intermittent. There have been no aggravating symptoms. It has been alleviated with nitroglycerin on the way to the emergency department. He has chronic respiratory failure as well, is on chronic home O2. He presents with chest pain intermittently from last night, lasting 5-10 minutes at a time as noted above. The pain radiates through to the back, is a sharp pain. He also notes increased lower extremity edema as well as increasing abdominal girth.   He has chronic orthopnea, but this has worsened and he is now sleeping in a chair. Oxygen saturations when EMS arrived were 83% on his home 3 L oxygen. The patietn was admitted with  chest pain, likely secondary to myocardial stretch with volume overload with acute diastolic congestive heart failure. The patient was started on Lasix gtt for diuresis. He diuresed well (total of 8.6 liters) and felt better. An echocardiogram was performed with report as follows: -  Left ventricle: Systolic function was at the lower limits of normal. Ejection fraction was estimated in the range of 50 % to 55 %. There were no regional wall motion abnormalities. There was mild concentric hypertrophy. The E/e' ratio was 24.35. There was diastolic dysfunction. -  Right ventricle: The ventricle was dilated. Systolic function was reduced. -  Left atrium: The atrium was moderately to markedly dilated. -  Right atrium: The atrium was markedly dilated. -  Inferior vena cava, hepatic veins: The inferior vena cava was dilated. The respirophasic change in diameter was less than 50%. -  Aortic valve: The aortic valve area by the continuity equation was 1.67 cm2.-  Mitral valve: There was mild annular calcification. There was moderate regurgitation. -  Tricuspid valve: There was moderate regurgitation. The patient continued to improved and was transitioned to po lasix and ready for discharge. The morning of 1/9/19, the patient was up feeling well without any complaints shortness of breath. LE edema was much improved. Patient's labs were stable with creatinine of .81. Patient was seen and examined by Dr. Carlos Estrada and determined stable and ready for discharge. Patient was instructed on the importance of medication compliance, low sodium diet, 2 liter per day fluid restriction and daily weights.   For maximized medical therapy of congestive heart failure, patient will continue use of BB and ACE-I.  The patient will have close transitional care follow up (TC-7) with [facility] Cardiology Dr. Vidal Hale on 1/15/19 at 1345 pm in [place] office. The patient will need INR in week (on coumadin for A. Fib) and will also need BMP. DISPOSITION: The patient is being discharged home in stable condition on a low saturated fat, low cholesterol and low salt diet. The patient is instructed to advance activities as tolerated to the limit of fatigue or shortness of breath. The patient is informed to monitor daily weights and maintain a 2 liter per day fluid restriction. The patient is instructed to call the office for any shortness of breath, weight gain, or increased peripheral edema. Discharge Exam:  
Visit Vitals /77 (BP 1 Location: Right arm, BP Patient Position: Post activity) Pulse 92 Temp 97.8 °F (36.6 °C) Resp 18 Ht 6' (1.829 m) Wt 105.6 kg (232 lb 14.4 oz) SpO2 96% BMI 31.59 kg/m² Patient has been seen by Dr. Ashia Amor: see his progress note for exam details. Recent Results (from the past 24 hour(s)) PLEASE READ & DOCUMENT PPD TEST IN 72 HRS Collection Time: 01/08/19 11:20 AM  
Result Value Ref Range PPD Negative Negative  
 mm Induration 0 mm PROTHROMBIN TIME + INR Collection Time: 01/09/19  6:02 AM  
Result Value Ref Range Prothrombin time 22.8 (H) 11.7 - 14.5 sec INR 2.1 MAGNESIUM Collection Time: 01/09/19  6:02 AM  
Result Value Ref Range Magnesium 2.6 (H) 1.8 - 2.4 mg/dL METABOLIC PANEL, BASIC Collection Time: 01/09/19  6:02 AM  
Result Value Ref Range Sodium 139 136 - 145 mmol/L Potassium 3.9 3.5 - 5.1 mmol/L Chloride 95 (L) 98 - 107 mmol/L  
 CO2 36 (H) 21 - 32 mmol/L Anion gap 8 7 - 16 mmol/L Glucose 152 (H) 65 - 100 mg/dL BUN 23 8 - 23 MG/DL Creatinine 0.81 0.8 - 1.5 MG/DL  
 GFR est AA >60 >60 ml/min/1.73m2 GFR est non-AA >60 >60 ml/min/1.73m2 Calcium 9.3 8.3 - 10.4 MG/DL Patient Instructions: Current Discharge Medication List  
  
START taking these medications Details  
budesonide (PULMICORT) 0.5 mg/2 mL nbsp 2 mL by Nebulization route two (2) times a day for 30 days. Qty: 120 mL, Refills: 0  
  
albuterol (PROVENTIL VENTOLIN) 2.5 mg /3 mL (0.083 %) nebulizer solution 3 mL by Nebulization route every six (6) hours. Qty: 120 Each, Refills: 0  
  
cyclobenzaprine (FLEXERIL) 10 mg tablet Take 1 Tab by mouth three (3) times daily. Qty: 90 Tab, Refills: 0  
  
furosemide (LASIX) 40 mg tablet Take 1 Tab by mouth two (2) times a day. Qty: 60 Tab, Refills: 5  
  
temazepam (RESTORIL) 15 mg capsule Take 1 Cap by mouth nightly. Max Daily Amount: 15 mg. 
Qty: 10 Cap, Refills: 0 Associated Diagnoses: Palliative care patient CONTINUE these medications which have CHANGED Details  
lisinopril (PRINIVIL, ZESTRIL) 10 mg tablet Take 1 Tab by mouth daily. Qty: 30 Tab, Refills: 11  
  
!! warfarin (COUMADIN) 2.5 mg tablet Take 1 Tab by mouth every Sunday, Tuesday, Thursday. Qty: 30 Tab, Refills: 5  
  
!! warfarin (COUMADIN) 5 mg tablet Take 1 Tab by mouth every Monday, Wednesday, Friday, Saturday. Qty: 30 Tab, Refills: 5  
  
 !! - Potential duplicate medications found. Please discuss with provider. CONTINUE these medications which have NOT CHANGED Details  
magnesium oxide (MAG-OX) 400 mg tablet Take 400 mg by mouth daily. atorvastatin (LIPITOR) 20 mg tablet Take 1 Tab by mouth daily. Qty: 30 Tab, Refills: 0  
  
potassium chloride (K-DUR, KLOR-CON) 20 mEq tablet Take 1 Tab by mouth daily. Qty: 30 Tab, Refills: 0  
  
glipiZIDE (GLUCOTROL) 10 mg tablet Take 10 mg by mouth daily. fluticasone-vilanterol (BREO ELLIPTA) 100-25 mcg/dose inhaler 1 inhalation daily, rinse mouth after use Qty: 1 Inhaler, Refills: 11  
  
aspirin delayed-release 81 mg tablet Take  by mouth daily.   
  
albuterol (PROVENTIL HFA, VENTOLIN HFA, PROAIR HFA) 90 mcg/actuation inhaler Take 1 Puff by inhalation every four (4) hours as needed for Wheezing. Qty: 1 Inhaler, Refills: 11  
  
levothyroxine (SYNTHROID) 75 mcg tablet Take 75 mcg by mouth Daily (before breakfast). memantine (NAMENDA) 10 mg tablet Take 10 mg by mouth two (2) times a day. ferrous sulfate 324 mg (65 mg iron) tablet Take 324 mg by mouth two (2) times daily (with meals). ascorbic acid, vitamin C, (VITAMIN C) 500 mg tablet Take 500 mg by mouth two (2) times a day. cyanocobalamin (VITAMIN B-12) 1,000 mcg/mL injection 1,000 mcg by IntraMUSCular route every thirty (30) days. Oxygen 3 lpm cont. multivitamin (ONE A DAY) tablet Take 1 Tab by mouth daily. omeprazole (PRILOSEC) 20 mg capsule Take 20 mg by mouth daily. nitroglycerin (NITROSTAT) 0.4 mg SL tablet 0.4 mg by SubLINGual route every five (5) minutes as needed for Chest Pain. carvedilol (COREG) 25 mg tablet Take 12.5 mg by mouth two (2) times a day. Indications: hypertension STOP taking these medications  
  
 gabapentin (NEURONTIN) 300 mg capsule Comments:  
Reason for Stopping:   
   
 gabapentin (NEURONTIN) 300 mg capsule Comments:  
Reason for Stopping:   
   
 bumetanide (BUMEX) 1 mg tablet Comments:  
Reason for Stopping:   
   
 predniSONE (DELTASONE) 20 mg tablet Comments:  
Reason for Stopping:   
   
 metOLazone (ZAROXOLYN) 5 mg tablet Comments:  
Reason for Stopping:   
   
 baclofen (LIORESAL) 10 mg tablet Comments:  
Reason for Stopping:   
   
  
 
 
 
Signed: 
SALMA Lebron 
1/9/2019 
7:49 AM

## 2019-01-09 NOTE — PROGRESS NOTES
Problem: Falls - Risk of 
Goal: *Absence of Falls Document [facility] Fall Risk and appropriate interventions in the flowsheet. Outcome: Progressing Towards Goal 
Fall Risk Interventions: 
Mobility Interventions: Bed/chair exit alarm, Communicate number of staff needed for ambulation/transfer, Patient to call before getting OOB, PT Consult for mobility concerns, PT Consult for assist device competence, Strengthening exercises (ROM-active/passive), Utilize walker, cane, or other assistive device Medication Interventions: Assess postural VS orthostatic hypotension, Evaluate medications/consider consulting pharmacy, Teach patient to arise slowly Elimination Interventions: Call light in reach, Patient to call for help with toileting needs, Toileting schedule/hourly rounds, Urinal in reach History of Falls Interventions: Consult care management for discharge planning, Bed/chair exit alarm, Door open when patient unattended, Evaluate medications/consider consulting pharmacy, Investigate reason for fall, Room close to nurse's station

## 2019-01-09 NOTE — PROGRESS NOTES
Bedside shift report given to Tramaine Mason RN (oncoming nurse) by Marylee Roys, RN (offgoing nurse). Bedside shift report included the following information: SBAR, Kardex, ED Summary, MAR, and Recent Results.

## 2019-01-09 NOTE — DISCHARGE INSTRUCTIONS
DISCHARGE SUMMARY from Nurse      These are general instructions for a healthy lifestyle:    No smoking/ No tobacco products/ Avoid exposure to second hand smoke  Surgeon General's Warning:  Quitting smoking now greatly reduces serious risk to your health. Obesity, smoking, and sedentary lifestyle greatly increases your risk for illness    A healthy diet, regular physical exercise & weight monitoring are important for maintaining a healthy lifestyle    You may be retaining fluid if you have a history of heart failure or if you experience any of the following symptoms:  Weight gain of 3 pounds or more overnight or 5 pounds in a week, increased swelling in our hands or feet or shortness of breath while lying flat in bed. Please call your doctor as soon as you notice any of these symptoms; do not wait until your next office visit. Recognize signs and symptoms of STROKE:    F-face looks uneven    A-arms unable to move or move unevenly    S-speech slurred or non-existent    T-time-call 911 as soon as signs and symptoms begin-DO NOT go       Back to bed or wait to see if you get better-TIME IS BRAIN. Warning Signs of HEART ATTACK     Call 911 if you have these symptoms:   Chest discomfort. Most heart attacks involve discomfort in the center of the chest that lasts more than a few minutes, or that goes away and comes back. It can feel like uncomfortable pressure, squeezing, fullness, or pain.  Discomfort in other areas of the upper body. Symptoms can include pain or discomfort in one or both arms, the back, neck, jaw, or stomach.  Shortness of breath with or without chest discomfort.  Other signs may include breaking out in a cold sweat, nausea, or lightheadedness. Don't wait more than five minutes to call 911 - MINUTES MATTER! Fast action can save your life. Calling 911 is almost always the fastest way to get lifesaving treatment.  Emergency Medical Services staff can begin treatment when they arrive -- up to an hour sooner than if someone gets to the hospital by car. The discharge information has been reviewed with the patient. The patient verbalized understanding. Discharge medications reviewed with the patient and appropriate educational materials and side effects teaching were provided.   ___________________________________________________________________________________________________________________________________

## 2019-01-09 NOTE — PROGRESS NOTES
Patient discharged via stretcher by transport ambulance. All belongings, patient information, discharge education material, and chart sent with the patient.

## 2019-01-09 NOTE — PROGRESS NOTES
Bedside shift report received from Suki Roman RN (offgoing nurse). Report given to Nick Erazo RN. Vital signs stable. No complaints present. Patient in stable condition.

## 2019-01-09 NOTE — PROGRESS NOTES
Warfarin dosing per pharmacist 
 
Brett Nowak is a 77 y.o. male. Height: 6' (182.9 cm)    Weight: 105.6 kg (232 lb 14.4 oz) Indication:  afib Goal INR:  2-3 Home dose:  2.5 mg Sun, Tues, Thurs; 5 mg all other days Risk factors or significant drug interactions:  none Other anticoagulants:  none Daily Monitoring Date  INR     Warfarin dose  HGB              Notes 1/2  2.5  5 mg   13.4   
1/3  2.2  2.5 mg   --- 
1/4  2.2  5 mg   --- 
1/5  1.9  5 mg   --- 
1/6  1.8  2.5 mg + 2.5 mg --- 
1/7  1.8  5 mg   --- 
1/8  1.9  5 mg   13 
1/9  2.1  5 mg   --- Pharmacy is consulted to manage warfarin for Mr. Dorothy Herrmann during this admission. He presents with therapeutic INR. No new interacting medications have been ordered. INR 2.1. Noted plans for discharge. Agree with continuing home dose of warfarin 2.5 mg Sun,Tues,Thurs and 5 mg all other days. Please call with any questions. Thank you, Nelda Coreas, PharmD Clinical Pharmacist 
[phone]

## 2019-01-10 ENCOUNTER — PATIENT OUTREACH (OUTPATIENT)
Dept: CASE MANAGEMENT | Age: 79
End: 2019-01-10

## 2019-01-10 NOTE — Clinical Note
Pt dc 1/9/19 to [facility], dx: heart failure,  Patient will be followed by [facility] after dc from [id number] [facility] [address] presented to the emergency department of [facility] with complaints of Shortness of breath and chest pain. The patient has prior h/o COPD, paroxysmal atrial fibrillation, chronic diastolic congestive heart failure, coronary artery disease with a previous CABG and aortic valve replacement.

## 2019-01-10 NOTE — PROGRESS NOTES
This note will not be viewable in [address] Patient discharged to [facility] on 1/9/19. Patient discharged to a [facility] facility. Patient will be included in weekly care coordination calls. Information forwarded to Sofia Bee, [facility] Provider [facility] RN Care Manager.

## 2019-01-11 ENCOUNTER — PATIENT OUTREACH (OUTPATIENT)
Dept: CASE MANAGEMENT | Age: 79
End: 2019-01-11

## 2019-01-11 NOTE — PROGRESS NOTES
Received call from [employer]. Patient is doing well, and has no concerns. Expressed the importance of daily weights, fluid restrictions, and diet Cardiology follow up 1/15/19.

## 2019-01-16 ENCOUNTER — PATIENT OUTREACH (OUTPATIENT)
Dept: CASE MANAGEMENT | Age: 79
End: 2019-01-16

## 2019-01-16 NOTE — PROGRESS NOTES
Weekly Care Coordination call with [employer]. Discharge weightt- 232lbs Last weight- 226lbs Plan for discharge home with [facility] on 1/23/19. Health  will follow with home visits after rehab. End of 90 days- 4/9/2019. Melchor Villa, RN- BC, BSN Care Transition/ Bundled Payment Navigator

## 2019-01-31 ENCOUNTER — PATIENT OUTREACH (OUTPATIENT)
Dept: RESPIRATORY THERAPY | Age: 79
End: 2019-01-31

## 2019-02-01 NOTE — PROGRESS NOTES
Contacted patient by phone to advise af an ETA for my visit. The patients wife advised that she was sick and wished to reschedule the visit as the [address] [place] [address] from [facility] would be there momentarily. As I was on the road to the residence continued on to locate the home for future reference. The home health nurse was just exiting the car so I stopped to make introduction. She invited me to step in an introduce myself to the patient and his wife. I did meet Mr. & Mrs. Dorothy Herrmann and introduce the program. A full visit was scheduled for Wednesday of next week with the Health  .

## 2019-03-21 ENCOUNTER — HOSPITAL ENCOUNTER (OUTPATIENT)
Dept: LAB | Age: 79
Discharge: HOME | End: 2019-03-21
Payer: MEDICARE

## 2019-03-21 DIAGNOSIS — I25.708 ATHEROSCLEROSIS OF CORONARY ARTERY BYPASS GRAFT OF NATIVE HEART WITH STABLE ANGINA PECTORIS (HCC): ICD-10-CM

## 2019-03-21 DIAGNOSIS — I50.31 ACUTE DIASTOLIC HEART FAILURE (HCC): ICD-10-CM

## 2019-03-21 LAB
ANION GAP SERPL CALC-SCNC: 5 MMOL/L
BUN SERPL-MCNC: 14 MG/DL (ref 8–23)
CALCIUM SERPL-MCNC: 10.1 MG/DL (ref 8.3–10.4)
CHLORIDE SERPL-SCNC: 99 MMOL/L (ref 98–107)
CHOLEST SERPL-MCNC: 167 MG/DL
CO2 SERPL-SCNC: 36 MMOL/L (ref 21–32)
CREAT SERPL-MCNC: 1 MG/DL (ref 0.8–1.5)
GLUCOSE SERPL-MCNC: 188 MG/DL (ref 65–100)
HDLC SERPL-MCNC: 33 MG/DL (ref 40–60)
HDLC SERPL: 5.1 {RATIO}
LDLC SERPL CALC-MCNC: 92 MG/DL
LIPID PROFILE,FLP: ABNORMAL
POTASSIUM SERPL-SCNC: 4.2 MMOL/L (ref 3.5–5.1)
SODIUM SERPL-SCNC: 140 MMOL/L (ref 136–145)
TRIGL SERPL-MCNC: 210 MG/DL (ref 35–150)
VLDLC SERPL CALC-MCNC: 42 MG/DL (ref 6–23)

## 2019-03-21 PROCEDURE — 80061 LIPID PANEL: CPT

## 2019-03-21 PROCEDURE — 80048 BASIC METABOLIC PNL TOTAL CA: CPT

## 2019-03-21 PROCEDURE — 36415 COLL VENOUS BLD VENIPUNCTURE: CPT

## 2019-04-30 ENCOUNTER — HOSPITAL ENCOUNTER (OUTPATIENT)
Dept: LAB | Age: 79
Discharge: HOME | End: 2019-04-30
Payer: MEDICARE

## 2019-04-30 DIAGNOSIS — I50.31 ACUTE DIASTOLIC HEART FAILURE (HCC): ICD-10-CM

## 2019-04-30 LAB
ANION GAP SERPL CALC-SCNC: 4 MMOL/L (ref 7–16)
BNP SERPL-MCNC: 274 PG/ML
BUN SERPL-MCNC: 15 MG/DL (ref 8–23)
CALCIUM SERPL-MCNC: 9.9 MG/DL (ref 8.3–10.4)
CHLORIDE SERPL-SCNC: 102 MMOL/L (ref 98–107)
CO2 SERPL-SCNC: 34 MMOL/L (ref 21–32)
CREAT SERPL-MCNC: 1 MG/DL (ref 0.8–1.5)
GLUCOSE SERPL-MCNC: 116 MG/DL (ref 65–100)
POTASSIUM SERPL-SCNC: 4.3 MMOL/L (ref 3.5–5.1)
SODIUM SERPL-SCNC: 140 MMOL/L (ref 136–145)

## 2019-04-30 PROCEDURE — 36415 COLL VENOUS BLD VENIPUNCTURE: CPT

## 2019-04-30 PROCEDURE — 83880 ASSAY OF NATRIURETIC PEPTIDE: CPT

## 2019-04-30 PROCEDURE — 80048 BASIC METABOLIC PNL TOTAL CA: CPT

## 2019-05-15 ENCOUNTER — HOSPITAL ENCOUNTER (OUTPATIENT)
Dept: LAB | Age: 79
Discharge: HOME | End: 2019-05-15
Payer: MEDICARE

## 2019-05-15 DIAGNOSIS — I50.32 CHRONIC DIASTOLIC HEART FAILURE (HCC): ICD-10-CM

## 2019-05-15 LAB
ANION GAP SERPL CALC-SCNC: 8 MMOL/L (ref 7–16)
BUN SERPL-MCNC: 40 MG/DL (ref 8–23)
CALCIUM SERPL-MCNC: 9.5 MG/DL (ref 8.3–10.4)
CHLORIDE SERPL-SCNC: 97 MMOL/L (ref 98–107)
CO2 SERPL-SCNC: 31 MMOL/L (ref 21–32)
CREAT SERPL-MCNC: 1.7 MG/DL (ref 0.8–1.5)
GLUCOSE SERPL-MCNC: 180 MG/DL (ref 65–100)
POTASSIUM SERPL-SCNC: 4.1 MMOL/L (ref 3.5–5.1)
SODIUM SERPL-SCNC: 136 MMOL/L (ref 136–145)

## 2019-05-15 PROCEDURE — 36415 COLL VENOUS BLD VENIPUNCTURE: CPT

## 2019-05-15 PROCEDURE — 80048 BASIC METABOLIC PNL TOTAL CA: CPT

## 2019-05-16 NOTE — PROGRESS NOTES
Called and informed pt of abnormal lab results and need to decrease his Lasix to once daily. Pt voiced understanding of results and to reduce his Lasix to once daily. Asked pt to please call if any problems or concerns.   Pt agreed to do so./wc

## 2019-06-17 ENCOUNTER — HOSPITAL ENCOUNTER (OUTPATIENT)
Dept: LAB | Age: 79
Discharge: HOME | End: 2019-06-17
Attending: INTERNAL MEDICINE
Payer: MEDICARE

## 2019-06-17 DIAGNOSIS — I10 HYPERTENSION, UNSPECIFIED TYPE: ICD-10-CM

## 2019-06-17 DIAGNOSIS — I50.32 CHRONIC DIASTOLIC HEART FAILURE (HCC): ICD-10-CM

## 2019-06-17 DIAGNOSIS — E11.9 CONTROLLED TYPE 2 DIABETES MELLITUS WITHOUT COMPLICATION, UNSPECIFIED WHETHER LONG TERM INSULIN USE (HCC): ICD-10-CM

## 2019-06-17 DIAGNOSIS — R06.02 SOB (SHORTNESS OF BREATH): ICD-10-CM

## 2019-06-17 LAB
ANION GAP SERPL CALC-SCNC: 8 MMOL/L (ref 7–16)
BUN SERPL-MCNC: 40 MG/DL (ref 8–23)
CALCIUM SERPL-MCNC: 9.3 MG/DL (ref 8.3–10.4)
CHLORIDE SERPL-SCNC: 95 MMOL/L (ref 98–107)
CO2 SERPL-SCNC: 33 MMOL/L (ref 21–32)
CREAT SERPL-MCNC: 1.6 MG/DL (ref 0.8–1.5)
GLUCOSE SERPL-MCNC: 176 MG/DL (ref 65–100)
POTASSIUM SERPL-SCNC: 4.3 MMOL/L (ref 3.5–5.1)
SODIUM SERPL-SCNC: 136 MMOL/L (ref 136–145)

## 2019-06-17 PROCEDURE — 80048 BASIC METABOLIC PNL TOTAL CA: CPT

## 2019-06-17 PROCEDURE — 36415 COLL VENOUS BLD VENIPUNCTURE: CPT

## 2019-10-28 PROBLEM — G89.29 CHRONIC PAIN: Status: ACTIVE | Noted: 2019-01-01

## 2019-10-28 PROBLEM — J44.9 COPD (CHRONIC OBSTRUCTIVE PULMONARY DISEASE) (HCC): Status: ACTIVE | Noted: 2019-01-01

## 2019-10-28 PROBLEM — I10 HTN (HYPERTENSION): Status: ACTIVE | Noted: 2019-01-01

## 2019-10-28 PROBLEM — Z95.2 S/P AVR: Status: ACTIVE | Noted: 2019-01-01

## 2019-10-28 PROBLEM — R06.02 SHORTNESS OF BREATH: Status: ACTIVE | Noted: 2019-01-01

## 2019-10-28 PROBLEM — E03.9 ACQUIRED HYPOTHYROIDISM: Status: ACTIVE | Noted: 2019-01-01

## 2019-10-28 PROBLEM — K27.9 PUD (PEPTIC ULCER DISEASE): Status: ACTIVE | Noted: 2019-01-01

## 2019-10-28 NOTE — ED PROVIDER NOTES
Patient presents the ER with shortness of breath. Apparently he went to see his primary care physician today for worsening shortness of breath the past couple days. Reports he feels very short of breath with any exertion. States he has to care for himself as well as his wife. Reports some cough, minimally productive of sputum. Denies any fevers or chills. Reports leg swelling however this is stable. With history of COPD as well as heart failure. Wears 5 L of oxygen at home The history is provided by the patient. Shortness of Breath This is a recurrent problem. The problem occurs frequently. The current episode started more than 2 days ago. Associated symptoms include cough and leg swelling. Pertinent negatives include no fever, no chest pain and no abdominal pain. Associated medical issues include COPD and heart failure. Past Medical History:  
Diagnosis Date  Arrhythmia   
 afib  Arthritis  CAD (coronary artery disease) 2009 CABG, 3 vessel. No MI  
 Chronic pain   
 legs \"R especially\"  Diabetes ([address] 2009  
 insulin controlled, avg fbs 107, recognizes hypo at 87, unsure last Ha1C  
 GERD (gastroesophageal reflux disease)   
 controlled with omeprazole twice daily  Heart failure (HCC)   
 echo 9/27/13:  EF 70%, Mild regurg mitral and tricuspid valve.  Hyperlipidemia   
 controlled with med  Hypertension   
 controlled with meds  Moderate aortic stenosis echo 9/27/13  
 aortic valve area 1.2 cm2  Psychiatric disorder   
 anxiety, depression, controlled with Venlafaxine  PUD (peptic ulcer disease) 1970's  Unspecified sleep apnea   
 wears cpap Past Surgical History:  
Procedure Laterality Date [address] [place] [address] UNLISTED  2003 cholecystectomy  CARDIAC SURG PROCEDURE UNLIST  2009 CABG 3V  
 HX APPENDECTOMY  HX GI    
 heller myotomy with toupee wrap [address] VALVE SURGERY  2011  HX HEENT Bilateral 2004  
 ptosis  HX HEENT    
 to have cataract surgery 2014  HX KNEE ARTHROSCOPY    
 bilateral  
 HX ORTHOPAEDIC Right 2002  
 bone spur  HX OTHER SURGICAL  2011  
 prostate surgery  HX PROSTATECTOMY  2011 TURP Family History:  
Problem Relation Age of Onset  Heart Disease Mother Social History Socioeconomic History  Marital status:  Spouse name: Not on file  Number of children: Not on file  Years of education: Not on file  Highest education level: Not on file Occupational History  Occupation:  Employer: RETIRED Social Needs  Financial resource strain: Not on file  Food insecurity:  
  Worry: Not on file Inability: Not on file  Transportation needs:  
  Medical: Not on file Non-medical: Not on file Tobacco Use  Smoking status: Never Smoker  Smokeless tobacco: Never Used Substance and Sexual Activity  Alcohol use: Yes Comment: socially occasional--1-2 times per year  Drug use: No  
 Sexual activity: Not on file Lifestyle  Physical activity:  
  Days per week: Not on file Minutes per session: Not on file  Stress: Not on file Relationships  Social connections:  
  Talks on phone: Not on file Gets together: Not on file Attends Yazidism service: Not on file Active member of club or organization: Not on file Attends meetings of clubs or organizations: Not on file Relationship status: Not on file  Intimate partner violence:  
  Fear of current or ex partner: Not on file Emotionally abused: Not on file Physically abused: Not on file Forced sexual activity: Not on file Other Topics Concern  Not on file Social History Narrative  
 lives with wife. Has 2 children. Retired . ALLERGIES: Celexa [citalopram]; Cymbalta [duloxetine]; Gabapentin; Lidocaine; Sertraline; and Sulfa (sulfonamide antibiotics) Review of Systems Constitutional: Positive for fatigue. Negative for fever and unexpected weight change. HENT: Negative for congestion and dental problem. Eyes: Negative for photophobia and visual disturbance. Respiratory: Positive for cough and shortness of breath. Negative for chest tightness. Cardiovascular: Positive for leg swelling. Negative for chest pain. Gastrointestinal: Negative for abdominal pain. Genitourinary: Negative for urgency. Musculoskeletal: Negative for back pain and gait problem. Skin: Negative for color change and pallor. Hematological: Negative for adenopathy. Does not bruise/bleed easily. Psychiatric/Behavioral: Negative for confusion. All other systems reviewed and are negative. Vitals:  
 10/28/19 1320 10/28/19 1524 BP: (!) 172/94 Pulse: (!) 106 Resp: 18 Temp: 98.5 °F (36.9 °C) SpO2: 91% (!) 84% Weight: 106.6 kg (235 lb) Height: 5' 8\" (1.727 m) Physical Exam  
Constitutional: He is oriented to person, place, and time. He appears well-developed and well-nourished. Eyes: Pupils are equal, round, and reactive to light. EOM are normal.  
Neck: Normal range of motion. Neck supple. No thyromegaly present. Cardiovascular: Normal rate and regular rhythm. Pulmonary/Chest: Effort normal. No stridor. No respiratory distress. He has wheezes. He has rales. Abdominal: Soft. Bowel sounds are normal. He exhibits no distension. There is no tenderness. Musculoskeletal: Normal range of motion. He exhibits edema. He exhibits no deformity. Right lower leg: He exhibits swelling and edema. Left lower leg: He exhibits swelling and edema. Neurological: He is alert and oriented to person, place, and time. No cranial nerve deficit. Nursing note and vitals reviewed. MDM Number of Diagnoses or Management Options Diagnosis management comments: Patient is hypoxic upon my assessment, will obtain chest x-ray. Differential diagnosis includes pneumonia, COPD exacerbation, heart failure 4:35 PM 
Chest x-ray shows stable edema. EKG shows no acute ischemic changes. ABG shows a PaCO2 of 55, PO2 of 62 and is normal 5 L. Treated here with nebs, Solu-Medrol and Lasix. Given continued hypoxia, case discussed with hospitalist for admission. Amount and/or Complexity of Data Reviewed Clinical lab tests: ordered and reviewed Tests in the radiology section of CPT®: reviewed and ordered Discuss the patient with other providers: yes (Hospitalist) Risk of Complications, Morbidity, and/or Mortality Presenting problems: high Diagnostic procedures: moderate Management options: moderate Patient Progress Patient progress: stable Procedures Results Include: 
 
Recent Results (from the past 24 hour(s)) EKG, 12 LEAD, INITIAL Collection Time: 10/28/19  1:22 PM  
Result Value Ref Range Ventricular Rate 98 BPM  
 Atrial Rate 110 BPM  
 QRS Duration 136 ms  
 Q-T Interval 370 ms QTC Calculation (Bezet) 472 ms Calculated R Axis 74 degrees Calculated T Axis 149 degrees Diagnosis Atrial fibrillation Non-specific intra-ventricular conduction block Abnormal ECG When compared with ECG of 02-JAN-2019 11:20, 
Questionable change in QRS axis Confirmed by Elizabeth Anderson MD (), FABIO RODRÍGUEZ ([id number]) on 10/28/2019 4:26:09 PM 
  
CBC WITH AUTOMATED DIFF Collection Time: 10/28/19  1:24 PM  
Result Value Ref Range WBC 9.2 4.3 - 11.1 K/uL  
 RBC 5.12 4.23 - 5.6 M/uL  
 HGB 14.9 13.6 - 17.2 g/dL HCT 48.6 41.1 - 50.3 % MCV 94.9 79.6 - 97.8 FL  
 MCH 29.1 26.1 - 32.9 PG  
 MCHC 30.7 (L) 31.4 - 35.0 g/dL  
 RDW 14.8 (H) 11.9 - 14.6 % PLATELET 732 723 - 622 K/uL MPV 12.6 (H) 9.4 - 12.3 FL ABSOLUTE NRBC 0.00 0.0 - 0.2 K/uL  
 DF AUTOMATED NEUTROPHILS 83 (H) 43 - 78 % LYMPHOCYTES 8 (L) 13 - 44 %  MONOCYTES 7 4.0 - 12.0 %  
 EOSINOPHILS 1 0.5 - 7.8 % BASOPHILS 1 0.0 - 2.0 % IMMATURE GRANULOCYTES 0 0.0 - 5.0 %  
 ABS. NEUTROPHILS 7.6 1.7 - 8.2 K/UL  
 ABS. LYMPHOCYTES 0.7 0.5 - 4.6 K/UL  
 ABS. MONOCYTES 0.6 0.1 - 1.3 K/UL  
 ABS. EOSINOPHILS 0.1 0.0 - 0.8 K/UL  
 ABS. BASOPHILS 0.1 0.0 - 0.2 K/UL  
 ABS. IMM. GRANS. 0.0 0.0 - 0.5 K/UL METABOLIC PANEL, COMPREHENSIVE Collection Time: 10/28/19  1:24 PM  
Result Value Ref Range Sodium 140 136 - 145 mmol/L Potassium 4.5 3.5 - 5.1 mmol/L Chloride 101 98 - 107 mmol/L  
 CO2 33 (H) 21 - 32 mmol/L Anion gap 6 (L) 7 - 16 mmol/L Glucose 116 (H) 65 - 100 mg/dL BUN 12 8 - 23 MG/DL Creatinine 0.70 (L) 0.8 - 1.5 MG/DL  
 GFR est AA >60 >60 ml/min/1.73m2 GFR est non-AA >60 >60 ml/min/1.73m2 Calcium 9.2 8.3 - 10.4 MG/DL Bilirubin, total 1.0 0.2 - 1.1 MG/DL  
 ALT (SGPT) 20 12 - 65 U/L  
 AST (SGOT) 24 15 - 37 U/L Alk. phosphatase 193 (H) 50 - 136 U/L Protein, total 7.9 6.3 - 8.2 g/dL Albumin 3.4 3.2 - 4.6 g/dL Globulin 4.5 (H) 2.3 - 3.5 g/dL A-G Ratio 0.8 (L) 1.2 - 3.5    
TROPONIN I Collection Time: 10/28/19  1:24 PM  
Result Value Ref Range Troponin-I, Qt. <0.02 (L) 0.02 - 0.05 NG/ML  
POC G3 Collection Time: 10/28/19  3:54 PM  
Result Value Ref Range Device: NASAL CANNULA pH (POC) 7.392 7.35 - 7.45    
 pCO2 (POC) 52.2 (H) 35 - 45 MMHG  
 pO2 (POC) 62 (L) 75 - 100 MMHG  
 HCO3 (POC) 31.7 (H) 22 - 26 MMOL/L  
 sO2 (POC) 91 (L) 95 - 98 % Base excess (POC) 5 mmol/L Allens test (POC) YES Site RIGHT RADIAL Patient temp. 98.6 Specimen type (POC) ARTERIAL Performed by Kevin   
 CO2, POC 33 MMOL/L Flow rate (POC) 5.000 L/min COLLECT TIME 1,552 Voice dictation software was used during the making of this note. This software is not perfect and grammatical and other typographical errors may be present. This note has been proofread, but may still contain errors. Jorgito Mom, MD; 10/28/2019 @4:35 PM  
===================================================================

## 2019-10-28 NOTE — PROGRESS NOTES
TRANSFER - IN REPORT: 
 
Verbal report received from Rebecca Yancey RN(name) on Lili Sandy  being received from ER(unit) for routine progression of care Report consisted of patients Situation, Background, Assessment and  
Recommendations(SBAR). Information from the following report(s) SBAR, Kardex, [facility] and Recent Results was reviewed with the receiving nurse. Opportunity for questions and clarification was provided. Report given to [facility], RN, who will be assuming primary care of patient on arrival to floor.

## 2019-10-28 NOTE — PROGRESS NOTES
Pt arrived to room 832 via stretcher from the ER. Pt alert oriented times 3 at this time. Pt complaints of right rib area pain 8/10 at this time. Condom cath placed on pt. Pt placed on remote telemetry running afib at this time. Pt has very dry flaky skin to BLE. Small abrasion to right inner leg. Pt skin assessed with Shanda Woodard RN. Pt oriented to room and surroundings. Pt encouraged to call for assistance if needed call light in reach, door open will monitor.

## 2019-10-28 NOTE — PROGRESS NOTES
Patient requested medication for generalized pain. Roxicodone 10 mg po given per prn order. Resting quietly in bed, wanting to go to sleep, states he is very tired. Call light in reach, will monitor.

## 2019-10-28 NOTE — ED TRIAGE NOTES
Patient arrives via EMS from PCP. States arrived with SOB and cold like symptoms x2 weeks. Congestion and non productive cough. Normally on 3L. Noticed wheezing and given 2.5 albuterol. EMS placed on 5L and O2 went to 97%. Hx CHF, COPD. Patient is alert and oriented, but dizzy and sob with ambulation.

## 2019-10-28 NOTE — ED NOTES
TRANSFER - OUT REPORT: 
 
Verbal report given to Flakito Chase RN(name) on John Rivas  being transferred to 8th floor(unit) for routine progression of care Report consisted of patients Situation, Background, Assessment and  
Recommendations(SBAR). Information from the following report(s) ED Summary was reviewed with the receiving nurse. Lines:  
Peripheral IV 10/28/19 Right Antecubital (Active) Site Assessment Clean, dry, & intact 10/28/2019  4:12 PM  
Phlebitis Assessment 0 10/28/2019  4:12 PM  
Infiltration Assessment 0 10/28/2019  4:12 PM  
Dressing Status Clean, dry, & intact 10/28/2019  4:12 PM  
  
 
Opportunity for questions and clarification was provided. Patient transported with: 
 O2 @ 5 liters, pt chart, pt belongings

## 2019-10-28 NOTE — PROGRESS NOTES
Warfarin dosing per pharmacist 
 
Shereen Fajadro is a [de-identified] y.o. male. Height: 5' 8\" (172.7 cm)    Weight: 106.6 kg (235 lb) Indication:  afib Goal INR:  2-3 Home dose:  2.5mg Sun/Tues/Thurs, 5mg Mon/Wed/Fri/Sat Risk factors or significant drug interactions:  none Other anticoagulants:  none Daily Monitoring Date  INR     Warfarin dose HGB              Notes 10/28  2  5mg  14.9 I will initiate on current home regimen as above. Pharmacy will follow daily INR and make dose adjustments as needed Thank you, Eladia Morrisonr, PharmD Clinical Staff Pharmacist 
[phone]

## 2019-10-28 NOTE — H&P
History of Present Illness: 
[de-identified] male presenting with 2 weeks of progressive dyspnea on exertion with PND, worsening orthopnea, bilateral lower extremity edema. Patient states his been taking all his medication as prescribed. Patient has had no recent change in his diet. On review of system patient does note bilateral lower chest tightness, abdominal distention, nonbloody loose stools. Patient specifically denies headache, fever, chills, nausea, vomiting, diaphoresis, lightheadedness, cough, abdominal pain, focal weakness, focal numbness. In ED, patient was found to be tachycardic to 106, hypoxic to 84% on home 5 LPM via NC, BP to 172/94. ABG with PaO2 to FiO2 ratio of 151. EKG with atrial fibrillation. Troponin negative. Chest x-ray essentially unchanged but with pulmonary edema noted. Comprehensive review of systems negative unless stated above. Past Medical History: HFpEF on 3-5 LPM via NC at home, last echo 1/19 with LVEF of 50-55% and E/e' ratio of 25 CAD status post three-vessel CABG in 2009 A. fib on warfarin COPD Status post AVR HTN 
HLD 
DM Hypothyroidism MONICA on CPAP Chronic pain PUD Past Surgical History: Three-vessel CABG, 2009 Cholecystectomy, 2003 Prostatectomy, 2011 Cataract repair, 2014 Appendectomy Family History: Mother: Heart disease Social History: 
Retired ,  Never smoker, denies alcohol, denies illicits Physical Exam: 
General: Elderly  male, sitting up in bed, moderate respiratory distress HEENT: NCAT, dry mucous membranes Skin: Scattered actinic keratosis, tree barking in bilateral lower extremities Cardio: Irregularly irregular, tachycardic to low 100s, normal S1/S2, no rubs, no gallops, 3/6 systolic murmur radiating to clavicular heads, marked JVD Pulm: Moderately labored respirations on 5 LPM via NC, LCAB, no wheezing, bibasilar rales, no rhonchi GI: Soft, Nt, Nd, no tympany to percussion, Nml bowel sounds, no masses noted Extremity: Atraumatic, no deformities, 4+ bilateral lower extremity edema Neuro: Alert, oriented, moving all extremities, no focal deficits noted Psych: Pleasant, cooperative, normal range of affect I have personally reviewed all current data for this patient. Assessment and Plan: 
 
#Exacerbation of HFpEF: Patient presenting with progressive dyspnea on exertion, PND, worsening orthopnea. Initially hypertensive and hypoxic. Physical exam with bibasilar rales, JVD, bilateral lower extremity edema. Given furosemide IV in ED. 
-Admit to inpatient 
-Furosemide IV 
-Continue home carvedilol, metolazone 
-Strict I's and O's 
-Daily weight 
-Daily labs 
-Continuous pulse oximetry #Rapid A. fib on warfarin: Heart rate to 112, irregularly irregular rhythm on exam. 
-Continue warfarin at home dosing, daily pharmacy to adjust dose as needed 
-Telemetry #COPD: No wheezing on exam, nebulized albuterol as needed #CAD: Continue home atorvastatin #MONICA: Consult respiratory therapy for nightly CPAP #HTN: Hypertensive on admission secondary to volume overload, furosemide as above, home carvedilol, hold metolazone, labetalol IV as needed #HLD: Continue home atorvastatin #DM: Sliding scale insulin protocol #PUD: Pantoprazole oral 
#Chronic pain: Continue home oxycodone #Hypothyroidism: Continue home levothyroxine she had a positive neurologic change Full Code Inpatient for above. Warfarin for VTE prevention Please call [phone] for questions or concerns.

## 2019-10-29 PROBLEM — K27.9 PUD (PEPTIC ULCER DISEASE): Chronic | Status: ACTIVE | Noted: 2019-01-01

## 2019-10-29 PROBLEM — J44.9 COPD (CHRONIC OBSTRUCTIVE PULMONARY DISEASE) (HCC): Chronic | Status: ACTIVE | Noted: 2019-01-01

## 2019-10-29 PROBLEM — E03.9 ACQUIRED HYPOTHYROIDISM: Chronic | Status: ACTIVE | Noted: 2019-01-01

## 2019-10-29 PROBLEM — G89.29 CHRONIC PAIN: Chronic | Status: ACTIVE | Noted: 2019-01-01

## 2019-10-29 PROBLEM — I10 HTN (HYPERTENSION): Chronic | Status: ACTIVE | Noted: 2019-01-01

## 2019-10-29 PROBLEM — J96.01 ACUTE RESPIRATORY FAILURE WITH HYPOXIA (HCC): Status: ACTIVE | Noted: 2019-01-01

## 2019-10-29 PROBLEM — Z79.01 LONG TERM (CURRENT) USE OF ANTICOAGULANTS: Chronic | Status: ACTIVE | Noted: 2018-05-21

## 2019-10-29 PROBLEM — Z95.2 S/P AVR: Chronic | Status: ACTIVE | Noted: 2019-01-01

## 2019-10-29 PROBLEM — I87.2 VENOUS STASIS DERMATITIS OF BOTH LOWER EXTREMITIES: Chronic | Status: ACTIVE | Noted: 2019-01-01

## 2019-10-29 NOTE — PROGRESS NOTES
Problem: Mobility Impaired (Adult and Pediatric) Goal: *Acute Goals and Plan of Care (Insert Text) Description STG: 
(1.)Mr. Esperanza Bernal will move from supine to sit and sit to supine  with CONTACT GUARD ASSIST within 3 treatment day(s). (2.)Mr. Esperanza Bernal will transfer from bed to chair and chair to bed with STAND BY ASSIST using the least restrictive device within 3 treatment day(s). (3.)Mr. Esperanza Bernal will ambulate with CONTACT GUARD ASSIST for 100+ feet with the least restrictive device within 3 treatment day(s). LTG: 
(1.)Mr. Esperanza Bernal will move from supine to sit and sit to supine  in bed with SUPERVISION within 7 treatment day(s). (2.)Mr. Esperanza Bernal will transfer from bed to chair and chair to bed with SUPERVISION using the least restrictive device within 7 treatment day(s). (3.)Mr. Esperanza Bernal will ambulate with STAND BY ASSIST for 250+ feet with the least restrictive device within 7 treatment day(s). ________________________________________________________________________________________________ Outcome: Progressing Towards Goal 
  
 
PHYSICAL THERAPY: Initial Assessment and AM 10/29/2019 INPATIENT: PT Visit Days : 1 [place] / [place] B / Product Type: Medicare /   
  
NAME/AGE/GENDER: Tee Patel is a [de-identified] y.o. male PRIMARY DIAGNOSIS: Shortness of breath [O73.40] Diastolic CHF, acute on chronic (HCC) Diastolic CHF, acute on chronic (HCC) ICD-10: Treatment Diagnosis:  
 Generalized Muscle Weakness (M62.81) Difficulty in walking, Not elsewhere classified (R26.2) Precaution/Allergies: 
Celexa [citalopram]; Cymbalta [duloxetine]; Gabapentin; Lidocaine; Sertraline; and Sulfa (sulfonamide antibiotics) ASSESSMENT:  
 
Mr. Esperanza Bernal is sitting up in bedside chair upon contact and agreeable to PT evaluation and treatment with encouragement from PT and MD. Pt lives with his wife and is her caregiver due to recent LE amputation.  Pt lives in 2 story home with all needs on 1st floor with ramp to enter. Pt is ambulatory short distance with use of SPC and community distance with use of RW. Pt is independent with ADLs and reports 0 falls. Pt requires 3-5L O2 NC at baseline dn is currently on 5L O2. Pt performed STS to RW with min-modA requiring cues for technique and safety. Pt demonstrates fair static standing balance with UE support on walker. Pt ambulated 30 ft in room with RW and CGA-Cici. Pt ambulates with widened ANABELLA, shuffling gait pattern, and increased trunk sway. Pt returned to chair and sitting position with Cici for controlled descent to chair. Pt left sitting up with all needs met and within reach with O2 sat at 96%. Amna Cruzumbjohanna will benefit from skilled PT (medically necessary) to address decreased strength, decreased balance, decreased functional tolerance, decreased cardiopulmonary endurance affecting participation in basic ADLs and functional tasks. This section established at most recent assessment PROBLEM LIST (Impairments causing functional limitations): 
Decreased Strength Decreased ADL/Functional Activities Decreased Transfer Abilities Decreased Ambulation Ability/Technique Decreased Balance Decreased Activity Tolerance Decreased Pacing Skills Increased Fatigue Increased Shortness of Breath Decreased Citrus with Home Exercise Program 
 INTERVENTIONS PLANNED: (Benefits and precautions of physical therapy have been discussed with the patient.) Balance Exercise Bed Mobility Family Education Gait Training Home Exercise Program (HEP) Neuromuscular Re-education/Strengthening Therapeutic Activites Therapeutic Exercise/Strengthening Transfer Training TREATMENT PLAN: Frequency/Duration: 3 times a week for duration of hospital stay Rehabilitation Potential For Stated Goals: Fair REHAB RECOMMENDATIONS (at time of discharge pending progress):   
Placement: It is my opinion, based on this patient's performance to date, that Mr. Reagan Walker may benefit from intensive therapy at a [address] [place] [address] after discharge due to the functional deficits listed above that are likely to improve with skilled rehabilitation and concerns that he/she may be unsafe to be unsupervised at home due to decreased strength and balance putting pt at increased risk for falls . Equipment:  
None at this time HISTORY:  
History of Present Injury/Illness (Reason for Referral): 
See H&P below 13-year-old male presenting with 2 weeks of progressive dyspnea on exertion with PND, worsening orthopnea, bilateral lower extremity edema. Patient states his been taking all his medication as prescribed. Patient has had no recent change in his diet. On review of system patient does note bilateral lower chest tightness, abdominal distention, nonbloody loose stools. Patient specifically denies headache, fever, chills, nausea, vomiting, diaphoresis, lightheadedness, cough, abdominal pain, focal weakness, focal numbness. Past Medical History/Comorbidities:  
Mr. Reagan Walker  has a past medical history of Acquired hypothyroidism (10/28/2019), Arrhythmia, Arthritis, CAD (coronary artery disease) (2009), Chronic pain, COPD (chronic obstructive pulmonary disease) ([address] (10/28/2019), Diabetes ([address] (2009), GERD (gastroesophageal reflux disease), Heart failure ([address] Hyperlipidemia, Hypertension, Moderate aortic stenosis (echo 9/27/13), Psychiatric disorder, PUD (peptic ulcer disease) (1970's), and Unspecified sleep apnea. He also has no past medical history of Aneurysm ([address] Asthma, Autoimmune disease ([address] Cancer ([address] Chronic kidney disease, Coagulation disorder ([address] Liver disease, Seizures ([address] Stroke ([address] or Thromboembolus ([address]   Mr. Reagan Walker  has a past surgical history that includes pr cardiac surg procedure unlist (2009); pr abdomen surgery proc unlisted (2003); hx other surgical (2011); hx knee arthroscopy; hx orthopaedic (Right, 2002); hx heent (Bilateral, 2004); hx heent; hx prostatectomy (2011); hx gi; hx appendectomy; and hx heart valve surgery (2011). Social History/Living Environment:  
Home Environment: Private residence # Steps to Enter: 0 Wheelchair Ramp: Yes One/Two Story Residence: Two story, live on 1st floor Living Alone: No 
Support Systems: Spouse/Significant Other/Partner Patient Expects to be Discharged to[de-identified] Rehabilitation facility Current DME Used/Available at Home: Shahid Mar[place]ella, straight, Walker, rollator, Oxygen, portable Prior Level of Function/Work/Activity: 
Use of SPC for household gait, RW for community distances, 0 falls Number of Personal Factors/Comorbidities that affect the Plan of Care: 3+: HIGH COMPLEXITY EXAMINATION:  
Most Recent Physical Functioning:  
Gross Assessment: 
AROM: Generally decreased, functional 
Strength: Generally decreased, functional 
Coordination: Generally decreased, functional 
         
  
Posture: 
  
Balance: 
Sitting: Intact; Without support Standing: Impaired;Pull to stand; With support Standing - Static: Fair;Constant support Standing - Dynamic : Fair;Constant support Bed Mobility: 
  
Wheelchair Mobility: 
  
Transfers: 
Sit to Stand: Minimum assistance; Moderate assistance Stand to Sit: Minimum assistance Gait: 
  
Base of Support: Widened;Center of gravity altered Speed/Lidia: Slow;Shuffled Step Length: Left shortened;Right shortened Gait Abnormalities: Decreased step clearance;Shuffling gait Distance (ft): 30 Feet (ft) Assistive Device: Walker, rolling Ambulation - Level of Assistance: Contact guard assistance;Minimal assistance Interventions: Safety awareness training; Tactile cues; Verbal cues Body Structures Involved: 
Lungs Bones Muscles Body Functions Affected: 
Cardio Respiratory Neuromusculoskeletal 
Movement Related Activities and Participation Affected: 
General Tasks and Demands Mobility Self Care Domestic Life Interpersonal Interactions and Relationships Community, Social and Foster Buck[place] Number of elements that affect the Plan of Care: 4+: HIGH COMPLEXITY CLINICAL PRESENTATION:  
Presentation: Evolving clinical presentation with changing clinical characteristics: MODERATE COMPLEXITY CLINICAL DECISION MAKIN[address] [place] [id number] AM-PAC 6 Clicks Basic Mobility Inpatient Short Form How much difficulty does the patient currently have. .. Unable A Lot A Little None 1. Turning over in bed (including adjusting bedclothes, sheets and blankets)? ? 1   ? 2   ? 3   ? 4  
2. Sitting down on and standing up from a chair with arms ( e.g., wheelchair, bedside commode, etc.)   ? 1   ? 2   ? 3   ? 4  
3. Moving from lying on back to sitting on the side of the bed?   ? 1   ? 2   ? 3   ? 4 How much help from another person does the patient currently need. .. Total A Lot A Little None 4. Moving to and from a bed to a chair (including a wheelchair)? ? 1   ? 2   ? 3   ? 4  
5. Need to walk in hospital room? ? 1   ? 2   ? 3   ? 4  
6. Climbing 3-5 steps with a railing? ? 1   ? 2   ? 3   ? 4  
© 2007, Trustees of [address] [place] [id number], under license to [email or web address]. All rights reserved Score:  Initial: 17 Most Recent: X (Date: -- ) Interpretation of Tool:  Represents activities that are increasingly more difficult (i.e. Bed mobility, Transfers, Gait). Medical Necessity:    
Patient is expected to demonstrate progress in strength, balance, coordination and functional technique 
 to decrease assistance required with gait, transfers, and functional mobility. . 
Reason for Services/Other Comments: 
Patient continues to require skilled intervention due to decreased strength, decreased balance, decreased functional tolerance, decreased cardiopulmonary endurance affecting participation in basic ADLs and functional tasks. Adams Mayes Use of outcome tool(s) and clinical judgement create a POC that gives a: Clear prediction of patient's progress: LOW COMPLEXITY  
  
 
 
 
TREATMENT:  
(In addition to Assessment/Re-Assessment sessions the following treatments were rendered) Pre-treatment Symptoms/Complaints:  weakness, fatigue Pain: Initial:  
Pain Intensity 1: 0  Post Session:  0/10 Therapeutic Activity: (    8 minutes): Therapeutic activities including Chair transfers, Ambulation on level ground and cues for ease and safety of transfers  to improve mobility, strength, balance and coordination. Required minimal Safety awareness training; Tactile cues; Verbal cues to promote static and dynamic balance in standing and promote coordination of bilateral, upper extremity(s), lower extremity(s). Braces/Orthotics/Lines/Etc:  
O2 Device: Nasal cannula Treatment/Session Assessment:   
Response to Treatment:  amb 30 ft with RW and Radha Interdisciplinary Collaboration:  
Physical Therapist 
Registered Nurse After treatment position/precautions:  
Up in chair Bed/Chair-wheels locked Bed in low position Call light within reach Compliance with Program/Exercises: Will assess as treatment progresses Recommendations/Intent for next treatment session: \"Next visit will focus on advancements to more challenging activities and reduction in assistance provided\". Total Treatment Duration: PT Patient Time In/Time Out Time In: 2142 Time Out: 1010 Claudia Sal 55

## 2019-10-29 NOTE — PROGRESS NOTES
Spiritual Care Visit, initial visit. Visited with patient at bedside. Patient was a pleasant gentleman of [de-identified] years old. Prayed for patient's healing and health. Visit by Shanti Noguera, Staff .  M.Ed., Th.B., B.A.

## 2019-10-29 NOTE — PROGRESS NOTES
Problem: Heart Failure: Day 2 Goal: Activity/Safety Outcome: Progressing Towards Goal 
Goal: Consults, if ordered Outcome: Progressing Towards Goal 
Goal: Diagnostic Test/Procedures Outcome: Progressing Towards Goal 
Goal: Nutrition/Diet Outcome: Progressing Towards Goal 
Goal: Medications Outcome: Progressing Towards Goal

## 2019-10-29 NOTE — PROGRESS NOTES
Pt remains up in chair. Pt on 5 L NC. Pt denies pain or distress at this time. Pulido in place draining clear yellow urine. Pt encouraged to call for assistance if needed call light in reach, door open will monitor.

## 2019-10-29 NOTE — CONSULTS
[facility] Cardiology Consult Date of  Admission: 10/28/2019  3:19 PM  
 
Primary Care Physician: ROSELYN 
Primary Cardiologist: Dr. Es Villaseñor Referring Physician: Hospitalist  
Consulting Physician: Dr. Es Villaseñor CC/Reason for consult: CHF Amna Salas is a [de-identified] y.o. male with prior h/o CAD s/p CABG x3 in 2009 at [facility] with Dr. Patsy Rai (no records available for anatomy), AV disease s/p TAVR at [facility] in 2013, persistent AF on coumadin, HTN, COPD with home O2, DM, and HLP. Last NST 3/19 showed normal perfusion but depressed LVEF 46%. Last echo 1/2/19 showed 96-79% w/ diastolic dysfunction. Patient presented to ED at [facility] with c/o progressive dyspnea, TORRE, PND, and edema over last 2 weeks. [facility]. Es Villaseñor and [place] changed his heart failure meds and he reports \"medications all messed up\". In ED, patient was found to be tachycardic to 106, hypoxic to 84% on home 5 LPM via NC, BP to 172/94. EKG with atrial fibrillation. Troponin negative. . Chest x-ray essentially unchanged but with pulmonary edema noted. Hospitalist admitted patient with exacerbation of HFpEF and he was started on IV lasix. Overnight he diuresed 3 liters. Cardiology was consulted for HF. He is SOB this am with minimal talking. + edema, but denies any chest pain. Diagnosis  Atrial fibrillation ([address]  Coronary atherosclerosis of artery bypass graft  Type 2 diabetes mellitus ([address]  Pulmonary edema  CAD (coronary artery disease)  Dyslipidemia  Hypoxia  Diastolic CHF, acute on chronic (HCC)  Acute-on-chronic respiratory failure ([address]  MONICA (obstructive sleep apnea)  Primary central sleep apnea  Palliative care patient  Restrictive lung disease  Long term (current) use of anticoagulants  Heart failure ([address]  Shortness of breath  COPD (chronic obstructive pulmonary disease) (HCC)  S/P AVR  
 HTN (hypertension)  PUD (peptic ulcer disease)  Chronic pain  Acquired hypothyroidism Past Medical History:  
Diagnosis Date  Acquired hypothyroidism 10/28/2019  Arrhythmia   
 afib  Arthritis  CAD (coronary artery disease) 2009 CABG, 3 vessel. No MI  
 Chronic pain   
 legs \"R especially\"  COPD (chronic obstructive pulmonary disease) [facility] 10/28/2019  Diabetes ([facility] 2009  
 insulin controlled, avg fbs 107, recognizes hypo at 87, unsure last Ha1C  
 GERD (gastroesophageal reflux disease)   
 controlled with omeprazole twice daily  Heart failure (HCC)   
 echo 9/27/13:  EF 70%, Mild regurg mitral and tricuspid valve.  Hyperlipidemia   
 controlled with med  Hypertension   
 controlled with meds  Moderate aortic stenosis echo 9/27/13  
 aortic valve area 1.2 cm2  Psychiatric disorder   
 anxiety, depression, controlled with Venlafaxine  PUD (peptic ulcer disease) 1970's  Unspecified sleep apnea   
 wears cpap Past Surgical History:  
Procedure Laterality Date [address] UNLISTED  2003 cholecystectomy  CARDIAC SURG PROCEDURE UNLIST  2009 CABG 3V  
 HX APPENDECTOMY  HX GI    
 heller myotomy with toupee wrap [address] VALVE SURGERY  2011  HX HEENT Bilateral 2004  
 ptosis  HX HEENT    
 to have cataract surgery 2014  HX KNEE ARTHROSCOPY    
 bilateral  
 HX ORTHOPAEDIC Right 2002  
 bone spur  HX OTHER SURGICAL  2011  
 prostate surgery  HX PROSTATECTOMY  2011 TURP Allergies Allergen Reactions  Celexa [Citalopram] Other (comments) Ineffective per pt  Cymbalta [Duloxetine] Other (comments) Altered mental state  Gabapentin Other (comments) Altered mental status  Lidocaine Unknown (comments)  Sertraline Unknown (comments)  Sulfa (Sulfonamide Antibiotics) Unknown (comments) Family History Problem Relation Age of Onset  Heart Disease Mother Current Facility-Administered Medications Medication Dose Route Frequency  atorvastatin (LIPITOR) tablet 20 mg  20 mg Oral DAILY  carvedilol (COREG) tablet 12.5 mg  12.5 mg Oral BID WITH MEALS  ferrous sulfate tablet 325 mg  1 Tab Oral DAILY WITH BREAKFAST  levothyroxine (SYNTHROID) tablet 75 mcg  75 mcg Oral ACB  lisinopril (PRINIVIL, ZESTRIL) tablet 10 mg  10 mg Oral DAILY  memantine (NAMENDA) tablet 10 mg  10 mg Oral BID  metOLazone (ZAROXOLYN) tablet 5 mg  5 mg Oral DAILY  warfarin (COUMADIN) tablet 5 mg  5 mg Oral Q M, W, F & SAT  warfarin (COUMADIN) tablet 2.5 mg  2.5 mg Oral Q HAHN, TU & TH  
 potassium chloride (K-DUR, KLOR-CON) SR tablet 20 mEq  20 mEq Oral DAILY  oxyCODONE IR (ROXICODONE) tablet 10 mg  10 mg Oral Q8H PRN  pantoprazole (PROTONIX) tablet 40 mg  40 mg Oral ACB  nitroglycerin (NITROSTAT) tablet 0.4 mg  0.4 mg SubLINGual Q5MIN PRN  
 sodium chloride (NS) flush 5-40 mL  5-40 mL IntraVENous Q8H  
 sodium chloride (NS) flush 5-40 mL  5-40 mL IntraVENous PRN  
 ondansetron (ZOFRAN) injection 4 mg  4 mg IntraVENous Q4H PRN  
 diphenhydrAMINE (BENADRYL) capsule 25 mg  25 mg Oral Q4H PRN  
 tuberculin injection 5 Units  5 Units IntraDERMal ONCE  
 insulin lispro (HUMALOG) injection   SubCUTAneous AC&HS  furosemide (LASIX) injection 80 mg  80 mg IntraVENous Q8H  
 albuterol (PROVENTIL VENTOLIN) nebulizer solution 2.5 mg  2.5 mg Nebulization Q4H PRN  
 labetalol (NORMODYNE;TRANDATE) injection 20 mg  20 mg IntraVENous Q2H PRN Review of Systems Constitution: Negative for diaphoresis and malaise/fatigue. HENT: Negative for congestion. Cardiovascular: Positive for dyspnea on exertion, leg swelling, orthopnea and paroxysmal nocturnal dyspnea. Negative for chest pain, claudication, cyanosis, irregular heartbeat, near-syncope, palpitations and syncope. Respiratory: Positive for shortness of breath. Negative for cough and wheezing. Endocrine: Negative for cold intolerance and heat intolerance. Hematologic/Lymphatic: Does not bruise/bleed easily. Skin: Negative for nail changes. Neurological: Negative for dizziness, headaches and weakness. Physical Exam 
Vitals:  
 10/28/19 1926 10/28/19 2234 10/29/19 0325 10/29/19 0715 BP: 144/80 138/64 137/69 133/75 Pulse: 90 74 67 79 Resp: 20 20 20 19 Temp: 98.2 °F (36.8 °C) 97.3 °F (36.3 °C) 97.7 °F (36.5 °C) 97.7 °F (36.5 °C) SpO2: 96% 96% 96% 95% Weight:      
Height:      
 
 
Physical Exam: 
General: Well Developed, Well Nourished, No Acute Distress HEENT: pupils equal and round, no abnormalities noted Neck: supple, + JVD, no carotid bruits Heart: S1S2 irregular irregular with RRR without murmurs or gallops Lungs: diminished in bases Abd: soft, nontender, nondistended, with good bowel sounds Ext: warm, 2+ edema with chronic venous changes , calves supple/nontender, pulses 2+ bilaterally Skin: warm and dry Psychiatric: Normal mood and affect Neurologic: Alert and oriented X 3 Cardiographics Telemetry: A. Fib controlled rates ECG: A. Fib vent rate 98 Echocardiogram: 1/19 showed -  Left ventricle: Systolic function was at the lower limits of normal. Ejection fraction was estimated in the range of 50 % to 55 %. There were no 
regional wall motion abnormalities. There was mild concentric hypertrophy. The E/e' ratio was 24.35. There was diastolic dysfunction. -  Right ventricle: The ventricle was dilated. Systolic function was reduced. -  Left atrium: The atrium was moderately to markedly dilated. -  Right atrium: The atrium was markedly dilated. -  Inferior vena cava, hepatic veins: The inferior vena cava was dilated. The respirophasic change in diameter was less than 50%. -  Aortic valve: The aortic valve area by the continuity equation was 1.67 cm2.-  Mitral valve: There was mild annular calcification.  There was moderate regurgitation. -  Tricuspid valve: There was moderate regurgitation. Labs:  
Recent Labs 10/29/19 
0726 10/28/19 
1625 10/28/19 
1324   --  140  
K 3.6  --  4.5 BUN 15  --  12  
CREA 0.71*  --  0.70* *  --  116* WBC  --   --  9.2 HGB  --   --  14.9 HCT  --   --  48.6 PLT  --   --  252 INR 2.1 2.0  -- Assessment/Plan: 
 
 Assessment:  
  
Principal Problem: 
  Diastolic CHF, acute on chronic ([facility] (9/30/2013)- still looks volume overload this am; diuresed well over night. Will continue IV lasix and daily labs. Continue home meds Coreg, ACE. Would be cautious with sending home on zaroxolyn. Would send home on po lasix 80 mg bid. Instructed patient to bring all home meds to next f/u appt with Dr. Doris Wheeler. Active Problems: 
  Atrial fibrillation ([facility] (9/27/2013)- persistent; on coumadin with INR today 2.1. Coronary atherosclerosis of artery bypass graft (9/27/2013)- no active angina sx; continue BB, ACE and statin Overview: CABG x3 in 2009 Type 2 diabetes mellitus ([facility] (9/27/2013) Pulmonary edema (9/27/2013) CAD (coronary artery disease) (9/27/2013) Dyslipidemia (9/27/2013)- on statin Hypoxia (9/30/2013) MONICA (obstructive sleep apnea) (12/16/2013) Long term (current) use of anticoagulants (5/21/2018) Shortness of breath (10/28/2019) COPD (chronic obstructive pulmonary disease) ([facility] (10/28/2019) S/P AVR (10/28/2019)- last echo 1/19 showed bioprosthesis present with no stenosis or no insufficiency. HTN (hypertension) (10/28/2019) PUD (peptic ulcer disease) (10/28/2019) Chronic pain (10/28/2019) Acquired hypothyroidism (10/28/2019) Thank you very much for this referral. We appreciate the opportunity to participate in this patient's care. We will follow along with above stated plan. Wilma Luz NP Consulting MD: Dr. Doris Wheeler

## 2019-10-29 NOTE — PROGRESS NOTES
Order received, chart reviewed, evaluation attempted; pt off floor for test/ procedure. Will follow up as schedule allows.  
 
Jade Oliver, OT

## 2019-10-29 NOTE — PROGRESS NOTES
Patient up to recliner chair, states he cannot sleep in bed because of difficulty breathing. Call light in reach, comfort measures given. Resting quietly.

## 2019-10-29 NOTE — PROGRESS NOTES
Care Management Interventions PCP Verified by CM: Yes Mode of Transport at Discharge: BLS Transition of Care Consult (CM Consult): SNF Partner SNF: Yes Discharge Durable Medical Equipment: No 
Physical Therapy Consult: Yes Current Support Network: Lives with Spouse Confirm Follow Up Transport: Other (see comment) Plan discussed with Pt/Family/Caregiver: Yes Freedom of Choice Offered: Yes Discharge Location Discharge Placement: Skilled nursing facility Patient lives with his spouse. Uses a walker to ambulate. Patient is independent at baseline with ADLs. Patient is willing to discharge to rehab.

## 2019-10-29 NOTE — PROGRESS NOTES
Pt returns to room from xray. No distress noted at this time. Pt reports right rib area is better at this time. Call light in reach, door open will monitor.

## 2019-10-29 NOTE — PROGRESS NOTES
Warfarin dosing per pharmacist 
 
Tee Patel is a [de-identified] y.o. male. Height: 5' 8\" (172.7 cm)    Weight: 106.6 kg (235 lb) Indication:  afib Goal INR:  2-3 Home dose:  2.5mg Sun/Tues/Thurs, 5mg Mon/Wed/Fri/Sat Risk factors or significant drug interactions:  none Other anticoagulants:  none Daily Monitoring Date  INR     Warfarin dose HGB              Notes 10/28  2  5mg  14.9   
10/29  2.1  2.5 mg  13.7 Will continue patient's home regimen and give 2.5 mg this evening. Pharmacy will continue to follow patient and adjust doses as needed. Thank you, Maria Luisa Omalley, Pharm. D. 
PGY1 Pharmacy Resident [phone]

## 2019-10-29 NOTE — PROGRESS NOTES
Hospitalist Note Admit Date:  10/28/2019  3:19 PM  
Name:  Antonio Mcfarland Age:  [de-identified] y.o. 
:  1940 MRN:  [id number] PCP:  Emilio Tejeda MD 
Treatment Team: Attending Provider: Maureen Piña MD; Primary Nurse: Jake Knapp, RN; Consulting Provider: Zach Wiley MD; Occupational Therapist: Damian Ryan OT; Physical Therapist: Harleen Brand; Utilization Review: [facility] [place] 
 
HPI/Subjective:  
48-year-old M with afib, dCHF, COPD on chronic oxygen, presented with 2 weeks of TORRE with PND, worsening orthopnea, bilateral lower extremity edema. has been taking all his medication as prescribed. no recent change in his diet. In ED, patient was found to be tachycardic to 106, hypoxic to 84% on home 5 LPM via NC, BP to 172/94. ABG with PaO2 to FiO2 ratio of 151. EKG with afib RVR. Troponin negative. Chest x-ray with pulmonary edema. Pt admitted, started on IV lasix. Cardiology consulted 10/29 - pt feeling less SOB but still very weak. No cough currently. Leg edema slightly improved per pt. No CP, fevers. Getting ready to work with PT. No other complaints Objective:  
 
Patient Vitals for the past 24 hrs: 
 Temp Pulse Resp BP SpO2  
10/29/19 0715 97.7 °F (36.5 °C) 79 19 133/75 95 % 10/29/19 0325 97.7 °F (36.5 °C) 67 20 137/69 96 % 10/28/19 2234 97.3 °F (36.3 °C) 74 20 138/64 96 % 10/28/19 1926 98.2 °F (36.8 °C) 90 20 144/80 96 % 10/28/19 1758 97.5 °F (36.4 °C) 97 21 158/83 94 % 10/28/19 1658  91 14 178/82 94 % 10/28/19 1634  (!) 112 24  100 % 10/28/19 1600  (!) 101  181/84   
10/28/19 1557  98 29 181/84 96 % 10/28/19 1545     94 % 10/28/19 1531  (!) 102 24 185/89 91 % 10/28/19 1524     (!) 84 % 10/28/19 1320 98.5 °F (36.9 °C) (!) 106 18 (!) 172/94 91 % Oxygen Therapy O2 Sat (%): 95 % (10/29/19 0715) Pulse via Oximetry: 102 beats per minute (10/28/19 1658) O2 Device: Nasal cannula (10/28/19 1658) O2 Flow Rate (L/min): 5 l/min (10/28/19 1658) Estimated body mass index is 35.73 kg/m² as calculated from the following: 
  Height as of this encounter: 5' 8\" (1.727 m). Weight as of this encounter: 106.6 kg (235 lb). Intake/Output Summary (Last 24 hours) at 10/29/2019 0950 Last data filed at 10/29/2019 8820 Gross per 24 hour Intake  Output 3000 ml Net -3000 ml *Note that automatically entered I/Os may not be accurate; dependent on patient compliance with collection and accurate  by techs. General:    Well nourished. Alert. CV:   Irregular, reg rate. 3/6 systolic murmur RUSB Lungs:   Diminished R base. Scattered rales Abdomen:   Soft, nontender, nondistended. Extremities: Warm and dry. No cyanosis. Stasis derm changes. 2+ pitting edema BLE Skin:     No rashes or jaundice. Neuro:  No gross focal deficits Data Review: 
I have reviewed all labs, meds, and studies from the last 24 hours: 
 
Recent Results (from the past 24 hour(s)) EKG, 12 LEAD, INITIAL Collection Time: 10/28/19  1:22 PM  
Result Value Ref Range Ventricular Rate 98 BPM  
 Atrial Rate 110 BPM  
 QRS Duration 136 ms  
 Q-T Interval 370 ms QTC Calculation (Bezet) 472 ms Calculated R Axis 74 degrees Calculated T Axis 149 degrees Diagnosis Atrial fibrillation Non-specific intra-ventricular conduction block Abnormal ECG When compared with ECG of 02-JAN-2019 11:20, 
Questionable change in QRS axis Confirmed by Dalila Stephens MD (), FABIO RODRÍGUEZ ([id number]) on 10/28/2019 4:26:09 PM 
  
CBC WITH AUTOMATED DIFF Collection Time: 10/28/19  1:24 PM  
Result Value Ref Range WBC 9.2 4.3 - 11.1 K/uL  
 RBC 5.12 4.23 - 5.6 M/uL  
 HGB 14.9 13.6 - 17.2 g/dL HCT 48.6 41.1 - 50.3 % MCV 94.9 79.6 - 97.8 FL  
 MCH 29.1 26.1 - 32.9 PG  
 MCHC 30.7 (L) 31.4 - 35.0 g/dL  
 RDW 14.8 (H) 11.9 - 14.6 % PLATELET 737 080 - 533 K/uL  MPV 12.6 (H) 9.4 - 12.3 FL  
 ABSOLUTE NRBC 0.00 0.0 - 0.2 K/uL  
 DF AUTOMATED NEUTROPHILS 83 (H) 43 - 78 % LYMPHOCYTES 8 (L) 13 - 44 % MONOCYTES 7 4.0 - 12.0 % EOSINOPHILS 1 0.5 - 7.8 % BASOPHILS 1 0.0 - 2.0 % IMMATURE GRANULOCYTES 0 0.0 - 5.0 %  
 ABS. NEUTROPHILS 7.6 1.7 - 8.2 K/UL  
 ABS. LYMPHOCYTES 0.7 0.5 - 4.6 K/UL  
 ABS. MONOCYTES 0.6 0.1 - 1.3 K/UL  
 ABS. EOSINOPHILS 0.1 0.0 - 0.8 K/UL  
 ABS. BASOPHILS 0.1 0.0 - 0.2 K/UL  
 ABS. IMM. GRANS. 0.0 0.0 - 0.5 K/UL METABOLIC PANEL, COMPREHENSIVE Collection Time: 10/28/19  1:24 PM  
Result Value Ref Range Sodium 140 136 - 145 mmol/L Potassium 4.5 3.5 - 5.1 mmol/L Chloride 101 98 - 107 mmol/L  
 CO2 33 (H) 21 - 32 mmol/L Anion gap 6 (L) 7 - 16 mmol/L Glucose 116 (H) 65 - 100 mg/dL BUN 12 8 - 23 MG/DL Creatinine 0.70 (L) 0.8 - 1.5 MG/DL  
 GFR est AA >60 >60 ml/min/1.73m2 GFR est non-AA >60 >60 ml/min/1.73m2 Calcium 9.2 8.3 - 10.4 MG/DL Bilirubin, total 1.0 0.2 - 1.1 MG/DL  
 ALT (SGPT) 20 12 - 65 U/L  
 AST (SGOT) 24 15 - 37 U/L Alk. phosphatase 193 (H) 50 - 136 U/L Protein, total 7.9 6.3 - 8.2 g/dL Albumin 3.4 3.2 - 4.6 g/dL Globulin 4.5 (H) 2.3 - 3.5 g/dL A-G Ratio 0.8 (L) 1.2 - 3.5 BNP Collection Time: 10/28/19  1:24 PM  
Result Value Ref Range  (H) 0 pg/mL TROPONIN I Collection Time: 10/28/19  1:24 PM  
Result Value Ref Range Troponin-I, Qt. <0.02 (L) 0.02 - 0.05 NG/ML  
POC G3 Collection Time: 10/28/19  3:54 PM  
Result Value Ref Range Device: NASAL CANNULA pH (POC) 7.392 7.35 - 7.45    
 pCO2 (POC) 52.2 (H) 35 - 45 MMHG  
 pO2 (POC) 62 (L) 75 - 100 MMHG  
 HCO3 (POC) 31.7 (H) 22 - 26 MMOL/L  
 sO2 (POC) 91 (L) 95 - 98 % Base excess (POC) 5 mmol/L Allens test (POC) YES Site RIGHT RADIAL Patient temp. 98.6 Specimen type (POC) ARTERIAL Performed by Kevin   
 CO2, POC 33 MMOL/L Flow rate (POC) 5.000 L/min COLLECT TIME 1,552 PROTHROMBIN TIME + INR Collection Time: 10/28/19  4:25 PM  
Result Value Ref Range Prothrombin time 22.8 (H) 11.7 - 14.5 sec INR 2.0    
TROPONIN I Collection Time: 10/28/19  6:43 PM  
Result Value Ref Range Troponin-I, Qt. <0.02 (L) 0.02 - 0.05 NG/ML  
GLUCOSE, POC Collection Time: 10/28/19  8:29 PM  
Result Value Ref Range Glucose (POC) 365 (H) 65 - 100 mg/dL GLUCOSE, POC Collection Time: 10/29/19  4:15 AM  
Result Value Ref Range Glucose (POC) 200 (H) 65 - 100 mg/dL GLUCOSE, POC Collection Time: 10/29/19  5:20 AM  
Result Value Ref Range Glucose (POC) 218 (H) 65 - 100 mg/dL METABOLIC PANEL, BASIC Collection Time: 10/29/19  7:26 AM  
Result Value Ref Range Sodium 140 136 - 145 mmol/L Potassium 3.6 3.5 - 5.1 mmol/L Chloride 99 98 - 107 mmol/L  
 CO2 36 (H) 21 - 32 mmol/L Anion gap 5 (L) 7 - 16 mmol/L Glucose 184 (H) 65 - 100 mg/dL BUN 15 8 - 23 MG/DL Creatinine 0.71 (L) 0.8 - 1.5 MG/DL  
 GFR est AA >60 >60 ml/min/1.73m2 GFR est non-AA >60 >60 ml/min/1.73m2 Calcium 9.2 8.3 - 10.4 MG/DL PROTHROMBIN TIME + INR Collection Time: 10/29/19  7:26 AM  
Result Value Ref Range Prothrombin time 24.4 (H) 11.7 - 14.5 sec INR 2.1 TROPONIN I Collection Time: 10/29/19  7:26 AM  
Result Value Ref Range Troponin-I, Qt. <0.02 (L) 0.02 - 0.05 NG/ML All Micro Results None No results found for this visit on 10/28/19. Current Meds: 
Current Facility-Administered Medications Medication Dose Route Frequency  atorvastatin (LIPITOR) tablet 20 mg  20 mg Oral DAILY  carvedilol (COREG) tablet 12.5 mg  12.5 mg Oral BID WITH MEALS  ferrous sulfate tablet 325 mg  1 Tab Oral DAILY WITH BREAKFAST  levothyroxine (SYNTHROID) tablet 75 mcg  75 mcg Oral ACB  lisinopril (PRINIVIL, ZESTRIL) tablet 10 mg  10 mg Oral DAILY  memantine (NAMENDA) tablet 10 mg  10 mg Oral BID  
  metOLazone (ZAROXOLYN) tablet 5 mg  5 mg Oral DAILY  warfarin (COUMADIN) tablet 5 mg  5 mg Oral Q M, W, F & SAT  warfarin (COUMADIN) tablet 2.5 mg  2.5 mg Oral Q HAHN, TU & TH  
 potassium chloride (K-DUR, KLOR-CON) SR tablet 20 mEq  20 mEq Oral DAILY  oxyCODONE IR (ROXICODONE) tablet 10 mg  10 mg Oral Q8H PRN  pantoprazole (PROTONIX) tablet 40 mg  40 mg Oral ACB  nitroglycerin (NITROSTAT) tablet 0.4 mg  0.4 mg SubLINGual Q5MIN PRN  
 sodium chloride (NS) flush 5-40 mL  5-40 mL IntraVENous Q8H  
 sodium chloride (NS) flush 5-40 mL  5-40 mL IntraVENous PRN  
 ondansetron (ZOFRAN) injection 4 mg  4 mg IntraVENous Q4H PRN  
 diphenhydrAMINE (BENADRYL) capsule 25 mg  25 mg Oral Q4H PRN  
 tuberculin injection 5 Units  5 Units IntraDERMal ONCE  
 insulin lispro (HUMALOG) injection   SubCUTAneous AC&HS  furosemide (LASIX) injection 80 mg  80 mg IntraVENous Q8H  
 albuterol (PROVENTIL VENTOLIN) nebulizer solution 2.5 mg  2.5 mg Nebulization Q4H PRN  
 labetalol (NORMODYNE;TRANDATE) injection 20 mg  20 mg IntraVENous Q2H PRN Other Studies (last 24 hours): Xr Chest [facility] Result Date: 10/28/2019 AP chest radiograph History: sob, [de-identified] years Male Comparison: Chest radiograph January 02, 2019 Findings:   Normal cardiomediastinal silhouette with evidence of CABG. Persistent low lung volumes. Persistent trace bilateral pleural effusions with associated mild bibasilar atelectasis and or consolidation. Mild diffuse interstitial prominence likely resenting edema unchanged. No evidence of pneumothorax. Visualized soft tissue and osseous structures otherwise unremarkable. Impression:  No significant interval change. Assessment and Plan:  
 
Hospital Problems as of 10/29/2019 Date Reviewed: 9/19/2019 Codes Class Noted - Resolved POA Acute respiratory failure with hypoxia [facility]) ICD-10-CM: J96.01 
ICD-9-CM: 518.81  10/29/2019 - Present Yes Shortness of breath ICD-10-CM: R06.02 
ICD-9-CM: 786.05  10/28/2019 - Present Yes COPD (chronic obstructive pulmonary disease) (HCC) (Chronic) ICD-10-CM: J44.9 ICD-9-CM: 937  10/28/2019 - Present Yes S/P AVR (Chronic) ICD-10-CM: Z95.2 ICD-9-CM: V43.3  10/28/2019 - Present Yes HTN (hypertension) (Chronic) ICD-10-CM: I10 
ICD-9-CM: 401.9  10/28/2019 - Present Yes  
   
 PUD (peptic ulcer disease) (Chronic) ICD-10-CM: K27.9 ICD-9-CM: 533.90  10/28/2019 - Present Yes Chronic pain (Chronic) ICD-10-CM: I41.40 ICD-9-CM: 338.29  10/28/2019 - Present Yes Acquired hypothyroidism (Chronic) ICD-10-CM: E03.9 ICD-9-CM: 244.9  10/28/2019 - Present Yes Long term (current) use of anticoagulants (Chronic) ICD-10-CM: Z79.01 
ICD-9-CM: V58.61  5/21/2018 - Present Yes  
   
 MONICA (obstructive sleep apnea) (Chronic) ICD-10-CM: D31.96 
ICD-9-CM: 327.23  12/16/2013 - Present Yes Hypoxia ICD-10-CM: R09.02 
ICD-9-CM: 799.02  9/30/2013 - Present Yes * (Principal) Diastolic CHF, acute on chronic (HCC) ICD-10-CM: I50.33 ICD-9-CM: 428.33, 428.0  9/30/2013 - Present Yes Atrial fibrillation ([address] ICD-10-CM: I48.91 
ICD-9-CM: 427.31  9/27/2013 - Present Yes Coronary atherosclerosis of artery bypass graft ICD-10-CM: I25.810 ICD-9-CM: 414.04  9/27/2013 - Present Yes Overview Signed 9/30/2013 11:29 AM by Giovany Craft NP  
  CABG x3 in 2009 Type 2 diabetes mellitus (HCC) (Chronic) ICD-10-CM: E11.9 ICD-9-CM: 250.00  9/27/2013 - Present Yes Acute pulmonary edema (HCC) ICD-10-CM: J81.0 ICD-9-CM: 518.4  9/27/2013 - Present Yes CAD (coronary artery disease) (Chronic) ICD-10-CM: I25.10 ICD-9-CM: 414.00  9/27/2013 - Present Yes Dyslipidemia (Chronic) ICD-10-CM: B56.5 ICD-9-CM: 272.4  9/27/2013 - Present Yes Plan: CHFpEF exac 
-cardio and PC consulted per protocol; appreciate help 
-on IV lasix 
-home metolazone -defer discharge/home med adjustment to cardio 
-monitor BMP 
-check Mg 
 
Weakness -PT/OT 
-PPD done. Will need placement. Pt agreeable DM 
-a1c 6.4% June 2018 
-recheck here 
-ISS Afib 
-warfarin 
-coreg COPD 
-not in exacerbation 
-cont home meds Dementia 
-namenda Diet:  DIET CARDIAC 
DVT ppx:   
 
Signed: 
Benjamin Welch MD

## 2019-10-29 NOTE — DIABETES MGMT
Patient admitted with CHF, seen by diabetes educator. Patient is well known to the diabetes management team as he has been seen twice in 2013 and in 2018 during previous hospitalizations and received diabetic education at those times. Patient states he has a positive family history of diabetes and he was diagnosed \"years ago. \" Patient states he was taking 70/30 insulin at one time, but \"it got too much for me so they scaled me way back to the pill. \" Patient reports he has had four different doctors in the past year. I go the [facility] is where my PCP Dr. Kyleigh Pyle is at. \" Patient is a retired  with history of COPD, dementia, atrial fibrillation, CABG, aortic stenosis, DVT, uses oxygen at home, and DM. Patient voices  A positive family history of diabetes. Patient does not believe he currently takes anything for his diabetes, but his PCP medication list shows saxagliptin 2.5 mg daily prescribed. Unable to verify if patient takes this drug or not. Patient states he has a glucometer at home but rarely checks his blood glucose. A1c was 7.2 in June which is an acceptable range given patient's age and complexity. Patient reports his wife only has one leg, and goes to dialysis M, W, F and this puts a lot of stress on him having to get her ready for dialysis. \"I get up at 3 AM on those days. \" Patient states case management is working on helping him out. No case management note currently in chart. Blood glucose 116 on admission. Patient received 125 mg of Solumedrol and blood glucose elevated to 365. Patient received Humalog 12 units. Blood glucose 184 this morning. Reviewed current regimen of Humalog SSI with patient. Educated patient on the impact steroids will have on his glycemic control, but that his readings should normalize as the steroids wear off. Encouraged patient to check his blood glucose occasionally at home especially if he doesn't feel good.  Patient voices no questions at this time.

## 2019-10-29 NOTE — PROGRESS NOTES
Patient continues resting in recliner, requested medication for generalized pain. States he is feeling rough this morning. Roxicodone 10 mg po given per prn order. Call light in reach, will monitor.

## 2019-10-29 NOTE — PROGRESS NOTES
Patient continues resting in recliner chair, call light in reach.   Will give report to oncoming RN

## 2019-10-29 NOTE — CONSULTS
Palliative Care Patient: Antonio Mcfarland MRN: [id number]  SSN: [id number] YOB: 1940  Age: [de-identified] y.o. Sex: male Date of Request: 10/29/2019 Date of Consult:  10/29/2019 Reason for Consult:  goals of care and medical decision making Requesting Physician: Dr. Tony Johnson Assessment/Plan:  
 
Principal Diagnosis: Dyspnea  R06.00 Additional Diagnoses: · Debility, Unspecified  R53.81 
· Frailty  R54 · Counseling, Encounter for Medical Advice  Z71.9 
· Encounter for Palliative Care  Z51.5 Palliative Performance Scale (PPS): 
 60% Medical Decision Making:  
Reviewed and summarized labs and imaging. I met with pt at bedside. No other family present. Pt lives with his wife who he helps care for. Wife with multiple medical issues as well. We discussed concerns regarding loss of performance status. Pt notes increasing dyspnea with exertion and decreased self care due to time spent caring for his wife. We discussed his wishes given severity of disease. Pt confirms DNR. States his wife is alternate decision maker and they have established living [facility]. I asked who we should contact should the unfortunate situation of both the pt and wife be hospitalized and unable to communicate. Pt states they have a son, Han Sanders, but could not remember the phone number. Pt remains hopeful his symptoms will improve and he can find some additional assistance at home Will discuss findings with members of the interdisciplinary team.   
 
Thank you for this referral.    
 
 
Subjective:  
 
History obtained from:  Patient and Chart Chief Complaint: dyspnea History of Present Illness:  [de-identified] male presenting with 2 weeks of progressive dyspnea on exertion with PND, worsening orthopnea, bilateral lower extremity edema. Patient states his been taking all his medication as prescribed. Patient has had no recent change in his diet.   On review of system patient does note bilateral lower chest tightness, abdominal distention, nonbloody loose stools. Patient specifically denies headache, fever, chills, nausea, vomiting, diaphoresis, lightheadedness, cough, abdominal pain, focal weakness, focal numbness. 
  
In ED, patient was found to be tachycardic to 106, hypoxic to 84% on home 5 LPM via NC, BP to 172/94. ABG with PaO2 to FiO2 ratio of 151. EKG with atrial fibrillation. Troponin negative. Chest x-ray essentially unchanged but with pulmonary edema noted. Pt feels breathing has improved some since admission. Advance Directive: Yes Code Status:  DNR Health Care Power of : No - Patient does not have a [address] Past Medical History:  
Diagnosis Date  Acquired hypothyroidism 10/28/2019  Arrhythmia   
 afib  Arthritis  CAD (coronary artery disease) 2009 CABG, 3 vessel. No MI  
 Chronic pain   
 legs \"R especially\"  COPD (chronic obstructive pulmonary disease) ([facility] 10/28/2019  Diabetes ([facility] 2009  
 insulin controlled, avg fbs 107, recognizes hypo at 87, unsure last Ha1C  
 GERD (gastroesophageal reflux disease)   
 controlled with omeprazole twice daily  Heart failure (HCC)   
 echo 9/27/13:  EF 70%, Mild regurg mitral and tricuspid valve.  Hyperlipidemia   
 controlled with med  Hypertension   
 controlled with meds  Moderate aortic stenosis echo 9/27/13  
 aortic valve area 1.2 cm2  Psychiatric disorder   
 anxiety, depression, controlled with Venlafaxine  PUD (peptic ulcer disease) 1970's  Unspecified sleep apnea   
 wears cpap Past Surgical History:  
Procedure Laterality Date [address] UNLISTED  2003 cholecystectomy  CARDIAC SURG PROCEDURE UNLIST  2009 CABG 3V  
 HX APPENDECTOMY  HX GI    
 heller myotomy with toupee wrap [address] VALVE SURGERY  2011  HX HEENT Bilateral 2004  
 ptosis  HX HEENT    
 to have cataract surgery 2014  HX KNEE ARTHROSCOPY    
 bilateral  
 HX ORTHOPAEDIC Right 2002  
 bone spur  HX OTHER SURGICAL  2011  
 prostate surgery  HX PROSTATECTOMY  2011 TURP Family History Problem Relation Age of Onset  Heart Disease Mother Social History Tobacco Use  Smoking status: Never Smoker  Smokeless tobacco: Never Used Substance Use Topics  Alcohol use: Yes Comment: socially occasional--1-2 times per year Prior to Admission medications Medication Sig Start Date End Date Taking? Authorizing Provider  
inhalational spacing device Use as directed with MDFREDRICK 9/19/19   Damien Gaines NP  
metOLazone (ZAROXOLYN) 5 mg tablet Take 1 Tab by mouth daily. Take 30-min prior to lasix for 5 days. 6/24/19   Opal Hinkle MD  
furosemide (LASIX) 80 mg tablet Take 80 mg by mouth two (2) times a day. Provider, Historical  
carvedilol (COREG) 12.5 mg tablet Take  by mouth two (2) times daily (with meals). Provider, Historical  
diclofenac (VOLTAREN) 1 % gel Apply  to affected area four (4) times daily. Provider, Historical  
senna-docusate (SENNA PLUS) 8.6-50 mg per tablet Take 1 Tab by mouth daily. Provider, Historical  
oxyCODONE IR (ROXICODONE) 5 mg immediate release tablet Take 10 mg by mouth every eight (8) hours as needed for Pain. Provider, Historical  
lisinopril (PRINIVIL, ZESTRIL) 10 mg tablet Take 1 Tab by mouth daily. 1/9/19   Sushant PAL NP  
warfarin (COUMADIN) 2.5 mg tablet Take 1 Tab by mouth every Sunday, Tuesday, Thursday. Patient taking differently: Take 2.5 mg by mouth two (2) times a week. 2.5mg on Sat and Emigdio 1/10/19   Sushant PAL NP  
warfarin (COUMADIN) 5 mg tablet Take 1 Tab by mouth every Monday, Wednesday, Friday, Saturday. Patient taking differently: Take 5 mg by mouth five (5) days a week.  Takes 5mg Monday thru Friday 1/9/19   Mario Camp NP  
 albuterol (PROVENTIL VENTOLIN) 2.5 mg /3 mL (0.083 %) nebulizer solution 3 mL by Nebulization route every six (6) hours. 1/9/19   Fide PAL NP  
magnesium oxide (MAG-OX) 400 mg tablet Take 400 mg by mouth daily. Provider, Historical  
atorvastatin (LIPITOR) 20 mg tablet Take 1 Tab by mouth daily. 6/6/18   Mana Palacios MD  
potassium chloride (K-DUR, KLOR-CON) 20 mEq tablet Take 1 Tab by mouth daily. 6/6/18   Mana Palacios MD  
aspirin delayed-release 81 mg tablet Take  by mouth daily. Provider, Historical  
albuterol (PROVENTIL HFA, VENTOLIN HFA, PROAIR HFA) 90 mcg/actuation inhaler Take 1 Puff by inhalation every four (4) hours as needed for Wheezing. 8/22/17   CARMITA Mixon  
levothyroxine (SYNTHROID) 75 mcg tablet Take 75 mcg by mouth Daily (before breakfast). Provider, Historical  
memantine (NAMENDA) 10 mg tablet Take 10 mg by mouth two (2) times a day. Provider, Historical  
ferrous sulfate 324 mg (65 mg iron) tablet Take 324 mg by mouth daily. Provider, Historical  
ascorbic acid, vitamin C, (VITAMIN C) 500 mg tablet Take 500 mg by mouth two (2) times a day. Provider, Historical  
cyanocobalamin (VITAMIN B-12) 1,000 mcg/mL injection 1,000 mcg by IntraMUSCular route every thirty (30) days. Provider, Historical  
Oxygen 3 lpm cont. Provider, Historical  
multivitamin (ONE A DAY) tablet Take 1 Tab by mouth daily. Provider, Historical  
omeprazole (PRILOSEC) 20 mg capsule Take 20 mg by mouth daily. As needed    Provider, Historical  
nitroglycerin (NITROSTAT) 0.4 mg SL tablet 0.4 mg by SubLINGual route every five (5) minutes as needed for Chest Pain. Provider, Historical  
 
 
Allergies Allergen Reactions  Celexa [Citalopram] Other (comments) Ineffective per pt  Cymbalta [Duloxetine] Other (comments) Altered mental state  Gabapentin Other (comments) Altered mental status  Lidocaine Unknown (comments)  Sertraline Unknown (comments)  Sulfa (Sulfonamide Antibiotics) Unknown (comments) Review of systems negative with exception of noted above Objective:  
 
Visit Vitals /70 Pulse 82 Temp 97.6 °F (36.4 °C) Resp 19 Ht 5' 8\" (1.727 m) Wt 235 lb (106.6 kg) SpO2 96% BMI 35.73 kg/m² Physical Exam: 
 
General:  Cooperative. No acute distress. frail Eyes:  Conjunctivae/corneas clear Nose: Nares normal. Septum midline. Neck: Supple, symmetrical, trachea midline, no JVD Lungs:   Coarse bilateral bs Heart:  Regular rate and rhythm, no murmur Abdomen:   Soft, non-tender, non-distended. Positive bowel sounds Extremities: Normal, atraumatic, no cyanosis or edema Skin: Skin color, texture, turgor normal. No rash or lesions. Neurologic: Nonfocal  
Psych: Alert and oriented Assessment:  
 
Hospital Problems  Date Reviewed: 9/19/2019 Codes Class Noted POA Acute respiratory failure with hypoxia ([facility] ICD-10-CM: J96.01 
ICD-9-CM: 518.81  10/29/2019 Yes Venous stasis dermatitis of both lower extremities (Chronic) ICD-10-CM: G30.2 ICD-9-CM: 454.1  10/29/2019 Yes Shortness of breath ICD-10-CM: R06.02 
ICD-9-CM: 786.05  10/28/2019 Yes COPD (chronic obstructive pulmonary disease) (HCC) (Chronic) ICD-10-CM: J44.9 ICD-9-CM: 592  10/28/2019 Yes S/P AVR (Chronic) ICD-10-CM: Z95.2 ICD-9-CM: V43.3  10/28/2019 Yes HTN (hypertension) (Chronic) ICD-10-CM: I10 
ICD-9-CM: 401.9  10/28/2019 Yes  
   
 PUD (peptic ulcer disease) (Chronic) ICD-10-CM: K27.9 ICD-9-CM: 533.90  10/28/2019 Yes Chronic pain (Chronic) ICD-10-CM: I36.18 ICD-9-CM: 338.29  10/28/2019 Yes Acquired hypothyroidism (Chronic) ICD-10-CM: E03.9 ICD-9-CM: 244.9  10/28/2019 Yes Long term (current) use of anticoagulants (Chronic) ICD-10-CM: Z79.01 
ICD-9-CM: V58.61  5/21/2018 Yes  MONICA (obstructive sleep apnea) (Chronic) ICD-10-CM: H78.20 
 ICD-9-CM: 327.23  12/16/2013 Yes Hypoxia ICD-10-CM: R09.02 
ICD-9-CM: 799.02  9/30/2013 Yes * (Principal) Diastolic CHF, acute on chronic (HCC) ICD-10-CM: I50.33 ICD-9-CM: 428.33, 428.0  9/30/2013 Yes Atrial fibrillation ([facility] ICD-10-CM: I48.91 
ICD-9-CM: 427.31  9/27/2013 Yes Coronary atherosclerosis of artery bypass graft ICD-10-CM: I25.810 ICD-9-CM: 414.04  9/27/2013 Yes Overview Signed 9/30/2013 11:29 AM by Avel Aranda NP  
  CABG x3 in 2009 Type 2 diabetes mellitus (HCC) (Chronic) ICD-10-CM: E11.9 ICD-9-CM: 250.00  9/27/2013 Yes Acute pulmonary edema (HCC) ICD-10-CM: J81.0 ICD-9-CM: 518.4  9/27/2013 Yes CAD (coronary artery disease) (Chronic) ICD-10-CM: I25.10 ICD-9-CM: 414.00  9/27/2013 Yes Dyslipidemia (Chronic) ICD-10-CM: T19.4 ICD-9-CM: 272.4  9/27/2013 Yes Signed By: Lorie Mcpherson MD   
 October 29, 2019

## 2019-10-29 NOTE — PROGRESS NOTES
RT attempted to place c-pap for patient during hs, however, he refused. Stated that he has tried several times before but cannot stand to have mask on his face.

## 2019-10-29 NOTE — WOUND CARE
Noted consult for dry skin for bilateral legs. Spoke to MARYAN Hill today, small open popped blisters on medial calf, shallow irregular, consistent with venous stasis, CHF hx. Noted consult fo Palliative care. Will start Aquaphor bilateral and xeroform oma over wound daily. Consider compression as goals of care are established. Will monitor.

## 2019-10-29 NOTE — PROGRESS NOTES
Pt sitting up in chair. Pt on 4 1/2 L NC a this time. Pt alert oriented times 3 at this time. Pt reports feeling bad this am and states \"I cant breath\" no acute distress noted at this time. Pt has 2+edema to LE and very dry flaky skin to LE. Pt encouraged to call for assistance if needed call light in reach, door open will monitor.

## 2019-10-30 PROBLEM — Z66 DO NOT RESUSCITATE: Status: ACTIVE | Noted: 2019-01-01

## 2019-10-30 PROBLEM — Z66 DO NOT RESUSCITATE: Chronic | Status: ACTIVE | Noted: 2019-01-01

## 2019-10-30 PROBLEM — I50.9 HEART FAILURE (HCC): Status: RESOLVED | Noted: 2019-01-02 | Resolved: 2019-01-01

## 2019-10-30 PROBLEM — J96.21 ACUTE ON CHRONIC RESPIRATORY FAILURE WITH HYPOXIA (HCC): Status: ACTIVE | Noted: 2019-01-01

## 2019-10-30 NOTE — DIABETES MGMT
Patient sitting up in bedside chair eating breakfast.Blood glucose ranged 155-218 yesterday with patient receiving Humalog 10 units. Blood glucose 136 this morning. A1c is 7 (eA) which is well in range for patient's age and complexity. K+ 3.4 today. Current regimen is Humalog SSI.

## 2019-10-30 NOTE — PROGRESS NOTES
Palliative Care Progress Note Patient: Valdemar Toro MRN: [id number]  SSN: [id number] YOB: 1940  Age: [de-identified] y.o. Sex: male Assessment/Plan: Chief Complaint/Interval History: Feeling nauseous Principal Diagnosis:   
· Nausea/Vomiting  R11.2 Additional Diagnoses: · Debility, Unspecified  R53.81 · Fatigue, Lethargy  R53.83 
· Counseling, Encounter for Medical Advice  Z71.9 
· Encounter for Palliative Care  Z51.5 Palliative Performance Scale (PPS) Medical Decision Making:  
Reviewed and summarized notes over previous 24 hours. Discussed case with appropriate providers. Reviewed laboratory and x-ray data. Patient sitting in recliner, no distress noted, no family present. Patient with weak voice and says he is feeling unwell with nausea. RN aware. Reviewed goals and plan of care with patient. He says that he will either do home health or STR after discharge, but overall goal is to return home. He is receptive to home health. Discussed addition of home based palliative care, and patient is agreeable to referral.  Discussed with case management. DNR confirmed with Dr. Jak Fischer yesterday; DNR placed on problem list.  Goals and plan of care are established at this time. Will sign off, please re-consult if needs arise. Will discuss findings with members of the interdisciplinary team.   
 
  
More than 50% of this 25 minute visit was spent counseling and coordination of care as outlined above. Subjective:  
 
Review of Systems: A comprehensive review of systems was negative except for:  
Constitutional: Positive for fatigue. Gastrointestinal: Positive for nausea. Objective:  
 
Visit Vitals /64 Pulse 81 Temp 98.1 °F (36.7 °C) Resp 17 Ht 5' 8\" (1.727 m) Wt 219 lb 5.7 oz (99.5 kg) SpO2 97% BMI 33.35 kg/m² Physical Exam: 
 
General:  Weak and debilitated appearing. Cooperative. No acute distress. Eyes:  Conjunctivae/corneas clear. Nose: Nares normal. Septum midline. O2 via NC. Neck: Supple, symmetrical, trachea midline. Lungs:   Diminished bilaterally, unlabored. Heart:  Regular rate and rhythm. Abdomen:   Soft, non-tender, non-distended. Extremities: Normal, atraumatic, no cyanosis. BLE edema. Skin: Skin color, texture, turgor normal. No rash. Neurologic: Nonfocal.  
Psych: Alert and oriented. Signed By: Nate Feliz NP October 30, 2019

## 2019-10-30 NOTE — PROGRESS NOTES
This note will not be viewable in [address] I spoke with patient in regards to Transition of Care Wellness program. I informed patient of the resources available as part of HF Bundle program and introduced the idea of a health  for assistance after d/c. Patient was interested and we discussed Palomo Carrillo, health  would contact and set up a home visit to assist after d/c. I left contact information for myself, helpline and literature in regards to the program mentioned. Health  notified and details discussed.  
  
Patient has cardiology consulted along with PC d/t RRAt score > 21 with HF dx.   
Patient will need TC7

## 2019-10-30 NOTE — ROUTINE PROCESS
CHF teaching started post introduction to pt.; aware of diagnosis. Planner/scale (home)@ BS and will follow. Smoking/ ETOH/Illicit drug use cessation and maintain a healthy weight covered. Pt. aware that I can not prescribe nor adjust  medications: 15mins Palliative Care score:  RATT 34, entered Refused ACP on admission Start 2L/D Fluid restriction/ cardiac diet CHF teaching continues to pt. . Emphasis on taking prescription meds as ordered, to keep F/U appts and to call MD STAT if any of the following occur: ? If you gain 2 lbs in one day or 5 lbs in a week, and short of breath. ? If you can not lay flat without developing short of breath or rapid breathing at night; or if it wakes you up. Develop a cough or wheezing. Pt. verbalizes understanding, will follow to reinforce teaching skills: 20 mins

## 2019-10-30 NOTE — PROGRESS NOTES
Hospitalist Note Admit Date:  10/28/2019  3:19 PM  
Name:  Loretta Mayes Age:  [de-identified] y.o. 
:  1940 MRN:  [id number] PCP:  Nikhil, MD Emilio 
Treatment Team: Attending Provider: Lyn Campoverde MD; Consulting Provider: Garo Conti MD; Utilization Review: Fawn Milton; Care Manager: Kofi Whiteside.; Consulting Provider: Mitra Carroll MD; Primary Nurse: Ada Kohli RN; Occupational Therapist: Indy Quinonez, OTR/L 
 
HPI/Subjective:  
79-year-old M with afib, dCHF, COPD on chronic oxygen, presented with 2 weeks of TORRE with PND, worsening orthopnea, bilateral lower extremity edema. has been taking all his medication as prescribed. no recent change in his diet. In ED, patient was found to be tachycardic to 106, hypoxic to 84% on home 5 LPM via NC, BP to 172/94. ABG with PaO2 to FiO2 ratio of 151. EKG with afib RVR. Troponin negative. Chest x-ray with pulmonary edema. Pt admitted, started on IV lasix. Cardiology consulted 10/30 - c/o fatigue but SOB improved. Leg edema about the same. On baseline oxygen 5L satting well at rest.  No CP, fevers, cough. No other complaints Objective:  
 
Patient Vitals for the past 24 hrs: 
 Temp Pulse Resp BP SpO2  
10/30/19 0956     96 % 10/30/19 0802 97.4 °F (36.3 °C) 73 20 113/61 95 % 10/30/19 0326 97.5 °F (36.4 °C) 75 16 125/71 95 % 10/29/19 2331 97.9 °F (36.6 °C) 80 17 114/57 94 % 10/29/19 1945 98.1 °F (36.7 °C) 84 16 127/71 96 % 10/29/19 1536 98 °F (36.7 °C) 82 18 124/71 92 % 10/29/19 1103 97.6 °F (36.4 °C) 82 19 128/70 96 % Oxygen Therapy O2 Sat (%): 96 % (10/30/19 0956) Pulse via Oximetry: 102 beats per minute (10/28/19 1658) O2 Device: Nasal cannula (10/30/19 0956) O2 Flow Rate (L/min): 5 l/min (10/30/19 0956) Estimated body mass index is 33.35 kg/m² as calculated from the following: 
  Height as of this encounter: 5' 8\" (1.727 m). Weight as of this encounter: 99.5 kg (219 lb 5.7 oz). Intake/Output Summary (Last 24 hours) at 10/30/2019 1059 Last data filed at 10/30/2019 7519 Gross per 24 hour Intake 240 ml Output 6425 ml Net -6185 ml *Note that automatically entered I/Os may not be accurate; dependent on patient compliance with collection and accurate  by techs. General:    Well nourished. Alert. CV:   Irregular, reg rate. 3/6 systolic murmur RUSB Lungs:   Diminished R base. Scattered rales Extremities: Warm and dry. No cyanosis. Stasis derm changes. 2+ pitting edema BLE Skin:     No rashes or jaundice. Neuro:  No gross focal deficits Data Review: 
I have reviewed all labs, meds, and studies from the last 24 hours: 
 
Recent Results (from the past 24 hour(s)) GLUCOSE, POC Collection Time: 10/29/19 11:02 AM  
Result Value Ref Range Glucose (POC) 162 (H) 65 - 100 mg/dL TROPONIN I Collection Time: 10/29/19  1:47 PM  
Result Value Ref Range Troponin-I, Qt. <0.02 (L) 0.02 - 0.05 NG/ML  
GLUCOSE, POC Collection Time: 10/29/19  5:10 PM  
Result Value Ref Range Glucose (POC) 155 (H) 65 - 100 mg/dL PLEASE READ & DOCUMENT PPD TEST IN 48 HRS Collection Time: 10/29/19  7:38 PM  
Result Value Ref Range PPD Negative Negative  
 mm Induration 0 0 - 5 mm GLUCOSE, POC Collection Time: 10/29/19  9:44 PM  
Result Value Ref Range Glucose (POC) 172 (H) 65 - 100 mg/dL GLUCOSE, POC Collection Time: 10/30/19  5:38 AM  
Result Value Ref Range Glucose (POC) 136 (H) 65 - 100 mg/dL METABOLIC PANEL, BASIC Collection Time: 10/30/19  7:28 AM  
Result Value Ref Range Sodium 137 136 - 145 mmol/L Potassium 3.4 (L) 3.5 - 5.1 mmol/L Chloride 90 (L) 98 - 107 mmol/L  
 CO2 45 (HH) 21 - 32 mmol/L Anion gap 2 (L) 7 - 16 mmol/L Glucose 151 (H) 65 - 100 mg/dL BUN 25 (H) 8 - 23 MG/DL Creatinine 0.86 0.8 - 1.5 MG/DL  
 GFR est AA >60 >60 ml/min/1.73m2 GFR est non-AA >60 >60 ml/min/1.73m2 Calcium 9.4 8.3 - 10.4 MG/DL PROTHROMBIN TIME + INR Collection Time: 10/30/19  7:28 AM  
Result Value Ref Range Prothrombin time 24.9 (H) 11.7 - 14.5 sec INR 2.2 MAGNESIUM Collection Time: 10/30/19  7:28 AM  
Result Value Ref Range Magnesium 1.9 1.8 - 2.4 mg/dL HEMOGLOBIN A1C WITH EAG Collection Time: 10/30/19  7:28 AM  
Result Value Ref Range Hemoglobin A1c 7.0 (H) 4.8 - 6.0 % Est. average glucose 154 mg/dL All Micro Results None No results found for this visit on 10/28/19. Current Meds: 
Current Facility-Administered Medications Medication Dose Route Frequency  pantothenic ac-min oil-pet,hyd (AQUAPHOR) 41 % ointment   Topical BID  atorvastatin (LIPITOR) tablet 20 mg  20 mg Oral DAILY  carvedilol (COREG) tablet 12.5 mg  12.5 mg Oral BID WITH MEALS  ferrous sulfate tablet 325 mg  1 Tab Oral DAILY WITH BREAKFAST  levothyroxine (SYNTHROID) tablet 75 mcg  75 mcg Oral ACB  lisinopril (PRINIVIL, ZESTRIL) tablet 10 mg  10 mg Oral DAILY  memantine (NAMENDA) tablet 10 mg  10 mg Oral BID  metOLazone (ZAROXOLYN) tablet 5 mg  5 mg Oral DAILY  warfarin (COUMADIN) tablet 5 mg  5 mg Oral Q M, W, F & SAT  warfarin (COUMADIN) tablet 2.5 mg  2.5 mg Oral Q HAHN, TU & TH  
 potassium chloride (K-DUR, KLOR-CON) SR tablet 20 mEq  20 mEq Oral DAILY  oxyCODONE IR (ROXICODONE) tablet 10 mg  10 mg Oral Q8H PRN  pantoprazole (PROTONIX) tablet 40 mg  40 mg Oral ACB  nitroglycerin (NITROSTAT) tablet 0.4 mg  0.4 mg SubLINGual Q5MIN PRN  
 sodium chloride (NS) flush 5-40 mL  5-40 mL IntraVENous Q8H  
 sodium chloride (NS) flush 5-40 mL  5-40 mL IntraVENous PRN  
 ondansetron (ZOFRAN) injection 4 mg  4 mg IntraVENous Q4H PRN  
 diphenhydrAMINE (BENADRYL) capsule 25 mg  25 mg Oral Q4H PRN  
 insulin lispro (HUMALOG) injection   SubCUTAneous AC&HS  furosemide (LASIX) injection 80 mg  80 mg IntraVENous Q8H  
  albuterol (PROVENTIL VENTOLIN) nebulizer solution 2.5 mg  2.5 mg Nebulization Q4H PRN  
 labetalol (NORMODYNE;TRANDATE) injection 20 mg  20 mg IntraVENous Q2H PRN Other Studies (last 24 hours): Xr Chest Pa Lat Result Date: 10/29/2019 CHEST X-RAY, 2 views. HISTORY:  Shortness breath and pulmonary edema. TECHNIQUE: PA and lateral views. COMPARISON: Yesterday's exam. FINDINGS: Decreased interstitial edema. Upper lung zones are clear. Heart is enlarged. There are sternal wires and a valve device. IMPRESSION: Decreased interstitial edema. Assessment and Plan:  
 
Hospital Problems as of 10/30/2019 Date Reviewed: 9/19/2019 Codes Class Noted - Resolved POA Acute respiratory failure with hypoxia [facility]) ICD-10-CM: J96.01 
ICD-9-CM: 518.81  10/29/2019 - Present Yes Venous stasis dermatitis of both lower extremities (Chronic) ICD-10-CM: K55.3 ICD-9-CM: 454.1  10/29/2019 - Present Yes Shortness of breath ICD-10-CM: R06.02 
ICD-9-CM: 786.05  10/28/2019 - Present Yes COPD (chronic obstructive pulmonary disease) (HCC) (Chronic) ICD-10-CM: J44.9 ICD-9-CM: 608  10/28/2019 - Present Yes S/P AVR (Chronic) ICD-10-CM: Z95.2 ICD-9-CM: V43.3  10/28/2019 - Present Yes HTN (hypertension) (Chronic) ICD-10-CM: I10 
ICD-9-CM: 401.9  10/28/2019 - Present Yes  
   
 PUD (peptic ulcer disease) (Chronic) ICD-10-CM: K27.9 ICD-9-CM: 533.90  10/28/2019 - Present Yes Chronic pain (Chronic) ICD-10-CM: O93.96 ICD-9-CM: 338.29  10/28/2019 - Present Yes Acquired hypothyroidism (Chronic) ICD-10-CM: E03.9 ICD-9-CM: 244.9  10/28/2019 - Present Yes Long term (current) use of anticoagulants (Chronic) ICD-10-CM: Z79.01 
ICD-9-CM: V58.61  5/21/2018 - Present Yes  
   
 MONICA (obstructive sleep apnea) (Chronic) ICD-10-CM: T23.99 
ICD-9-CM: 327.23  12/16/2013 - Present Yes Hypoxia ICD-10-CM: R09.02 
ICD-9-CM: 799.02  9/30/2013 - Present Yes * (Principal) Diastolic CHF, acute on chronic (HCC) ICD-10-CM: I50.33 ICD-9-CM: 428.33, 428.0  9/30/2013 - Present Yes Atrial fibrillation ([address] ICD-10-CM: I48.91 
ICD-9-CM: 427.31  9/27/2013 - Present Yes Coronary atherosclerosis of artery bypass graft ICD-10-CM: I25.810 ICD-9-CM: 414.04  9/27/2013 - Present Yes Overview Signed 9/30/2013 11:29 AM by Carrie Lara NP  
  CABG x3 in 2009 Type 2 diabetes mellitus (HCC) (Chronic) ICD-10-CM: E11.9 ICD-9-CM: 250.00  9/27/2013 - Present Yes Acute pulmonary edema (HCC) ICD-10-CM: J81.0 ICD-9-CM: 518.4  9/27/2013 - Present Yes CAD (coronary artery disease) (Chronic) ICD-10-CM: I25.10 ICD-9-CM: 414.00  9/27/2013 - Present Yes Dyslipidemia (Chronic) ICD-10-CM: X04.0 ICD-9-CM: 272.4  9/27/2013 - Present Yes Plan: CHFpEF exac 
-cardio and PC consulted per protocol; appreciate help 
-back on baseline oxygen 
-cont IV lasix for now. Has had 9.5L out since admit 
-likely can switch to PO lasix again soon at this rate of diuresis 
-hold home lisinopril to be more permissive to aggressive diuresis 
-cont home metolazone 
-monitor BMP Weakness -PT/OT 
-PPD done. Will need placement. Pt agreeable DM 
-a1c 6.4% June 2018 
-recheck here 
-ISS Afib 
-warfarin 
-coreg COPD 
-not in exacerbation 
-cont home meds Dementia 
-namenda Diet:  DIET CARDIAC 
DVT ppx:   
 
Signed: 
David Claudio MD

## 2019-10-30 NOTE — PROGRESS NOTES
Warfarin dosing per pharmacist 
 
Tee Patel is a [de-identified] y.o. male. Height: 5' 8\" (172.7 cm)    Weight: 99.5 kg (219 lb 5.7 oz) Indication:  afib Goal INR:  2-3 Home dose:  2.5mg Sun/Tues/Thurs, 5mg Mon/Wed/Fri/Sat Risk factors or significant drug interactions:  none Other anticoagulants:  none Daily Monitoring Date  INR     Warfarin dose HGB              Notes 10/28  2  5mg  14.9   
10/29  2.1  2.5 mg  13.7 
10/30  2.2   5 mg  --- Will continue patient's home regimen and give 5 mg this evening. Pharmacy will continue to follow patient and adjust doses as needed. Thank you, Maria Luisa Omalley, Pharm. D. 
PGY1 Pharmacy Resident [phone]

## 2019-10-30 NOTE — PROGRESS NOTES
CM spoke with patient. He prefers to discharge home with [facility] rather than going to rehab. CM called patient's wife. She reports that patient would be better off if he went home with [facility] rather than discharging to rehab. She reports that patient has mild dementia and when he has discharged to rehab in the past he is  less active then he is at home. Spouse discussed her struggles with transportation to her dialysis center. CM gave her the number for [facility] [employer] for transportation and caregiver assistance. Spouse reports that she has tried to get assistance in the home but they don't meet the financial guidelines. LILIANA will discuss with MD tomorrow.

## 2019-10-30 NOTE — PROGRESS NOTES
Problem: Self Care Deficits Care Plan (Adult) Goal: *Acute Goals and Plan of Care (Insert Text) Description 1. Patient will complete total body bathing and dressing with minimal assistance and adaptive equipment as needed. 2. Patient will complete toileting with minimal assistance and adaptive equipment as needed. 3. Patient will tolerate 20 minutes of OT treatment with up to 2 rest breaks to increase activity tolerance for ADLs. 4. Patient will complete functional transfers with contact guard assistance and adaptive equipment as needed. 5. Patient will demonstrate modified independence with therapeutic exercise HEP to increase strength in BUEs for increased safety and independence with functional transfers. 6. Patient will complete functional mobility for ADLs with contact guard assistance and adaptive equipment as needed. Timeframe: 7 visits Outcome: Progressing Towards Goal 
  
OCCUPATIONAL THERAPY: Initial Assessment, Daily Note and AM 10/30/2019 INPATIENT: OT Visit Days: 1 [place] / [place] B / Product Type: Medicare /  
  
NAME/AGE/GENDER: Elda Pierson is a [de-identified] y.o. male PRIMARY DIAGNOSIS:  Shortness of breath [K79.89] Diastolic CHF, acute on chronic (HCC) Diastolic CHF, acute on chronic (HCC) ICD-10: Treatment Diagnosis:  
 Generalized Muscle Weakness (M62.81) Difficulty in walking, Not elsewhere classified (R26.2) Precautions/Allergies: 
  Fall precautions  Celexa [citalopram]; Cymbalta [duloxetine]; Gabapentin; Lidocaine; Sertraline; and Sulfa (sulfonamide antibiotics) ASSESSMENT:  
 
Mr. Sena Beltran is a [de-identified] y.o. male admitted with SOB, CHF. At baseline pt lives with wife and reports independence to modified independence with ADLs, driving, and ambulation utilizing cane for short distances, RW for community distances. Pt utilizes 3-5L supplemental O2, reports no falls. Upon arrival pt alert and agreeable to OT evaluation and treatment. BUE assessment revealed AROM generally decreased proximally and strength decreased in BUEs. Pt demonstrates intact sitting balance, requires TA to don socks. Pitting edema noted in BLEs. Treatment initiated to include functional transfers with ModA/cueing for safety/technique progressing to [place]/cueing. Pt practiced standing tolerance in RW with CGA-[place] in preparation for standing ADLs. Pt with decreased standing balance and poor activity tolerance requiring seated rest break soon after standing. O2 sats stable at 96% on 5L O2 NC. Pt sat with [place]/cueing for controlled descent. Pt left seated in chair with call bell within reach. Pt presents with deficits in strength, activity tolerance, balance, ambulation and transfers. Nae Ross is currently functioning below baseline and would benefit from continued OT to increase safety and independence with ADLs. Will follow. This section established at most recent assessment PROBLEM LIST (Impairments causing functional limitations): 
Decreased Strength Decreased ADL/Functional Activities Decreased Transfer Abilities Decreased Ambulation Ability/Technique Decreased Balance Increased Pain Decreased Activity Tolerance Increased Fatigue Decreased Flexibility/Joint Mobility Edema/Girth Decreased Skin Integrity/Hygeine Decreased [place] with Home Exercise Program 
 INTERVENTIONS PLANNED: (Benefits and precautions of occupational therapy have been discussed with the patient.) Activities of daily living training Adaptive equipment training Balance training Clothing management Community reintergration Donning&doffing training Group therapy Hygiene training Neuromuscular re-eduation Re-evaluation Therapeutic activity Therapeutic exercise TREATMENT PLAN: Frequency/Duration: Follow patient 3x/week to address above goals. Rehabilitation Potential For Stated Goals: Good REHAB RECOMMENDATIONS (at time of discharge pending progress):   
Placement: It is my opinion, based on this patient's performance to date, that Mr. Girish Marquez may benefit from intensive therapy at a [address] [place] [address] after discharge due to the functional deficits listed above that are likely to improve with skilled rehabilitation and concerns that he/she may be unsafe to be unsupervised at home due to impaired strength, balance, activity tolerance increasing risk for falls . Equipment:  
None at this time OCCUPATIONAL PROFILE AND HISTORY:  
History of Present Injury/Illness (Reason for Referral): 
See H&P. Past Medical History/Comorbidities:  
Mr. Girish Marquez  has a past medical history of Acquired hypothyroidism (10/28/2019), Arrhythmia, Arthritis, CAD (coronary artery disease) (2009), Chronic pain, COPD (chronic obstructive pulmonary disease) ([address] (10/28/2019), Diabetes ([address] (2009), GERD (gastroesophageal reflux disease), Heart failure ([address] Hyperlipidemia, Hypertension, Moderate aortic stenosis (echo 9/27/13), Psychiatric disorder, PUD (peptic ulcer disease) (1970's), and Unspecified sleep apnea. He also has no past medical history of Aneurysm ([address] Asthma, Autoimmune disease ([address] Cancer ([address] Chronic kidney disease, Coagulation disorder ([address] Liver disease, Seizures ([address] Stroke ([address] or Thromboembolus ([address] Mr. Girish Marquez  has a past surgical history that includes pr cardiac surg procedure unlist (2009); pr abdomen surgery proc unlisted (2003); hx other surgical (2011); hx knee arthroscopy; hx orthopaedic (Right, 2002); hx heent (Bilateral, 2004); hx heent; hx prostatectomy (2011); hx gi; hx appendectomy; and hx heart valve surgery (2011). Social History/Living Environment:  
Home Environment: Private residence # Steps to Enter: 0 Wheelchair Ramp: Yes One/Two Story Residence: Two story, live on 1st floor Living Alone: No 
Support Systems: Spouse/Significant Other/Partner Patient Expects to be Discharged to[de-identified] Rehabilitation facility Current DME Used/Available at Home: Phillip Lynetteley, quad, Shower chair, Walker, rolling, Walker, rollator, Oxygen, portable Tub or Shower Type: Tub/Shower combination Prior Level of Function/Work/Activity: At baseline pt lives with wife and reports independence to modified independence with ADLs, driving, and ambulation utilizing cane for short distances, RW for community distances. Pt utilizes 3-5L supplemental O2, reports no falls. Dominant Side: LEFT Personal Factors:   
      Sex:  male Age:  [de-identified] y.o. Other factors that influence how disability is experienced by the patient:  Multiple co-morbidities Number of Personal Factors/Comorbidities that affect the Plan of Care: Expanded review of therapy/medical records (1-2):  MODERATE COMPLEXITY ASSESSMENT OF OCCUPATIONAL PERFORMANCE[de-identified]  
Activities of Daily Living:  
Basic ADLs (From Assessment) Complex ADLs (From Assessment) Feeding: Independent Oral Facial Hygiene/Grooming: Setup Bathing: Maximum assistance Upper Body Dressing: Setup Lower Body Dressing: Total assistance Toileting: Maximum assistance Instrumental ADL Meal Preparation: Total assistance Homemaking: Total assistance Medication Management: Total assistance Financial Management: Total assistance Grooming/Bathing/Dressing Activities of Daily Living Cognitive Retraining Safety/Judgement: Awareness of environment; Fall prevention; Insight into deficits Lower Body Dressing Assistance Socks: Total assistance (dependent) Bed/Mat Mobility Sit to Stand: Moderate assistance;Minimum assistance Stand to Sit: Minimum assistance Scooting: Contact guard assistance Most Recent Physical Functioning:  
Gross Assessment: 
AROM: Generally decreased, functional(BUEs proximally) Strength: Generally decreased, functional(BUEs) Coordination: Generally decreased, functional(BUEs) Sensation: Intact(BUEs to light touch) Posture: 
  
Balance: 
Sitting: Intact Standing: Impaired Standing - Static: Fair;Constant support Standing - Dynamic : Fair;Poor;Constant support Bed Mobility: 
Scooting: Contact guard assistance Wheelchair Mobility: 
  
Transfers: 
Sit to Stand: Moderate assistance;Minimum assistance Stand to Sit: Minimum assistance Patient Vitals for the past 6 hrs: 
 BP SpO2 O2 Flow Rate (L/min) Pulse 10/30/19 0802 113/61 95 %  73  
10/30/19 0956  96 % 5 l/min Mental Status Neurologic State: Alert Orientation Level: Appropriate for age Cognition: Appropriate for age attention/concentration, Follows commands Perception: Appears intact Perseveration: No perseveration noted Safety/Judgement: Awareness of environment, Fall prevention, Insight into deficits Physical Skills Involved: 
Range of Motion Balance Strength Activity Tolerance Pain (acute) Edema Cognitive Skills Affected (resulting in the inability to perform in a timely and safe manner): 
None  Psychosocial Skills Affected: 
Habits/Routines Environmental Adaptation Social Interaction Emotional Regulation Self-Awareness Awareness of Others Social Roles Number of elements that affect the Plan of Care: 5+:  HIGH COMPLEXITY CLINICAL DECISION MAKIN \A [facility] [place] [facility]\"" [address] [address] [facility] 6 Clicks Daily Activity Inpatient Short Form How much help from another person does the patient currently need. .. Total A Lot A Little None 1. Putting on and taking off regular lower body clothing? ? 1   ? 2   ? 3   ? 4  
2. Bathing (including washing, rinsing, drying)? ? 1   ? 2   ? 3   ? 4  
3. Toileting, which includes using toilet, bedpan or urinal?   ? 1   ? 2   ? 3   ? 4  
4. Putting on and taking off regular upper body clothing? ? 1   ? 2   ? 3   ? 4  
5. Taking care of personal grooming such as brushing teeth?    ? 1   ? 2   ? 3   ? 4  
 6.  Eating meals? ? 1   ? 2   ? 3   ? 4  
© 2007, Trustees of [address] [place] [address] [address] [address], under license to [employer]. All rights reserved Score:  Initial: 15 10/30/19 Most Recent: X (Date: -- ) Interpretation of Tool:  Represents activities that are increasingly more difficult (i.e. Bed mobility, Transfers, Gait). Medical Necessity:    
Patient demonstrates good 
 rehab potential due to higher previous functional level. Reason for Services/Other Comments: 
Patient continues to require skilled intervention due to inability to complete ADLs at prior level of independence Tanisha Renteria Use of outcome tool(s) and clinical judgement create a POC that gives a: MODERATE COMPLEXITY  
 
 
 
TREATMENT:  
(In addition to Assessment/Re-Assessment sessions the following treatments were rendered) Pre-treatment Symptoms/Complaints:   
Pain: Initial:  
Pain Intensity 1: 8 Pain Location 1: Rib cage Pain Orientation 1: Right Pain Intervention(s) 1: Ambulation/Increased Activity, Repositioned(RN aware per pt)  Post Session:  same Therapeutic Activity: (    15 minutes): Therapeutic activities including Chair transfers and standing tolerance  to improve mobility, strength, balance, coordination and activity tolerance . Required moderate   to promote static and dynamic balance in standing and promote coordination of bilateral, upper extremity(s), lower extremity(s). Treatment initiated to include functional transfers with ModA/cueing for safety/technique progressing to [place]/cueing. Pt practiced standing tolerance in RW with CGA-[place] in preparation for standing ADLs. Pt with decreased standing balance and poor activity tolerance requiring seated rest break soon after standing. O2 sats stable at 96% on 5L O2 NC. Pt sat with [place]/cueing for controlled descent. Braces/Orthotics/Lines/Etc:  
valentino catheter O2 Device: Nasal cannula Treatment/Session Assessment: Response to Treatment:  Decreased activity tolerance, good participation Interdisciplinary Collaboration: Occupational Therapist 
Registered Nurse Certified Nursing Assistant/Patient Care Technician After treatment position/precautions:  
Up in chair Bed/Chair-wheels locked Bed in low position Call light within reach RN notified BLEs elevated CNA at bedside Compliance with Program/Exercises: Compliant all of the time, Will assess as treatment progresses. Recommendations/Intent for next treatment session: \"Next visit will focus on advancements to more challenging activities and reduction in assistance provided\". Total Treatment Duration: OT Patient Time In/Time Out Time In: 1759 Time Out: 1025 Magali Busby, OTR/L

## 2019-10-30 NOTE — COMMUNITY CARE MANAGEMENT
I spoke with patient in regards to Transition of Care Wellness program. I informed patient of the resources available as part of HF Bundle program and introduced the idea of a health  for assistance after d/c. Patient was interested and we discussed Ronna Anderson, health  would contact and set up a home visit to assist after d/c. I left contact information for myself, helpline and literature in regards to the program mentioned. Health  notified and details discussed. Patient has cardiology consulted along with PC d/t RRAt score > 21 with HF dx.   
Patient will need TC7

## 2019-10-30 NOTE — PROGRESS NOTES
Problem: Heart Failure: Day 3 Goal: Off Pathway (Use only if patient is Off Pathway) Outcome: Progressing Towards Goal 
Goal: Activity/Safety Outcome: Progressing Towards Goal 
Goal: Diagnostic Test/Procedures Outcome: Progressing Towards Goal 
Goal: Nutrition/Diet Outcome: Progressing Towards Goal 
Goal: Discharge Planning Outcome: Progressing Towards Goal

## 2019-10-30 NOTE — CONSULTS
Palliative care consult per heart failure protocol received and completed yesterday. Will continue to follow as needed.

## 2019-10-30 NOTE — PROGRESS NOTES
Pt remains up in chair. No distress noted at this time. Pt on RA. Pt encouraged to call for assistance if needed call light in reach, door open will monitor.

## 2019-10-30 NOTE — PROGRESS NOTES
Pt sitting up in chair. Pt alert oriented times 3 at this time. Pt states \"I feel alittle better\" no acute distress noted at this time. Pt on 5  l NC at this time. Pt encouraged to call for assistance if needed call light in reach, door open will monitor.

## 2019-10-30 NOTE — PROGRESS NOTES
Pt resting in bed with eyes closed. Pt on 5 L NC at this time. Call light in reach, door open will monitor.

## 2019-10-30 NOTE — PROGRESS NOTES
[facility] CARDIOLOGY PROGRESS NOTE 
      
 
10/30/2019 3:39 PM 
 
Admit Date: 10/28/2019 Subjective:  
Patient feels poorly - he is weak and nauseated. Breathing is at baseline. ROS: 
Cardiovascular:  As noted above Objective:  
  
Vitals:  
 10/30/19 0802 10/30/19 0956 10/30/19 1055 10/30/19 1536 BP: 113/61  102/51 111/64 Pulse: 73  76 81 Resp: 20  16 17 Temp: 97.4 °F (36.3 °C)  98.3 °F (36.8 °C) 98.1 °F (36.7 °C) SpO2: 95% 96% 97% 97% Weight:      
Height:      
 
 
Physical Exam: 
General-No Acute Distress Neck- supple, no JVD 
CV- regular rate and rhythm no MRG Lung- diminished bilaterally Abd- soft, nontender, nondistended Ext- 3+ edema bilaterally. Skin- warm and dry Data Review:  
Recent Labs 10/30/19 
0728 10/29/19 
1347 10/29/19 
0726  10/28/19 
1324   --  140  --  140  
K 3.4*  --  3.6  --  4.5 MG 1.9  --   --   --   --   
BUN 25*  --  15  --  12  
CREA 0.86  --  0.71*  --  0.70* *  --  184*  --  116* WBC  --   --  4.9  --  9.2 HGB  --   --  13.7  --  14.9 HCT  --   --  44.1  --  48.6 PLT  --   --  213  --  252 INR 2.2  --  2.1   < >  --   
TROIQ  --  <0.02* <0.02*   < > <0.02*  
 < > = values in this interval not displayed. Assessment/Plan:  
 
Principal Problem: 
  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 PASP 80mmHg. Edema in legs is due to venous stasis and venous hypertension with RV failure. Prognosis is very poor. Has had massive diuresis and change to PO lasix. Address LE edema with leg wraps, elevation and wound care. Active Problems: 
  Atrial fibrillation ([facility] (9/27/2013) Stable with rate control and wrafarin Coronary atherosclerosis of artery bypass graft (9/27/2013) No angina Type 2 diabetes mellitus ([facility] (9/27/2013) Chronic Acute pulmonary edema ([address] (9/27/2013) Resolved. Most of respiratory failure is chronic end stage COPD with severe PHTN and RV failure CAD (coronary artery disease) (9/27/2013) Asymptomatic Dyslipidemia (9/27/2013) On therapy Chronic respiratory failure with hypoxia ([address] (9/30/2013) As above MONICA (obstructive sleep apnea) (12/16/2013) Chronic Long term (current) use of anticoagulants (5/21/2018) Stable on warfarin COPD (chronic obstructive pulmonary disease) ([address] (10/28/2019) Severe end-stage on 5L NCO2 S/P AVR (10/28/2019) Normal function HTN (hypertension) (10/28/2019) Stable PUD (peptic ulcer disease) (10/28/2019) Stable Chronic pain (10/28/2019) Leads to progressive debility Acquired hypothyroidism (10/28/2019) Chronic Acute on chronic respiratory failure with hypoxia ([address] (10/29/2019) As above Venous stasis dermatitis of both lower extremities (10/29/2019) As above José Miguel Shelley MD 
10/30/2019 3:39 PM

## 2019-10-31 PROBLEM — J96.21 ACUTE ON CHRONIC RESPIRATORY FAILURE WITH HYPOXIA (HCC): Status: RESOLVED | Noted: 2019-01-01 | Resolved: 2019-01-01

## 2019-10-31 NOTE — DISCHARGE SUMMARY
Hospitalist Discharge Summary Admit Date:  10/28/2019  3:19 PM  
Name:  Zeynep Alonso Age:  [de-identified] y.o. 
:  1940 MRN:  [id number] PCP:  Emilio Tejeda MD 
Treatment Team: Attending Provider: Juan C Joy MD; Consulting Provider: Yanique Garcia MD; Utilization Review: Kevin Pace; Care Manager: Alicia Rothman.; Primary Nurse: Gerry Lucas RN; Physical Therapy Assistant: Donna Cuevas PTA Problem List for this Hospitalization: 
Hospital Problems as of 10/31/2019 Date Reviewed: 2019 Codes Class Noted - Resolved POA Do not resuscitate (Chronic) ICD-10-CM: C06 ICD-9-CM: V49.86 Chronic 10/30/2019 - Present Yes Venous stasis dermatitis of both lower extremities (Chronic) ICD-10-CM: D37.5 ICD-9-CM: 454.1  10/29/2019 - Present Yes COPD (chronic obstructive pulmonary disease) (HCC) (Chronic) ICD-10-CM: J44.9 ICD-9-CM: 362  10/28/2019 - Present Yes S/P AVR (Chronic) ICD-10-CM: Z95.2 ICD-9-CM: V43.3  10/28/2019 - Present Yes HTN (hypertension) (Chronic) ICD-10-CM: I10 
ICD-9-CM: 401.9  10/28/2019 - Present Yes  
   
 PUD (peptic ulcer disease) (Chronic) ICD-10-CM: K27.9 ICD-9-CM: 533.90  10/28/2019 - Present Yes Chronic pain (Chronic) ICD-10-CM: I36.40 ICD-9-CM: 338.29  10/28/2019 - Present Yes Acquired hypothyroidism (Chronic) ICD-10-CM: E03.9 ICD-9-CM: 244.9  10/28/2019 - Present Yes Long term (current) use of anticoagulants (Chronic) ICD-10-CM: Z79.01 
ICD-9-CM: V58.61  2018 - Present Yes  
   
 MONICA (obstructive sleep apnea) (Chronic) ICD-10-CM: T49.21 
ICD-9-CM: 327.23  2013 - Present Yes * (Principal) Diastolic CHF, acute on chronic (HCC) ICD-10-CM: I50.33 ICD-9-CM: 428.33, 428.0  2013 - Present Yes Chronic respiratory failure with hypoxia (HCC) (Chronic) ICD-10-CM: J96.11 
ICD-9-CM: 518.83, 799.02  2013 - Present Yes Overview Addendum 10/30/2019 11:01 AM by Ermelinda Mckeon MD  
  5L NC continuously Atrial fibrillation (HCC) (Chronic) ICD-10-CM: I48.91 
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  
  CABG x3 in 2009 Type 2 diabetes mellitus (HCC) (Chronic) ICD-10-CM: E11.9 ICD-9-CM: 250.00  9/27/2013 - Present Yes CAD (coronary artery disease) (Chronic) ICD-10-CM: I25.10 ICD-9-CM: 414.00  9/27/2013 - Present Yes Dyslipidemia (Chronic) ICD-10-CM: Q78.8 ICD-9-CM: 272.4  9/27/2013 - Present Yes RESOLVED: Acute on chronic respiratory failure with hypoxia [facility]) ICD-10-CM: J96.21 
ICD-9-CM: 518.84, 799.02  10/29/2019 - 10/31/2019 Yes RESOLVED: Acute pulmonary edema (HCC) ICD-10-CM: J81.0 ICD-9-CM: 518.4  9/27/2013 - 10/31/2019 Yes Admission HPI from 10/28/2019:   
\"  60-year-old male presenting with 2 weeks of progressive dyspnea on exertion with PND, worsening orthopnea, bilateral lower extremity edema. Patient states his been taking all his medication as prescribed. Patient has had no recent change in his diet. On review of system patient does note bilateral lower chest tightness, abdominal distention, nonbloody loose stools. Patient specifically denies headache, fever, chills, nausea, vomiting, diaphoresis, lightheadedness, cough, abdominal pain, focal weakness, focal numbness. 
  
In ED, patient was found to be tachycardic to 106, hypoxic to 84% on home 5 LPM via NC, BP to 172/94. ABG with PaO2 to FiO2 ratio of 151. EKG with atrial fibrillation. Troponin negative. Chest x-ray essentially unchanged but with pulmonary edema noted. \" Hospital Course: 
Pt admitted, started on IV lasix 80mg BID. Cardiology consulted.   He had excellent diuresis over the course of 3 days, appx 17+ L net out. Has been on baseline oxygen and oral lasix since yesterday. I questioned him about compliance with meds and he says he does not miss doses. 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 PASP 80mmHg. Edema in legs is due to venous stasis and venous hypertension with RV failure. Prognosis is very poor. \" Told pt he may need to have hospice discussion if he feels his quality of life is poor, but I informed him he is medically optimized at this point. PC did see pt in hospital, is DNR. CM is arranging outpatient PC follow up also. Disposition: Home Health Care Svc Activity: Activity as tolerated and PT/OT per Home Health Diet: DIET CARDIAC Regular; Consistent Carb 1800kcal; FR 2000ML Code Status: DNR Follow up instructions, discharge meds at bottom of this note. Plan was discussed with pt. All questions answered. Patient was stable at time of discharge. Patient will call a physician or return if any concerns. Diagnostic Imaging/Tests:  
Xr Chest Pa Lat Result Date: 10/29/2019 CHEST X-RAY, 2 views. HISTORY:  Shortness breath and pulmonary edema. TECHNIQUE: PA and lateral views. COMPARISON: Yesterday's exam. FINDINGS: Decreased interstitial edema. Upper lung zones are clear. Heart is enlarged. There are sternal wires and a valve device. IMPRESSION: Decreased interstitial edema. Xr Chest [facility] Result Date: 10/28/2019 AP chest radiograph History: sob, [de-identified] years Male Comparison: Chest radiograph January 02, 2019 Findings:   Normal cardiomediastinal silhouette with evidence of CABG. Persistent low lung volumes. Persistent trace bilateral pleural effusions with associated mild bibasilar atelectasis and or consolidation. Mild diffuse interstitial prominence likely resenting edema unchanged. No evidence of pneumothorax. Visualized soft tissue and osseous structures otherwise unremarkable. Impression:  No significant interval change. Echocardiogram results: 
Results for orders placed or performed during the hospital encounter of 10/28/19  
2D ECHO COMPLETE ADULT (TTE) W OR WO CONTR Narrative [facility] [address] [place], [address] [facility] 
[phone] Transthoracic Echocardiogram 
2D, M-mode, Doppler, and Color Doppler Patient: Anca Courser 
MR #: [id number] : 10-Miguel-1940 Age: [de-identified] years Gender: Male Study date: 30-Oct-2019 Account #: [de-identified] Height: 68 in 
Weight: 218.5 lb 
BSA: 2.12 mï¾² Status:Routine Location: 832 BP: 111/ 64 Allergies: CITALOPRAM, DULOXETINE, GABAPENTIN, LIDOCAINE, SERTRALINE, SULFA 
(SULFONAMIDE ANTIBIOTICS) Sonographer:  FARNAZ Delcid Group:  [address] Cardiology Referring Physician:  Pippa Mccabe. Opal Alcantara MD [facility] Reading Physician:  Sam Zavala MD [facility] INDICATIONS: CHF, PHTN 
 
PROCEDURE: This was a routine study. A transthoracic echocardiogram was 
performed. The study included complete 2D imaging, M-mode, complete spectral 
Doppler, and color Doppler. Intravenous contrast (Definity) was administered. Echocardiographic views were limited by poor acoustic window availability. This 
was a technically difficult study. LEFT VENTRICLE: Size was normal. Systolic function was normal. Ejection 
fraction was estimated in the range of 55 % to 60 %. There were no regional 
wall motion abnormalities. There was mild concentric hypertrophy. The study  
was 
not technically sufficient to allow evaluation of LV diastolic function. RIGHT VENTRICLE: The ventricle was mildly dilated. Systolic function was 
reduced. Estimated peak pressure was in the range of 35-40 mmHg. LEFT ATRIUM: The atrium was moderately to markedly dilated. RIGHT ATRIUM: The atrium was moderately to markedly dilated. SYSTEMIC VEINS: IVC: The inferior vena cava was normal in size and course. The 
respirophasic change in diameter was more than 50%. AORTIC VALVE: A bioprosthesis was present (TAVR 2014). The aortic valve area  
by 
the continuity equation was 1.25 cm2. The peak velocity was 2.36 m/s. The  
mean 
pressure gradient was 12 mmHg. The peak pressure gradient was 22 mmHg. DI: 
0.45, SVi: 25.94, AT: 90ms. There was no insufficiency. MITRAL VALVE: There was mild annular calcification. There was no evidence for 
stenosis. There was mild regurgitation. TRICUSPID VALVE: The valve structure was normal. There was no evidence for 
stenosis. There was mild regurgitation. PULMONIC VALVE: Not well visualized. There was no evidence for stenosis. There 
was no insufficiency. PERICARDIUM: There was no pericardial effusion. AORTA: The root exhibited normal size. SUMMARY: 
 
-  Left ventricle: Systolic function was normal. Ejection fraction was 
estimated in the range of 55 % to 60 %. There were no regional wall motion 
abnormalities. There was mild concentric hypertrophy. 
 
-  Right ventricle: The ventricle was mildly dilated. Systolic function was 
reduced. -  Left atrium: The atrium was moderately to markedly dilated. -  Right atrium: The atrium was moderately to markedly dilated. -  Inferior vena cava, hepatic veins: The respirophasic change in diameter  
was 
more than 50%. -  Aortic valve: The aortic valve area by the continuity equation was 1.25  
cm2. 
 
-  Mitral valve: There was mild annular calcification. There was mild 
regurgitation. 
 
-  Tricuspid valve: There was mild regurgitation. SYSTEM MEASUREMENT TABLES 
 
2D mode AoR Diam (2D): 2.9 cm 
LA Dimension (2D): 4.5 cm Left Atrium Systolic Volume Index; Method of Disks, Biplane; 2D mode;: 35.4 
ml/m2 IVS/LVPW (2D): 1.2 IVSd (2D): 1.4 cm LVIDd (2D): 4.9 cm LVIDs (2D): 3.1 cm 
LVOT Area (2D): 2.8 cm2 LVPWd (2D): 1.2 cm RVIDd (2D): 3.6 cm Unspecified Scan Mode Peak Grad; Mean; Antegrade Flow: 21 mm[Hg] Vmax; Antegrade Flow: 236 cm/s LVOT Diam: 1.9 cm Prepared and signed by 
 
Tonny Hernandes MD [facility] Signed 30-Oct-2019 17:42:15 Procedures done this admission: * No surgery found * All Micro Results None Labs: Results:  
   
BMP, Mg, Phos Recent Labs 10/31/19 
7337 10/30/19 
2007 10/29/19 
5769  137 140  
K 3.9 3.4* 3.6 CL 87* 90* 99  
CO2 PENDING 45* 36* AGAP PENDING 2* 5* BUN 32* 25* 15  
CREA 0.92 0.86 0.71* CA 9.3 9.4 9.2 * 151* 184* MG  --  1.9  --   
  
CBC Recent Labs 10/29/19 
0726 10/28/19 
1324 WBC 4.9 9.2  
RBC 4.83 5.12  
HGB 13.7 14.9 HCT 44.1 48.6  252 GRANS 84* 83* LYMPH 9* 8*  
EOS 0* 1 MONOS 6 7 BASOS 0 1 IG 0 0 ANEU 4.1 7.6 ABL 0.5 0.7 LUPILLO 0.0 0.1 ABM 0.3 0.6 ABB 0.0 0.1 AIG 0.0 0.0 LFT Recent Labs 10/28/19 
1324 SGOT 24 ALT 20 * TP 7.9 ALB 3.4 GLOB 4.5* AGRAT 0.8* Cardiac Testing Lab Results Component Value Date/Time  (H) 10/28/2019 01:24 PM  
  (H) 04/30/2019 10:34 AM  
  (H) 01/02/2019 11:40 AM  
 BNP 30 09/30/2013 05:17 AM  
  09/28/2013 06:48 AM  
  09/27/2013 12:10 PM  
 Troponin-I, Qt. <0.02 (L) 10/29/2019 01:47 PM  
 Troponin-I, Qt. <0.02 (L) 10/29/2019 07:26 AM  
 Troponin-I, Qt. <0.02 (L) 10/28/2019 06:43 PM  
  
Coagulation Tests Lab Results Component Value Date/Time Prothrombin time 21.3 (H) 10/31/2019 07:12 AM  
 Prothrombin time 24.9 (H) 10/30/2019 07:28 AM  
 Prothrombin time 24.4 (H) 10/29/2019 07:26 AM  
 INR 1.8 10/31/2019 07:12 AM  
 INR 2.2 10/30/2019 07:28 AM  
 INR 2.1 10/29/2019 07:26 AM  
  
A1c Lab Results Component Value Date/Time Hemoglobin A1c 7.0 (H) 10/30/2019 07:28 AM  
 Hemoglobin A1c 6.4 (H) 06/02/2018 06:58 AM  
 Hemoglobin A1c 6.6 (H) 06/22/2017 07:45 AM  
  
Lipid Panel Lab Results Component Value Date/Time Cholesterol, total 167 03/21/2019 09:21 AM  
 HDL Cholesterol 33 (L) 03/21/2019 09:21 AM  
 LDL, calculated 92 03/21/2019 09:21 AM  
 VLDL, calculated 42 (H) 03/21/2019 09:21 AM  
 Triglyceride 210 (H) 03/21/2019 09:21 AM  
 CHOL/HDL Ratio 5.1 03/21/2019 09:21 AM  
  
Thyroid Panel Lab Results Component Value Date/Time TSH 2.560 06/02/2018 11:45 AM  
 TSH 3.470 08/22/2017 04:30 PM  
 T4, Total 13.6 06/20/2017 06:04 PM  
    
Most Recent UA No results found for: COLOR, APPRN, REFSG, MARGIE, PROTU, GLUCU, KETU, BILU, BLDU, UROU, BRYAN, LEUKU, WBCU, RBCU, UEPI, BACTU, CASTS, UCRY, MUCUS, UCOM Allergies Allergen Reactions  Celexa [Citalopram] Other (comments) Ineffective per pt  Cymbalta [Duloxetine] Other (comments) Altered mental state  Gabapentin Other (comments) Altered mental status  Lidocaine Unknown (comments)  Sertraline Unknown (comments)  Sulfa (Sulfonamide Antibiotics) Unknown (comments) Immunization History Administered Date(s) Administered  Influenza High Dose Vaccine PF 09/28/2015, 09/25/2017, 09/10/2018  Influenza Vaccine 10/15/2013, 10/06/2014, 10/01/2017  Pneumococcal Conjugate (PCV-13) 09/28/2015  Pneumococcal Polysaccharide (PPSV-23) 01/04/2010, 01/13/2015, 01/01/2016  TB Skin Test (PPD) 06/12/2017, 06/18/2017, 04/18/2018  TB Skin Test (PPD) Intradermal 06/12/2017, 06/18/2017, 04/18/2018, 06/02/2018, 01/05/2019, 10/28/2019  Tdap 10/12/2016  Zoster Vaccine, Live 01/10/2012 All Labs from Last 24 Hrs: 
Recent Results (from the past 24 hour(s)) GLUCOSE, POC Collection Time: 10/30/19 10:58 AM  
Result Value Ref Range Glucose (POC) 149 (H) 65 - 100 mg/dL GLUCOSE, POC Collection Time: 10/30/19  5:09 PM  
Result Value Ref Range Glucose (POC) 182 (H) 65 - 100 mg/dL PLEASE READ & DOCUMENT PPD TEST IN 24 HRS Collection Time: 10/30/19  7:39 PM  
Result Value Ref Range PPD Negative Negative  
 mm Induration 0 0 - 5 mm GLUCOSE, POC Collection Time: 10/30/19  9:37 PM  
Result Value Ref Range Glucose (POC) 111 (H) 65 - 100 mg/dL GLUCOSE, POC Collection Time: 10/31/19  5:45 AM  
Result Value Ref Range Glucose (POC) 150 (H) 65 - 100 mg/dL PROTHROMBIN TIME + INR Collection Time: 10/31/19  7:12 AM  
Result Value Ref Range Prothrombin time 21.3 (H) 11.7 - 14.5 sec INR 1.8 METABOLIC PANEL, BASIC Collection Time: 10/31/19  7:12 AM  
Result Value Ref Range Sodium 136 136 - 145 mmol/L Potassium 3.9 3.5 - 5.1 mmol/L Chloride 87 (L) 98 - 107 mmol/L  
 CO2 PENDING mmol/L Anion gap PENDING mmol/L Glucose 135 (H) 65 - 100 mg/dL BUN 32 (H) 8 - 23 MG/DL Creatinine 0.92 0.8 - 1.5 MG/DL  
 GFR est AA >60 >60 ml/min/1.73m2 GFR est non-AA >60 >60 ml/min/1.73m2 Calcium 9.3 8.3 - 10.4 MG/DL Current Med List in Hospital:  
Current Facility-Administered Medications Medication Dose Route Frequency  furosemide (LASIX) tablet 80 mg  80 mg Oral Q12H  potassium chloride (K-DUR, KLOR-CON) SR tablet 40 mEq  40 mEq Oral DAILY  pantothenic ac-min oil-pet,hyd (AQUAPHOR) 41 % ointment   Topical BID  atorvastatin (LIPITOR) tablet 20 mg  20 mg Oral DAILY  carvedilol (COREG) tablet 12.5 mg  12.5 mg Oral BID WITH MEALS  ferrous sulfate tablet 325 mg  1 Tab Oral DAILY WITH BREAKFAST  levothyroxine (SYNTHROID) tablet 75 mcg  75 mcg Oral ACB  lisinopril (PRINIVIL, ZESTRIL) tablet 10 mg  10 mg Oral DAILY  memantine (NAMENDA) tablet 10 mg  10 mg Oral BID  metOLazone (ZAROXOLYN) tablet 5 mg  5 mg Oral DAILY  warfarin (COUMADIN) tablet 5 mg  5 mg Oral Q M, W, F & SAT  warfarin (COUMADIN) tablet 2.5 mg  2.5 mg Oral Q HAHN, TU & TH  
 oxyCODONE IR (ROXICODONE) tablet 10 mg  10 mg Oral Q8H PRN  pantoprazole (PROTONIX) tablet 40 mg  40 mg Oral ACB  nitroglycerin (NITROSTAT) tablet 0.4 mg  0.4 mg SubLINGual Q5MIN PRN  
  sodium chloride (NS) flush 5-40 mL  5-40 mL IntraVENous Q8H  
 sodium chloride (NS) flush 5-40 mL  5-40 mL IntraVENous PRN  
 ondansetron (ZOFRAN) injection 4 mg  4 mg IntraVENous Q4H PRN  
 diphenhydrAMINE (BENADRYL) capsule 25 mg  25 mg Oral Q4H PRN  
 insulin lispro (HUMALOG) injection   SubCUTAneous AC&HS  
 albuterol (PROVENTIL VENTOLIN) nebulizer solution 2.5 mg  2.5 mg Nebulization Q4H PRN  
 labetalol (NORMODYNE;TRANDATE) injection 20 mg  20 mg IntraVENous Q2H PRN Discharge Exam: 
Patient Vitals for the past 24 hrs: 
 Temp Pulse Resp BP SpO2  
10/31/19 0730 98 °F (36.7 °C) 78 19 123/56 98 % 10/31/19 0409 97.9 °F (36.6 °C) 76 17 118/66 96 % 10/31/19 0028 98.1 °F (36.7 °C) 78 18 106/65 92 % 10/30/19 2138     98 % 10/30/19 2040 98.2 °F (36.8 °C) 75 18 117/65 93 % 10/30/19 1536 98.1 °F (36.7 °C) 81 17 111/64 97 % 10/30/19 1055 98.3 °F (36.8 °C) 76 16 102/51 97 % 10/30/19 0956     96 % Oxygen Therapy O2 Sat (%): 98 % (10/31/19 0730) Pulse via Oximetry: 78 beats per minute (10/30/19 2138) O2 Device: Nasal cannula (10/30/19 2138) O2 Flow Rate (L/min): 5 l/min (10/30/19 2138) Estimated body mass index is 32.75 kg/m² as calculated from the following: 
  Height as of this encounter: 5' 8\" (1.727 m). Weight as of this encounter: 97.7 kg (215 lb 6.2 oz). Intake/Output Summary (Last 24 hours) at 10/31/2019 0848 Last data filed at 10/31/2019 7394 Gross per 24 hour Intake 480 ml Output 5000 ml Net -4520 ml  
   
*Note that automatically entered I/Os may not be accurate; dependent on patient compliance with collection and accurate  by assistants. General:          Well nourished. Alert. CV:                  Irregular, reg rate. 3/6 systolic murmur RUSB Lungs:             Diminished bilat worse at bases Extremities:     Warm and dry. No cyanosis. Stasis derm changes. 2+ pitting edema BLE Skin:                No rashes or jaundice. Neuro:             No gross focal deficits Discharge Info:  
Current Discharge Medication List  
  
CONTINUE these medications which have NOT CHANGED Details  
inhalational spacing device Use as directed with MDI Qty: 1 Device, Refills: 1  
  
metOLazone (ZAROXOLYN) 5 mg tablet Take 1 Tab by mouth daily. Take 30-min prior to lasix for 5 days. Qty: 30 Tab, Refills: 0  
  
furosemide (LASIX) 80 mg tablet Take 80 mg by mouth two (2) times a day. carvedilol (COREG) 12.5 mg tablet Take  by mouth two (2) times daily (with meals). diclofenac (VOLTAREN) 1 % gel Apply  to affected area four (4) times daily. senna-docusate (SENNA PLUS) 8.6-50 mg per tablet Take 1 Tab by mouth daily. oxyCODONE IR (ROXICODONE) 5 mg immediate release tablet Take 10 mg by mouth every eight (8) hours as needed for Pain. lisinopril (PRINIVIL, ZESTRIL) 10 mg tablet Take 1 Tab by mouth daily. Qty: 30 Tab, Refills: 11  
  
!! warfarin (COUMADIN) 2.5 mg tablet Take 1 Tab by mouth every Sunday, Tuesday, Thursday. Qty: 30 Tab, Refills: 5  
  
!! warfarin (COUMADIN) 5 mg tablet Take 1 Tab by mouth every Monday, Wednesday, Friday, Saturday. Qty: 30 Tab, Refills: 5  
  
albuterol (PROVENTIL VENTOLIN) 2.5 mg /3 mL (0.083 %) nebulizer solution 3 mL by Nebulization route every six (6) hours. Qty: 120 Each, Refills: 0  
  
magnesium oxide (MAG-OX) 400 mg tablet Take 400 mg by mouth daily. atorvastatin (LIPITOR) 20 mg tablet Take 1 Tab by mouth daily. Qty: 30 Tab, Refills: 0  
  
potassium chloride (K-DUR, KLOR-CON) 20 mEq tablet Take 1 Tab by mouth daily. Qty: 30 Tab, Refills: 0  
  
aspirin delayed-release 81 mg tablet Take  by mouth daily. albuterol (PROVENTIL HFA, VENTOLIN HFA, PROAIR HFA) 90 mcg/actuation inhaler Take 1 Puff by inhalation every four (4) hours as needed for Wheezing. Qty: 1 Inhaler, Refills: 11  
  
levothyroxine (SYNTHROID) 75 mcg tablet Take 75 mcg by mouth Daily (before breakfast). memantine (NAMENDA) 10 mg tablet Take 10 mg by mouth two (2) times a day. ferrous sulfate 324 mg (65 mg iron) tablet Take 324 mg by mouth daily. ascorbic acid, vitamin C, (VITAMIN C) 500 mg tablet Take 500 mg by mouth two (2) times a day. cyanocobalamin (VITAMIN B-12) 1,000 mcg/mL injection 1,000 mcg by IntraMUSCular route every thirty (30) days. Oxygen 5L NC all times  
  
multivitamin (ONE A DAY) tablet Take 1 Tab by mouth daily. omeprazole (PRILOSEC) 20 mg capsule Take 20 mg by mouth daily. As needed  
  
nitroglycerin (NITROSTAT) 0.4 mg SL tablet 0.4 mg by SubLINGual route every five (5) minutes as needed for Chest Pain. !! - Potential duplicate medications found. Please discuss with provider. Follow Up Orders: Follow-up Appointments Procedures  FOLLOW UP VISIT Appointment in: 3 - 5 Days With cardiology for CHF follow up. Needs close follow up or he will be readmitted With cardiology for CHF follow up. Needs close follow up or he will be readmitted Standing Status:   Standing Number of Occurrences:   1 Order Specific Question:   Appointment in Answer:   3 - 5 Days Follow-up Information Follow up With Specialties Details Why Contact Info [facility] CARDIOLOGY   less than a week to follow up CHF, pulm edema.  high risk for readmission [address] [place] [address] [phone] [facility] 
[phone] Time spent in patient discharge planning and coordination 35 minutes.  
 
Signed: 
Shaista Ceja MD

## 2019-10-31 NOTE — PROGRESS NOTES
Pt resting in bed. Pt alert oriented times 3 at this time. Pt reports \"just not feeling good\" pt denies pain or distress at this time. Pt on 5 L NC at this time. Pt encouraged to call for assistance if needed call light in reach, door open will monitor.

## 2019-10-31 NOTE — PROGRESS NOTES
Warfarin dosing per pharmacist 
 
Juan Carlos Walters is a [de-identified] y.o. male. Height: 5' 8\" (172.7 cm)    Weight: 97.7 kg (215 lb 6.2 oz) Indication:  afib Goal INR:  2-3 Home dose:  2.5mg Sun/Tues/Thurs, 5mg Mon/Wed/Fri/Sat Risk factors or significant drug interactions:  none Other anticoagulants:  none Daily Monitoring Date  INR     Warfarin dose HGB              Notes 10/28  2  5mg  14.9   
10/29  2.1  2.5 mg  13.7 
10/30  2.2   5 mg  --- 
10/31  1.8  2.5 mg  --- Will continue patient's home regimen and give 2.5 mg this evening. Pharmacy will continue to follow patient and adjust doses as needed. Thank you, Maria Luisa Omalley, Pharm. D. 
PGY1 Pharmacy Resident [phone]

## 2019-10-31 NOTE — PROGRESS NOTES
Care Management Interventions PCP Verified by CM: Yes Mode of Transport at Discharge: BLS Transition of Care Consult (CM Consult): Home Health [address] Yes 
Partner SNF: Yes Discharge Durable Medical Equipment: No 
Physical Therapy Consult: Yes Current Support Network: Lives with Spouse Confirm Follow Up Transport: Family Plan discussed with Pt/Family/Caregiver: Yes Freedom of Choice Offered: Yes Discharge Location Discharge Placement: Home with home health After a long discussion with the patient and his wife they decided that patient will discharge home with [facility] services. [facility] HH RN/PT/OT/Aide/SW was ordered. CM faxed referral to [facility] for in home palliative care. Patient will discharge via [facility]. CM remains available.

## 2019-10-31 NOTE — ROUTINE PROCESS
CHF teaching completed, verbalize emphasis on monitoring self and report to MD: 
? If you gain 2 lbs in one day or 5 lbs in a week, and short of breath. ? If you can not lay flat without developing short of breath or rapid breathing at night; or if it wakes you up. Develop a cough or wheezing. ? If you notice swollen hands/feet/ankles or stomach with a bloated/ full feeling. ? If you are  more confused or mentally fuzzy or dizzy. ? If you notice a rapid or change in your heart rate. ? If you become more exhausted all the time and unable to do the same level of activity without stopping to catch your breath. Drink no more than 8 cups a day in 8 oz. cups. Limit Cola Drinks. Your Heart can not handle any more. Stay away from salt (limit anything with salt or sodium in it). Limit to 250mg per serving. Exercise needs to be started with your Doctors approval. 
Reduce stress; Call myself or Provider if assistance is needed. Pass post test via teach back, will make self available post DC ,if an questions arise. Diabetic teaching completed. Planner/scale @ BS:  60 mins total 
 
SW @ BS 
 
[facility] appt: 7NOV @ 10:45, pt aware

## 2019-10-31 NOTE — PROGRESS NOTES
[facility] - INPATIENT Face to Face Encounter Patients Name: Valdemar Toro    YOB: 1940 Ordering Physician: Dr. Dangelo Jenkins Primary Diagnosis: Shortness of breath [R06.02] Date of Face to Face:   10/31/2019 Face to Face Encounter findings are related to primary reason for home care:   yes. 1. I certify that the patient needs intermittent care as follows: skilled nursing care:  skilled observation/assessment, patient education 
physical therapy: strengthening 
occupational therapy:  ADL safety (ie. cooking, bathing, dressing) 2. I certify that this patient is homebound, that is: 1) patient requires the use of a walker device, special transportation, or assistance of another to leave the home; or 2) patient's condition makes leaving the home medically contraindicated; and 3) patient has a normal inability to leave the home and leaving the home requires considerable and taxing effort. Patient may leave the home for infrequent and short duration for medical reasons, and occasional absences for non-medical reasons. Homebound status is due to the following functional limitations: Patient with strength deficits limiting the performance of all ADL's without caregiver assistance or the use of an assistive device. 3. I certify that this patient is under my care and that I, or a nurse practitioner or  [id number], or clinical nurse specialist, or certified nurse midwife, working with me, had a Face-to-Face Encounter that meets the physician Face-to-Face Encounter requirements. The following are the clinical findings from the [address] [place] [address] encounter that support the need for skilled services and is a summary of the encounter:  
 
See discharge summary John Shanks 10/31/2019 THE FOLLOWING TO BE COMPLETED BY THE COMMUNITY PHYSICIAN: 
 
I concur with the findings described above from the [facility] encounter that this patient is homebound and in need of a skilled service. Certifying Physician: _____________________________________ Printed Certifying Physician Name: _____________________________________ Date: _________________

## 2019-10-31 NOTE — PROGRESS NOTES
Discharge instructions,follow up information, and medication list provided and explained to the pt. No new prescriptions written. IV removed from right [facility], remote telemetry removed. Opportunity for questions provided.   Ambulance transport arranged by case management for 12:30pm

## 2019-10-31 NOTE — DISCHARGE INSTRUCTIONS
DISCHARGE SUMMARY from Nurse    PATIENT INSTRUCTIONS:    After general anesthesia or intravenous sedation, for 24 hours or while taking prescription Narcotics:  · Limit your activities  · Do not drive and operate hazardous machinery  · Do not make important personal or business decisions  · Do  not drink alcoholic beverages  · If you have not urinated within 8 hours after discharge, please contact your surgeon on call. Report the following to your surgeon:  · Excessive pain, swelling, redness or odor of or around the surgical area  · Temperature over 100.5  · Nausea and vomiting lasting longer than 4 hours or if unable to take medications  · Any signs of decreased circulation or nerve impairment to extremity: change in color, persistent  numbness, tingling, coldness or increase pain  · Any questions    What to do at Home:  Recommended activity: Activity as tolerated. If you experience any of the following symptoms temp > 101.5, unrelieved pain, nausea or vomiting, shortness of breath or fatigue not relieved with rest, please follow up with MD.    *  Please give a list of your current medications to your Primary Care Provider. *  Please update this list whenever your medications are discontinued, doses are      changed, or new medications (including over-the-counter products) are added. *  Please carry medication information at all times in case of emergency situations. These are general instructions for a healthy lifestyle:    No smoking/ No tobacco products/ Avoid exposure to second hand smoke  Surgeon General's Warning:  Quitting smoking now greatly reduces serious risk to your health.     Obesity, smoking, and sedentary lifestyle greatly increases your risk for illness    A healthy diet, regular physical exercise & weight monitoring are important for maintaining a healthy lifestyle    You may be retaining fluid if you have a history of heart failure or if you experience any of the following symptoms:  Weight gain of 3 pounds or more overnight or 5 pounds in a week, increased swelling in our hands or feet or shortness of breath while lying flat in bed. Please call your doctor as soon as you notice any of these symptoms; do not wait until your next office visit. The discharge information has been reviewed with the patient. The patient verbalized understanding. Discharge medications reviewed with the patient and appropriate educational materials and side effects teaching were provided. Patient Education        Chronic Obstructive Pulmonary Disease (COPD) Flare-Ups: Care Instructions  Your Care Instructions    Chronic obstructive pulmonary disease (COPD) is a lung disease that makes it hard to breathe. It is caused by damage to the lungs over many years, usually from smoking. COPD is often a mix of two diseases:  · Chronic bronchitis: The airways that carry air to the lungs (bronchial tubes) get inflamed and make a lot of mucus. This can narrow or block the airways. · Emphysema: In a healthy person, the tiny air sacs in the lungs are like balloons. As you breathe in and out, they get bigger and smaller to move air through your lungs. But with emphysema, these air sacs are damaged and lose their stretch. Less air gets in and out of the lungs. Many people with COPD have attacks called flare-ups or exacerbations. This is when your usual symptoms quickly get worse and stay worse. The doctor has checked you carefully. But problems can develop later. If you notice any problems or new symptoms, get medical treatment right away. Follow-up care is a key part of your treatment and safety. Be sure to make and go to all appointments, and call your doctor if you are having problems. It's also a good idea to know your test results and keep a list of the medicines you take. How can you care for yourself at home? · Be safe with medicines. Take your medicines exactly as prescribed.  Call your doctor if you think you are having a problem with your medicine. You may be taking medicines such as:  ? Bronchodilators. These help open your airways and make breathing easier. ? Corticosteroids. These reduce airway inflammation. They may be given as pills, in a vein, or in an inhaled form. You may go home with pills in addition to an inhaler that you already use. · A spacer may help you get more inhaled medicine to your lungs. Ask your doctor or pharmacist if a spacer is right for you. If it is, ask how to use it properly. · If your doctor prescribed antibiotics, take them as directed. Do not stop taking them just because you feel better. You need to take the full course of antibiotics. · If your doctor prescribed oxygen, use the flow rate your doctor has recommended. Do not change it without talking to your doctor first.  · Do not smoke. Smoking makes COPD worse. If you need help quitting, talk to your doctor about stop-smoking programs and medicines. These can increase your chances of quitting for good. When should you call for help? Call 911 anytime you think you may need emergency care. For example, call if:    · You have severe trouble breathing.    Call your doctor now or seek immediate medical care if:    · You have new or worse trouble breathing.     · Your coughing or wheezing gets worse.     · You cough up dark brown or bloody mucus (sputum).     · You have a new or higher fever.    Watch closely for changes in your health, and be sure to contact your doctor if:    · You notice more mucus or a change in the color of your mucus.     · You need to use your antibiotic or steroid pills.     · You do not get better as expected. Where can you learn more? Go to http://karma-jane.info/. Enter U840 in the search box to learn more about \"Chronic Obstructive Pulmonary Disease (COPD) Flare-Ups: Care Instructions. \"  Current as of: June 9, 2019  Content Version: 12.2  © 9112-4915 [employer]. Care instructions adapted under license by [employer] (which disclaims liability or warranty for this information). If you have questions about a medical condition or this instruction, always ask your healthcare professional. [address] any warranty or liability for your use of this information. _Patient Education        Heart Failure: Care Instructions  Your Care Instructions    Heart failure occurs when your heart does not pump as much blood as the body needs. Failure does not mean that the heart has stopped pumping but rather that it is not pumping as well as it should. Over time, this causes fluid buildup in your lungs and other parts of your body. Fluid buildup can cause shortness of breath, fatigue, swollen ankles, and other problems. By taking medicines regularly, reducing sodium (salt) in your diet, checking your weight every day, and making lifestyle changes, you can feel better and live longer. Follow-up care is a key part of your treatment and safety. Be sure to make and go to all appointments, and call your doctor if you are having problems. It's also a good idea to know your test results and keep a list of the medicines you take. How can you care for yourself at home? Medicines    · Be safe with medicines. Take your medicines exactly as prescribed. Call your doctor if you think you are having a problem with your medicine.     · Do not take any vitamins, over-the-counter medicine, or herbal products without talking to your doctor first. Ciro Gal not take ibuprofen (Advil or Motrin) and naproxen (Aleve) without talking to your doctor first. They could make your heart failure worse.     · You may take some of the following medicine. ? Angiotensin-converting enzyme inhibitors (ACEIs) or angiotensin II receptor blockers (ARBs) reduce the heart's workload, lower blood pressure, and reduce swelling.  Taking an ACEI or ARB may lower your chance of needing to be hospitalized. ? Beta-blockers can slow heart rate, decrease blood pressure, and improve your condition. Taking a beta-blocker may lower your chance of needing to be hospitalized. ? Diuretics, also called water pills, reduce swelling.    You will get more details on the specific medicines your doctor prescribes. Diet    · Your doctor may suggest that you limit sodium. Your doctor can tell you how much sodium is right for you. An example is less than 3,000 mg a day. This includes all the salt you eat in cooking or in packaged foods. People get most of their sodium from processed foods. Fast food and restaurant meals also tend to be very high in sodium.     · Ask your doctor how much liquid you can drink each day. You may have to limit liquids.    Weight    · Weigh yourself without clothing at the same time each day. Record your weight. Call your doctor if you have a sudden weight gain, such as more than 2 to 3 pounds in a day or 5 pounds in a week. (Your doctor may suggest a different range of weight gain.) A sudden weight gain may mean that your heart failure is getting worse.    Activity level    · Start light exercise (if your doctor says it is okay). Even if you can only do a small amount, exercise will help you get stronger, have more energy, and manage your weight and your stress. Walking is an easy way to get exercise. Start out by walking a little more than you did before. Bit by bit, increase the amount you walk.     · When you exercise, watch for signs that your heart is working too hard. You are pushing yourself too hard if you cannot talk while you are exercising. If you become short of breath or dizzy or have chest pain, stop, sit down, and rest.     · If you feel \"wiped out\" the day after you exercise, walk slower or for a shorter distance until you can work up to a better pace.     · Get enough rest at night. Sleeping with 1 or 2 pillows under your upper body and head may help you breathe easier.  Lifestyle changes    · Do not smoke. Smoking can make a heart condition worse. If you need help quitting, talk to your doctor about stop-smoking programs and medicines. These can increase your chances of quitting for good. Quitting smoking may be the most important step you can take to protect your heart.     · Limit alcohol to 2 drinks a day for men and 1 drink a day for women. Too much alcohol can cause health problems.     · Avoid getting sick from colds and the flu. Get a pneumococcal vaccine shot. If you have had one before, ask your doctor whether you need another dose. Get a flu shot each year. If you must be around people with colds or the flu, wash your hands often. When should you call for help? Call 911 if you have symptoms of sudden heart failure such as:    · You have severe trouble breathing.     · You cough up pink, foamy mucus.     · You have a new irregular or rapid heartbeat.    Call your doctor now or seek immediate medical care if:    · You have new or increased shortness of breath.     · You are dizzy or lightheaded, or you feel like you may faint.     · You have sudden weight gain, such as more than 2 to 3 pounds in a day or 5 pounds in a week. (Your doctor may suggest a different range of weight gain.)     · You have increased swelling in your legs, ankles, or feet.     · You are suddenly so tired or weak that you cannot do your usual activities.    Watch closely for changes in your health, and be sure to contact your doctor if you develop new symptoms. Where can you learn more? Go to http://karma-jane.info/. Enter X328 in the search box to learn more about \"Heart Failure: Care Instructions. \"  Current as of: April 9, 2019  Content Version: 12.2  © 4820-0795 [employer]. Care instructions adapted under license by [employer] (which disclaims liability or warranty for this information).  If you have questions about a medical condition or this instruction, always ask your healthcare professional. [address] any warranty or liability for your use of this information.        __________________________________________________________________________________________________________________________________

## 2019-11-01 NOTE — TELEPHONE ENCOUNTER
[address] [place] [address] Vishal Hunt Pharmacist consult. Mr. Sukumar Blake was discharged yesterday from [facility] with CHF. I have called to review his discharge medications with him. I only got voice mail. I left my name and cell phone number. I asked him to call me with any medication questions or issues.

## 2019-11-04 NOTE — PROGRESS NOTES
Contacted the patient via phone and spoke with the patients wife to offer health  services. She spoke the  asking if he still wanted the health  to come? He could be heard stating \"I don't think so right now\". Health  will monitor via chart review and reach out again via phone at a later time.

## 2019-11-15 NOTE — TELEPHONE ENCOUNTER
Mr. Alden Seay said he was doing better. He has gotten is weight down and is watching what he eats. His fluid is down also. He said he was diagnosed with COPD, but he has never smoked. He was a jone for many years. He said he went to the diabetes center and the lady there gave him some pills to take for 2 weeks, but he has not heard from them for follow up. I recommended he call them. His O2 was 83 this morning, so he upped his O2 and it got better. He said his O2 level just swings a lot. He is only on his usual maintenance medications and had no questions about them. He said I could call back any time.

## 2020-01-01 ENCOUNTER — HOME CARE VISIT (OUTPATIENT)
Dept: SCHEDULING | Facility: HOME HEALTH | Age: 80
End: 2020-01-01
Payer: MEDICARE

## 2020-01-01 ENCOUNTER — HOME CARE VISIT (OUTPATIENT)
Dept: HOME HEALTH SERVICES | Facility: HOME HEALTH | Age: 80
End: 2020-01-01

## 2020-01-01 ENCOUNTER — PATIENT OUTREACH (OUTPATIENT)
Dept: CASE MANAGEMENT | Age: 80
End: 2020-01-01

## 2020-01-01 ENCOUNTER — HOSPITAL ENCOUNTER (OUTPATIENT)
Dept: LAB | Age: 80
Discharge: HOME | End: 2020-06-30

## 2020-01-01 ENCOUNTER — TELEPHONE (OUTPATIENT)
Dept: CARDIOLOGY | Age: 80
End: 2020-01-01

## 2020-01-01 ENCOUNTER — HOSPITAL ENCOUNTER (EMERGENCY)
Age: 80
Discharge: HOME | End: 2020-04-20
Attending: EMERGENCY MEDICINE
Payer: MEDICARE

## 2020-01-01 ENCOUNTER — HOME HEALTH ADMISSION (OUTPATIENT)
Dept: HOME HEALTH SERVICES | Facility: HOME HEALTH | Age: 80
End: 2020-01-01
Payer: MEDICARE

## 2020-01-01 ENCOUNTER — APPOINTMENT (OUTPATIENT)
Dept: GENERAL RADIOLOGY | Age: 80
DRG: 292 | End: 2020-01-01
Attending: EMERGENCY MEDICINE
Payer: MEDICARE

## 2020-01-01 ENCOUNTER — HOME CARE VISIT (OUTPATIENT)
Dept: HOME HEALTH SERVICES | Facility: HOME HEALTH | Age: 80
End: 2020-01-01
Payer: MEDICARE

## 2020-01-01 ENCOUNTER — APPOINTMENT (OUTPATIENT)
Dept: ULTRASOUND IMAGING | Age: 80
DRG: 292 | End: 2020-01-01
Attending: INTERNAL MEDICINE
Payer: MEDICARE

## 2020-01-01 ENCOUNTER — APPOINTMENT (OUTPATIENT)
Dept: GENERAL RADIOLOGY | Age: 80
End: 2020-01-01
Attending: EMERGENCY MEDICINE
Payer: MEDICARE

## 2020-01-01 ENCOUNTER — HOME HEALTH ADMISSION (OUTPATIENT)
Dept: HOME HEALTH SERVICES | Facility: HOME HEALTH | Age: 80
End: 2020-01-01

## 2020-01-01 ENCOUNTER — HOSPITAL ENCOUNTER (INPATIENT)
Age: 80
LOS: 7 days | Discharge: NURSING, REHAB OR LONG-TERM CARE | DRG: 292 | End: 2020-05-04
Attending: EMERGENCY MEDICINE | Admitting: INTERNAL MEDICINE
Payer: MEDICARE

## 2020-01-01 ENCOUNTER — HOSPITAL ENCOUNTER (OUTPATIENT)
Dept: LAB | Age: 80
Discharge: HOME | End: 2020-01-06
Attending: INTERNAL MEDICINE
Payer: MEDICARE

## 2020-01-01 VITALS
DIASTOLIC BLOOD PRESSURE: 67 MMHG | WEIGHT: 204.15 LBS | HEIGHT: 68 IN | SYSTOLIC BLOOD PRESSURE: 111 MMHG | OXYGEN SATURATION: 96 % | RESPIRATION RATE: 18 BRPM | BODY MASS INDEX: 30.94 KG/M2 | HEART RATE: 67 BPM | TEMPERATURE: 97.7 F

## 2020-01-01 VITALS
TEMPERATURE: 98 F | DIASTOLIC BLOOD PRESSURE: 82 MMHG | RESPIRATION RATE: 18 BRPM | SYSTOLIC BLOOD PRESSURE: 140 MMHG | OXYGEN SATURATION: 95 % | HEART RATE: 94 BPM

## 2020-01-01 VITALS
OXYGEN SATURATION: 95 % | DIASTOLIC BLOOD PRESSURE: 82 MMHG | TEMPERATURE: 98 F | SYSTOLIC BLOOD PRESSURE: 138 MMHG | HEART RATE: 81 BPM | RESPIRATION RATE: 18 BRPM

## 2020-01-01 VITALS
RESPIRATION RATE: 18 BRPM | SYSTOLIC BLOOD PRESSURE: 124 MMHG | TEMPERATURE: 98.1 F | DIASTOLIC BLOOD PRESSURE: 66 MMHG | HEART RATE: 81 BPM | OXYGEN SATURATION: 97 %

## 2020-01-01 VITALS
RESPIRATION RATE: 18 BRPM | SYSTOLIC BLOOD PRESSURE: 140 MMHG | TEMPERATURE: 97 F | HEART RATE: 70 BPM | DIASTOLIC BLOOD PRESSURE: 80 MMHG | OXYGEN SATURATION: 98 %

## 2020-01-01 VITALS
RESPIRATION RATE: 18 BRPM | SYSTOLIC BLOOD PRESSURE: 128 MMHG | HEART RATE: 80 BPM | DIASTOLIC BLOOD PRESSURE: 78 MMHG | TEMPERATURE: 98.4 F

## 2020-01-01 VITALS
SYSTOLIC BLOOD PRESSURE: 140 MMHG | HEART RATE: 70 BPM | DIASTOLIC BLOOD PRESSURE: 80 MMHG | TEMPERATURE: 97 F | RESPIRATION RATE: 18 BRPM | OXYGEN SATURATION: 98 %

## 2020-01-01 VITALS
HEART RATE: 80 BPM | TEMPERATURE: 97.9 F | RESPIRATION RATE: 18 BRPM | SYSTOLIC BLOOD PRESSURE: 110 MMHG | DIASTOLIC BLOOD PRESSURE: 68 MMHG

## 2020-01-01 VITALS
TEMPERATURE: 98.1 F | DIASTOLIC BLOOD PRESSURE: 60 MMHG | OXYGEN SATURATION: 94 % | HEART RATE: 90 BPM | RESPIRATION RATE: 18 BRPM | SYSTOLIC BLOOD PRESSURE: 118 MMHG

## 2020-01-01 VITALS
OXYGEN SATURATION: 95 % | TEMPERATURE: 97.2 F | RESPIRATION RATE: 16 BRPM | DIASTOLIC BLOOD PRESSURE: 68 MMHG | HEART RATE: 89 BPM | SYSTOLIC BLOOD PRESSURE: 122 MMHG

## 2020-01-01 VITALS
HEART RATE: 90 BPM | RESPIRATION RATE: 18 BRPM | TEMPERATURE: 97.9 F | SYSTOLIC BLOOD PRESSURE: 130 MMHG | DIASTOLIC BLOOD PRESSURE: 72 MMHG

## 2020-01-01 VITALS
TEMPERATURE: 98 F | SYSTOLIC BLOOD PRESSURE: 140 MMHG | RESPIRATION RATE: 18 BRPM | DIASTOLIC BLOOD PRESSURE: 64 MMHG | HEART RATE: 80 BPM | OXYGEN SATURATION: 92 %

## 2020-01-01 VITALS
SYSTOLIC BLOOD PRESSURE: 128 MMHG | RESPIRATION RATE: 18 BRPM | DIASTOLIC BLOOD PRESSURE: 72 MMHG | TEMPERATURE: 98.4 F | HEART RATE: 84 BPM | OXYGEN SATURATION: 93 %

## 2020-01-01 VITALS
DIASTOLIC BLOOD PRESSURE: 60 MMHG | TEMPERATURE: 98.1 F | SYSTOLIC BLOOD PRESSURE: 118 MMHG | RESPIRATION RATE: 18 BRPM | HEART RATE: 90 BPM | OXYGEN SATURATION: 93 %

## 2020-01-01 VITALS
SYSTOLIC BLOOD PRESSURE: 122 MMHG | HEART RATE: 84 BPM | RESPIRATION RATE: 17 BRPM | TEMPERATURE: 97.9 F | DIASTOLIC BLOOD PRESSURE: 76 MMHG

## 2020-01-01 VITALS
HEART RATE: 80 BPM | OXYGEN SATURATION: 98 % | RESPIRATION RATE: 18 BRPM | DIASTOLIC BLOOD PRESSURE: 90 MMHG | TEMPERATURE: 98 F | SYSTOLIC BLOOD PRESSURE: 158 MMHG

## 2020-01-01 VITALS
SYSTOLIC BLOOD PRESSURE: 144 MMHG | OXYGEN SATURATION: 98 % | DIASTOLIC BLOOD PRESSURE: 64 MMHG | TEMPERATURE: 97.5 F | RESPIRATION RATE: 18 BRPM | HEART RATE: 80 BPM

## 2020-01-01 VITALS
HEART RATE: 102 BPM | RESPIRATION RATE: 18 BRPM | OXYGEN SATURATION: 98 % | SYSTOLIC BLOOD PRESSURE: 130 MMHG | DIASTOLIC BLOOD PRESSURE: 60 MMHG | TEMPERATURE: 98.2 F

## 2020-01-01 VITALS
DIASTOLIC BLOOD PRESSURE: 68 MMHG | HEART RATE: 61 BPM | OXYGEN SATURATION: 94 % | SYSTOLIC BLOOD PRESSURE: 130 MMHG | RESPIRATION RATE: 18 BRPM | TEMPERATURE: 98 F

## 2020-01-01 VITALS
RESPIRATION RATE: 16 BRPM | OXYGEN SATURATION: 98 % | TEMPERATURE: 98.5 F | SYSTOLIC BLOOD PRESSURE: 128 MMHG | HEART RATE: 81 BPM | DIASTOLIC BLOOD PRESSURE: 72 MMHG

## 2020-01-01 VITALS
RESPIRATION RATE: 14 BRPM | SYSTOLIC BLOOD PRESSURE: 181 MMHG | OXYGEN SATURATION: 90 % | DIASTOLIC BLOOD PRESSURE: 91 MMHG | HEART RATE: 96 BPM

## 2020-01-01 VITALS — TEMPERATURE: 98 F | OXYGEN SATURATION: 98 % | HEART RATE: 80 BPM

## 2020-01-01 VITALS
OXYGEN SATURATION: 98 % | HEART RATE: 70 BPM | DIASTOLIC BLOOD PRESSURE: 80 MMHG | RESPIRATION RATE: 18 BRPM | TEMPERATURE: 97 F | SYSTOLIC BLOOD PRESSURE: 140 MMHG

## 2020-01-01 VITALS
HEART RATE: 86 BPM | RESPIRATION RATE: 18 BRPM | SYSTOLIC BLOOD PRESSURE: 124 MMHG | TEMPERATURE: 98.5 F | DIASTOLIC BLOOD PRESSURE: 76 MMHG

## 2020-01-01 DIAGNOSIS — R60.0 BILATERAL LOWER EXTREMITY EDEMA: ICD-10-CM

## 2020-01-01 DIAGNOSIS — I27.29 RIGHT HEART FAILURE DUE TO PULMONARY HYPERTENSION (HCC): Primary | ICD-10-CM

## 2020-01-01 DIAGNOSIS — I50.33 DIASTOLIC CHF, ACUTE ON CHRONIC (HCC): ICD-10-CM

## 2020-01-01 DIAGNOSIS — I48.21 PERMANENT ATRIAL FIBRILLATION (HCC): Chronic | ICD-10-CM

## 2020-01-01 DIAGNOSIS — I87.2 VENOUS STASIS DERMATITIS OF BOTH LOWER EXTREMITIES: ICD-10-CM

## 2020-01-01 DIAGNOSIS — I25.810 CORONARY ARTERY DISEASE INVOLVING CORONARY BYPASS GRAFT OF NATIVE HEART WITHOUT ANGINA PECTORIS: Chronic | ICD-10-CM

## 2020-01-01 DIAGNOSIS — I50.810 RIGHT HEART FAILURE DUE TO PULMONARY HYPERTENSION (HCC): Primary | ICD-10-CM

## 2020-01-01 DIAGNOSIS — L03.116 CELLULITIS OF LEFT LOWER EXTREMITY: ICD-10-CM

## 2020-01-01 DIAGNOSIS — I50.812 CHRONIC RIGHT-SIDED CONGESTIVE HEART FAILURE (HCC): Primary | ICD-10-CM

## 2020-01-01 DIAGNOSIS — R07.9 CHEST PAIN, UNSPECIFIED TYPE: ICD-10-CM

## 2020-01-01 LAB
ALBUMIN SERPL-MCNC: 2.6 G/DL (ref 3.2–4.6)
ALBUMIN SERPL-MCNC: 3.5 G/DL (ref 3.2–4.6)
ALBUMIN SERPL-MCNC: 3.7 G/DL (ref 3.2–4.6)
ALBUMIN/GLOB SERPL: 0.7 {RATIO} (ref 1.2–3.5)
ALBUMIN/GLOB SERPL: 0.8 {RATIO} (ref 1.2–3.5)
ALBUMIN/GLOB SERPL: 0.8 {RATIO} (ref 1.2–3.5)
ALP SERPL-CCNC: 137 U/L (ref 50–136)
ALP SERPL-CCNC: 186 U/L (ref 50–136)
ALP SERPL-CCNC: 201 U/L (ref 50–136)
ALT SERPL-CCNC: 25 U/L (ref 12–65)
ALT SERPL-CCNC: 27 U/L (ref 12–65)
ALT SERPL-CCNC: 31 U/L (ref 12–65)
ANION GAP SERPL CALC-SCNC: 0 MMOL/L (ref 7–16)
ANION GAP SERPL CALC-SCNC: 3 MMOL/L (ref 7–16)
ANION GAP SERPL CALC-SCNC: 4 MMOL/L (ref 7–16)
ANION GAP SERPL CALC-SCNC: 5 MMOL/L (ref 7–16)
ANION GAP SERPL CALC-SCNC: 5 MMOL/L (ref 7–16)
ANION GAP SERPL CALC-SCNC: 6 MMOL/L (ref 7–16)
ANION GAP SERPL CALC-SCNC: 6 MMOL/L (ref 7–16)
ANION GAP SERPL CALC-SCNC: 8 MMOL/L (ref 7–16)
ANION GAP SERPL CALC-SCNC: 8 MMOL/L (ref 7–16)
AST SERPL-CCNC: 31 U/L (ref 15–37)
AST SERPL-CCNC: 41 U/L (ref 15–37)
AST SERPL-CCNC: 58 U/L (ref 15–37)
ATRIAL RATE: 111 BPM
ATRIAL RATE: 340 BPM
BACTERIA SPEC CULT: ABNORMAL
BACTERIA SPEC CULT: NORMAL
BACTERIA SPEC CULT: NORMAL
BASOPHILS # BLD: 0.1 K/UL (ref 0–0.2)
BASOPHILS NFR BLD: 1 % (ref 0–2)
BILIRUB DIRECT SERPL-MCNC: 0.6 MG/DL
BILIRUB DIRECT SERPL-MCNC: 0.8 MG/DL
BILIRUB INDIRECT SERPL-MCNC: 0.6 MG/DL (ref 0–1.1)
BILIRUB SERPL-MCNC: 1.2 MG/DL (ref 0.2–1.1)
BILIRUB SERPL-MCNC: 1.6 MG/DL (ref 0.2–1.1)
BILIRUB SERPL-MCNC: 1.9 MG/DL (ref 0.2–1.1)
BILIRUB SERPL-MCNC: 2.2 MG/DL (ref 0.2–1.1)
BNP SERPL-MCNC: 6722 PG/ML
BNP SERPL-MCNC: 8264 PG/ML
BUN SERPL-MCNC: 15 MG/DL (ref 8–23)
BUN SERPL-MCNC: 15 MG/DL (ref 8–23)
BUN SERPL-MCNC: 16 MG/DL (ref 8–23)
BUN SERPL-MCNC: 17 MG/DL (ref 8–23)
BUN SERPL-MCNC: 18 MG/DL (ref 8–23)
BUN SERPL-MCNC: 18 MG/DL (ref 8–23)
BUN SERPL-MCNC: 19 MG/DL (ref 8–23)
BUN SERPL-MCNC: 21 MG/DL (ref 8–23)
BUN SERPL-MCNC: 24 MG/DL (ref 8–23)
CALCIUM SERPL-MCNC: 8.1 MG/DL (ref 8.3–10.4)
CALCIUM SERPL-MCNC: 8.2 MG/DL (ref 8.3–10.4)
CALCIUM SERPL-MCNC: 8.3 MG/DL (ref 8.3–10.4)
CALCIUM SERPL-MCNC: 8.4 MG/DL (ref 8.3–10.4)
CALCIUM SERPL-MCNC: 8.6 MG/DL (ref 8.3–10.4)
CALCIUM SERPL-MCNC: 8.6 MG/DL (ref 8.3–10.4)
CALCIUM SERPL-MCNC: 9.1 MG/DL (ref 8.3–10.4)
CALCIUM SERPL-MCNC: 9.4 MG/DL (ref 8.3–10.4)
CALCIUM SERPL-MCNC: 9.5 MG/DL (ref 8.3–10.4)
CALCULATED R AXIS, ECG10: 129 DEGREES
CALCULATED R AXIS, ECG10: 42 DEGREES
CALCULATED T AXIS, ECG11: -58 DEGREES
CALCULATED T AXIS, ECG11: 129 DEGREES
CHLORIDE SERPL-SCNC: 100 MMOL/L (ref 98–107)
CHLORIDE SERPL-SCNC: 101 MMOL/L (ref 98–107)
CHLORIDE SERPL-SCNC: 95 MMOL/L (ref 98–107)
CHLORIDE SERPL-SCNC: 97 MMOL/L (ref 98–107)
CHLORIDE SERPL-SCNC: 98 MMOL/L (ref 98–107)
CHLORIDE SERPL-SCNC: 99 MMOL/L (ref 98–107)
CO2 SERPL-SCNC: 31 MMOL/L (ref 21–32)
CO2 SERPL-SCNC: 32 MMOL/L (ref 21–32)
CO2 SERPL-SCNC: 33 MMOL/L (ref 21–32)
CO2 SERPL-SCNC: 33 MMOL/L (ref 21–32)
CO2 SERPL-SCNC: 34 MMOL/L (ref 21–32)
CO2 SERPL-SCNC: 36 MMOL/L (ref 21–32)
CO2 SERPL-SCNC: 36 MMOL/L (ref 21–32)
CO2 SERPL-SCNC: 37 MMOL/L (ref 21–32)
CO2 SERPL-SCNC: 38 MMOL/L (ref 21–32)
CREAT SERPL-MCNC: 0.79 MG/DL (ref 0.8–1.5)
CREAT SERPL-MCNC: 0.79 MG/DL (ref 0.8–1.5)
CREAT SERPL-MCNC: 0.81 MG/DL (ref 0.8–1.5)
CREAT SERPL-MCNC: 0.82 MG/DL (ref 0.8–1.5)
CREAT SERPL-MCNC: 0.95 MG/DL (ref 0.8–1.5)
CREAT SERPL-MCNC: 0.95 MG/DL (ref 0.8–1.5)
CREAT SERPL-MCNC: 1 MG/DL (ref 0.8–1.5)
CREAT SERPL-MCNC: 1.07 MG/DL (ref 0.8–1.5)
CREAT SERPL-MCNC: 1.08 MG/DL (ref 0.8–1.5)
DIAGNOSIS, 93000: NORMAL
DIAGNOSIS, 93000: NORMAL
DIFFERENTIAL METHOD BLD: ABNORMAL
EMERGENT DISEASE PANEL, EDPR: NOT DETECTED
EOSINOPHIL # BLD: 0.1 K/UL (ref 0–0.8)
EOSINOPHIL NFR BLD: 2 % (ref 0.5–7.8)
ERYTHROCYTE [DISTWIDTH] IN BLOOD BY AUTOMATED COUNT: 15.3 % (ref 11.9–14.6)
ERYTHROCYTE [DISTWIDTH] IN BLOOD BY AUTOMATED COUNT: 15.6 % (ref 11.9–14.6)
ERYTHROCYTE [DISTWIDTH] IN BLOOD BY AUTOMATED COUNT: 15.7 % (ref 11.9–14.6)
ERYTHROCYTE [DISTWIDTH] IN BLOOD BY AUTOMATED COUNT: 15.8 % (ref 11.9–14.6)
ERYTHROCYTE [DISTWIDTH] IN BLOOD BY AUTOMATED COUNT: 15.8 % (ref 11.9–14.6)
ERYTHROCYTE [DISTWIDTH] IN BLOOD BY AUTOMATED COUNT: 15.9 % (ref 11.9–14.6)
ERYTHROCYTE [DISTWIDTH] IN BLOOD BY AUTOMATED COUNT: 15.9 % (ref 11.9–14.6)
ERYTHROCYTE [DISTWIDTH] IN BLOOD BY AUTOMATED COUNT: 16 % (ref 11.9–14.6)
ERYTHROCYTE [DISTWIDTH] IN BLOOD BY AUTOMATED COUNT: 16 % (ref 11.9–14.6)
GLOBULIN SER CALC-MCNC: 3.9 G/DL (ref 2.3–3.5)
GLOBULIN SER CALC-MCNC: 4.6 G/DL (ref 2.3–3.5)
GLOBULIN SER CALC-MCNC: 4.7 G/DL (ref 2.3–3.5)
GLUCOSE BLD STRIP.AUTO-MCNC: 101 MG/DL (ref 65–100)
GLUCOSE BLD STRIP.AUTO-MCNC: 103 MG/DL (ref 65–100)
GLUCOSE BLD STRIP.AUTO-MCNC: 108 MG/DL (ref 65–100)
GLUCOSE BLD STRIP.AUTO-MCNC: 109 MG/DL (ref 65–100)
GLUCOSE BLD STRIP.AUTO-MCNC: 112 MG/DL (ref 65–100)
GLUCOSE BLD STRIP.AUTO-MCNC: 117 MG/DL (ref 65–100)
GLUCOSE BLD STRIP.AUTO-MCNC: 118 MG/DL (ref 65–100)
GLUCOSE BLD STRIP.AUTO-MCNC: 120 MG/DL (ref 65–100)
GLUCOSE BLD STRIP.AUTO-MCNC: 121 MG/DL (ref 65–100)
GLUCOSE BLD STRIP.AUTO-MCNC: 121 MG/DL (ref 65–100)
GLUCOSE BLD STRIP.AUTO-MCNC: 124 MG/DL (ref 65–100)
GLUCOSE BLD STRIP.AUTO-MCNC: 126 MG/DL (ref 65–100)
GLUCOSE BLD STRIP.AUTO-MCNC: 131 MG/DL (ref 65–100)
GLUCOSE BLD STRIP.AUTO-MCNC: 134 MG/DL (ref 65–100)
GLUCOSE BLD STRIP.AUTO-MCNC: 139 MG/DL (ref 65–100)
GLUCOSE BLD STRIP.AUTO-MCNC: 144 MG/DL (ref 65–100)
GLUCOSE BLD STRIP.AUTO-MCNC: 145 MG/DL (ref 65–100)
GLUCOSE BLD STRIP.AUTO-MCNC: 152 MG/DL (ref 65–100)
GLUCOSE BLD STRIP.AUTO-MCNC: 152 MG/DL (ref 65–100)
GLUCOSE BLD STRIP.AUTO-MCNC: 155 MG/DL (ref 65–100)
GLUCOSE BLD STRIP.AUTO-MCNC: 161 MG/DL (ref 65–100)
GLUCOSE BLD STRIP.AUTO-MCNC: 166 MG/DL (ref 65–100)
GLUCOSE BLD STRIP.AUTO-MCNC: 174 MG/DL (ref 65–100)
GLUCOSE BLD STRIP.AUTO-MCNC: 189 MG/DL (ref 65–100)
GLUCOSE BLD STRIP.AUTO-MCNC: 83 MG/DL (ref 65–100)
GLUCOSE BLD STRIP.AUTO-MCNC: 88 MG/DL (ref 65–100)
GLUCOSE BLD STRIP.AUTO-MCNC: 97 MG/DL (ref 65–100)
GLUCOSE SERPL-MCNC: 102 MG/DL (ref 65–100)
GLUCOSE SERPL-MCNC: 112 MG/DL (ref 65–100)
GLUCOSE SERPL-MCNC: 114 MG/DL (ref 65–100)
GLUCOSE SERPL-MCNC: 124 MG/DL (ref 65–100)
GLUCOSE SERPL-MCNC: 136 MG/DL (ref 65–100)
GLUCOSE SERPL-MCNC: 136 MG/DL (ref 65–100)
GLUCOSE SERPL-MCNC: 167 MG/DL (ref 65–100)
GLUCOSE SERPL-MCNC: 168 MG/DL (ref 65–100)
GLUCOSE SERPL-MCNC: 98 MG/DL (ref 65–100)
GRAM STN SPEC: ABNORMAL
HCT VFR BLD AUTO: 42.4 % (ref 41.1–50.3)
HCT VFR BLD AUTO: 42.7 % (ref 41.1–50.3)
HCT VFR BLD AUTO: 43 % (ref 41.1–50.3)
HCT VFR BLD AUTO: 44 % (ref 41.1–50.3)
HCT VFR BLD AUTO: 44.1 % (ref 41.1–50.3)
HCT VFR BLD AUTO: 44.4 % (ref 41.1–50.3)
HCT VFR BLD AUTO: 46.2 % (ref 41.1–50.3)
HCT VFR BLD AUTO: 48.2 % (ref 41.1–50.3)
HCT VFR BLD AUTO: 50.9 % (ref 41.1–50.3)
HGB BLD-MCNC: 13.9 G/DL (ref 13.6–17.2)
HGB BLD-MCNC: 14 G/DL (ref 13.6–17.2)
HGB BLD-MCNC: 14 G/DL (ref 13.6–17.2)
HGB BLD-MCNC: 14.4 G/DL (ref 13.6–17.2)
HGB BLD-MCNC: 14.4 G/DL (ref 13.6–17.2)
HGB BLD-MCNC: 14.5 G/DL (ref 13.6–17.2)
HGB BLD-MCNC: 14.6 G/DL (ref 13.6–17.2)
HGB BLD-MCNC: 15.7 G/DL (ref 13.6–17.2)
HGB BLD-MCNC: 16.7 G/DL (ref 13.6–17.2)
IMM GRANULOCYTES # BLD AUTO: 0 K/UL (ref 0–0.5)
IMM GRANULOCYTES NFR BLD AUTO: 0 % (ref 0–5)
INR PPP: 1.7
INR PPP: 1.8
INR PPP: 2
INR PPP: 2.2
INR PPP: 2.4
INR PPP: 2.8
INR PPP: 3.3
INR PPP: 3.4
INR PPP: 3.4
LACTATE SERPL-SCNC: 1.6 MMOL/L (ref 0.4–2)
LYMPHOCYTES # BLD: 0.5 K/UL (ref 0.5–4.6)
LYMPHOCYTES NFR BLD: 9 % (ref 13–44)
MAGNESIUM SERPL-MCNC: 1.7 MG/DL (ref 1.8–2.4)
MAGNESIUM SERPL-MCNC: 2.1 MG/DL (ref 1.8–2.4)
MAGNESIUM SERPL-MCNC: 2.2 MG/DL (ref 1.8–2.4)
MAGNESIUM SERPL-MCNC: 2.3 MG/DL (ref 1.8–2.4)
MAGNESIUM SERPL-MCNC: 2.3 MG/DL (ref 1.8–2.4)
MCH RBC QN AUTO: 29.8 PG (ref 26.1–32.9)
MCH RBC QN AUTO: 29.9 PG (ref 26.1–32.9)
MCH RBC QN AUTO: 30.2 PG (ref 26.1–32.9)
MCH RBC QN AUTO: 30.3 PG (ref 26.1–32.9)
MCH RBC QN AUTO: 30.3 PG (ref 26.1–32.9)
MCH RBC QN AUTO: 30.4 PG (ref 26.1–32.9)
MCH RBC QN AUTO: 30.6 PG (ref 26.1–32.9)
MCH RBC QN AUTO: 30.8 PG (ref 26.1–32.9)
MCH RBC QN AUTO: 30.9 PG (ref 26.1–32.9)
MCHC RBC AUTO-ENTMCNC: 31.6 G/DL (ref 31.4–35)
MCHC RBC AUTO-ENTMCNC: 31.6 G/DL (ref 31.4–35)
MCHC RBC AUTO-ENTMCNC: 32.4 G/DL (ref 31.4–35)
MCHC RBC AUTO-ENTMCNC: 32.6 G/DL (ref 31.4–35)
MCHC RBC AUTO-ENTMCNC: 32.6 G/DL (ref 31.4–35)
MCHC RBC AUTO-ENTMCNC: 32.7 G/DL (ref 31.4–35)
MCHC RBC AUTO-ENTMCNC: 32.8 G/DL (ref 31.4–35)
MCHC RBC AUTO-ENTMCNC: 33 G/DL (ref 31.4–35)
MCHC RBC AUTO-ENTMCNC: 34 G/DL (ref 31.4–35)
MCV RBC AUTO: 90.7 FL (ref 79.6–97.8)
MCV RBC AUTO: 92.4 FL (ref 79.6–97.8)
MCV RBC AUTO: 92.6 FL (ref 79.6–97.8)
MCV RBC AUTO: 92.8 FL (ref 79.6–97.8)
MCV RBC AUTO: 92.9 FL (ref 79.6–97.8)
MCV RBC AUTO: 93.2 FL (ref 79.6–97.8)
MCV RBC AUTO: 94.4 FL (ref 79.6–97.8)
MCV RBC AUTO: 94.7 FL (ref 79.6–97.8)
MCV RBC AUTO: 95.3 FL (ref 79.6–97.8)
MM INDURATION POC: 0 MM (ref 0–5)
MONOCYTES # BLD: 0.9 K/UL (ref 0.1–1.3)
MONOCYTES NFR BLD: 15 % (ref 4–12)
NEUTS SEG # BLD: 4.5 K/UL (ref 1.7–8.2)
NEUTS SEG NFR BLD: 73 % (ref 43–78)
NRBC # BLD: 0 K/UL (ref 0–0.2)
PLATELET # BLD AUTO: 183 K/UL (ref 150–450)
PLATELET # BLD AUTO: 188 K/UL (ref 150–450)
PLATELET # BLD AUTO: 192 K/UL (ref 150–450)
PLATELET # BLD AUTO: 192 K/UL (ref 150–450)
PLATELET # BLD AUTO: 195 K/UL (ref 150–450)
PLATELET # BLD AUTO: 207 K/UL (ref 150–450)
PLATELET # BLD AUTO: 221 K/UL (ref 150–450)
PLATELET # BLD AUTO: 237 K/UL (ref 150–450)
PLATELET # BLD AUTO: 238 K/UL (ref 150–450)
PMV BLD AUTO: 11.8 FL (ref 9.4–12.3)
PMV BLD AUTO: 11.9 FL (ref 9.4–12.3)
PMV BLD AUTO: 12 FL (ref 9.4–12.3)
PMV BLD AUTO: 12 FL (ref 9.4–12.3)
PMV BLD AUTO: 12.1 FL (ref 9.4–12.3)
PMV BLD AUTO: 12.1 FL (ref 9.4–12.3)
PMV BLD AUTO: 12.3 FL (ref 9.4–12.3)
POTASSIUM SERPL-SCNC: 3.1 MMOL/L (ref 3.5–5.1)
POTASSIUM SERPL-SCNC: 3.5 MMOL/L (ref 3.5–5.1)
POTASSIUM SERPL-SCNC: 3.6 MMOL/L (ref 3.5–5.1)
POTASSIUM SERPL-SCNC: 3.7 MMOL/L (ref 3.5–5.1)
POTASSIUM SERPL-SCNC: 3.9 MMOL/L (ref 3.5–5.1)
POTASSIUM SERPL-SCNC: 4 MMOL/L (ref 3.5–5.1)
POTASSIUM SERPL-SCNC: 4.9 MMOL/L (ref 3.5–5.1)
PPD POC: NEGATIVE NEGATIVE
PROT SERPL-MCNC: 6.5 G/DL (ref 6.3–8.2)
PROT SERPL-MCNC: 8.1 G/DL (ref 6.3–8.2)
PROT SERPL-MCNC: 8.4 G/DL (ref 6.3–8.2)
PROTHROMBIN TIME: 20.1 SEC (ref 12–14.7)
PROTHROMBIN TIME: 21.3 SEC (ref 12–14.7)
PROTHROMBIN TIME: 23.3 SEC (ref 12–14.7)
PROTHROMBIN TIME: 24.9 SEC (ref 12–14.7)
PROTHROMBIN TIME: 25.5 SEC (ref 12–14.7)
PROTHROMBIN TIME: 25.8 SEC (ref 11.7–14.5)
PROTHROMBIN TIME: 27.1 SEC (ref 12–14.7)
PROTHROMBIN TIME: 30.6 SEC (ref 12–14.7)
PROTHROMBIN TIME: 34.3 SEC (ref 12–14.7)
PROTHROMBIN TIME: 35 SEC (ref 12–14.7)
PROTHROMBIN TIME: 35.5 SEC (ref 12–14.7)
Q-T INTERVAL, ECG07: 320 MS
Q-T INTERVAL, ECG07: 378 MS
QRS DURATION, ECG06: 116 MS
QRS DURATION, ECG06: 144 MS
QTC CALCULATION (BEZET), ECG08: 419 MS
QTC CALCULATION (BEZET), ECG08: 462 MS
RBC # BLD AUTO: 4.58 M/UL (ref 4.23–5.6)
RBC # BLD AUTO: 4.63 M/UL (ref 4.23–5.6)
RBC # BLD AUTO: 4.66 M/UL (ref 4.23–5.6)
RBC # BLD AUTO: 4.66 M/UL (ref 4.23–5.6)
RBC # BLD AUTO: 4.71 M/UL (ref 4.23–5.6)
RBC # BLD AUTO: 4.75 M/UL (ref 4.23–5.6)
RBC # BLD AUTO: 4.88 M/UL (ref 4.23–5.6)
RBC # BLD AUTO: 5.17 M/UL (ref 4.23–5.6)
RBC # BLD AUTO: 5.51 M/UL (ref 4.23–5.6)
SERVICE CMNT-IMP: ABNORMAL
SERVICE CMNT-IMP: NORMAL
SERVICE CMNT-IMP: NORMAL
SODIUM SERPL-SCNC: 137 MMOL/L (ref 136–145)
SODIUM SERPL-SCNC: 137 MMOL/L (ref 136–145)
SODIUM SERPL-SCNC: 138 MMOL/L (ref 136–145)
SODIUM SERPL-SCNC: 139 MMOL/L (ref 136–145)
SODIUM SERPL-SCNC: 140 MMOL/L (ref 136–145)
SODIUM SERPL-SCNC: 140 MMOL/L (ref 136–145)
SODIUM SERPL-SCNC: 141 MMOL/L (ref 136–145)
TROPONIN I SERPL-MCNC: 0.02 NG/ML (ref 0.02–0.05)
TROPONIN I SERPL-MCNC: 0.02 NG/ML (ref 0.02–0.05)
TSH SERPL DL<=0.005 MIU/L-ACNC: 3.83 UIU/ML (ref 0.36–3.74)
VANCOMYCIN TROUGH SERPL-MCNC: 10.8 UG/ML (ref 5–20)
VENTRICULAR RATE, ECG03: 103 BPM
VENTRICULAR RATE, ECG03: 90 BPM
WBC # BLD AUTO: 6.1 K/UL (ref 4.3–11.1)
WBC # BLD AUTO: 6.5 K/UL (ref 4.3–11.1)
WBC # BLD AUTO: 7.2 K/UL (ref 4.3–11.1)
WBC # BLD AUTO: 7.3 K/UL (ref 4.3–11.1)
WBC # BLD AUTO: 7.4 K/UL (ref 4.3–11.1)
WBC # BLD AUTO: 7.5 K/UL (ref 4.3–11.1)
WBC # BLD AUTO: 7.6 K/UL (ref 4.3–11.1)
WBC # BLD AUTO: 8 K/UL (ref 4.3–11.1)
WBC # BLD AUTO: 8.8 K/UL (ref 4.3–11.1)

## 2020-01-01 PROCEDURE — 3331090001 HH PPS REVENUE CREDIT

## 2020-01-01 PROCEDURE — 3331090002 HH PPS REVENUE DEBIT

## 2020-01-01 PROCEDURE — 94640 AIRWAY INHALATION TREATMENT: CPT

## 2020-01-01 PROCEDURE — G0157 HHC PT ASSISTANT EA 15: HCPCS

## 2020-01-01 PROCEDURE — 80048 BASIC METABOLIC PNL TOTAL CA: CPT

## 2020-01-01 PROCEDURE — 83735 ASSAY OF MAGNESIUM: CPT

## 2020-01-01 PROCEDURE — 85027 COMPLETE CBC AUTOMATED: CPT

## 2020-01-01 PROCEDURE — G0152 HHCP-SERV OF OT,EA 15 MIN: HCPCS

## 2020-01-01 PROCEDURE — A4450 NON-WATERPROOF TAPE: HCPCS

## 2020-01-01 PROCEDURE — G0299 HHS/HOSPICE OF RN EA 15 MIN: HCPCS

## 2020-01-01 PROCEDURE — A6216 NON-STERILE GAUZE<=16 SQ IN: HCPCS

## 2020-01-01 PROCEDURE — 94760 N-INVAS EAR/PLS OXIMETRY 1: CPT

## 2020-01-01 PROCEDURE — 74011000302 HC RX REV CODE- 302: Performed by: INTERNAL MEDICINE

## 2020-01-01 PROCEDURE — G0151 HHCP-SERV OF PT,EA 15 MIN: HCPCS

## 2020-01-01 PROCEDURE — 77030040393 HC DRSG OPTIFOAM GENT MDII -B

## 2020-01-01 PROCEDURE — 74011250637 HC RX REV CODE- 250/637: Performed by: INTERNAL MEDICINE

## 2020-01-01 PROCEDURE — 74011000250 HC RX REV CODE- 250: Performed by: INTERNAL MEDICINE

## 2020-01-01 PROCEDURE — 84443 ASSAY THYROID STIM HORMONE: CPT

## 2020-01-01 PROCEDURE — 85610 PROTHROMBIN TIME: CPT

## 2020-01-01 PROCEDURE — 65660000000 HC RM CCU STEPDOWN

## 2020-01-01 PROCEDURE — 74011250636 HC RX REV CODE- 250/636: Performed by: INTERNAL MEDICINE

## 2020-01-01 PROCEDURE — 82962 GLUCOSE BLOOD TEST: CPT

## 2020-01-01 PROCEDURE — 96375 TX/PRO/DX INJ NEW DRUG ADDON: CPT

## 2020-01-01 PROCEDURE — 77010033678 HC OXYGEN DAILY

## 2020-01-01 PROCEDURE — 97161 PT EVAL LOW COMPLEX 20 MIN: CPT

## 2020-01-01 PROCEDURE — G0158 HHC OT ASSISTANT EA 15: HCPCS

## 2020-01-01 PROCEDURE — 36415 COLL VENOUS BLD VENIPUNCTURE: CPT

## 2020-01-01 PROCEDURE — 84484 ASSAY OF TROPONIN QUANT: CPT

## 2020-01-01 PROCEDURE — 97530 THERAPEUTIC ACTIVITIES: CPT

## 2020-01-01 PROCEDURE — A9270 NON-COVERED ITEM OR SERVICE: HCPCS

## 2020-01-01 PROCEDURE — 87186 SC STD MICRODIL/AGAR DIL: CPT

## 2020-01-01 PROCEDURE — 76705 ECHO EXAM OF ABDOMEN: CPT

## 2020-01-01 PROCEDURE — 93005 ELECTROCARDIOGRAM TRACING: CPT | Performed by: EMERGENCY MEDICINE

## 2020-01-01 PROCEDURE — 96374 THER/PROPH/DIAG INJ IV PUSH: CPT

## 2020-01-01 PROCEDURE — 97165 OT EVAL LOW COMPLEX 30 MIN: CPT

## 2020-01-01 PROCEDURE — 74011636637 HC RX REV CODE- 636/637: Performed by: INTERNAL MEDICINE

## 2020-01-01 PROCEDURE — 86580 TB INTRADERMAL TEST: CPT | Performed by: INTERNAL MEDICINE

## 2020-01-01 PROCEDURE — 83880 ASSAY OF NATRIURETIC PEPTIDE: CPT

## 2020-01-01 PROCEDURE — 71046 X-RAY EXAM CHEST 2 VIEWS: CPT

## 2020-01-01 PROCEDURE — A6454 SELF-ADHER BAND W>=3" <5"/YD: HCPCS

## 2020-01-01 PROCEDURE — 80202 ASSAY OF VANCOMYCIN: CPT

## 2020-01-01 PROCEDURE — 74011000258 HC RX REV CODE- 258: Performed by: INTERNAL MEDICINE

## 2020-01-01 PROCEDURE — 87077 CULTURE AEROBIC IDENTIFY: CPT

## 2020-01-01 PROCEDURE — 73090 X-RAY EXAM OF FOREARM: CPT

## 2020-01-01 PROCEDURE — 99283 EMERGENCY DEPT VISIT LOW MDM: CPT

## 2020-01-01 PROCEDURE — 82248 BILIRUBIN DIRECT: CPT

## 2020-01-01 PROCEDURE — 80076 HEPATIC FUNCTION PANEL: CPT

## 2020-01-01 PROCEDURE — 74011250636 HC RX REV CODE- 250/636: Performed by: EMERGENCY MEDICINE

## 2020-01-01 PROCEDURE — 99285 EMERGENCY DEPT VISIT HI MDM: CPT

## 2020-01-01 PROCEDURE — 80053 COMPREHEN METABOLIC PANEL: CPT

## 2020-01-01 PROCEDURE — 99232 SBSQ HOSP IP/OBS MODERATE 35: CPT | Performed by: INTERNAL MEDICINE

## 2020-01-01 PROCEDURE — 97535 SELF CARE MNGMENT TRAINING: CPT

## 2020-01-01 PROCEDURE — 87040 BLOOD CULTURE FOR BACTERIA: CPT

## 2020-01-01 PROCEDURE — 87205 SMEAR GRAM STAIN: CPT

## 2020-01-01 PROCEDURE — 85025 COMPLETE CBC W/AUTO DIFF WBC: CPT

## 2020-01-01 PROCEDURE — 400013 HH SOC

## 2020-01-01 PROCEDURE — 83605 ASSAY OF LACTIC ACID: CPT

## 2020-01-01 PROCEDURE — A6260 WOUND CLEANSER ANY TYPE/SIZE: HCPCS

## 2020-01-01 RX ORDER — OXYCODONE HYDROCHLORIDE 10 MG/1
10 TABLET ORAL
Qty: 10 TAB | Refills: 0 | Status: SHIPPED | OUTPATIENT
Start: 2020-01-01 | End: 2020-01-01

## 2020-01-01 RX ORDER — WARFARIN 3 MG/1
3 TABLET ORAL EVERY EVENING
Qty: 10 TAB | Refills: 0 | Status: SHIPPED
Start: 2020-01-01 | End: 2020-01-01

## 2020-01-01 RX ORDER — PANTOPRAZOLE SODIUM 40 MG/1
40 TABLET, DELAYED RELEASE ORAL
Status: DISCONTINUED | OUTPATIENT
Start: 2020-01-01 | End: 2020-01-01 | Stop reason: HOSPADM

## 2020-01-01 RX ORDER — INSULIN LISPRO 100 [IU]/ML
INJECTION, SOLUTION INTRAVENOUS; SUBCUTANEOUS
Qty: 1 VIAL | Refills: 0 | Status: SHIPPED
Start: 2020-01-01

## 2020-01-01 RX ORDER — CIPROFLOXACIN 500 MG/1
500 TABLET ORAL EVERY 12 HOURS
Status: DISCONTINUED | OUTPATIENT
Start: 2020-01-01 | End: 2020-01-01 | Stop reason: HOSPADM

## 2020-01-01 RX ORDER — FUROSEMIDE 10 MG/ML
80 INJECTION INTRAMUSCULAR; INTRAVENOUS
Status: COMPLETED | OUTPATIENT
Start: 2020-01-01 | End: 2020-01-01

## 2020-01-01 RX ORDER — WARFARIN SODIUM 5 MG/1
5 TABLET ORAL EVERY EVENING
Status: DISCONTINUED | OUTPATIENT
Start: 2020-01-01 | End: 2020-01-01

## 2020-01-01 RX ORDER — INSULIN LISPRO 100 [IU]/ML
INJECTION, SOLUTION INTRAVENOUS; SUBCUTANEOUS
Status: DISCONTINUED | OUTPATIENT
Start: 2020-01-01 | End: 2020-01-01 | Stop reason: HOSPADM

## 2020-01-01 RX ORDER — FUROSEMIDE 10 MG/ML
40 INJECTION INTRAMUSCULAR; INTRAVENOUS
Status: COMPLETED | OUTPATIENT
Start: 2020-01-01 | End: 2020-01-01

## 2020-01-01 RX ORDER — WARFARIN 2 MG/1
4 TABLET ORAL EVERY EVENING
Status: DISCONTINUED | OUTPATIENT
Start: 2020-01-01 | End: 2020-01-01

## 2020-01-01 RX ORDER — DOXYCYCLINE 100 MG/1
100 TABLET ORAL 2 TIMES DAILY
Qty: 14 TAB | Refills: 0 | Status: SHIPPED
Start: 2020-01-01 | End: 2020-01-01 | Stop reason: SDUPTHER

## 2020-01-01 RX ORDER — PETROLATUM 42 G/100G
1 OINTMENT TOPICAL AS NEEDED
Status: DISCONTINUED | OUTPATIENT
Start: 2020-01-01 | End: 2020-01-01

## 2020-01-01 RX ORDER — FAMOTIDINE 20 MG/1
20 TABLET, FILM COATED ORAL EVERY 12 HOURS
Status: DISCONTINUED | OUTPATIENT
Start: 2020-01-01 | End: 2020-01-01

## 2020-01-01 RX ORDER — VANCOMYCIN 2 GRAM/500 ML IN 0.9 % SODIUM CHLORIDE INTRAVENOUS
2000 ONCE
Status: COMPLETED | OUTPATIENT
Start: 2020-01-01 | End: 2020-01-01

## 2020-01-01 RX ORDER — ASPIRIN 81 MG/1
81 TABLET ORAL DAILY
Status: DISCONTINUED | OUTPATIENT
Start: 2020-01-01 | End: 2020-01-01 | Stop reason: HOSPADM

## 2020-01-01 RX ORDER — FUROSEMIDE 10 MG/ML
40 INJECTION INTRAMUSCULAR; INTRAVENOUS 2 TIMES DAILY
Status: DISCONTINUED | OUTPATIENT
Start: 2020-01-01 | End: 2020-01-01

## 2020-01-01 RX ORDER — DOXYCYCLINE 100 MG/1
100 TABLET ORAL 2 TIMES DAILY
Qty: 14 TAB | Refills: 0 | Status: SHIPPED | OUTPATIENT
Start: 2020-01-01 | End: 2020-01-01

## 2020-01-01 RX ORDER — LEVOTHYROXINE SODIUM 100 UG/1
100 TABLET ORAL
Qty: 30 TAB | Refills: 0 | Status: SHIPPED
Start: 2020-01-01

## 2020-01-01 RX ORDER — WARFARIN SODIUM 5 MG/1
5 TABLET ORAL EVERY EVENING
Status: DISCONTINUED | OUTPATIENT
Start: 2020-01-01 | End: 2020-01-01 | Stop reason: HOSPADM

## 2020-01-01 RX ORDER — VANCOMYCIN HYDROCHLORIDE 1 G/20ML
INJECTION, POWDER, LYOPHILIZED, FOR SOLUTION INTRAVENOUS ONCE
Status: DISCONTINUED | OUTPATIENT
Start: 2020-01-01 | End: 2020-01-01 | Stop reason: CLARIF

## 2020-01-01 RX ORDER — ALBUTEROL SULFATE 0.83 MG/ML
2.5 SOLUTION RESPIRATORY (INHALATION)
Status: DISCONTINUED | OUTPATIENT
Start: 2020-01-01 | End: 2020-01-01 | Stop reason: HOSPADM

## 2020-01-01 RX ORDER — FUROSEMIDE 40 MG/1
40 TABLET ORAL EVERY 12 HOURS
Qty: 60 TAB | Refills: 0 | Status: SHIPPED | OUTPATIENT
Start: 2020-01-01

## 2020-01-01 RX ORDER — DOXYCYCLINE 100 MG/1
100 CAPSULE ORAL EVERY 12 HOURS
Status: DISCONTINUED | OUTPATIENT
Start: 2020-01-01 | End: 2020-01-01 | Stop reason: HOSPADM

## 2020-01-01 RX ORDER — LISINOPRIL 5 MG/1
10 TABLET ORAL DAILY
Status: DISCONTINUED | OUTPATIENT
Start: 2020-01-01 | End: 2020-01-01 | Stop reason: HOSPADM

## 2020-01-01 RX ORDER — ATORVASTATIN CALCIUM 20 MG/1
20 TABLET, FILM COATED ORAL DAILY
Status: DISCONTINUED | OUTPATIENT
Start: 2020-01-01 | End: 2020-01-01 | Stop reason: HOSPADM

## 2020-01-01 RX ORDER — FUROSEMIDE 40 MG/1
40 TABLET ORAL
Status: DISCONTINUED | OUTPATIENT
Start: 2020-01-01 | End: 2020-01-01 | Stop reason: HOSPADM

## 2020-01-01 RX ORDER — LANOLIN ALCOHOL/MO/W.PET/CERES
1 CREAM (GRAM) TOPICAL
Status: DISCONTINUED | OUTPATIENT
Start: 2020-01-01 | End: 2020-01-01 | Stop reason: HOSPADM

## 2020-01-01 RX ORDER — OXYCODONE HYDROCHLORIDE 5 MG/1
10 TABLET ORAL
Status: DISCONTINUED | OUTPATIENT
Start: 2020-01-01 | End: 2020-01-01

## 2020-01-01 RX ORDER — OXYCODONE HYDROCHLORIDE 5 MG/1
10 TABLET ORAL
Status: DISCONTINUED | OUTPATIENT
Start: 2020-01-01 | End: 2020-01-01 | Stop reason: HOSPADM

## 2020-01-01 RX ORDER — DOCUSATE SODIUM 100 MG/1
100 CAPSULE, LIQUID FILLED ORAL 2 TIMES DAILY
Status: DISCONTINUED | OUTPATIENT
Start: 2020-01-01 | End: 2020-01-01 | Stop reason: HOSPADM

## 2020-01-01 RX ORDER — POLYETHYLENE GLYCOL 3350 17 G/17G
17 POWDER, FOR SOLUTION ORAL DAILY
Status: DISCONTINUED | OUTPATIENT
Start: 2020-01-01 | End: 2020-01-01

## 2020-01-01 RX ORDER — MORPHINE SULFATE 2 MG/ML
2 INJECTION, SOLUTION INTRAMUSCULAR; INTRAVENOUS
Status: DISCONTINUED | OUTPATIENT
Start: 2020-01-01 | End: 2020-01-01

## 2020-01-01 RX ORDER — PANTOPRAZOLE SODIUM 40 MG/1
40 TABLET, DELAYED RELEASE ORAL
Qty: 30 TAB | Refills: 0 | Status: SHIPPED
Start: 2020-01-01

## 2020-01-01 RX ORDER — LEVOTHYROXINE SODIUM 75 UG/1
75 TABLET ORAL
Status: DISCONTINUED | OUTPATIENT
Start: 2020-01-01 | End: 2020-01-01

## 2020-01-01 RX ORDER — POTASSIUM CHLORIDE 20 MEQ/1
40 TABLET, EXTENDED RELEASE ORAL
Status: COMPLETED | OUTPATIENT
Start: 2020-01-01 | End: 2020-01-01

## 2020-01-01 RX ORDER — ACETAMINOPHEN 325 MG/1
650 TABLET ORAL
Status: DISCONTINUED | OUTPATIENT
Start: 2020-01-01 | End: 2020-01-01 | Stop reason: HOSPADM

## 2020-01-01 RX ORDER — MORPHINE SULFATE 2 MG/ML
4 INJECTION, SOLUTION INTRAMUSCULAR; INTRAVENOUS
Status: COMPLETED | OUTPATIENT
Start: 2020-01-01 | End: 2020-01-01

## 2020-01-01 RX ORDER — CIPROFLOXACIN 500 MG/1
500 TABLET ORAL 2 TIMES DAILY
Qty: 14 TAB | Refills: 0 | Status: SHIPPED | OUTPATIENT
Start: 2020-01-01 | End: 2020-01-01

## 2020-01-01 RX ORDER — CARVEDILOL 12.5 MG/1
12.5 TABLET ORAL 2 TIMES DAILY WITH MEALS
Status: DISCONTINUED | OUTPATIENT
Start: 2020-01-01 | End: 2020-01-01 | Stop reason: HOSPADM

## 2020-01-01 RX ORDER — LEVOTHYROXINE SODIUM 100 UG/1
100 TABLET ORAL
Status: DISCONTINUED | OUTPATIENT
Start: 2020-01-01 | End: 2020-01-01 | Stop reason: HOSPADM

## 2020-01-01 RX ORDER — SODIUM CHLORIDE 0.9 % (FLUSH) 0.9 %
5-40 SYRINGE (ML) INJECTION EVERY 8 HOURS
Status: DISCONTINUED | OUTPATIENT
Start: 2020-01-01 | End: 2020-01-01 | Stop reason: HOSPADM

## 2020-01-01 RX ORDER — LANOLIN ALCOHOL/MO/W.PET/CERES
400 CREAM (GRAM) TOPICAL DAILY
Status: DISCONTINUED | OUTPATIENT
Start: 2020-01-01 | End: 2020-01-01 | Stop reason: HOSPADM

## 2020-01-01 RX ORDER — SODIUM CHLORIDE 0.9 % (FLUSH) 0.9 %
5-40 SYRINGE (ML) INJECTION AS NEEDED
Status: DISCONTINUED | OUTPATIENT
Start: 2020-01-01 | End: 2020-01-01 | Stop reason: HOSPADM

## 2020-01-01 RX ORDER — POLYETHYLENE GLYCOL 3350 17 G/17G
17 POWDER, FOR SOLUTION ORAL DAILY
Status: DISCONTINUED | OUTPATIENT
Start: 2020-01-01 | End: 2020-01-01 | Stop reason: HOSPADM

## 2020-01-01 RX ORDER — MEMANTINE HYDROCHLORIDE 5 MG/1
10 TABLET ORAL 2 TIMES DAILY
Status: DISCONTINUED | OUTPATIENT
Start: 2020-01-01 | End: 2020-01-01 | Stop reason: HOSPADM

## 2020-01-01 RX ORDER — MAG HYDROX/ALUMINUM HYD/SIMETH 200-200-20
30 SUSPENSION, ORAL (FINAL DOSE FORM) ORAL
Status: DISCONTINUED | OUTPATIENT
Start: 2020-01-01 | End: 2020-01-01 | Stop reason: HOSPADM

## 2020-01-01 RX ORDER — CIPROFLOXACIN 500 MG/1
500 TABLET ORAL 2 TIMES DAILY
Qty: 14 TAB | Refills: 0 | Status: SHIPPED
Start: 2020-01-01 | End: 2020-01-01 | Stop reason: SDUPTHER

## 2020-01-01 RX ORDER — DOCUSATE SODIUM 100 MG/1
100 CAPSULE, LIQUID FILLED ORAL 2 TIMES DAILY
Qty: 60 CAP | Refills: 0 | Status: SHIPPED
Start: 2020-01-01 | End: 2020-01-01

## 2020-01-01 RX ADMIN — ASPIRIN 81 MG: 81 TABLET ORAL at 08:50

## 2020-01-01 RX ADMIN — PANTOPRAZOLE SODIUM 40 MG: 40 TABLET, DELAYED RELEASE ORAL at 16:49

## 2020-01-01 RX ADMIN — POLYETHYLENE GLYCOL 3350 17 G: 17 POWDER, FOR SOLUTION ORAL at 08:50

## 2020-01-01 RX ADMIN — PANTOPRAZOLE SODIUM 40 MG: 40 TABLET, DELAYED RELEASE ORAL at 05:48

## 2020-01-01 RX ADMIN — OXYCODONE HYDROCHLORIDE 10 MG: 5 TABLET ORAL at 16:58

## 2020-01-01 RX ADMIN — POLYETHYLENE GLYCOL 3350 17 G: 17 POWDER, FOR SOLUTION ORAL at 09:01

## 2020-01-01 RX ADMIN — CIPROFLOXACIN 500 MG: 500 TABLET, FILM COATED ORAL at 09:11

## 2020-01-01 RX ADMIN — PANTOPRAZOLE SODIUM 40 MG: 40 TABLET, DELAYED RELEASE ORAL at 07:30

## 2020-01-01 RX ADMIN — VANCOMYCIN HYDROCHLORIDE 1000 MG: 1 INJECTION, POWDER, LYOPHILIZED, FOR SOLUTION INTRAVENOUS at 12:30

## 2020-01-01 RX ADMIN — MORPHINE SULFATE 4 MG: 2 INJECTION, SOLUTION INTRAMUSCULAR; INTRAVENOUS at 16:56

## 2020-01-01 RX ADMIN — WARFARIN SODIUM 5 MG: 5 TABLET ORAL at 17:01

## 2020-01-01 RX ADMIN — OXYCODONE HYDROCHLORIDE 10 MG: 5 TABLET ORAL at 05:31

## 2020-01-01 RX ADMIN — FERROUS SULFATE TAB 325 MG (65 MG ELEMENTAL FE) 325 MG: 325 (65 FE) TAB at 09:20

## 2020-01-01 RX ADMIN — CARVEDILOL 12.5 MG: 12.5 TABLET, FILM COATED ORAL at 09:12

## 2020-01-01 RX ADMIN — ACETAMINOPHEN 650 MG: 325 TABLET, FILM COATED ORAL at 12:39

## 2020-01-01 RX ADMIN — OXYCODONE HYDROCHLORIDE 10 MG: 5 TABLET ORAL at 03:10

## 2020-01-01 RX ADMIN — PANTOPRAZOLE SODIUM 40 MG: 40 TABLET, DELAYED RELEASE ORAL at 05:51

## 2020-01-01 RX ADMIN — VANCOMYCIN HYDROCHLORIDE 1000 MG: 1 INJECTION, POWDER, LYOPHILIZED, FOR SOLUTION INTRAVENOUS at 19:58

## 2020-01-01 RX ADMIN — ALBUTEROL SULFATE 2.5 MG: 2.5 SOLUTION RESPIRATORY (INHALATION) at 20:34

## 2020-01-01 RX ADMIN — CARVEDILOL 12.5 MG: 12.5 TABLET, FILM COATED ORAL at 17:22

## 2020-01-01 RX ADMIN — Medication 1 AMPULE: at 08:50

## 2020-01-01 RX ADMIN — INSULIN LISPRO 2 UNITS: 100 INJECTION, SOLUTION INTRAVENOUS; SUBCUTANEOUS at 11:41

## 2020-01-01 RX ADMIN — OXYCODONE HYDROCHLORIDE 10 MG: 5 TABLET ORAL at 19:58

## 2020-01-01 RX ADMIN — OXYCODONE HYDROCHLORIDE 10 MG: 5 TABLET ORAL at 12:58

## 2020-01-01 RX ADMIN — MULTIPLE VITAMINS W/ MINERALS TAB 1 TABLET: TAB at 09:52

## 2020-01-01 RX ADMIN — POTASSIUM CHLORIDE 40 MEQ: 20 TABLET, EXTENDED RELEASE ORAL at 09:00

## 2020-01-01 RX ADMIN — FUROSEMIDE 40 MG: 40 INJECTION, SOLUTION INTRAMUSCULAR; INTRAVENOUS at 09:01

## 2020-01-01 RX ADMIN — MORPHINE SULFATE 2 MG: 2 INJECTION, SOLUTION INTRAMUSCULAR; INTRAVENOUS at 09:32

## 2020-01-01 RX ADMIN — PIPERACILLIN AND TAZOBACTAM 3.38 G: 3; .375 INJECTION, POWDER, FOR SOLUTION INTRAVENOUS at 12:08

## 2020-01-01 RX ADMIN — ALBUTEROL SULFATE 2.5 MG: 2.5 SOLUTION RESPIRATORY (INHALATION) at 13:17

## 2020-01-01 RX ADMIN — ASPIRIN 81 MG: 81 TABLET ORAL at 09:20

## 2020-01-01 RX ADMIN — OXYCODONE HYDROCHLORIDE 10 MG: 5 TABLET ORAL at 22:36

## 2020-01-01 RX ADMIN — MEMANTINE 10 MG: 5 TABLET ORAL at 18:24

## 2020-01-01 RX ADMIN — DOCUSATE SODIUM 100 MG: 100 CAPSULE, LIQUID FILLED ORAL at 09:52

## 2020-01-01 RX ADMIN — Medication 10 ML: at 16:25

## 2020-01-01 RX ADMIN — Medication 10 ML: at 12:27

## 2020-01-01 RX ADMIN — OXYCODONE HYDROCHLORIDE 10 MG: 5 TABLET ORAL at 00:51

## 2020-01-01 RX ADMIN — DOXYCYCLINE HYCLATE 100 MG: 100 CAPSULE ORAL at 09:11

## 2020-01-01 RX ADMIN — DOXYCYCLINE HYCLATE 100 MG: 100 CAPSULE ORAL at 09:50

## 2020-01-01 RX ADMIN — TUBERCULIN PURIFIED PROTEIN DERIVATIVE 5 UNITS: 5 INJECTION, SOLUTION INTRADERMAL at 19:48

## 2020-01-01 RX ADMIN — PIPERACILLIN AND TAZOBACTAM 3.38 G: 3; .375 INJECTION, POWDER, FOR SOLUTION INTRAVENOUS at 03:00

## 2020-01-01 RX ADMIN — Medication 10 ML: at 06:31

## 2020-01-01 RX ADMIN — CIPROFLOXACIN 500 MG: 500 TABLET, FILM COATED ORAL at 09:50

## 2020-01-01 RX ADMIN — PANTOPRAZOLE SODIUM 40 MG: 40 TABLET, DELAYED RELEASE ORAL at 05:40

## 2020-01-01 RX ADMIN — ALUMINUM HYDROXIDE, MAGNESIUM HYDROXIDE, AND SIMETHICONE 30 ML: 200; 200; 20 SUSPENSION ORAL at 01:00

## 2020-01-01 RX ADMIN — FUROSEMIDE 40 MG: 40 INJECTION, SOLUTION INTRAMUSCULAR; INTRAVENOUS at 08:51

## 2020-01-01 RX ADMIN — Medication 10 ML: at 21:00

## 2020-01-01 RX ADMIN — FAMOTIDINE 20 MG: 20 TABLET, FILM COATED ORAL at 09:16

## 2020-01-01 RX ADMIN — PIPERACILLIN AND TAZOBACTAM 3.38 G: 3; .375 INJECTION, POWDER, FOR SOLUTION INTRAVENOUS at 03:24

## 2020-01-01 RX ADMIN — PIPERACILLIN AND TAZOBACTAM 3.38 G: 3; .375 INJECTION, POWDER, FOR SOLUTION INTRAVENOUS at 20:17

## 2020-01-01 RX ADMIN — LEVOTHYROXINE SODIUM 75 MCG: 0.07 TABLET ORAL at 05:36

## 2020-01-01 RX ADMIN — OXYCODONE HYDROCHLORIDE 10 MG: 5 TABLET ORAL at 19:42

## 2020-01-01 RX ADMIN — ATORVASTATIN CALCIUM 20 MG: 40 TABLET, FILM COATED ORAL at 09:20

## 2020-01-01 RX ADMIN — Medication 10 ML: at 21:31

## 2020-01-01 RX ADMIN — VANCOMYCIN HYDROCHLORIDE 2000 MG: 10 INJECTION, POWDER, LYOPHILIZED, FOR SOLUTION INTRAVENOUS at 16:26

## 2020-01-01 RX ADMIN — Medication 10 ML: at 16:33

## 2020-01-01 RX ADMIN — Medication 1 AMPULE: at 20:15

## 2020-01-01 RX ADMIN — POLYETHYLENE GLYCOL 3350 17 G: 17 POWDER, FOR SOLUTION ORAL at 09:56

## 2020-01-01 RX ADMIN — CARVEDILOL 12.5 MG: 12.5 TABLET, FILM COATED ORAL at 09:49

## 2020-01-01 RX ADMIN — INSULIN LISPRO 2 UNITS: 100 INJECTION, SOLUTION INTRAVENOUS; SUBCUTANEOUS at 12:39

## 2020-01-01 RX ADMIN — FUROSEMIDE 40 MG: 40 TABLET ORAL at 09:52

## 2020-01-01 RX ADMIN — CARVEDILOL 12.5 MG: 12.5 TABLET, FILM COATED ORAL at 16:32

## 2020-01-01 RX ADMIN — ALBUTEROL SULFATE 2.5 MG: 2.5 SOLUTION RESPIRATORY (INHALATION) at 09:04

## 2020-01-01 RX ADMIN — FUROSEMIDE 40 MG: 40 TABLET ORAL at 17:22

## 2020-01-01 RX ADMIN — FERROUS SULFATE TAB 325 MG (65 MG ELEMENTAL FE) 325 MG: 325 (65 FE) TAB at 09:12

## 2020-01-01 RX ADMIN — CARVEDILOL 12.5 MG: 12.5 TABLET, FILM COATED ORAL at 16:28

## 2020-01-01 RX ADMIN — LISINOPRIL 10 MG: 5 TABLET ORAL at 09:17

## 2020-01-01 RX ADMIN — FERROUS SULFATE TAB 325 MG (65 MG ELEMENTAL FE) 325 MG: 325 (65 FE) TAB at 09:49

## 2020-01-01 RX ADMIN — MEMANTINE 10 MG: 5 TABLET ORAL at 09:49

## 2020-01-01 RX ADMIN — ASPIRIN 81 MG: 81 TABLET ORAL at 09:49

## 2020-01-01 RX ADMIN — ALBUTEROL SULFATE 2.5 MG: 2.5 SOLUTION RESPIRATORY (INHALATION) at 19:42

## 2020-01-01 RX ADMIN — MEMANTINE 10 MG: 5 TABLET ORAL at 09:20

## 2020-01-01 RX ADMIN — PIPERACILLIN AND TAZOBACTAM 3.38 G: 3; .375 INJECTION, POWDER, FOR SOLUTION INTRAVENOUS at 11:45

## 2020-01-01 RX ADMIN — DOCUSATE SODIUM 100 MG: 100 CAPSULE, LIQUID FILLED ORAL at 09:00

## 2020-01-01 RX ADMIN — Medication 1 AMPULE: at 22:13

## 2020-01-01 RX ADMIN — DOXYCYCLINE HYCLATE 100 MG: 100 CAPSULE ORAL at 09:20

## 2020-01-01 RX ADMIN — MULTIPLE VITAMINS W/ MINERALS TAB 1 TABLET: TAB at 08:50

## 2020-01-01 RX ADMIN — Medication 1 AMPULE: at 09:53

## 2020-01-01 RX ADMIN — MULTIPLE VITAMINS W/ MINERALS TAB 1 TABLET: TAB at 09:49

## 2020-01-01 RX ADMIN — FERROUS SULFATE TAB 325 MG (65 MG ELEMENTAL FE) 325 MG: 325 (65 FE) TAB at 08:48

## 2020-01-01 RX ADMIN — ASPIRIN 81 MG: 81 TABLET ORAL at 09:52

## 2020-01-01 RX ADMIN — FUROSEMIDE 40 MG: 40 INJECTION, SOLUTION INTRAMUSCULAR; INTRAVENOUS at 09:17

## 2020-01-01 RX ADMIN — OXYCODONE HYDROCHLORIDE 10 MG: 5 TABLET ORAL at 16:24

## 2020-01-01 RX ADMIN — ATORVASTATIN CALCIUM 20 MG: 40 TABLET, FILM COATED ORAL at 09:12

## 2020-01-01 RX ADMIN — PANTOPRAZOLE SODIUM 40 MG: 40 TABLET, DELAYED RELEASE ORAL at 05:36

## 2020-01-01 RX ADMIN — LISINOPRIL 10 MG: 5 TABLET ORAL at 09:10

## 2020-01-01 RX ADMIN — ALBUTEROL SULFATE 2.5 MG: 2.5 SOLUTION RESPIRATORY (INHALATION) at 15:10

## 2020-01-01 RX ADMIN — ALBUTEROL SULFATE 2.5 MG: 2.5 SOLUTION RESPIRATORY (INHALATION) at 08:33

## 2020-01-01 RX ADMIN — ATORVASTATIN CALCIUM 20 MG: 40 TABLET, FILM COATED ORAL at 09:49

## 2020-01-01 RX ADMIN — Medication 400 MG: at 09:52

## 2020-01-01 RX ADMIN — OXYCODONE HYDROCHLORIDE 10 MG: 5 TABLET ORAL at 16:32

## 2020-01-01 RX ADMIN — MORPHINE SULFATE 2 MG: 2 INJECTION, SOLUTION INTRAMUSCULAR; INTRAVENOUS at 04:00

## 2020-01-01 RX ADMIN — MULTIPLE VITAMINS W/ MINERALS TAB 1 TABLET: TAB at 09:20

## 2020-01-01 RX ADMIN — MEMANTINE 10 MG: 5 TABLET ORAL at 16:29

## 2020-01-01 RX ADMIN — FUROSEMIDE 40 MG: 40 TABLET ORAL at 07:30

## 2020-01-01 RX ADMIN — DOCUSATE SODIUM 100 MG: 100 CAPSULE, LIQUID FILLED ORAL at 17:49

## 2020-01-01 RX ADMIN — VANCOMYCIN HYDROCHLORIDE 1000 MG: 1 INJECTION, POWDER, LYOPHILIZED, FOR SOLUTION INTRAVENOUS at 23:59

## 2020-01-01 RX ADMIN — OXYCODONE HYDROCHLORIDE 10 MG: 5 TABLET ORAL at 12:19

## 2020-01-01 RX ADMIN — OXYCODONE HYDROCHLORIDE 10 MG: 5 TABLET ORAL at 09:53

## 2020-01-01 RX ADMIN — FERROUS SULFATE TAB 325 MG (65 MG ELEMENTAL FE) 325 MG: 325 (65 FE) TAB at 09:52

## 2020-01-01 RX ADMIN — Medication 10 ML: at 12:07

## 2020-01-01 RX ADMIN — MULTIPLE VITAMINS W/ MINERALS TAB 1 TABLET: TAB at 09:11

## 2020-01-01 RX ADMIN — ACETAMINOPHEN 650 MG: 325 TABLET, FILM COATED ORAL at 21:30

## 2020-01-01 RX ADMIN — ASPIRIN 81 MG: 81 TABLET ORAL at 08:57

## 2020-01-01 RX ADMIN — OXYCODONE HYDROCHLORIDE 10 MG: 5 TABLET ORAL at 08:50

## 2020-01-01 RX ADMIN — DOCUSATE SODIUM 100 MG: 100 CAPSULE, LIQUID FILLED ORAL at 16:59

## 2020-01-01 RX ADMIN — MEMANTINE 10 MG: 5 TABLET ORAL at 17:49

## 2020-01-01 RX ADMIN — PIPERACILLIN AND TAZOBACTAM 3.38 G: 3; .375 INJECTION, POWDER, FOR SOLUTION INTRAVENOUS at 18:10

## 2020-01-01 RX ADMIN — PIPERACILLIN AND TAZOBACTAM 3.38 G: 3; .375 INJECTION, POWDER, FOR SOLUTION INTRAVENOUS at 19:37

## 2020-01-01 RX ADMIN — PIPERACILLIN AND TAZOBACTAM 3.38 G: 3; .375 INJECTION, POWDER, FOR SOLUTION INTRAVENOUS at 19:44

## 2020-01-01 RX ADMIN — INSULIN LISPRO 2 UNITS: 100 INJECTION, SOLUTION INTRAVENOUS; SUBCUTANEOUS at 12:06

## 2020-01-01 RX ADMIN — DOCUSATE SODIUM 100 MG: 100 CAPSULE, LIQUID FILLED ORAL at 09:49

## 2020-01-01 RX ADMIN — ALBUTEROL SULFATE 2.5 MG: 2.5 SOLUTION RESPIRATORY (INHALATION) at 08:28

## 2020-01-01 RX ADMIN — Medication 1 AMPULE: at 09:11

## 2020-01-01 RX ADMIN — OXYCODONE HYDROCHLORIDE 10 MG: 5 TABLET ORAL at 20:14

## 2020-01-01 RX ADMIN — ACETAMINOPHEN 650 MG: 325 TABLET, FILM COATED ORAL at 20:30

## 2020-01-01 RX ADMIN — FUROSEMIDE 40 MG: 40 TABLET ORAL at 16:59

## 2020-01-01 RX ADMIN — OXYCODONE HYDROCHLORIDE 10 MG: 5 TABLET ORAL at 13:15

## 2020-01-01 RX ADMIN — ALBUTEROL SULFATE 2.5 MG: 2.5 SOLUTION RESPIRATORY (INHALATION) at 07:28

## 2020-01-01 RX ADMIN — CARVEDILOL 12.5 MG: 12.5 TABLET, FILM COATED ORAL at 09:52

## 2020-01-01 RX ADMIN — ACETAMINOPHEN 650 MG: 325 TABLET, FILM COATED ORAL at 08:49

## 2020-01-01 RX ADMIN — LISINOPRIL 10 MG: 5 TABLET ORAL at 09:49

## 2020-01-01 RX ADMIN — Medication 400 MG: at 09:49

## 2020-01-01 RX ADMIN — ASPIRIN 81 MG: 81 TABLET ORAL at 09:16

## 2020-01-01 RX ADMIN — Medication 400 MG: at 09:16

## 2020-01-01 RX ADMIN — VANCOMYCIN HYDROCHLORIDE 1000 MG: 1 INJECTION, POWDER, LYOPHILIZED, FOR SOLUTION INTRAVENOUS at 05:47

## 2020-01-01 RX ADMIN — CIPROFLOXACIN 500 MG: 500 TABLET, FILM COATED ORAL at 09:20

## 2020-01-01 RX ADMIN — MEMANTINE 10 MG: 5 TABLET ORAL at 08:50

## 2020-01-01 RX ADMIN — Medication 10 ML: at 22:04

## 2020-01-01 RX ADMIN — DOCUSATE SODIUM 100 MG: 100 CAPSULE, LIQUID FILLED ORAL at 08:58

## 2020-01-01 RX ADMIN — CARVEDILOL 12.5 MG: 12.5 TABLET, FILM COATED ORAL at 17:49

## 2020-01-01 RX ADMIN — CIPROFLOXACIN 500 MG: 500 TABLET, FILM COATED ORAL at 22:12

## 2020-01-01 RX ADMIN — DOCUSATE SODIUM 100 MG: 100 CAPSULE, LIQUID FILLED ORAL at 16:28

## 2020-01-01 RX ADMIN — Medication 10 ML: at 20:15

## 2020-01-01 RX ADMIN — FUROSEMIDE 40 MG: 40 TABLET ORAL at 17:49

## 2020-01-01 RX ADMIN — Medication 400 MG: at 08:50

## 2020-01-01 RX ADMIN — Medication: at 20:41

## 2020-01-01 RX ADMIN — POLYETHYLENE GLYCOL 3350 17 G: 17 POWDER, FOR SOLUTION ORAL at 09:13

## 2020-01-01 RX ADMIN — FUROSEMIDE 40 MG: 40 TABLET ORAL at 09:20

## 2020-01-01 RX ADMIN — ALUMINUM HYDROXIDE, MAGNESIUM HYDROXIDE, AND SIMETHICONE 30 ML: 200; 200; 20 SUSPENSION ORAL at 22:35

## 2020-01-01 RX ADMIN — ACETAMINOPHEN 650 MG: 325 TABLET, FILM COATED ORAL at 12:08

## 2020-01-01 RX ADMIN — MEMANTINE 10 MG: 5 TABLET ORAL at 09:11

## 2020-01-01 RX ADMIN — Medication 1 AMPULE: at 09:06

## 2020-01-01 RX ADMIN — CARVEDILOL 12.5 MG: 12.5 TABLET, FILM COATED ORAL at 09:20

## 2020-01-01 RX ADMIN — FERROUS SULFATE TAB 325 MG (65 MG ELEMENTAL FE) 325 MG: 325 (65 FE) TAB at 08:50

## 2020-01-01 RX ADMIN — CARVEDILOL 12.5 MG: 12.5 TABLET, FILM COATED ORAL at 08:58

## 2020-01-01 RX ADMIN — OXYCODONE HYDROCHLORIDE 10 MG: 5 TABLET ORAL at 09:19

## 2020-01-01 RX ADMIN — PANTOPRAZOLE SODIUM 40 MG: 40 TABLET, DELAYED RELEASE ORAL at 05:27

## 2020-01-01 RX ADMIN — OXYCODONE HYDROCHLORIDE 10 MG: 5 TABLET ORAL at 05:40

## 2020-01-01 RX ADMIN — FERROUS SULFATE TAB 325 MG (65 MG ELEMENTAL FE) 325 MG: 325 (65 FE) TAB at 09:16

## 2020-01-01 RX ADMIN — DOCUSATE SODIUM 100 MG: 100 CAPSULE, LIQUID FILLED ORAL at 16:49

## 2020-01-01 RX ADMIN — LISINOPRIL 10 MG: 5 TABLET ORAL at 09:52

## 2020-01-01 RX ADMIN — PIPERACILLIN AND TAZOBACTAM 3.38 G: 3; .375 INJECTION, POWDER, FOR SOLUTION INTRAVENOUS at 05:25

## 2020-01-01 RX ADMIN — ALBUTEROL SULFATE 2.5 MG: 2.5 SOLUTION RESPIRATORY (INHALATION) at 20:14

## 2020-01-01 RX ADMIN — PIPERACILLIN AND TAZOBACTAM 3.38 G: 3; .375 INJECTION, POWDER, FOR SOLUTION INTRAVENOUS at 17:49

## 2020-01-01 RX ADMIN — Medication 400 MG: at 09:20

## 2020-01-01 RX ADMIN — LEVOTHYROXINE SODIUM 100 MCG: 0.1 TABLET ORAL at 05:40

## 2020-01-01 RX ADMIN — ALBUTEROL SULFATE 2.5 MG: 2.5 SOLUTION RESPIRATORY (INHALATION) at 07:02

## 2020-01-01 RX ADMIN — POLYETHYLENE GLYCOL 3350 17 G: 17 POWDER, FOR SOLUTION ORAL at 09:00

## 2020-01-01 RX ADMIN — WARFARIN SODIUM 3 MG: 2 TABLET ORAL at 19:59

## 2020-01-01 RX ADMIN — LISINOPRIL 10 MG: 5 TABLET ORAL at 08:58

## 2020-01-01 RX ADMIN — ATORVASTATIN CALCIUM 20 MG: 40 TABLET, FILM COATED ORAL at 09:52

## 2020-01-01 RX ADMIN — DOCUSATE SODIUM 100 MG: 100 CAPSULE, LIQUID FILLED ORAL at 18:24

## 2020-01-01 RX ADMIN — DOCUSATE SODIUM 100 MG: 100 CAPSULE, LIQUID FILLED ORAL at 17:22

## 2020-01-01 RX ADMIN — FUROSEMIDE 40 MG: 10 INJECTION, SOLUTION INTRAMUSCULAR; INTRAVENOUS at 16:22

## 2020-01-01 RX ADMIN — INSULIN LISPRO 2 UNITS: 100 INJECTION, SOLUTION INTRAVENOUS; SUBCUTANEOUS at 09:50

## 2020-01-01 RX ADMIN — Medication 10 ML: at 05:27

## 2020-01-01 RX ADMIN — CARVEDILOL 12.5 MG: 12.5 TABLET, FILM COATED ORAL at 09:17

## 2020-01-01 RX ADMIN — Medication 10 ML: at 13:21

## 2020-01-01 RX ADMIN — MEMANTINE 10 MG: 5 TABLET ORAL at 19:42

## 2020-01-01 RX ADMIN — CIPROFLOXACIN 500 MG: 500 TABLET, FILM COATED ORAL at 22:36

## 2020-01-01 RX ADMIN — ALBUTEROL SULFATE 2.5 MG: 2.5 SOLUTION RESPIRATORY (INHALATION) at 13:50

## 2020-01-01 RX ADMIN — FUROSEMIDE 80 MG: 10 INJECTION, SOLUTION INTRAMUSCULAR; INTRAVENOUS at 16:52

## 2020-01-01 RX ADMIN — ACETAMINOPHEN 650 MG: 325 TABLET, FILM COATED ORAL at 03:24

## 2020-01-01 RX ADMIN — LEVOTHYROXINE SODIUM 75 MCG: 0.07 TABLET ORAL at 04:57

## 2020-01-01 RX ADMIN — ATORVASTATIN CALCIUM 20 MG: 40 TABLET, FILM COATED ORAL at 09:16

## 2020-01-01 RX ADMIN — MEMANTINE 10 MG: 5 TABLET ORAL at 08:59

## 2020-01-01 RX ADMIN — MEMANTINE 10 MG: 5 TABLET ORAL at 09:16

## 2020-01-01 RX ADMIN — ALBUTEROL SULFATE 2.5 MG: 2.5 SOLUTION RESPIRATORY (INHALATION) at 15:49

## 2020-01-01 RX ADMIN — LEVOTHYROXINE SODIUM 100 MCG: 0.1 TABLET ORAL at 05:27

## 2020-01-01 RX ADMIN — OXYCODONE HYDROCHLORIDE 10 MG: 5 TABLET ORAL at 04:11

## 2020-01-01 RX ADMIN — Medication 1 AMPULE: at 09:20

## 2020-01-01 RX ADMIN — MEMANTINE 10 MG: 5 TABLET ORAL at 17:22

## 2020-01-01 RX ADMIN — PIPERACILLIN AND TAZOBACTAM 3.38 G: 3; .375 INJECTION, POWDER, FOR SOLUTION INTRAVENOUS at 06:29

## 2020-01-01 RX ADMIN — Medication 10 ML: at 05:49

## 2020-01-01 RX ADMIN — Medication 1 AMPULE: at 16:48

## 2020-01-01 RX ADMIN — OXYCODONE HYDROCHLORIDE 10 MG: 5 TABLET ORAL at 04:53

## 2020-01-01 RX ADMIN — LISINOPRIL 10 MG: 5 TABLET ORAL at 08:49

## 2020-01-01 RX ADMIN — ASPIRIN 81 MG: 81 TABLET ORAL at 09:13

## 2020-01-01 RX ADMIN — PIPERACILLIN AND TAZOBACTAM 3.38 G: 3; .375 INJECTION, POWDER, FOR SOLUTION INTRAVENOUS at 22:15

## 2020-01-01 RX ADMIN — WARFARIN SODIUM 5 MG: 5 TABLET ORAL at 17:00

## 2020-01-01 RX ADMIN — Medication 1 AMPULE: at 22:35

## 2020-01-01 RX ADMIN — LISINOPRIL 10 MG: 5 TABLET ORAL at 09:20

## 2020-01-01 RX ADMIN — FUROSEMIDE 40 MG: 40 INJECTION, SOLUTION INTRAMUSCULAR; INTRAVENOUS at 16:27

## 2020-01-01 RX ADMIN — MULTIPLE VITAMINS W/ MINERALS TAB 1 TABLET: TAB at 09:16

## 2020-01-01 RX ADMIN — ATORVASTATIN CALCIUM 20 MG: 40 TABLET, FILM COATED ORAL at 08:48

## 2020-01-01 RX ADMIN — PIPERACILLIN AND TAZOBACTAM 3.38 G: 3; .375 INJECTION, POWDER, FOR SOLUTION INTRAVENOUS at 12:41

## 2020-01-01 RX ADMIN — DOCUSATE SODIUM 100 MG: 100 CAPSULE, LIQUID FILLED ORAL at 09:12

## 2020-01-01 RX ADMIN — ATORVASTATIN CALCIUM 20 MG: 40 TABLET, FILM COATED ORAL at 08:50

## 2020-01-01 RX ADMIN — CARVEDILOL 12.5 MG: 12.5 TABLET, FILM COATED ORAL at 08:50

## 2020-01-01 RX ADMIN — Medication 10 ML: at 05:37

## 2020-01-01 RX ADMIN — Medication 1 AMPULE: at 22:03

## 2020-01-01 RX ADMIN — ALBUTEROL SULFATE 2.5 MG: 2.5 SOLUTION RESPIRATORY (INHALATION) at 07:31

## 2020-01-01 RX ADMIN — FAMOTIDINE 20 MG: 20 TABLET, FILM COATED ORAL at 20:57

## 2020-01-01 RX ADMIN — DOXYCYCLINE HYCLATE 100 MG: 100 CAPSULE ORAL at 22:12

## 2020-01-01 RX ADMIN — Medication 1 AMPULE: at 21:30

## 2020-01-01 RX ADMIN — ALBUTEROL SULFATE 2.5 MG: 2.5 SOLUTION RESPIRATORY (INHALATION) at 14:29

## 2020-01-01 RX ADMIN — ALBUTEROL SULFATE 2.5 MG: 2.5 SOLUTION RESPIRATORY (INHALATION) at 19:24

## 2020-01-01 RX ADMIN — LEVOTHYROXINE SODIUM 100 MCG: 0.1 TABLET ORAL at 05:48

## 2020-01-01 RX ADMIN — FUROSEMIDE 40 MG: 40 INJECTION, SOLUTION INTRAMUSCULAR; INTRAVENOUS at 17:01

## 2020-01-01 RX ADMIN — FUROSEMIDE 40 MG: 40 INJECTION, SOLUTION INTRAMUSCULAR; INTRAVENOUS at 18:24

## 2020-01-01 RX ADMIN — MEMANTINE 10 MG: 5 TABLET ORAL at 17:01

## 2020-01-01 RX ADMIN — CARVEDILOL 12.5 MG: 12.5 TABLET, FILM COATED ORAL at 16:59

## 2020-01-01 RX ADMIN — WARFARIN SODIUM 4 MG: 2 TABLET ORAL at 17:22

## 2020-01-01 RX ADMIN — ALBUTEROL SULFATE 2.5 MG: 2.5 SOLUTION RESPIRATORY (INHALATION) at 19:54

## 2020-01-01 RX ADMIN — LEVOTHYROXINE SODIUM 100 MCG: 0.1 TABLET ORAL at 05:51

## 2020-01-01 RX ADMIN — POLYETHYLENE GLYCOL 3350 17 G: 17 POWDER, FOR SOLUTION ORAL at 09:17

## 2020-01-01 RX ADMIN — FUROSEMIDE 40 MG: 40 TABLET ORAL at 09:12

## 2020-01-01 RX ADMIN — ALBUTEROL SULFATE 2.5 MG: 2.5 SOLUTION RESPIRATORY (INHALATION) at 07:00

## 2020-01-01 RX ADMIN — MORPHINE SULFATE 2 MG: 2 INJECTION, SOLUTION INTRAMUSCULAR; INTRAVENOUS at 20:33

## 2020-01-01 RX ADMIN — PIPERACILLIN AND TAZOBACTAM 3.38 G: 3; .375 INJECTION, POWDER, FOR SOLUTION INTRAVENOUS at 03:25

## 2020-01-01 RX ADMIN — ALBUTEROL SULFATE 2.5 MG: 2.5 SOLUTION RESPIRATORY (INHALATION) at 20:17

## 2020-01-01 RX ADMIN — CARVEDILOL 12.5 MG: 12.5 TABLET, FILM COATED ORAL at 19:42

## 2020-01-01 RX ADMIN — POLYETHYLENE GLYCOL 3350 17 G: 17 POWDER, FOR SOLUTION ORAL at 09:19

## 2020-01-01 RX ADMIN — OXYCODONE HYDROCHLORIDE 10 MG: 5 TABLET ORAL at 11:25

## 2020-01-01 RX ADMIN — ALBUTEROL SULFATE 2.5 MG: 2.5 SOLUTION RESPIRATORY (INHALATION) at 13:00

## 2020-01-01 RX ADMIN — Medication 1 AMPULE: at 09:49

## 2020-01-01 RX ADMIN — DOCUSATE SODIUM 100 MG: 100 CAPSULE, LIQUID FILLED ORAL at 09:20

## 2020-01-01 RX ADMIN — ALBUTEROL SULFATE 2.5 MG: 2.5 SOLUTION RESPIRATORY (INHALATION) at 13:03

## 2020-01-01 RX ADMIN — OXYCODONE HYDROCHLORIDE 10 MG: 5 TABLET ORAL at 07:15

## 2020-01-01 RX ADMIN — Medication 10 ML: at 22:16

## 2020-01-01 RX ADMIN — Medication 400 MG: at 09:12

## 2020-01-01 RX ADMIN — INSULIN LISPRO 2 UNITS: 100 INJECTION, SOLUTION INTRAVENOUS; SUBCUTANEOUS at 22:24

## 2020-01-01 RX ADMIN — MORPHINE SULFATE 2 MG: 2 INJECTION, SOLUTION INTRAMUSCULAR; INTRAVENOUS at 10:13

## 2020-01-01 RX ADMIN — INSULIN LISPRO 2 UNITS: 100 INJECTION, SOLUTION INTRAVENOUS; SUBCUTANEOUS at 22:36

## 2020-01-01 RX ADMIN — Medication 400 MG: at 08:49

## 2020-01-01 RX ADMIN — ACETAMINOPHEN 650 MG: 325 TABLET, FILM COATED ORAL at 09:50

## 2020-01-01 RX ADMIN — MEMANTINE 10 MG: 5 TABLET ORAL at 17:00

## 2020-01-01 RX ADMIN — MEMANTINE 10 MG: 5 TABLET ORAL at 09:52

## 2020-01-01 RX ADMIN — DOXYCYCLINE HYCLATE 100 MG: 100 CAPSULE ORAL at 22:36

## 2020-01-01 RX ADMIN — WARFARIN SODIUM 5 MG: 5 TABLET ORAL at 20:57

## 2020-01-06 PROBLEM — I50.810 RIGHT HEART FAILURE DUE TO PULMONARY HYPERTENSION (HCC): Status: ACTIVE | Noted: 2020-01-01

## 2020-01-06 PROBLEM — I27.29 RIGHT HEART FAILURE DUE TO PULMONARY HYPERTENSION (HCC): Status: ACTIVE | Noted: 2020-01-01

## 2020-01-08 NOTE — PROGRESS NOTES
Called pt to inform of lab results. No answer. Left message on voice mail asking for a return call concerning lab results. Frankey Mule

## 2020-01-15 NOTE — PROGRESS NOTES
Called to inform pt of lab results. Pt unavailable. Spoke with wife. Informed of pt's normal lab results voiced understanding and will relay to pt. Asked wife to have pt to call if any problems or concerns.   Wife voiced understanding and agreed to do so./wc

## 2020-03-12 NOTE — PROGRESS NOTES
REESE FORD reached out to pt regarding a referral for in home assistance. Pt did not answer-voice mailbox full. REESE FORD will attempt again in 24 hours. This note will not be viewable in [address]

## 2020-03-13 NOTE — PROGRESS NOTES
2nd attempt made by REESE FORD to inquire about in home needs. Voice mailbox still full. REESE FORD will attempt once more in 5 days before closing the case. This note will not be viewable in [address]

## 2020-03-19 NOTE — PROGRESS NOTES
3rd and final attempt made to reach pt. Voicemail box still full. REESE CM will close case for now. Can reopen if indicated. This note will not be viewable in [address]

## 2020-04-11 NOTE — TELEPHONE ENCOUNTER
Received call from [facility] monitoring that Pts INR tonight was 5.9 (critical value). Reviewed chart. Last INR check was on 3/30/20 and INR = 2.5. Pt is taking 2.5mg on Sun and 5mg other days. Called Pt -- spoke with Pts wife Teresa Schrader. She states that she wasn't sure Pt was taking his Coumadin every night. So she gave him 2 tablets (10mg) tonight. She said she thought that since his \"number was high he needed more Coumadin to bring it down. \"     Educated that higher INR means that Pts blood is too thin and he needs LESS meds. Wife v/u. Instructed that Pt should not take any Coumadin tomorrow. She needs to recheck his INR and call service back on Sun to determine if he needs to restart his Coumadin. Wife v/u. Instructed to monitor closely for s/s of bleeding due to elevated INR. If noted -- go to ED for evaluation and management. Wife v/u.        SALMA Raymundo

## 2020-04-20 NOTE — ED NOTES
I have reviewed discharge instructions with the patient. The patient verbalized understanding. Patient left ED via Discharge Method: ambulatory to Home with self. Opportunity for questions and clarification provided. Patient given 0 scripts. To continue your aftercare when you leave the hospital, you may receive an automated call from our care team to check in on how you are doing. This is a free service and part of our promise to provide the best care and service to meet your aftercare needs.  If you have questions, or wish to unsubscribe from this service please call [phone]. Thank you for Choosing our [address] [place] [address] Emergency Department.

## 2020-04-20 NOTE — ED PROVIDER NOTES
Patient complains of a fall 2 weeks ago with some right forearm pain and bruising. He also complains of some right lower back pain as result of this fall. Patient complains of some increasing shortness of breath over the last several weeks as well. Patient has a history of congestive heart failure and has new onset weeping from his lower extremities possibly a couple of days. He denies any chest pain. He occasionally has chest pain with exertion but the last time he had pain was 3 weeks ago. He denies any runny nose congestion cough or sore throat. He denies any fever. Patient is on Coumadin. The history is provided by the patient. Past Medical History:  
Diagnosis Date  Acquired hypothyroidism 10/28/2019  Arrhythmia   
 afib  Arthritis  CAD (coronary artery disease) 2009 CABG, 3 vessel. No MI  
 Chronic pain   
 legs \"R especially\"  COPD (chronic obstructive pulmonary disease) ([facility] 10/28/2019  Diabetes ([facility] 2009  
 insulin controlled, avg fbs 107, recognizes hypo at 87, unsure last Ha1C  
 GERD (gastroesophageal reflux disease)   
 controlled with omeprazole twice daily  Heart failure (HCC)   
 echo 9/27/13:  EF 70%, Mild regurg mitral and tricuspid valve.  Hyperlipidemia   
 controlled with med  Hypertension   
 controlled with meds  Moderate aortic stenosis echo 9/27/13  
 aortic valve area 1.2 cm2  Psychiatric disorder   
 anxiety, depression, controlled with Venlafaxine  PUD (peptic ulcer disease) 1970's  Unspecified sleep apnea   
 wears cpap Past Surgical History:  
Procedure Laterality Date [address] [place] [address] UNLISTED  2003 cholecystectomy  CARDIAC SURG PROCEDURE UNLIST  2009 CABG 3V  
 HX APPENDECTOMY  HX GI    
 heller myotomy with toupee wrap [address] VALVE SURGERY  2011  HX HEENT Bilateral 2004  
 ptosis  HX HEENT    
 to have cataract surgery 2014  HX KNEE ARTHROSCOPY    
 bilateral  
  HX ORTHOPAEDIC Right 2002  
 bone spur  HX OTHER SURGICAL  2011  
 prostate surgery  HX PROSTATECTOMY  2011 TURP Family History:  
Problem Relation Age of Onset  Heart Disease Mother  Diabetes Mother  Diabetes Father  No Known Problems Son  No Known Problems Daughter Social History Socioeconomic History  Marital status:  Spouse name: Not on file  Number of children: Not on file  Years of education: Not on file  Highest education level: Not on file Occupational History  Occupation:  Employer: RETIRED Social Needs  Financial resource strain: Not on file  Food insecurity Worry: Not on file Inability: Not on file  Transportation needs Medical: Not on file Non-medical: Not on file Tobacco Use  Smoking status: Never Smoker  Smokeless tobacco: Never Used Substance and Sexual Activity  Alcohol use: Yes Comment: socially occasional--1-2 times per year  Drug use: No  
 Sexual activity: Not on file Lifestyle  Physical activity Days per week: Not on file Minutes per session: Not on file  Stress: Not on file Relationships  Social connections Talks on phone: Not on file Gets together: Not on file Attends Latter day service: Not on file Active member of club or organization: Not on file Attends meetings of clubs or organizations: Not on file Relationship status: Not on file  Intimate partner violence Fear of current or ex partner: Not on file Emotionally abused: Not on file Physically abused: Not on file Forced sexual activity: Not on file Other Topics Concern  Not on file Social History Narrative  
 lives with wife. Has 2 children. Retired . ALLERGIES: Celexa [citalopram]; Cymbalta [duloxetine]; Gabapentin; Lidocaine; Sertraline; and Sulfa (sulfonamide antibiotics) Review of Systems Constitutional: Negative for chills and fever. HENT: Negative for congestion, rhinorrhea and sore throat. Eyes: Negative for photophobia and redness. Respiratory: Positive for shortness of breath. Negative for cough. Cardiovascular: Negative for chest pain and leg swelling. Gastrointestinal: Negative for abdominal pain, diarrhea, nausea and vomiting. Endocrine: Negative for polydipsia and polyuria. Genitourinary: Negative for dysuria. Musculoskeletal: Negative for back pain and myalgias. Skin: Positive for color change and wound. Neurological: Negative for weakness, numbness and headaches. There were no vitals filed for this visit. Physical Exam 
Vitals signs and nursing note reviewed. Constitutional:   
   Appearance: He is well-developed. HENT:  
   Mouth/Throat:  
   Pharynx: No oropharyngeal exudate. Eyes:  
   Conjunctiva/sclera: Conjunctivae normal.  
   Pupils: Pupils are equal, round, and reactive to light. Neck: Musculoskeletal: Neck supple. Comments: Hepatojugular reflux present Cardiovascular:  
   Rate and Rhythm: Normal rate and regular rhythm. Heart sounds: Normal heart sounds. Pulmonary:  
   Effort: Pulmonary effort is normal.  
   Breath sounds: Rales ( Bibasilar Rales present) present. Abdominal:  
   General: Bowel sounds are normal. There is no distension. Palpations: Abdomen is soft. Tenderness: There is no abdominal tenderness. There is no guarding or rebound. Musculoskeletal: Normal range of motion. General: No tenderness. Right lower leg: Edema present. Left lower leg: Edema present. Comments: 3-4+ pitting edema lower extremities with diffuse weeping bilaterally and lower legs. Lymphadenopathy:  
   Cervical: No cervical adenopathy. Skin: 
   General: Skin is warm. Comments: Bilateral lower shins and ankles and feet injected. Neurological: Mental Status: He is alert and oriented to person, place, and time. MDM Number of Diagnoses or Management Options Diagnosis management comments: Worsening CHF. Work-up initiated. X-ray of mildly tender right forearm that appears chronically bruised. 4:43 PM 
Patient with mildly worsening CHF but is seeing palliative care for end-stage CHF right ventricular failure and pulmonary hypertension. Patient has a poor prognosis. IV diuresis here in the emergency department and discharged home with follow-up with cardiology. No hypoxia on his usual 3 L nasal cannula which she wears \"24/7\". No fracture on x-ray of right forearm. PT/INR therapeutic. Amount and/or Complexity of Data Reviewed Clinical lab tests: ordered and reviewed (Results for orders placed or performed during the hospital encounter of 04/20/20 
-CBC W/O DIFF Result                      Value             Ref Range WBC                         8.0               4.3 - 11.1 K* 
     RBC                         5.17              4.23 - 5.6 M* HGB                         15.7              13.6 - 17.2 * HCT                         48.2              41.1 - 50.3 % MCV                         93.2              79.6 - 97.8 * MCH                         30.4              26.1 - 32.9 * MCHC                        32.6              31.4 - 35.0 * RDW                         15.3 (H)          11.9 - 14.6 % PLATELET                    237               150 - 450 K/* MPV                         11.8              9.4 - 12.3 FL ABSOLUTE NRBC               0.00              0.0 - 0.2 K/* 
-METABOLIC PANEL, COMPREHENSIVE Result                      Value             Ref Range Sodium                      138               136 - 145 mm* Potassium                   4.9               3.5 - 5.1 mm*      Chloride                    101               98 - 107 mmo* 
 CO2                         32                21 - 32 mmol* Anion gap                   5 (L)             7 - 16 mmol/L Glucose                     98                65 - 100 mg/* BUN                         18                8 - 23 MG/DL Creatinine                  0.95              0.8 - 1.5 MG* 
     GFR est AA                  >60               >60 ml/min/1* GFR est non-AA              >60               >60 ml/min/1* Calcium                     9.1               8.3 - 10.4 M* Bilirubin, total            1.6 (H)           0.2 - 1.1 MG* ALT (SGPT)                  27                12 - 65 U/L   
     AST (SGOT)                  58 (H)            15 - 37 U/L Alk. phosphatase            201 (H)           50 - 136 U/L Protein, total              8.1               6.3 - 8.2 g/* Albumin                     3.5               3.2 - 4.6 g/* Globulin                    4.6 (H)           2.3 - 3.5 g/* A-G Ratio                   0.8 (L)           1.2 - 3.5     
-TROPONIN I Result                      Value             Ref Range Troponin-I, Qt.             0.02              0.02 - 0.05 * 
-NT-PRO BNP Result                      Value             Ref Range NT pro-BNP                  8,264 (H)         <450 PG/ML    
-PROTHROMBIN TIME + INR Result                      Value             Ref Range Prothrombin time            24.9 (H)          12.0 - 14.7 * INR                         2.2                             
) 
Tests in the radiology section of CPT®: ordered and reviewed (Xr Chest Pa Lat Result Date: 4/20/2020 PA and lateral chest radiographs History:  sob, chf, 79 years Male Comparison: Chest radiograph October 29, 2019 Findings:  Normal cardiomediastinal silhouette with evidence of aortic valve replacement and CABG.  Persistent low lung volumes with trace bilateral pleural effusions and associated mild bibasilar atelectasis and or consolidation. Probable mild interstitial edema unchanged. No evidence of pneumothorax. Visualized soft tissue and osseous structures otherwise unremarkable. Impression:   No significant interval change. Xr Forearm Rt Ap/lat Result Date: 4/20/2020 Exam:  Right forearm radiographs History:  pain, forearm pain,injury, 79 years Male Comparison: None available Findings:  No evidence of acute fracture or dislocation. Normal alignment, joint spaces preserved. Normal mineralization. Visualized soft tissues otherwise unremarkable. Impression:  No evidence of acute injury. ) Procedures

## 2020-04-20 NOTE — DISCHARGE INSTRUCTIONS
Patient Education      Continue Lasix 80 mg twice daily. Low-sodium diet. Follow-up with your cardiologist later this week when called with appointment time. Call Wednesday morning if you have not heard from them regarding this follow-up appointment. Return if any new, worsening or concerning symptoms. Follow-up with the wound care center regarding venous stasis dermatitis and weeping lower extremities. Call tomorrow for follow-up. Your primary care doctor may be able to get you seen there earlier. Learning About Venous Insufficiency  What is it? Venous insufficiency is a problem with the flow of blood from the veins of the legs back to the heart. It's also called chronic venous insufficiency or chronic venous stasis. Your veins bring blood back to the heart after it flows through your body. Veins have valves that keep the blood moving in one direction--toward the heart. But with venous insufficiency, the veins of the legs might not work as they should. This can allow blood to leak backward. Fluid can pool in the legs. This can lead to problems that include varicose veins. What causes it? Venous insufficiency is sometimes caused by deep vein thrombosis and high blood pressure inside leg veins. You are more likely to have venous insufficiency if you:  · Are older. · Are female. · Are overweight. · Don't get enough physical activity. · Smoke. · Have a family history of varicose veins. What are the symptoms? Symptoms of venous insufficiency affect the legs. They may include:  · Swelling, often in the ankles. · A rash. · Varicose veins. · Itching. · Cramping. · Skin sores (ulcers). · Aching or a feeling of heaviness. · Changes in skin color. How is it diagnosed? Your doctor can diagnose venous insufficiency by examining your legs and by using a type of ultrasound test (duplex Doppler) to find out how well blood is flowing in your legs. How is it treated?   To reduce swelling and relieve pain caused by venous insufficiency, you can wear compression stockings. They are tighter at the ankles than at the top of the legs. They also can help venous skin ulcers heal. But there are different types of stockings, and they need to fit right. So your doctor will recommend what you need. You also can try to:  · Get more exercise, especially walking. It can increase blood flow. · Avoid standing still or sitting for a long time, which can make the fluid pool in your legs. · Try not to sit with your legs crossed at the knee. · Keep your legs raised above your heart when you're lying down. This reduces swelling. If these treatments don't work, you may need medicine or a procedure to help relieve symptoms. Procedures might be done to close the vein, to remove the vein, or to improve blood flow. Follow-up care is a key part of your treatment and safety. Be sure to make and go to all appointments, and call your doctor if you are having problems. It's also a good idea to know your test results and keep a list of the medicines you take. Current as of: July 14, 2019Content Version: 12.4  © 3382-2076 [employer]. Care instructions adapted under license by [employer] (which disclaims liability or warranty for this information). If you have questions about a medical condition or this instruction, always ask your healthcare professional. [address] any warranty or liability for your use of this information. Patient Education        Pulmonary Hypertension: Care Instructions  Your Care Instructions  Pulmonary hypertension is high blood pressure in the arteries of your lungs. These blood vessels carry blood from the heart to the lungs, where the blood picks up oxygen. The walls of the arteries may get thick, and the arteries may get narrow. When this happens, blood does not flow as well as it should. Pressure builds up in the arteries.  Then your heart has to work harder to pump blood through your lungs. There are different types of pulmonary hypertension. They are caused by different things. Causes include other health conditions such as heart or lung problems. Sometimes it can happen without a known cause. When you have this condition, your body gets less oxygen from your blood. This causes symptoms such as shortness of breath and feeling tired, faint, or dizzy. Over time, these symptoms may change or get worse if your heart gets weaker. You may get heart failure. Heart failure means your heart doesn't pump as much blood as your body needs. Treatment can help you feel better and live longer. Your treatment options will depend on the type of pulmonary hypertension you have. It can be hard to learn that you have a problem with your lungs and heart. But there are things you can do to feel better and stay as active as you can. Follow-up care is a key part of your treatment and safety. Be sure to make and go to all appointments, and call your doctor if you are having problems. It's also a good idea to know your test results and keep a list of the medicines you take. How can you care for yourself at home? Medicine    · Be safe with medicines. Take your medicines exactly as prescribed. Call your doctor if you think you are having a problem with your medicine. You will get more details on the specific medicines your doctor prescribes.     · Your doctor may prescribe oxygen therapy. You will get more details on how to use it.     · Talk to your doctor before you take any vitamins, over-the-counter medicine, or herbal products. Don't take ibuprofen (Advil or Motrin) and naproxen (Aleve) without talking to your doctor first.     · If you take a blood thinner, be sure to get instructions about how to take your medicine safely. Blood thinners can cause serious bleeding problems. Activity    · Be as active as you can.  Talk to your doctor about making a plan before you start a new activity.     · Ask your doctor if a pulmonary rehabilitation program is right for you.     · Learn how to save your energy. Making small changes in daily activities can make a big impact on how you feel. Staying healthy    · Eat healthy foods, and try to stay at a healthy weight.     · Do not smoke. Smoking can make this condition worse. If you need help quitting, talk to your doctor about stop-smoking programs and medicines. These can increase your chances of quitting for good.     · Talk to your doctor about preventing pregnancy. You may need to take steps to avoid becoming pregnant. Pregnancy and childbirth can cause changes in the body that could be life-threatening for women who have this condition.     · Avoid colds and flu. Get a pneumococcal vaccine shot. If you have had one before, ask your doctor if you need another dose. Get a flu vaccine every year. If you must be around people with colds or flu, wash your hands often. When should you call for help? Call 911 anytime you think you may need emergency care. For example, call if:    · You have symptoms of sudden heart failure. These may include:  ? Severe trouble breathing. ? A fast or irregular heartbeat. ? Coughing up pink, foamy mucus. ? Passing out.    Call your doctor now or seek immediate medical care if:    · You have new or changed symptoms of heart failure, such as:  ? New or increased shortness of breath. ? New or worse swelling in your legs, ankles, or feet. ? Sudden weight gain, such as more than 2 to 3 pounds in a day or 5 pounds in a week. (Your doctor may suggest a different range of weight gain.)  ? Feeling dizzy or lightheaded or like you may faint. ? Feeling so tired or weak that you cannot do your usual activities. ? Not sleeping well. Shortness of breath wakes you at night.  You need extra pillows to prop yourself up to breathe easier.    Watch closely for changes in your health, and be sure to contact your doctor if:    · You have new or worse symptoms. Where can you learn more? Go to http://karma-jane.info/  Enter T699 in the search box to learn more about \"Pulmonary Hypertension: Care Instructions. \"  Current as of: December 15, 2019Content Version: 12.4  © 9954-7764 [employer]. Care instructions adapted under license by [employer] (which disclaims liability or warranty for this information). If you have questions about a medical condition or this instruction, always ask your healthcare professional. [address] any warranty or liability for your use of this information.

## 2020-04-21 NOTE — PROGRESS NOTES
1st Attempt to contact patient for LUCILA  Covid 19 Risk call, no answer on home number and unable to L/M on V/M  Will attempt to contact patient again within 24 hours

## 2020-04-22 NOTE — PROGRESS NOTES
2nd attempt to contact patient for LUCILA call Covid 19 Risk , no answer, on home number . Episode will be closed as CC has not been able to contact patient. .Will reopen episode if call is returned.

## 2020-04-27 PROBLEM — R74.8 ELEVATED LIVER ENZYMES: Status: ACTIVE | Noted: 2020-01-01

## 2020-04-27 PROBLEM — R10.13 EPIGASTRIC PAIN: Status: ACTIVE | Noted: 2020-01-01

## 2020-04-27 PROBLEM — E87.70 VOLUME OVERLOAD: Status: ACTIVE | Noted: 2020-01-01

## 2020-04-27 PROBLEM — J90 BILATERAL PLEURAL EFFUSION: Status: ACTIVE | Noted: 2020-01-01

## 2020-04-27 PROBLEM — I27.81 COR PULMONALE (HCC): Status: ACTIVE | Noted: 2020-01-01

## 2020-04-27 PROBLEM — L03.90 CELLULITIS: Status: ACTIVE | Noted: 2020-01-01

## 2020-04-27 PROBLEM — L03.119 LOWER EXTREMITY CELLULITIS: Status: ACTIVE | Noted: 2020-01-01

## 2020-04-27 PROBLEM — R60.0 BILATERAL LOWER EXTREMITY EDEMA: Status: ACTIVE | Noted: 2020-01-01

## 2020-04-27 NOTE — ED TRIAGE NOTES
Pt arrives via [facility] from home, pt states shortness of breath, swollen legs, weeping from dressings, wrapped in duct tape from wipe. Pt states hx of CHF, weeping legs, worse with laying flat. Pt has mask on in triage.

## 2020-04-27 NOTE — PROGRESS NOTES
TRANSFER - IN REPORT: 
 
Verbal report received from Yousuf Booth RN(name) on Rachael Sizer  being received from ED(unit) for routine progression of care Report consisted of patients Situation, Background, Assessment and  
Recommendations(SBAR). Information from the following report(s) SBAR, Kardex, ED Summary, Intake/Output, MAR and Recent Results was reviewed with the receiving nurse. Opportunity for questions and clarification was provided. Assessment completed upon patients arrival to unit and care assumed.

## 2020-04-27 NOTE — H&P
HOSPITALIST H&P 
 
 
NAME:  Tarun Regan Age:  79 y.o. 
:   1940 MRN:   [id number] PCP: Nikhil, MD Emilio 
 
Attending MD: Randall Antonio DO Treatment Team: Primary Nurse: Tony Miller RN; Physician: Janell Silva MD; Primary Nurse: Noy Parks, RN; Primary Nurse: Victoria Trujillo RN 
 
HPI:  
 
Tarun Regan is a 79year old CM with a PMH of chronic cor pulmonale due to COPD & MONICA, chronic BLE stasis dermatitis, atrial fibrillation, DM2, CAD s/p CABG, GERD, and hypothyroidism who presented to the ER on 20 due to malodorous, excoriated, draining, painful lower extremities with erythema and swelling, orthopnea and epigastric pain. He also has had subjective fevers and a dry cough for a week. He states that he was seen on 20 for similar issues and his symptoms have not improved despite being compliant with home medications. He is supposed to be on 3LNC at home but he has not been wearing his oxygen. Denies N/V. Denies CP. Complete ROS done and is as stated in HPI or otherwise negative Past Medical History:  
Diagnosis Date  Acquired hypothyroidism 10/28/2019  Arrhythmia   
 afib  Arthritis  CAD (coronary artery disease)  CABG, 3 vessel. No MI  
 Chronic pain   
 legs \"R especially\"  COPD (chronic obstructive pulmonary disease) ([facility] [address] 10/28/2019  Diabetes ([facility] [address]   
 insulin controlled, avg fbs 107, recognizes hypo at 87, unsure last Ha1C  
 GERD (gastroesophageal reflux disease)   
 controlled with omeprazole twice daily  Heart failure (HCC)   
 echo 13:  EF 70%, Mild regurg mitral and tricuspid valve.  Hyperlipidemia   
 controlled with med  Hypertension   
 controlled with meds  Moderate aortic stenosis echo 13  
 aortic valve area 1.2 cm2  Psychiatric disorder   
 anxiety, depression, controlled with Venlafaxine  PUD (peptic ulcer disease)   Unspecified sleep apnea wears cpap Past Surgical History:  
Procedure Laterality Date  [address] UNLISTED   cholecystectomy  CARDIAC SURG PROCEDURE UNLIST  2009 CABG 3V  
 HX APPENDECTOMY  HX GI    
 heller myotomy with toupee wrap [address] VALVE SURGERY  2011  HX HEENT Bilateral 2004  
 ptosis  HX HEENT    
 to have cataract surgery 2014  HX KNEE ARTHROSCOPY    
 bilateral  
 HX ORTHOPAEDIC Right 2002  
 bone spur  HX OTHER SURGICAL  2011  
 prostate surgery  HX PROSTATECTOMY  2011 TURP Prior to Admission Medications Prescriptions Last Dose Informant Patient Reported? Taking? Jardiance 10 mg tablet   No No  
Sig: Take 1 Tab by mouth daily. acetaminophen (TYLENOL) 500 mg tablet   Yes No  
Sig: Take 1,000 mg by mouth every twelve (12) hours as needed for Pain. albuterol (PROVENTIL HFA, VENTOLIN HFA, PROAIR HFA) 90 mcg/actuation inhaler   No No  
Sig: Take 1 Puff by inhalation every four (4) hours as needed for Wheezing. albuterol (PROVENTIL VENTOLIN) 2.5 mg /3 mL (0.083 %) nebulizer solution   No No  
Sig: 3 mL by Nebulization route every six (6) hours. ascorbic acid, vitamin C, (VITAMIN C) 500 mg tablet   Yes No  
Sig: Take 500 mg by mouth two (2) times a day. aspirin delayed-release 81 mg tablet   Yes No  
Sig: Take 81 mg by mouth daily. atorvastatin (LIPITOR) 40 mg tablet   Yes No  
Sig: Take 20 mg by mouth daily. carvedilol (COREG) 12.5 mg tablet   Yes No  
Sig: Take 12.5 mg by mouth two (2) times daily (with meals). has 25mg tablets  
cyanocobalamin (VITAMIN B-12) 1,000 mcg/mL injection   Yes No  
Si,000 mcg by IntraMUSCular route every thirty (30) days. diclofenac (VOLTAREN) 1 % gel   Yes No  
Sig: Apply  to affected area four (4) times daily. ferrous sulfate 324 mg (65 mg iron) tablet   Yes No  
Sig: Take 324 mg by mouth daily. furosemide (LASIX) 80 mg tablet   Yes No  
Sig: Take 80 mg by mouth two (2) times a day. inhalational spacing device   No No  
Sig: Use as directed with MDI  
levothyroxine (SYNTHROID) 75 mcg tablet   Yes No  
Sig: Take 75 mcg by mouth Daily (before breakfast). lisinopril (PRINIVIL, ZESTRIL) 20 mg tablet   Yes No  
Sig: Take 10 mg by mouth daily. magnesium oxide (MAG-OX) 400 mg tablet   Yes No  
Sig: Take 400 mg by mouth daily. memantine (NAMENDA) 10 mg tablet   Yes No  
Sig: Take 10 mg by mouth two (2) times a day. metOLazone (ZAROXOLYN) 5 mg tablet   No No  
Sig: Take 1 Tab by mouth daily. Take 30-min prior to lasix for 5 days. Patient taking differently: Take 5 mg by mouth daily as needed for Other (swelling or shortness of breath). Take 30-min prior to first lasix dose as needed for lower extremity swelling or shortness of breath.  
mineral oil-hydrophil petrolat (AQUAPHOR) ointment   Yes No  
Sig: Apply 1 Each to affected area as needed for Dry Skin or Itching. apply daily as needed to any areas of dry/itchy skin  
multivitamin (ONE A DAY) tablet   Yes No  
Sig: Take 1 Tab by mouth daily. nitroglycerin (NITROSTAT) 0.4 mg SL tablet   Yes No  
Si.4 mg by SubLINGual route every five (5) minutes as needed for Chest Pain. oxyCODONE IR (ROXICODONE) 10 mg tab immediate release tablet   Yes No  
Sig: Take 10 mg by mouth every eight (8) hours as needed for Pain.  
polyethylene glycol (MIRALAX) 17 gram/dose powder   Yes No  
Sig: Take 17 g by mouth daily as needed for Other (constipation). potassium chloride (K-DUR, KLOR-CON) 20 mEq tablet   No No  
Sig: Take 1 Tab by mouth daily. senna-docusate (SENNA PLUS) 8.6-50 mg per tablet   Yes No  
Sig: Take 1 Tab by mouth daily. warfarin (COUMADIN) 5 mg tablet   Yes No  
Sig: Take 5 mg by mouth daily. Or as directed Facility-Administered Medications: None Allergies Allergen Reactions  Celexa [Citalopram] Other (comments) Ineffective per pt  Cymbalta [Duloxetine] Other (comments) Altered mental state  Gabapentin Other (comments) Altered mental status  Lidocaine Unknown (comments)  Sertraline Unknown (comments)  Sulfa (Sulfonamide Antibiotics) Unknown (comments) Social History Tobacco Use  Smoking status: Never Smoker  Smokeless tobacco: Never Used Substance Use Topics  Alcohol use: Yes Comment: socially occasional--1-2 times per year Family History Problem Relation Age of Onset  Heart Disease Mother  Diabetes Mother  Diabetes Father  No Known Problems Son  No Known Problems Daughter Objective:  
 
 
Visit Vitals /87 Pulse (!) 103 Temp 97.8 °F (36.6 °C) Resp 19 Ht 5' 8\" (1.727 m) Wt 103.4 kg (228 lb) SpO2 94% BMI 34.67 kg/m² Temp (24hrs), Av.8 °F (36.6 °C), Min:97.8 °F (36.6 °C), Max:97.8 °F (36.6 °C) Oxygen Therapy O2 Sat (%): 94 % (20) Pulse via Oximetry: 98 beats per minute (20) O2 Device: Nasal cannula (20) O2 Flow Rate (L/min): 2 l/min (20) Physical Exam: 
 
General:    Alert, cooperative, no distress, appears stated age. Eyes:   PERRLA EOMI Anicteric Head:   Normocephalic, without obvious abnormality, atraumatic. ENT:  Nares normal. No drainage. Lungs:   Scattered rhonchi. No Wheezing. Heart:   Tachy, irregular rate and rhythm,  no murmur, rub or gallop. No JVD. Abdomen:   Soft, non-tender. Not distended. Bowel sounds normal.  
MSK:  2+ BLE edema with diffuse erythema, malodorous excoriation, flaking, weeping, and tenderness Skin:     BLE with diffuse erythema, malodorous excoriation, flaking, weeping, and tenderness  No Jaundice. Neurologic: Alert and oriented x 3, no focal deficits Psychiatry:      No depression/anxiety. Mood congruent for context. Heme/Lymph/Immune: No echymoses or overt signs of bleeding. Lab/Data Review: 
Recent Results (from the past 24 hour(s)) CBC WITH AUTOMATED DIFF  
 Collection Time: 04/27/20  2:28 PM  
Result Value Ref Range WBC 6.1 4.3 - 11.1 K/uL  
 RBC 5.51 4.23 - 5.6 M/uL  
 HGB 16.7 13.6 - 17.2 g/dL HCT 50.9 (H) 41.1 - 50.3 % MCV 92.4 79.6 - 97.8 FL  
 MCH 30.3 26.1 - 32.9 PG  
 MCHC 32.8 31.4 - 35.0 g/dL  
 RDW 15.9 (H) 11.9 - 14.6 % PLATELET 369 930 - 219 K/uL MPV 11.9 9.4 - 12.3 FL ABSOLUTE NRBC 0.00 0.0 - 0.2 K/uL  
 DF AUTOMATED NEUTROPHILS 73 43 - 78 % LYMPHOCYTES 9 (L) 13 - 44 % MONOCYTES 15 (H) 4.0 - 12.0 % EOSINOPHILS 2 0.5 - 7.8 % BASOPHILS 1 0.0 - 2.0 % IMMATURE GRANULOCYTES 0 0.0 - 5.0 %  
 ABS. NEUTROPHILS 4.5 1.7 - 8.2 K/UL  
 ABS. LYMPHOCYTES 0.5 0.5 - 4.6 K/UL  
 ABS. MONOCYTES 0.9 0.1 - 1.3 K/UL  
 ABS. EOSINOPHILS 0.1 0.0 - 0.8 K/UL  
 ABS. BASOPHILS 0.1 0.0 - 0.2 K/UL  
 ABS. IMM. GRANS. 0.0 0.0 - 0.5 K/UL METABOLIC PANEL, COMPREHENSIVE Collection Time: 04/27/20  2:28 PM  
Result Value Ref Range Sodium 137 136 - 145 mmol/L Potassium 3.5 3.5 - 5.1 mmol/L Chloride 98 98 - 107 mmol/L  
 CO2 31 21 - 32 mmol/L Anion gap 8 7 - 16 mmol/L Glucose 124 (H) 65 - 100 mg/dL BUN 19 8 - 23 MG/DL Creatinine 0.82 0.8 - 1.5 MG/DL  
 GFR est AA >60 >60 ml/min/1.73m2 GFR est non-AA >60 >60 ml/min/1.73m2 Calcium 9.4 8.3 - 10.4 MG/DL Bilirubin, total 2.2 (H) 0.2 - 1.1 MG/DL  
 ALT (SGPT) 31 12 - 65 U/L  
 AST (SGOT) 41 (H) 15 - 37 U/L Alk. phosphatase 186 (H) 50 - 136 U/L Protein, total 8.4 (H) 6.3 - 8.2 g/dL Albumin 3.7 3.2 - 4.6 g/dL Globulin 4.7 (H) 2.3 - 3.5 g/dL A-G Ratio 0.8 (L) 1.2 - 3.5 NT-PRO BNP Collection Time: 04/27/20  2:28 PM  
Result Value Ref Range NT pro-BNP 6,722 (H) <450 PG/ML  
TROPONIN I Collection Time: 04/27/20  2:28 PM  
Result Value Ref Range Troponin-I, Qt. 0.02 0.02 - 0.05 NG/ML  
LACTIC ACID Collection Time: 04/27/20  2:28 PM  
Result Value Ref Range Lactic acid 1.6 0.4 - 2.0 MMOL/L  
EKG, 12 LEAD, INITIAL Collection Time: 04/27/20  2:52 PM  
Result Value Ref Range Ventricular Rate 90 BPM  
 Atrial Rate 340 BPM  
 QRS Duration 144 ms Q-T Interval 378 ms QTC Calculation (Bezet) 462 ms Calculated R Axis 42 degrees Calculated T Axis 129 degrees Diagnosis    
  !! AGE AND GENDER SPECIFIC ECG ANALYSIS !! Atrial fibrillation Left bundle branch block Non-specific ST-t wave changes When compared with ECG of 20-APR-2020 16:05, Left bundle branch block is now Present Criteria for Septal infarct are no longer Present Criteria for Lateral infarct are no longer Present Confirmed by Nando Ortega MD (), Zack Mack ([id number]) on 4/27/2020 3:23:48 PM 
  
PROTHROMBIN TIME + INR Collection Time: 04/27/20  3:00 PM  
Result Value Ref Range Prothrombin time 23.3 (H) 12.0 - 14.7 sec INR 2.0 Imaging: Xr Chest Pa Lat Result Date: 4/27/2020 IMPRESSION: 1. No worsening changes. Only improving aeration, improving basilar atelectatic appearing changes, and some improvement in trace basilar effusions are seen. Cultures: All Micro Results Procedure Component Value Units Date/Time CULTURE, BLOOD [[id number]] Collected:  04/27/20 1618 Order Status:  Completed Specimen:  Blood Updated:  04/27/20 1650 CULTURE, BLOOD [[id number]] Collected:  04/27/20 1445 Order Status:  Completed Specimen:  Blood Updated:  04/27/20 1502 Assessment/Plan:  
 
Principal Problem: 
  Lower extremity cellulitis (4/27/2020) - BLE with diffuse erythema, malodorous excoriation, flaking, weeping, and tenderness 
- Start Zosyn (to cover PSA) - Send wound culture if possible - Consult wound care Active Problems: 
  Volume overload (4/27/2020) - BLE edema worse despite outpatient compliance - Likely due to right heart failure - Start IV Lasix BID 
- BNP improved from a week prior - Strict I/O 
- Daily weight - Cardiology following Epigastric pain (4/27/2020) - Unsure etiology - Had PMH of PUD and GERD 
- Start Pepcid BID 
- Trend H/H 
- If worsens, may need GI to assess for PUD Venous stasis dermatitis of both lower extremities (10/29/2019) - Appears infected - Start Zosyn 
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 
- BNP improved from a week prior - Strict I/O 
- Daily weight Cor pulmonale ([address] (4/27/2020) - Start IV Lasix BID 
- BNP improved from a week prior - Strict I/O 
- Daily weight Bilateral lower extremity edema (4/27/2020) - Start IV Lasix BID 
- BNP improved from a week prior - Strict I/O 
- Daily weight Bilateral pleural effusion (4/27/2020) - Start IV Lasix BID 
- BNP improved from a week prior - Strict I/O 
- Daily weight Atrial fibrillation ([address] (9/27/2013) - Currently in afib 
- Continue Coreg 
- Continue Coumadin Type 2 diabetes mellitus ([address] (9/27/2013) - Recent A1C 6.7 
- Hold Jardiance 
- Start Humalog SSI Chronic respiratory failure with hypoxia ([address] (9/30/2013) - Patient on home [facility] Elevated liver enzymes (4/27/2020) - I suspect his is due to right sided HF 
- Start IV Lasix - Check RUQ U/S 
- Check HFP in AM 
 
  CAD (coronary artery disease) (9/27/2013) - No acute CP 
- Continue ASA - Continue Lisinopril 
- Continue Coreg Restrictive lung disease (10/3/2017) COPD (chronic obstructive pulmonary disease) ([address] (10/28/2019) - No acute wheezing 
- Continue home meds S/P AVR (10/28/2019) MONICA (obstructive sleep apnea) (12/16/2013) Palliative care patient (10/3/2017) Acquired hypothyroidism (10/28/2019) Do not resuscitate (10/30/2019) - Patient confirmed DNR status Code Status: DNR 
DVT Prophylaxis: Coumadin Anticipated discharge: 3-4 days Carlo Sanon DO 
5:22 PM

## 2020-04-27 NOTE — PROGRESS NOTES
Warfarin dosing per pharmacist 
 
Kamille Hodges is a 79 y.o. male. Height: 5' 8\" (172.7 cm)    Weight: 103.4 kg (228 lb) Indication:  Afib Goal INR:  2-3 Home dose:  2.5 mg every Sunday and 5 mg all remaining days of the week Risk factors or significant drug interactions:  None for now Other anticoagulants:  none Daily Monitoring Date  INR     Warfarin dose HGB              Notes 4/27  2.0  5 mg  16.7 Pharmacy consulted for warfarin dosing, home dose is 2.5 mg on Sunday and 5 mg all remaining days of the week. INR on admission is therapeutic, confirmed with the ED nurse the patient had not taken warfarin yet today. Will initiate 5 mg daily for now to begin this evening. Daily INRs, pharmacy will continue to follow. Thank you, Elpidio Wilson, PharmD, [facility] Clinical Pharmacy Specialist 
[phone]

## 2020-04-27 NOTE — ED NOTES
TRANSFER - OUT REPORT: 
 
Verbal report given to Kris Machado RN on Tarun Regan  being transferred to Room #237 for routine progression of care Report consisted of patients Situation, Background, Assessment and  
Recommendations(SBAR). Information from the following report(s) SBAR, Kardex, ED Summary, Intake/Output, MAR and Recent Results was reviewed with the receiving nurse. Lines:  
Peripheral IV 04/27/20 Left Antecubital (Active) Site Assessment Clean, dry, & intact 4/27/2020  4:25 PM  
Phlebitis Assessment 0 4/27/2020  4:25 PM  
Infiltration Assessment 0 4/27/2020  4:25 PM  
Dressing Status Clean, dry, & intact 4/27/2020  4:25 PM  
Hub Color/Line Status Pink 4/27/2020  4:25 PM  
   
Peripheral IV 04/27/20 Right Antecubital (Active) Site Assessment Clean, dry, & intact 4/27/2020  2:30 PM  
Phlebitis Assessment 0 4/27/2020  2:30 PM  
Infiltration Assessment 0 4/27/2020  2:30 PM  
Dressing Status Clean, dry, & intact 4/27/2020  2:30 PM  
Dressing Type Transparent 4/27/2020  2:30 PM  
Hub Color/Line Status Pink 4/27/2020  2:30 PM  
  
 
Opportunity for questions and clarification was provided. Patient transported with: 
 O2 @ 3 liters

## 2020-04-27 NOTE — CONSULTS
[facility] Cardiology Consult Date of  Admission: 4/27/2020 11:28 AM  
 
Primary Care Physician: ROSELYN 
Primary Cardiologist: Dr. Zonia Zaragoza Referring Physician: ED 
 
 
CC/Reason for consult: SOB ? Volume overload/CHF Jo Ann Nuñez is a 79 y.o. male with prior h/o CAD s/p CABG x3 in 2009 at [facility] with Dr. Natalia Hill (no records available for anatomy), AV disease s/p TAVR at [facility] in 2013, persistent AF on coumadin, HTN, COPD with home O2, Pul HTN RVSP 35-40, right sided heart failure, DM, and HLP. Last NST 3/19 showed normal perfusion but depressed LVEF 46%. Patient presented to ED at [facility] with inability to walk along with increased edema, legs weeping, chest pain and SOB. He also reports possible fever a few days ago with mild cough. In ED, he was hypoxic requiring O2. Labs in Ed showed Trop neg, pro BNP 6722 which is better from previous labs last week. His CXR showed no worsening changes from prior. He was given lasix in ED along with vanco and morphine. Hospitalist to admitted pr ED and cardiology was consulted for volume overload. I have reviewed his echo from October 2019. He has severe RV dysfunction but normal LV systolic function. Diagnosis  Atrial fibrillation ([address]  Coronary atherosclerosis of artery bypass graft  Type 2 diabetes mellitus ([address]  CAD (coronary artery disease)  Dyslipidemia  Diastolic CHF, acute on chronic (HCC)  Chronic respiratory failure with hypoxia ([address]  MONICA (obstructive sleep apnea)  Primary central sleep apnea  Palliative care patient  Restrictive lung disease  Long term (current) use of anticoagulants  COPD (chronic obstructive pulmonary disease) (HCC)  S/P AVR  
 HTN (hypertension)  PUD (peptic ulcer disease)  Chronic pain  Acquired hypothyroidism  Venous stasis dermatitis of both lower extremities  Do not resuscitate  Right heart failure due to pulmonary hypertension ([address]  Volume overload Past Medical History:  
Diagnosis Date  Acquired hypothyroidism 10/28/2019  Arrhythmia   
 afib  Arthritis  CAD (coronary artery disease) 2009 CABG, 3 vessel. No MI  
 Chronic pain   
 legs \"R especially\"  COPD (chronic obstructive pulmonary disease) ([facility] 10/28/2019  Diabetes ([facility] 2009  
 insulin controlled, avg fbs 107, recognizes hypo at 87, unsure last Ha1C  
 GERD (gastroesophageal reflux disease)   
 controlled with omeprazole twice daily  Heart failure (HCC)   
 echo 9/27/13:  EF 70%, Mild regurg mitral and tricuspid valve.  Hyperlipidemia   
 controlled with med  Hypertension   
 controlled with meds  Moderate aortic stenosis echo 9/27/13  
 aortic valve area 1.2 cm2  Psychiatric disorder   
 anxiety, depression, controlled with Venlafaxine  PUD (peptic ulcer disease) 1970's  Unspecified sleep apnea   
 wears cpap Past Surgical History:  
Procedure Laterality Date [address] UNLISTED  2003 cholecystectomy  CARDIAC SURG PROCEDURE UNLIST  2009 CABG 3V  
 HX APPENDECTOMY  HX GI    
 heller myotomy with toupee wrap [address] VALVE SURGERY  2011  HX HEENT Bilateral 2004  
 ptosis  HX HEENT    
 to have cataract surgery 2014  HX KNEE ARTHROSCOPY    
 bilateral  
 HX ORTHOPAEDIC Right 2002  
 bone spur  HX OTHER SURGICAL  2011  
 prostate surgery  HX PROSTATECTOMY  2011 TURP Allergies Allergen Reactions  Celexa [Citalopram] Other (comments) Ineffective per pt  Cymbalta [Duloxetine] Other (comments) Altered mental state  Gabapentin Other (comments) Altered mental status  Lidocaine Unknown (comments)  Sertraline Unknown (comments)  Sulfa (Sulfonamide Antibiotics) Unknown (comments) Family History Problem Relation Age of Onset  Heart Disease Mother  Diabetes Mother  Diabetes Father  No Known Problems Son  No Known Problems Daughter Current Facility-Administered Medications Medication Dose Route Frequency  vancomycin (VANCOCIN) 2000 mg in  ml infusion  2,000 mg IntraVENous ONCE  
 morphine injection 4 mg  4 mg IntraVENous NOW Current Outpatient Medications Medication Sig  Jardiance 10 mg tablet Take 1 Tab by mouth daily.  atorvastatin (LIPITOR) 40 mg tablet Take 20 mg by mouth daily.  lisinopril (PRINIVIL, ZESTRIL) 20 mg tablet Take 10 mg by mouth daily.  warfarin (COUMADIN) 5 mg tablet Take 5 mg by mouth daily. Or as directed  mineral oil-hydrophil petrolat (AQUAPHOR) ointment Apply 1 Each to affected area as needed for Dry Skin or Itching. apply daily as needed to any areas of dry/itchy skin  oxyCODONE IR (ROXICODONE) 10 mg tab immediate release tablet Take 10 mg by mouth every eight (8) hours as needed for Pain.  polyethylene glycol (MIRALAX) 17 gram/dose powder Take 17 g by mouth daily as needed for Other (constipation).  acetaminophen (TYLENOL) 500 mg tablet Take 1,000 mg by mouth every twelve (12) hours as needed for Pain.  inhalational spacing device Use as directed with MDI  metOLazone (ZAROXOLYN) 5 mg tablet Take 1 Tab by mouth daily. Take 30-min prior to lasix for 5 days. (Patient taking differently: Take 5 mg by mouth daily as needed for Other (swelling or shortness of breath). Take 30-min prior to first lasix dose as needed for lower extremity swelling or shortness of breath.)  furosemide (LASIX) 80 mg tablet Take 80 mg by mouth two (2) times a day.  carvedilol (COREG) 12.5 mg tablet Take 12.5 mg by mouth two (2) times daily (with meals). has 25mg tablets  diclofenac (VOLTAREN) 1 % gel Apply  to affected area four (4) times daily.  senna-docusate (SENNA PLUS) 8.6-50 mg per tablet Take 1 Tab by mouth daily.  albuterol (PROVENTIL VENTOLIN) 2.5 mg /3 mL (0.083 %) nebulizer solution 3 mL by Nebulization route every six (6) hours.  magnesium oxide (MAG-OX) 400 mg tablet Take 400 mg by mouth daily.  potassium chloride (K-DUR, KLOR-CON) 20 mEq tablet Take 1 Tab by mouth daily.  aspirin delayed-release 81 mg tablet Take 81 mg by mouth daily.  albuterol (PROVENTIL HFA, VENTOLIN HFA, PROAIR HFA) 90 mcg/actuation inhaler Take 1 Puff by inhalation every four (4) hours as needed for Wheezing.  levothyroxine (SYNTHROID) 75 mcg tablet Take 75 mcg by mouth Daily (before breakfast).  memantine (NAMENDA) 10 mg tablet Take 10 mg by mouth two (2) times a day.  ferrous sulfate 324 mg (65 mg iron) tablet Take 324 mg by mouth daily.  ascorbic acid, vitamin C, (VITAMIN C) 500 mg tablet Take 500 mg by mouth two (2) times a day.  cyanocobalamin (VITAMIN B-12) 1,000 mcg/mL injection 1,000 mcg by IntraMUSCular route every thirty (30) days.  multivitamin (ONE A DAY) tablet Take 1 Tab by mouth daily.  nitroglycerin (NITROSTAT) 0.4 mg SL tablet 0.4 mg by SubLINGual route every five (5) minutes as needed for Chest Pain. Review of Systems Constitution: Positive for fever (? few days ago ). Negative for diaphoresis and malaise/fatigue. HENT: Negative for congestion. Cardiovascular: Positive for chest pain and leg swelling. Negative for claudication, cyanosis, dyspnea on exertion, irregular heartbeat, near-syncope, orthopnea, palpitations, paroxysmal nocturnal dyspnea and syncope. Respiratory: Positive for shortness of breath. Negative for cough and wheezing. Endocrine: Negative for cold intolerance and heat intolerance. Hematologic/Lymphatic: Does not bruise/bleed easily. Skin: Negative for nail changes. Neurological: Negative for dizziness, headaches and weakness. Physical Exam 
Vitals:  
 04/27/20 1202 04/27/20 1625 BP: 167/66 143/87 Pulse: (!) 104 (!) 103 Resp: 16 19 Temp: 97.8 °F (36.6 °C) SpO2: (!) 88% 94% Weight: 103.4 kg (228 lb) Height: 5' 8\" (1.727 m) Physical Exam: General: Well Developed, Well Nourished, No Acute Distress HEENT: pupils equal and round, no abnormalities noted Neck: supple, no JVD, no carotid bruits Heart: S1S2 irregular irregular Lungs: Clear throughout auscultation bilaterally without adventitious sounds Abd: soft, nontender, nondistended, with good bowel sounds Ext: warm, 3+ edema with weeping legs/cellulitis, calves supple/nontender,  
Skin: warm and dry Psychiatric: Normal mood and affect Neurologic: Alert and oriented X 3 Cardiographics Telemetry:  A. Fib 90s ECG: Atrial fibrillation with controlled rate Left bundle branch block Non-specific ST-t wave changes Echocardiogram: last echo 10/19 showed -  Left ventricle: Systolic function was normal. Ejection fraction was estimated in the range of 55 % to 60 %. There were no regional wall motion abnormalities. There was mild concentric hypertrophy. 
 -  Right ventricle: The ventricle was mildly dilated. Systolic function was 
reduced. 
 -  Left atrium: The atrium was moderately to markedly dilated. 
 -  Right atrium: The atrium was moderately to markedly dilated. 
 -  Inferior vena cava, hepatic veins: The respirophasic change in diameter was 
more than 50%.  -  Aortic valve: The aortic valve area by the continuity equation was 1.25 cm2. 
 -  Mitral valve: There was mild annular calcification. There was mild regurgitation. 
 -  Tricuspid valve: There was mild regurgitation. Labs:  
Recent Labs  
  04/27/20 
1500 04/27/20 
1428 NA  --  137 K  --  3.5 BUN  --  19  
CREA  --  0.82 GLU  --  124* WBC  --  6.1 HGB  --  16.7 HCT  --  50.9* PLT  --  238 INR 2.0  -- Assessment/Plan: 
 
 Assessment:  
  
Principal Problem: 
  Cellulitis (4/27/2020)- per primary team; likely contributing to his edema. Active Problems: 
  Atrial fibrillation ([address] (9/27/2013)- rate controlled; continue coreg and coumadin CAD (coronary artery disease) (9/27/2013)- trop neg in ED,  continue BB, ACE and statin COPD (chronic obstructive pulmonary disease) ([facility] (10/28/2019)- per primary team  
 
  Volume overload (4/27/2020)- Cautious diuresis with right sided heart failure; will change po lasix to IV and add daily labs. Will follow along with you. Thank you very much for this referral. We appreciate the opportunity to participate in this patient's care. We will follow along with above stated plan.  
 
Mundo Mackenzie MD

## 2020-04-27 NOTE — ED PROVIDER NOTES
HPI: 
58-year-old male here with complaint of unable to walk, increased leg swelling, weeping, drainage, chest pain and shortness of breath. History of CHF. On Coumadin for A. fib. Has been taking Lasix at home compliant. Unable to lay flat and sleep at night. Has significant shortness of breath with small exertional activities. Stated he thinks he had a fever a few days ago. Has had a mild cough for the past 3 days. Denies any known direct exposure to coronavirus ROS Constitutional: + fever, no chills Skin: no rash Eye: No vision changes ENMT: No sore throat Respiratory: + shortness of breath, + cough Cardiovascular: No chest pain, no palpitations Gastrointestinal: No vomiting, no nausea, no diarrhea, no abdominal pain : No dysuria MSK: No back pain, no muscle pain, no joint pain Neuro: No headache, no change in mental status, no numbness, no tingling, no weakness Psych:  
Endocrine:  
All other review of systems positive per history of present illness and the above otherwise negative or noncontributory. Visit Vitals /66 (BP 1 Location: Right arm, BP Patient Position: At rest) Pulse (!) 104 Temp 97.8 °F (36.6 °C) Resp 16 Ht 5' 8\" (1.727 m) Wt 103.4 kg (228 lb) SpO2 (!) 88% Comment: placed on 3L NC  
BMI 34.67 kg/m² Past Medical History:  
Diagnosis Date  Acquired hypothyroidism 10/28/2019  Arrhythmia   
 afib  Arthritis  CAD (coronary artery disease) 2009 CABG, 3 vessel. No MI  
 Chronic pain   
 legs \"R especially\"  COPD (chronic obstructive pulmonary disease) ([address] 10/28/2019  Diabetes ([facility] 2009  
 insulin controlled, avg fbs 107, recognizes hypo at 87, unsure last Ha1C  
 GERD (gastroesophageal reflux disease)   
 controlled with omeprazole twice daily  Heart failure (HCC)   
 echo 9/27/13:  EF 70%, Mild regurg mitral and tricuspid valve.  Hyperlipidemia   
 controlled with med  Hypertension   
 controlled with meds  Moderate aortic stenosis echo 13  
 aortic valve area 1.2 cm2  Psychiatric disorder   
 anxiety, depression, controlled with Venlafaxine  PUD (peptic ulcer disease) 's  Unspecified sleep apnea   
 wears cpap Past Surgical History:  
Procedure Laterality Date [address] UN[address]ED   cholecystectomy  CARDIAC SURG PROCEDURE UNLIST  2009 CABG 3V  
 HX APPENDECTOMY  HX GI    
 heller myotomy with toupee wrap [address] VALVE SURGERY    HX HEENT Bilateral 2004  
 ptosis  HX HEENT    
 to have cataract surgery 2014  HX KNEE ARTHROSCOPY    
 bilateral  
 HX ORTHOPAEDIC Right 2002  
 bone spur  HX OTHER SURGICAL  2011  
 prostate surgery  HX PROSTATECTOMY  2011 TURP Prior to Admission Medications Prescriptions Last Dose Informant Patient Reported? Taking? Jardiance 10 mg tablet   No No  
Sig: Take 1 Tab by mouth daily. acetaminophen (TYLENOL) 500 mg tablet   Yes No  
Sig: Take 1,000 mg by mouth every twelve (12) hours as needed for Pain. albuterol (PROVENTIL HFA, VENTOLIN HFA, PROAIR HFA) 90 mcg/actuation inhaler   No No  
Sig: Take 1 Puff by inhalation every four (4) hours as needed for Wheezing. albuterol (PROVENTIL VENTOLIN) 2.5 mg /3 mL (0.083 %) nebulizer solution   No No  
Sig: 3 mL by Nebulization route every six (6) hours. ascorbic acid, vitamin C, (VITAMIN C) 500 mg tablet   Yes No  
Sig: Take 500 mg by mouth two (2) times a day. aspirin delayed-release 81 mg tablet   Yes No  
Sig: Take 81 mg by mouth daily. atorvastatin (LIPITOR) 40 mg tablet   Yes No  
Sig: Take 20 mg by mouth daily. carvedilol (COREG) 12.5 mg tablet   Yes No  
Sig: Take 12.5 mg by mouth two (2) times daily (with meals). has 25mg tablets  
cyanocobalamin (VITAMIN B-12) 1,000 mcg/mL injection   Yes No  
Si,000 mcg by IntraMUSCular route every thirty (30) days.   
diclofenac (VOLTAREN) 1 % gel   Yes No  
 Sig: Apply  to affected area four (4) times daily. ferrous sulfate 324 mg (65 mg iron) tablet   Yes No  
Sig: Take 324 mg by mouth daily. furosemide (LASIX) 80 mg tablet   Yes No  
Sig: Take 80 mg by mouth two (2) times a day. inhalational spacing device   No No  
Sig: Use as directed with MDI  
levothyroxine (SYNTHROID) 75 mcg tablet   Yes No  
Sig: Take 75 mcg by mouth Daily (before breakfast). lisinopril (PRINIVIL, ZESTRIL) 20 mg tablet   Yes No  
Sig: Take 10 mg by mouth daily. magnesium oxide (MAG-OX) 400 mg tablet   Yes No  
Sig: Take 400 mg by mouth daily. memantine (NAMENDA) 10 mg tablet   Yes No  
Sig: Take 10 mg by mouth two (2) times a day. metOLazone (ZAROXOLYN) 5 mg tablet   No No  
Sig: Take 1 Tab by mouth daily. Take 30-min prior to lasix for 5 days. Patient taking differently: Take 5 mg by mouth daily as needed for Other (swelling or shortness of breath). Take 30-min prior to first lasix dose as needed for lower extremity swelling or shortness of breath.  
mineral oil-hydrophil petrolat (AQUAPHOR) ointment   Yes No  
Sig: Apply 1 Each to affected area as needed for Dry Skin or Itching. apply daily as needed to any areas of dry/itchy skin  
multivitamin (ONE A DAY) tablet   Yes No  
Sig: Take 1 Tab by mouth daily. nitroglycerin (NITROSTAT) 0.4 mg SL tablet   Yes No  
Si.4 mg by SubLINGual route every five (5) minutes as needed for Chest Pain. oxyCODONE IR (ROXICODONE) 10 mg tab immediate release tablet   Yes No  
Sig: Take 10 mg by mouth every eight (8) hours as needed for Pain.  
polyethylene glycol (MIRALAX) 17 gram/dose powder   Yes No  
Sig: Take 17 g by mouth daily as needed for Other (constipation). potassium chloride (K-DUR, KLOR-CON) 20 mEq tablet   No No  
Sig: Take 1 Tab by mouth daily. senna-docusate (SENNA PLUS) 8.6-50 mg per tablet   Yes No  
Sig: Take 1 Tab by mouth daily.   
warfarin (COUMADIN) 5 mg tablet   Yes No  
 Sig: Take 5 mg by mouth daily. Or as directed Facility-Administered Medications: None Adult Exam  
General: alert, no acute distress Head: normocephalic, atraumatic ENT: moist mucous membranes Neck: supple, non-tender; full range of motion Cardiovascular: regular rate and rhythm, normal peripheral perfusion. Significant pitting edema noted in bilateral lower extremity. There is significant drainage, weeping, purulent discharge noted on the left compared to the right. Significantly erythematous bilateral lower extremities. Distal pulses are intact. Respiratory:  normal respirations; no wheezing, rales or rhonchi Gastrointestinal: soft, non-tender; no rebound or guarding, no peritoneal signs, no distension Back: non-tender, full range of motion Musculoskeletal: normal range of motion, normal strength, no gross deformities Neurological: alert and oriented x 4, no gross focal deficits; normal speech Psychiatric: cooperative; appropriate mood and affect MDM: 
He was mildly hypoxic in triage. 88%. Placed on oxygen. Currently 100%. Compliant with medication. Concern for significant fluid overload. Lasix given. There is also some concern for possible cellulitis given the purulent-like drainage noted in the left lower legs compared to the right. Will also obtain blood culture, lactic acid and give a dose of IV vancomycin. Chest x-ray fairly consistent with baseline without any worsening infiltrate. EKG rate of 90. Atrial fibrillation. Normal axis. Left bundle branch block. No STEMI criteria noted. Patient will need to be admitted to the hospital for further management. 4:31 PM 
 
Spoke with cardiology about the case. Agrees with Lasix. Recommend admission to the hospitalist due to possible source of infection with subjective fever at home for last 3 days. Chest x-ray does not show any consolidation or infiltrate. Not consistent with pneumonia.   Lower suspicion for coronavirus. Lasix has been given. Vancomycin given. Patient will be admitted to the hospitalist at this time for further management. Xr Chest Pa Lat Result Date: 4/27/2020 CHEST X-RAY, 2 views 4/27/2020 History: Shortness of breath. Technique: PA and lateral views of the chest. Comparison: Chest x-ray 4/20/2020 Findings: Improving aeration is seen of the lungs. Stable post surgical changes overlie the mediastinal silhouette. The cardiac silhouette is mildly enlarged although not significant changed from the prior study. The lungs are expanded without evidence for pneumothorax. No evolving consolidation, or evidence of pleural effusion is seen. Prior basilar atelectatic appearing changes appear improved. Additional prior trace effusions at the costophrenic angles appear slightly improved particularly on the right. The bony thorax demonstrates no acute changes. The upper abdomen is unremarkable in appearance. IMPRESSION: 1. No worsening changes. Only improving aeration, improving basilar atelectatic appearing changes, and some improvement in trace basilar effusions are seen. Recent Results (from the past 12 hour(s)) CBC WITH AUTOMATED DIFF Collection Time: 04/27/20  2:28 PM  
Result Value Ref Range WBC 6.1 4.3 - 11.1 K/uL  
 RBC 5.51 4.23 - 5.6 M/uL  
 HGB 16.7 13.6 - 17.2 g/dL HCT 50.9 (H) 41.1 - 50.3 % MCV 92.4 79.6 - 97.8 FL  
 MCH 30.3 26.1 - 32.9 PG  
 MCHC 32.8 31.4 - 35.0 g/dL  
 RDW 15.9 (H) 11.9 - 14.6 % PLATELET 494 156 - 156 K/uL MPV 11.9 9.4 - 12.3 FL ABSOLUTE NRBC 0.00 0.0 - 0.2 K/uL  
 DF AUTOMATED NEUTROPHILS 73 43 - 78 % LYMPHOCYTES 9 (L) 13 - 44 % MONOCYTES 15 (H) 4.0 - 12.0 % EOSINOPHILS 2 0.5 - 7.8 % BASOPHILS 1 0.0 - 2.0 % IMMATURE GRANULOCYTES 0 0.0 - 5.0 %  
 ABS. NEUTROPHILS 4.5 1.7 - 8.2 K/UL  
 ABS. LYMPHOCYTES 0.5 0.5 - 4.6 K/UL  
 ABS. MONOCYTES 0.9 0.1 - 1.3 K/UL  
 ABS. EOSINOPHILS 0.1 0.0 - 0.8 K/UL ABS. BASOPHILS 0.1 0.0 - 0.2 K/UL  
 ABS. IMM. GRANS. 0.0 0.0 - 0.5 K/UL METABOLIC PANEL, COMPREHENSIVE Collection Time: 04/27/20  2:28 PM  
Result Value Ref Range Sodium 137 136 - 145 mmol/L Potassium 3.5 3.5 - 5.1 mmol/L Chloride 98 98 - 107 mmol/L  
 CO2 31 21 - 32 mmol/L Anion gap 8 7 - 16 mmol/L Glucose 124 (H) 65 - 100 mg/dL BUN 19 8 - 23 MG/DL Creatinine 0.82 0.8 - 1.5 MG/DL  
 GFR est AA >60 >60 ml/min/1.73m2 GFR est non-AA >60 >60 ml/min/1.73m2 Calcium 9.4 8.3 - 10.4 MG/DL Bilirubin, total 2.2 (H) 0.2 - 1.1 MG/DL  
 ALT (SGPT) 31 12 - 65 U/L  
 AST (SGOT) 41 (H) 15 - 37 U/L Alk. phosphatase 186 (H) 50 - 136 U/L Protein, total 8.4 (H) 6.3 - 8.2 g/dL Albumin 3.7 3.2 - 4.6 g/dL Globulin 4.7 (H) 2.3 - 3.5 g/dL A-G Ratio 0.8 (L) 1.2 - 3.5 NT-PRO BNP Collection Time: 04/27/20  2:28 PM  
Result Value Ref Range NT pro-BNP 6,722 (H) <450 PG/ML  
TROPONIN I Collection Time: 04/27/20  2:28 PM  
Result Value Ref Range Troponin-I, Qt. 0.02 0.02 - 0.05 NG/ML  
LACTIC ACID Collection Time: 04/27/20  2:28 PM  
Result Value Ref Range Lactic acid 1.6 0.4 - 2.0 MMOL/L  
EKG, 12 LEAD, INITIAL Collection Time: 04/27/20  2:52 PM  
Result Value Ref Range Ventricular Rate 90 BPM  
 Atrial Rate 340 BPM  
 QRS Duration 144 ms Q-T Interval 378 ms QTC Calculation (Bezet) 462 ms Calculated R Axis 42 degrees Calculated T Axis 129 degrees Diagnosis    
  !! AGE AND GENDER SPECIFIC ECG ANALYSIS !! Atrial fibrillation Left bundle branch block Non-specific ST-t wave changes When compared with ECG of 20-APR-2020 16:05, Left bundle branch block is now Present Criteria for Septal infarct are no longer Present Criteria for Lateral infarct are no longer Present Confirmed by Franco Parker MD (), Yg Garber ([id number]) on 4/27/2020 3:23:48 PM 
  
PROTHROMBIN TIME + INR Collection Time: 04/27/20  3:00 PM  
Result Value Ref Range Prothrombin time 23.3 (H) 12.0 - 14.7 sec INR 2.0 Dragon voice recognition software was used to create this note. Although the note has been reviewed and corrected where necessary, additional errors may have been overlooked and remain in the text.

## 2020-04-28 PROBLEM — I48.20 CHRONIC ATRIAL FIBRILLATION (HCC): Chronic | Status: ACTIVE | Noted: 2020-01-01

## 2020-04-28 NOTE — PROGRESS NOTES
04/27/20 1840 Dual Skin Pressure Injury Assessment Dual Skin Pressure Injury Assessment X Second Care Provider (Based on [address] [place] [address]) Lacey Mosquera RN Knee Bilateral 
(redness, flaking, weeping, blistering BLE) Redness outlined on pt's legs

## 2020-04-28 NOTE — PROGRESS NOTES
Warfarin dosing per pharmacist 
 
Dorina Cueavs is a 79 y.o. male. Height: 5' 8\" (172.7 cm)    Weight: 92.4 kg (203 lb 12.8 oz) Indication:  Afib Goal INR:  2-3 Home dose:  2.5 mg every Sunday and 5 mg all remaining days of the week Risk factors or significant drug interactions:  None for now Other anticoagulants:  none Daily Monitoring Date  INR     Warfarin dose HGB              Notes 4/27  2.0  5 mg  16.7  
4/28  2.4  5 mg  14.5 Pharmacy consulted for warfarin dosing, home dose is 2.5 mg on Sunday and 5 mg all remaining days of the week. INR 2.4. Will continue home dosing currently. Daily INRs, pharmacy will continue to follow. Thank you, Miladis Conner, Pharm. D. Clinical Pharmacist 
[phone]

## 2020-04-28 NOTE — CDMP QUERY
Patient admitted with cellulitis and volume overload, noted to have atrial fibrillation. If possible, please document in progress notes and discharge summary further specificity regarding the type of atrial fibrillation: 
 
? Paroxysmal Atrial Fibrillation ? Longstanding Persistent Atrial Fibrillation ? Permanent Atrial Fibrillation ? Persistent Atrial Fibrillation ? Chronic Atrial Fibrillation, unspecified ? Other, please specify ? Clinically unable to determine The medical record reflects the following: 
   Risk Factors: Hx of Afib, CAD Clinical Indicators: EKG w/A fib, rate 90's, \"Atrial fibrillation-rate controlled\" per MD note Treatment: Coreg, Coumadin, Telemetry monitoring Chronic: nonspecific term that could be referring to paroxysmal, persistent, or permanent Longstanding persistent: persistent and continuous, lasting > 1 year. Paroxysmal - self-terminating or intermittent; resolves with or without intervention within 7 days of onset; may recur with various frequency. Persistent - Fails to terminate within 7 days; Often requires meds or cardioversion to restore to NSR. Permanent - longstanding & persistent; Medication has been ineffective in restoring NSR &/or cardioversion is contraindicated Definitions per MS-[employer] Training Guide and Quick Reference Guide, [address] 5 Diseases and Disorders of the Circulatory System; 2019; [employer].  Software content from the [employer] Program

## 2020-04-28 NOTE — PROGRESS NOTES
Hospitalist Note Admit Date:  2020 11:28 AM  
Name:  Alecia Siddiqi Age:  79 y.o. 
:  1940 MRN:  [id number] PCP:  Emilio Tejeda MD 
Treatment Team: Attending Provider: Phuc Rodriguez MD; Physician: Cyn Nair MD; Physical Therapist: Jake Post, PT, DPT; Primary Nurse: Bel Villalta, RN; Occupational Therapist: Britney Krishnamurthy, OTR/L; Utilization Review: Emma Trejo; Care Manager: Malachi Brown; Utilization Review: Carlos Pitts HPI/Subjective:  
 
 
Mr. Guzman is a 80 yo male with PMH of cor pulmonale with COPD/ MONICA-noncompliant with home O2, LE venous stasis dermatitis, AFIB on coumadin, s/p AVR, DM2, CAD s/p CABG, admitted with painful/ erythematous/ weeping bilateral LE with concern for cellulitis and volume overload. His cellulitis is managed with vancomycin/zosyn, BC NGTD. Wound care consulted. Cardiology has evaluated his volume overload and has continued to diurese with IV lasix. He has mildly elevated bilirubin and ABD US shows mild ascites. Discharge plans pending. 20 feels \"fair to [place]\", ate ok but has some mid epigastric abdominal pain, LE burn like a \"campfire\", and are red and swelling, no recent BM Objective:  
 
Patient Vitals for the past 24 hrs: 
 Temp Pulse Resp BP SpO2  
20 1320    101/60   
20 1054 98 °F (36.7 °C) 88 18 108/63 98 % 20 0915  94  132/63   
20 0701 98.4 °F (36.9 °C) 90 19 111/70 96 % 20 0400  (!) 101   99 % 20 0338 97.7 °F (36.5 °C) (!) 102 20 127/63 90 % 20 2243 98.2 °F (36.8 °C) 95 17 103/59 90 % 20 2017 97.4 °F (36.3 °C) (!) 110 17 129/67 96 % 20 1942  81  116/66   
04/27/20 1924     92 % 20 1750  (!) 109  164/81 100 % 20 1723  (!) 101   100 % 20 1656  (!) 101 15  98 % 20 1625  (!) 103 19 143/87 94 % 20 1420  92 16 140/78 99 % Oxygen Therapy O2 Sat (%): 98 % (04/28/20 1054) Pulse via Oximetry: 99 beats per minute (04/28/20 0701) O2 Device: Nasal cannula (04/27/20 2030) O2 Flow Rate (L/min): 3 l/min (04/28/20 0701) Estimated body mass index is 30.99 kg/m² as calculated from the following: 
  Height as of this encounter: 5' 8\" (1.727 m). Weight as of this encounter: 92.4 kg (203 lb 12.8 oz). Intake/Output Summary (Last 24 hours) at 4/28/2020 1356 Last data filed at 4/28/2020 1319 Gross per 24 hour Intake 240 ml Output 1675 ml Net -1435 ml *Note that automatically entered I/Os may not be accurate; dependent on patient compliance with collection and accurate  by techs. General:    Well nourished. Alert. No distress, elderly CV:   RRR. No murmur, rub, or gallop. 2+ edema Lungs:   CTAB. No wheezing, rhonchi, or rales. anterior Abdomen:   Soft,tender mid epigastric area, nondistended. Decreased BS Skin:     LE venous stasis with erythema Neuro:  No gross focal deficits Data Review: 
I have reviewed all labs, meds, and studies from the last 24 hours: 
 
Recent Results (from the past 24 hour(s)) CBC WITH AUTOMATED DIFF Collection Time: 04/27/20  2:28 PM  
Result Value Ref Range WBC 6.1 4.3 - 11.1 K/uL  
 RBC 5.51 4.23 - 5.6 M/uL  
 HGB 16.7 13.6 - 17.2 g/dL HCT 50.9 (H) 41.1 - 50.3 % MCV 92.4 79.6 - 97.8 FL  
 MCH 30.3 26.1 - 32.9 PG  
 MCHC 32.8 31.4 - 35.0 g/dL  
 RDW 15.9 (H) 11.9 - 14.6 % PLATELET 958 261 - 099 K/uL MPV 11.9 9.4 - 12.3 FL ABSOLUTE NRBC 0.00 0.0 - 0.2 K/uL  
 DF AUTOMATED NEUTROPHILS 73 43 - 78 % LYMPHOCYTES 9 (L) 13 - 44 % MONOCYTES 15 (H) 4.0 - 12.0 % EOSINOPHILS 2 0.5 - 7.8 % BASOPHILS 1 0.0 - 2.0 % IMMATURE GRANULOCYTES 0 0.0 - 5.0 %  
 ABS. NEUTROPHILS 4.5 1.7 - 8.2 K/UL  
 ABS. LYMPHOCYTES 0.5 0.5 - 4.6 K/UL  
 ABS. MONOCYTES 0.9 0.1 - 1.3 K/UL  
 ABS. EOSINOPHILS 0.1 0.0 - 0.8 K/UL  
 ABS. BASOPHILS 0.1 0.0 - 0.2 K/UL ABS. IMM. GRANS. 0.0 0.0 - 0.5 K/UL METABOLIC PANEL, COMPREHENSIVE Collection Time: 04/27/20  2:28 PM  
Result Value Ref Range Sodium 137 136 - 145 mmol/L Potassium 3.5 3.5 - 5.1 mmol/L Chloride 98 98 - 107 mmol/L  
 CO2 31 21 - 32 mmol/L Anion gap 8 7 - 16 mmol/L Glucose 124 (H) 65 - 100 mg/dL BUN 19 8 - 23 MG/DL Creatinine 0.82 0.8 - 1.5 MG/DL  
 GFR est AA >60 >60 ml/min/1.73m2 GFR est non-AA >60 >60 ml/min/1.73m2 Calcium 9.4 8.3 - 10.4 MG/DL Bilirubin, total 2.2 (H) 0.2 - 1.1 MG/DL  
 ALT (SGPT) 31 12 - 65 U/L  
 AST (SGOT) 41 (H) 15 - 37 U/L Alk. phosphatase 186 (H) 50 - 136 U/L Protein, total 8.4 (H) 6.3 - 8.2 g/dL Albumin 3.7 3.2 - 4.6 g/dL Globulin 4.7 (H) 2.3 - 3.5 g/dL A-G Ratio 0.8 (L) 1.2 - 3.5 NT-PRO BNP Collection Time: 04/27/20  2:28 PM  
Result Value Ref Range NT pro-BNP 6,722 (H) <450 PG/ML  
TROPONIN I Collection Time: 04/27/20  2:28 PM  
Result Value Ref Range Troponin-I, Qt. 0.02 0.02 - 0.05 NG/ML  
LACTIC ACID Collection Time: 04/27/20  2:28 PM  
Result Value Ref Range Lactic acid 1.6 0.4 - 2.0 MMOL/L  
CULTURE, BLOOD Collection Time: 04/27/20  2:45 PM  
Result Value Ref Range Special Requests: RIGHT ANTERIOR Culture result: NO GROWTH AFTER 12 HOURS    
EKG, 12 LEAD, INITIAL Collection Time: 04/27/20  2:52 PM  
Result Value Ref Range Ventricular Rate 90 BPM  
 Atrial Rate 340 BPM  
 QRS Duration 144 ms Q-T Interval 378 ms QTC Calculation (Bezet) 462 ms Calculated R Axis 42 degrees Calculated T Axis 129 degrees Diagnosis    
  !! AGE AND GENDER SPECIFIC ECG ANALYSIS !! Atrial fibrillation Left bundle branch block Non-specific ST-t wave changes When compared with ECG of 20-APR-2020 16:05, Left bundle branch block is now Present Criteria for Septal infarct are no longer Present Criteria for Lateral infarct are no longer Present Confirmed by Yuriy Porter MD (), Nikole Pedraza ([id number]) on 4/27/2020 3:23:48 PM 
  
PROTHROMBIN TIME + INR Collection Time: 04/27/20  3:00 PM  
Result Value Ref Range Prothrombin time 23.3 (H) 12.0 - 14.7 sec INR 2.0    
CULTURE, BLOOD Collection Time: 04/27/20  4:18 PM  
Result Value Ref Range Special Requests: LEFT Antecubital 
    
 Culture result: NO GROWTH AFTER 12 HOURS    
GLUCOSE, POC Collection Time: 04/27/20  8:29 PM  
Result Value Ref Range Glucose (POC) 112 (H) 65 - 100 mg/dL CULTURE, WOUND W GRAM STAIN Collection Time: 04/27/20  8:40 PM  
Result Value Ref Range Special Requests: NO SPECIAL REQUESTS    
 GRAM STAIN 0 TO 12 WBC'S SEEN PER OIF   
 GRAM STAIN MANY GRAM NEGATIVE RODS    
 GRAM STAIN FEW GRAM POSITIVE COCCI Culture result:     
  NO GROWTH AFTER SHORT PERIOD OF INCUBATION. FURTHER RESULTS TO FOLLOW AFTER OVERNIGHT INCUBATION. METABOLIC PANEL, BASIC Collection Time: 04/28/20  4:48 AM  
Result Value Ref Range Sodium 138 136 - 145 mmol/L Potassium 3.6 3.5 - 5.1 mmol/L Chloride 101 98 - 107 mmol/L  
 CO2 33 (H) 21 - 32 mmol/L Anion gap 4 (L) 7 - 16 mmol/L Glucose 167 (H) 65 - 100 mg/dL BUN 17 8 - 23 MG/DL Creatinine 0.95 0.8 - 1.5 MG/DL  
 GFR est AA >60 >60 ml/min/1.73m2 GFR est non-AA >60 >60 ml/min/1.73m2 Calcium 8.4 8.3 - 10.4 MG/DL MAGNESIUM Collection Time: 04/28/20  4:48 AM  
Result Value Ref Range Magnesium 2.2 1.8 - 2.4 mg/dL CBC W/O DIFF Collection Time: 04/28/20  4:48 AM  
Result Value Ref Range WBC 8.8 4.3 - 11.1 K/uL  
 RBC 4.71 4.23 - 5.6 M/uL  
 HGB 14.5 13.6 - 17.2 g/dL HCT 42.7 41.1 - 50.3 % MCV 90.7 79.6 - 97.8 FL  
 MCH 30.8 26.1 - 32.9 PG  
 MCHC 34.0 31.4 - 35.0 g/dL  
 RDW 15.8 (H) 11.9 - 14.6 % PLATELET 255 578 - 252 K/uL MPV 12.3 9.4 - 12.3 FL ABSOLUTE NRBC 0.00 0.0 - 0.2 K/uL PROTHROMBIN TIME + INR Collection Time: 04/28/20  4:48 AM  
Result Value Ref Range Prothrombin time 27.1 (H) 12.0 - 14.7 sec INR 2.4 HEPATIC FUNCTION PANEL Collection Time: 04/28/20  4:48 AM  
Result Value Ref Range Protein, total 6.5 6.3 - 8.2 g/dL Albumin 2.6 (L) 3.2 - 4.6 g/dL Globulin 3.9 (H) 2.3 - 3.5 g/dL A-G Ratio 0.7 (L) 1.2 - 3.5 Bilirubin, total 1.9 (H) 0.2 - 1.1 MG/DL Bilirubin, direct 0.8 (H) <0.4 MG/DL Alk. phosphatase 137 (H) 50 - 136 U/L  
 AST (SGOT) 31 15 - 37 U/L  
 ALT (SGPT) 25 12 - 65 U/L  
GLUCOSE, POC Collection Time: 04/28/20  7:36 AM  
Result Value Ref Range Glucose (POC) 134 (H) 65 - 100 mg/dL GLUCOSE, POC Collection Time: 04/28/20 11:25 AM  
Result Value Ref Range Glucose (POC) 131 (H) 65 - 100 mg/dL All Micro Results Procedure Component Value Units Date/Time Jomar Rios [[id number]] Collected:  04/27/20 2040 Order Status:  Completed Specimen:  Wound Drainage Updated:  04/28/20 1206 Special Requests: NO SPECIAL REQUESTS     
  GRAM STAIN 0 TO 12 WBC'S SEEN PER OIF  
   MANY GRAM NEGATIVE RODS     
   FEW GRAM POSITIVE COCCI Culture result:    
  NO GROWTH AFTER SHORT PERIOD OF INCUBATION. FURTHER RESULTS TO FOLLOW AFTER OVERNIGHT INCUBATION. CULTURE, BLOOD [[id number]] Collected:  04/27/20 1445 Order Status:  Completed Specimen:  Blood Updated:  04/28/20 9573 Special Requests: --     
  RIGHT ANTERIOR Culture result: NO GROWTH AFTER 12 HOURS     
 CULTURE, BLOOD [[id number]] Collected:  04/27/20 1618 Order Status:  Completed Specimen:  Blood Updated:  04/28/20 7914 Special Requests: --     
  LEFT Antecubital 
  
  Culture result: NO GROWTH AFTER 12 HOURS Current Meds: 
Current Facility-Administered Medications Medication Dose Route Frequency  alcohol 62% (NOZIN) nasal  1 Ampule  1 Ampule Topical Q12H  furosemide (LASIX) injection 40 mg  40 mg IntraVENous BID  acetaminophen (TYLENOL) tablet 650 mg  650 mg Oral Q6H PRN  
  albuterol (PROVENTIL VENTOLIN) nebulizer solution 2.5 mg  2.5 mg Nebulization TID RT  
 aspirin delayed-release tablet 81 mg  81 mg Oral DAILY  atorvastatin (LIPITOR) tablet 20 mg  20 mg Oral DAILY  carvediloL (COREG) tablet 12.5 mg  12.5 mg Oral BID WITH MEALS  ferrous sulfate tablet 325 mg  1 Tab Oral DAILY WITH BREAKFAST  levothyroxine (SYNTHROID) tablet 75 mcg  75 mcg Oral ACB  lisinopriL (PRINIVIL, ZESTRIL) tablet 10 mg  10 mg Oral DAILY  magnesium oxide (MAG-OX) tablet 400 mg  400 mg Oral DAILY  memantine (NAMENDA) tablet 10 mg  10 mg Oral BID  multivitamin, tx-iron-ca-min (THERA-M w/ IRON) tablet 1 Tab  1 Tab Oral DAILY  oxyCODONE IR (ROXICODONE) tablet 10 mg  10 mg Oral Q8H PRN  
 sodium chloride (NS) flush 5-40 mL  5-40 mL IntraVENous Q8H  
 sodium chloride (NS) flush 5-40 mL  5-40 mL IntraVENous PRN  
 morphine injection 2 mg  2 mg IntraVENous Q4H PRN  
 insulin lispro (HUMALOG) injection   SubCUTAneous AC&HS  warfarin (COUMADIN) tablet 5 mg  5 mg Oral QPM  
 tuberculin injection 5 Units  5 Units IntraDERMal ONCE  polyethylene glycol (MIRALAX) packet 17 g  17 g Oral DAILY  famotidine (PEPCID) tablet 20 mg  20 mg Oral Q12H  piperacillin-tazobactam (ZOSYN) 3.375 g in 0.9% sodium chloride (MBP/ADV) 100 mL  3.375 g IntraVENous Q8H  
 pantothenic ac-min oil-pet,hyd (AQUAPHOR) 41 % ointment   Topical PRN Other Studies: No results found for this visit on 04/27/20. [id number] [facility] Result Date: 4/28/2020 Right Upper Quadrant Ultrasound INDICATION:  Elevated liver function tests with low albumin, hyperbilirubinemia, increased alkaline phosphatase, prior cholecystectomy. Bilateral lower extremity swelling and erythema, chronic right second just of heart failure, hyperlipidemia, diabetes, or artery disease, atrial fibrillation, hypertension, peptic ulcer disease, GERD, aortic stenosis, hypothyroidism.  COMPARISON: Correlation with CT chest 10/25/2017 (no prior abdominal imaging is available at this facility) TECHNIQUE:  Sonographic gray scale and Doppler images were obtained of the right upper quadrant of the abdomen. FINDINGS: The hepatic parenchymal echotexture is markedly heterogeneous and elevated throughout, limiting sensitivity for masses. The left lobe cannot be well visualized evaluated due to overlying bowel and rib artifact. There are no discrete lesions in the visualized portions of the liver. Liver size is 15.8 cm, within normal limits. Main portal vein is patent on Doppler imaging. There is no visualized intrahepatic bile duct dilatation. The common bile duct diameter is 8 mm, a common finding after cholecystectomy. The gallbladder is surgically absent. Gallbladder fossa is unremarkable grossly. Sonographic Wen's sign is not seen. There are no discrete lesions in the limited visualized portions of the pancreas, which is almost entirely obscured by overlying bowel artifact. The right kidney measures 12.8 cm in length. There is no hydronephrosis. There is no evidence of a solid renal mass. Color Doppler demonstrates expected right renal flow. There is small volume abdominal ascites, and a right pleural effusion is partially seen. The aorta is not well seen or evaluated due to overlying artifact. IVC appears patent on spectral Doppler. IMPRESSION: 1. Echogenic liver parenchyma, nonspecific but most often steatosis. 2. Cholecystectomy. 3. Small volume ascites, and right pleural effusion. SARS-CoV-2 LAB Results \"Novel Coronavirus\" Test: No results found for: COV2NT \"Emergent Disease\" Test: No results found for: EDPR As of: 1:56 PM on 4/28/2020 Assessment and Plan:  
 
Hospital Problems as of 4/28/2020 Date Reviewed: 3/11/2020 Codes Class Noted - Resolved POA Do not resuscitate (Chronic) ICD-10-CM: J77 ICD-9-CM: V49.86 Chronic 10/30/2019 - Present Yes  Volume overload ICD-10-CM: E87.70 
 ICD-9-CM: 276.69  4/27/2020 - Present Yes Cellulitis ICD-10-CM: L03.90 ICD-9-CM: 682.9  4/27/2020 - Present Cor pulmonale (HCC) ICD-10-CM: F20.48 ICD-9-CM: 416.9  4/27/2020 - Present Yes * (Principal) Lower extremity cellulitis ICD-10-CM: L03.119 ICD-9-CM: 682.6  4/27/2020 - Present Yes Bilateral lower extremity edema ICD-10-CM: R60.0 ICD-9-CM: 782.3  4/27/2020 - Present Yes Elevated liver enzymes ICD-10-CM: R74.8 ICD-9-CM: 790.5  4/27/2020 - Present Yes Epigastric pain ICD-10-CM: R10.13 ICD-9-CM: 789.06  4/27/2020 - Present Yes Bilateral pleural effusion ICD-10-CM: J90 ICD-9-CM: 511.9  4/27/2020 - Present Yes 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) ICD-10-CM: I27.29, I50.810 ICD-9-CM: 428.0, 416.8  1/6/2020 - Present Yes Venous stasis dermatitis of both lower extremities (Chronic) ICD-10-CM: W30.1 ICD-9-CM: 454.1  10/29/2019 - Present Yes COPD (chronic obstructive pulmonary disease) (HCC) (Chronic) ICD-10-CM: J44.9 ICD-9-CM: 941  10/28/2019 - Present Yes S/P AVR (Chronic) ICD-10-CM: Z95.2 ICD-9-CM: V43.3  10/28/2019 - Present Yes Acquired hypothyroidism (Chronic) ICD-10-CM: E03.9 ICD-9-CM: 244.9  10/28/2019 - Present Yes Palliative care patient ICD-10-CM: Z51.5 ICD-9-CM: V66.7  10/3/2017 - Present Yes Restrictive lung disease ICD-10-CM: J98.4 ICD-9-CM: 518.89  10/3/2017 - Present Yes Overview Signed 10/3/2017  3:54 PM by Reanna Munroe NP  
  9/29/2017 10:15 AM - RT Daniela Garibay This is complete pulmonary function performed on 9/28/2017. 
 Patient with history of restrictive lung disease and dyspnea on exertion. SPIROMETRY: 
 
FVC 2.43 l and 52 % of predicted. FEV1 1.88 l and 54 % of predicted. Ratio 77 %. There was no clinically relevant improvement with bronchodilator. LUNG VOLUMES: 
 
TLC 5.35 l and 72 % of predicted. RV 2.91 l and 107 % of predicted. DIFFUSION: 
 
The diffusing capacity was corrected for hemoglobin of 14.2 g/dL and was 12.0 mL/mmHg/minute and 37 % of predicted. IMPRESSION: 
 
Spirometry is consistent with moderately severe restrictive defect, there is no improvement with bronchodilator.  Lung volume analysis reveals mild restrictive defect concomitantly and gas trapping.  The diffusion capacity was corrected for hemoglobin and was decreased.  Overall this is consistent with a mixed ventilatory defect with both restriction which is predominant and concomitant obstruction and decreased diffusion capacity. Aiden Nayak MD  
 
 
  
  
   
 MONICA (obstructive sleep apnea) (Chronic) ICD-10-CM: G84.33 
ICD-9-CM: 327.23  12/16/2013 - Present Yes Diastolic CHF, acute on chronic (HCC) ICD-10-CM: I50.33 ICD-9-CM: 428.33, 428.0  9/30/2013 - Present Chronic respiratory failure with hypoxia (HCC) (Chronic) ICD-10-CM: J96.11 
ICD-9-CM: 518.83, 799.02  9/30/2013 - Present Yes Overview Addendum 10/30/2019 11:01 AM by Mellissa Horne MD  
  5L NC continuously Atrial fibrillation (HCC) (Chronic) ICD-10-CM: I48.91 
ICD-9-CM: 427.31  9/27/2013 - Present Yes Type 2 diabetes mellitus (HCC) (Chronic) ICD-10-CM: E11.9 ICD-9-CM: 250.00  9/27/2013 - Present Yes CAD (coronary artery disease) (Chronic) ICD-10-CM: I25.10 ICD-9-CM: 414.00  9/27/2013 - Present Yes Plan: · Bilateral LE cellulitis with venous stasis: · Diuretics per cardiology · Continued vancomycin/zosyn and followup BC · Wound care consulted · Cor pulmonale with COPD/ MONICA/ chronic hypoxia: 
· Wean O2 as tolerant · Diuretics per cardiology · CAD: 
· Continued asa, lipitor · HTN: 
· Continued coreg, lisinopril · DM2: 
· SSI · Chronic AFIB: 
· Continued on coumadin with daily INR checks · Elevated LFTS and abdominal pain: · Add protonix · followup clinically as diet tolerant · Treat constipation · Suspect elevated bilirubin due to hepatic congestion with right sided CHF 
 
DC planning/Dispo:  Pending, PT/OT, PPD Diet:  DIET DIABETIC CONSISTENT CARB 
DVT ppx:  INR 2.8 on coumadin Signed: Antonino Piña MD

## 2020-04-28 NOTE — PROGRESS NOTES
Problem: Pressure Injury - Risk of 
Goal: *Prevention of pressure injury Description: Document Elvis Scale and appropriate interventions in the flowsheet. Outcome: Progressing Towards Goal 
Note: Pressure Injury Interventions: 
Sensory Interventions: Assess changes in LOC Moisture Interventions: Absorbent underpads, Contain wound drainage Activity Interventions: Increase time out of bed, Pressure redistribution bed/mattress(bed type), PT/OT evaluation Mobility Interventions: Assess need for specialty bed, HOB 30 degrees or less, Pressure redistribution bed/mattress (bed type), PT/OT evaluation Nutrition Interventions: Document food/fluid/supplement intake, Offer support with meals,snacks and hydration Friction and Shear Interventions: Apply protective barrier, creams and emollients, Lift team/patient mobility team, Minimize layers, Transferring/repositioning devices Problem: Patient Education: Go to Patient Education Activity Goal: Patient/Family Education Outcome: Progressing Towards Goal 
  
Problem: Patient Education: Go to Patient Education Activity Goal: Patient/Family Education Outcome: Progressing Towards Goal 
  
Problem: Falls - Risk of 
Goal: *Absence of Falls Description: Document Dilip Helms Fall Risk and appropriate interventions in the flowsheet. Outcome: Progressing Towards Goal 
Note: Fall Risk Interventions: 
Mobility Interventions: Communicate number of staff needed for ambulation/transfer, OT consult for ADLs, Patient to call before getting OOB, PT Consult for mobility concerns Medication Interventions: Assess postural VS orthostatic hypotension, Evaluate medications/consider consulting pharmacy, Patient to call before getting OOB, Teach patient to arise slowly Elimination Interventions: Call light in reach, Patient to call for help with toileting needs, Toilet paper/wipes in reach, Toileting schedule/hourly rounds, Urinal in reach Problem: Patient Education: Go to Patient Education Activity Goal: Patient/Family Education Outcome: Progressing Towards Goal 
  
Problem: Patient Education: Go to Patient Education Activity Goal: Patient/Family Education Outcome: Progressing Towards Goal

## 2020-04-28 NOTE — PROGRESS NOTES
Care Management Interventions PCP Verified by CM: Yes Mode of Transport at Discharge: Other (see comment)(TBD based on need) Transition of Care Consult (CM Consult): Discharge Planning Discharge Durable Medical Equipment: No 
Physical Therapy Consult: Yes Occupational Therapy Consult: Yes Speech Therapy Consult: No 
Current Support Network: Own Home, Lives with Spouse Confirm Follow Up Transport: Other (see comment)(TBD based on need) The Plan for Transition of Care is Related to the Following Treatment Goals : Unable to determine at this time [employer] Information Provided?: No 
Discharge Location Discharge Placement: Unable to determine at this time This CM met with pt at bedside this day to complete assessment. Pt verified his PCP, insurance, emergency contact, and home address. He reports no difficulty obtaining his medications in the community. He lives at home with spouse with 3 steps to enter. He confirms his DME includes a cane, walker, motorized scooter, and home oxygen. He reports that he has recently needed more assistance with his ADLs including ambulating, bathing, and toileting. Pt reports he was still driving. Pt is the primary caregiver for his wife who needs assistance with most of her ADLs as well. This CM reviewed with pt the recommendation from PT eval this day that pt would benefit from SNF STR prior to returning home. Pt at first stated that he wants referral sent to [facility]. We then continued to talk about going to rehab and he became hesitant because I explained to him that I would not be able to get his wife in as well (which is what he thought this CM could do). This CM discussed concerns noted from staff that pt stated he hadn't eaten in 3 days and that staff were also concerned because he showed up to ED with his legs wrapped in duct tape.   He stated it's been difficult to get food because his wife has been weaker lately due to her dialysis she gets and that \"they\" wrapped up his legs because it was leaking and \"ruining the furniture\". Pt was hesitant about SNF for STR because he states no one is at home to take care of his wife. He states that they have a son but he is not reliable. Pt reports they have a granddaughter but could not tell this CM her phone number. This CM called pt spouse to discuss situation. Pt states that they have eaten and that pt is the one who wrapped his legs in duct tape and that \"he has dementia\". She reports she is an amputee and can do minimal for herself. When this CM discussed pt going to the STR she became anxious about him coming home to just get therapy at the house. This CM that at this time it would not be safe for either of them if pt were to return immediately home to resume his caregiver duties. Pt spouse reports \"well I guess I'll just give up dialysis\". This CM assured her that was not what I was insinuating, merely pointing out that pt may need more care when stable for discharge. She reports she would prefer he return home with home health therapy. This CM discussed idea of an assisted living facility with pt - he states that he had started working on that but with Manjinder it ihas made everything much harder. This CM reviewed pt chart and it appears he had an  visit on 4/11 where the physician made a referral to ambulatory  for \"poor living situation\". This CM called [facility], [facility], and [facility]  to determine where this referral went and if anything was followed up - this CM was not able to get an answer as to who this referral went to from any of the 4 locations. It is noted in the visit that it was suggested to have an APS report filed - this CM could not determine if that occurred or not. Left VM this day with another location of ambulatory care with [facility].   Intend to call APS to see if report filed. Difficult living situation. Discharge plan pending at this time.

## 2020-04-28 NOTE — PROGRESS NOTES
Per pt he has not eaten in 3 days and on 4/25 his wife wrapped his legs with \"half a roll of duct tape, all the way around from my feet to my toes\" because wife thought he was \"peeing on the floor\". Duct tape was not removed until today, 4/27. Per pt, he thinks his wife is \"starting to get confused\". Legs excoriated, cracked, blistered and draining. LLE cultured and sent, legs covered with aquaphor. Right great toe has small black spot, sole of R foot has horizontal laceration that is painful and draining. Pedal pulses present with doppler and marked on feet. Placed consult for case management per protocol.

## 2020-04-28 NOTE — PROGRESS NOTES
[facility] CARDIOLOGY PROGRESS NOTE 
 
4/28/2020 7:58 AM 
 
Admit Date: 4/27/2020 Subjective:  
Stable overnight without angina, CHF, or palpitations. Still SOB but improved a bit since yesterday. Vitals stable and controlled. No other complaints overnight. Tolerating meds well. Objective:  
  
Vitals:  
 04/28/20 6851 04/28/20 0400 04/28/20 0543 04/28/20 0701 BP: 127/63   111/70 Pulse: (!) 102 (!) 101  90 Resp: 20 19 Temp: 97.7 °F (36.5 °C)   98.4 °F (36.9 °C) SpO2: 90% 99%  96% Weight:   92.4 kg (203 lb 12.8 oz) Height:      
 
 
Physical Exam: 
Neck- supple, 10cm JVD sitting upright CV- irregular rate and rhythm no MRG Lung- clear bilaterally anteroapical, decreased bibasilar Abd- soft, nontender, nondistended Ext- 1+ pitting edema with severe cellulitis/weeping Skin- warm and dry Data Review:  
Recent Labs  
  04/28/20 
0448 04/27/20 
1500 04/27/20 
1428   --  137  
K 3.6  --  3.5 MG 2.2  --   --   
BUN 17  --  19  
CREA 0.95  --  0.82 *  --  124* WBC 8.8  --  6.1 HGB 14.5  --  16.7 HCT 42.7  --  50.9*  
  --  238 INR 2.4 2.0  -- Assessment and Plan:  
 
Principal Problem: 
  Cellulitis (4/27/2020)- per primary team; likely contributing to his edema. ABX/wound care. CBC Chuck  
  
Active Problems: 
  Atrial fibrillation (HCC) (9/27/2013)- rate controlled; continue coreg and coumadin as tolerated, INR therapeutic 
  
  CAD (coronary artery disease) (9/27/2013)- trop neg in ED, no angina overnight, continue BB, ACE and statin  
  
  COPD (chronic obstructive pulmonary disease) ([facility] (10/28/2019)- per primary team - no wheezing, SOB improving with nebs at present, follow 
  
  Volume overload (4/27/2020)- Cautious diuresis with right sided heart failure;  changed po lasix to IV and add daily labs. Will follow along with you. CBC, BMP, Mg in AM. Elevate legs, ABX per primary MD's  
  
 
NICOLE Russell MD 
[facility] Cardiology Pager [phone]

## 2020-04-28 NOTE — CDMP QUERY
Patient admitted with cellulitis and volume overload. Patient noted to have hx of diastolic CHF. If possible, please document in progress notes and d/c summary if you are evaluating and /or treating any of the following: ? Acute on Chronic Diastolic CHF ? Acute Diastolic CHF ? Chronic Diastolic CHF 
? Other, please specify ? Clinically unable to determine The medical record reflects the following: 
  Risk Factors: CAD, HTN, Hx of CHF, Cor pulmonale Clinical Indicators: 2+BLE pitting edema and weeping, CXR with trace basilar effusions, shortness of breath/orthopnea, O2 sats 88% in ED placed on 3L NC (home dose), pro BNP 6722 Treatment: Lasix 40 mg IV bid, daily weights, strict I/O's, 3L O2 Thanks, Anca Bee, RN, BSN, CDS Clinical Documentation Improvement 
[phone]

## 2020-04-28 NOTE — PROGRESS NOTES
Problem: Mobility Impaired (Adult and Pediatric) Goal: *Acute Goals and Plan of Care (Insert Text) Description: LTG: 
(1.)Mr. Rene Augustin will move from supine to sit and sit to supine , scoot up and down and roll side to side in bed with INDEPENDENT within 7 treatment day(s). (2.)Mr. Rene Augustin will transfer from bed to chair and chair to bed with CGA using the least restrictive device within 7 treatment day(s). (3.)Mr. Rene Augustin will ambulate with CONTACT GUARD ASSIST for 100 feet with the least restrictive device within 7 treatment day(s). ________________________________________________________________________________________________ Outcome: Progressing Towards Goal 
  
PHYSICAL THERAPY: Initial Assessment and Daily Note 4/28/2020 INPATIENT:  
Pre[place] / [place] B / Product Type: Medicare /   
  
NAME/AGE/GENDER: Elvie Dozier is a 79 y.o. male PRIMARY DIAGNOSIS: Lower extremity cellulitis [V20.834] Cor pulmonale ([facility] [I27.81] Bilateral lower extremity edema [R60.0] Lower extremity cellulitis Lower extremity cellulitis ICD-10: Treatment Diagnosis:  
 Other abnormalities of gait and mobility (R26.89) Precaution/Allergies: 
Celexa [citalopram]; Cymbalta [duloxetine]; Gabapentin; Lidocaine; Sertraline; and Sulfa (sulfonamide antibiotics) ASSESSMENT:  
 
Mr. Rene Augustin presents supine in bed, endorsed 9/10 pain LE's despite receiving pain medication 2 hours ago. Patient reports living with his spouse and ambulates with cane or RW as needed independently. Patient transitioned to sit with CGA. Upon his LE's being in a dependent position, he reported increased pain and refused to even place his feet on the floor. Patient scooted to [place] with CGA, then returned to supine. Mr. Rene Sons would benefit from skilled physical therapy (medically necessary) to address his deficits and maximize his function. This section established at most recent assessment PROBLEM LIST (Impairments causing functional limitations): 
Decreased Strength Decreased ADL/Functional Activities Decreased Transfer Abilities Decreased Ambulation Ability/Technique Increased Pain Decreased Activity Tolerance INTERVENTIONS PLANNED: (Benefits and precautions of physical therapy have been discussed with the patient.) Balance Exercise Bed Mobility Gait Training Home Exercise Program (HEP) Therapeutic Activites Therapeutic Exercise/Strengthening Transfer Training 
education TREATMENT PLAN: Frequency/Duration: 5 times a week for duration of hospital stay Rehabilitation Potential For Stated Goals: Good REHAB RECOMMENDATIONS (at time of discharge pending progress):   
Placement: It is my opinion, based on this patient's performance to date, that Mr. Diego Hermrann may benefit from intensive therapy at a [address] [place] [address] after discharge due to the functional deficits listed above that are likely to improve with skilled rehabilitation and concerns that he/she may be unsafe to be unsupervised at home due to decline in functional mobility. Equipment:  
None at this time HISTORY:  
History of Present Injury/Illness (Reason for Referral): 
Per MD note, Yelena Becker is a 79year old CM with a PMH of chronic cor pulmonale due to COPD & MONICA, chronic BLE stasis dermatitis, atrial fibrillation, DM2, CAD s/p CABG, GERD, and hypothyroidism who presented to the ER on 4/27/20 due to malodorous, excoriated, draining, painful lower extremities with erythema and swelling, orthopnea and epigastric pain. He also has had subjective fevers and a dry cough for a week. He states that he was seen on 4/20/20 for similar issues and his symptoms have not improved despite being compliant with home medications. He is supposed to be on 3LNC at home but he has not been wearing his oxygen. Denies N/V. Denies CP. \" Past Medical History/Comorbidities:  
Mr. Katie Ling  has a past medical history of Acquired hypothyroidism (10/28/2019), Arrhythmia, Arthritis, CAD (coronary artery disease) (2009), Chronic pain, COPD (chronic obstructive pulmonary disease) ([address] (10/28/2019), Diabetes ([address] (2009), GERD (gastroesophageal reflux disease), Heart failure ([address] Hyperlipidemia, Hypertension, Moderate aortic stenosis (echo 9/27/13), Psychiatric disorder, PUD (peptic ulcer disease) (1970's), and Unspecified sleep apnea. He also has no past medical history of Aneurysm ([address] Asthma, Autoimmune disease ([address] Cancer ([address] Chronic kidney disease, Coagulation disorder ([address] Liver disease, Seizures ([address] Stroke ([address] or Thromboembolus ([address] Mr. Katie Ling  has a past surgical history that includes pr cardiac surg procedure unlist (2009); pr abdomen surgery proc unlisted (2003); hx other surgical (2011); hx knee arthroscopy; hx orthopaedic (Right, 2002); hx heent (Bilateral, 2004); hx heent; hx prostatectomy (2011); hx gi; hx appendectomy; and hx heart valve surgery (2011). Social History/Living Environment:  
Home Environment: Private residence Current DME Used/Available at Home: Cane, straight, Walker, rolling Prior Level of Function/Work/Activity: 
Patient reports living with his spouse and ambulates with cane or RW as needed independently. Number of Personal Factors/Comorbidities that affect the Plan of Care: 1-2: MODERATE COMPLEXITY EXAMINATION:  
Most Recent Physical Functioning:  
Gross Assessment: 
AROM: Generally decreased, functional 
Strength: Generally decreased, functional 
         
  
Posture: 
Posture (WDL): Exceptions to [facility] Posture Assessment: Forward head, Rounded shoulders Balance: 
Sitting: Impaired Sitting - Static: Prop sitting Sitting - Dynamic: Prop sitting Bed Mobility: 
Rolling: Contact guard assistance Supine to Sit: Contact guard assistance Sit to Supine: Contact guard assistance Scooting: Contact guard assistance Wheelchair Mobility: 
  
Transfers: 
Sit to Stand: (refused) Gait: refused Body Structures Involved: 
skin  Body Functions Affected: 
Sensory/Pain Movement Related Skin Related Activities and Participation Affected: Mobility Self Care Domestic Life Community, Social and [place] Number of elements that affect the Plan of Care: 4+: HIGH COMPLEXITY CLINICAL PRESENTATION:  
Presentation: Evolving clinical presentation with changing clinical characteristics: MODERATE COMPLEXITY CLINICAL DECISION MAKING:  
[facility] AM-PAC 6 Clicks Basic Mobility Inpatient Short Form How much difficulty does the patient currently have. .. Unable A Lot A Little None 1. Turning over in bed (including adjusting bedclothes, sheets and blankets)? [] 1   [] 2   [x] 3   [] 4  
2. Sitting down on and standing up from a chair with arms ( e.g., wheelchair, bedside commode, etc.)   [] 1   [] 2   [x] 3   [] 4  
3. Moving from lying on back to sitting on the side of the bed? [] 1   [] 2   [x] 3   [] 4 How much help from another person does the patient currently need. .. Total A Lot A Little None 4. Moving to and from a bed to a chair (including a wheelchair)? [] 1   [] 2   [x] 3   [] 4  
5. Need to walk in hospital room? [] 1   [] 2   [x] 3   [] 4  
6. Climbing 3-5 steps with a railing? [] 1   [x] 2   [] 3   [] 4  
© 2007, Trustees of [facility], under license to [employer]. All rights reserved Score:  Initial: 17 Most Recent: X (Date: -- ) Interpretation of Tool:  Represents activities that are increasingly more difficult (i.e. Bed mobility, Transfers, Gait). Medical Necessity:    
Patient is expected to demonstrate progress in  
strength, range of motion, balance, and functional technique 
 to  
increase independence with   and improve safety during all functional mobility Abiodun Smith Patient demonstrates  
good rehab potential due to higher previous functional level. Reason for Services/Other Comments: 
Patient continues to require skilled intervention due to  
medical complications and patient unable to attend/participate in therapy as expected Parminder Palmer Use of outcome tool(s) and clinical judgement create a POC that gives a: Clear prediction of patient's progress: LOW COMPLEXITY  
  
 
 
 
TREATMENT:  
(In addition to Assessment/Re-Assessment sessions the following treatments were rendered) Pre-treatment Symptoms/Complaints:  none. Pain: Initial:  
Pain Intensity 1: 9 Pain Location 1: Leg 
Pain Orientation 1: Left, Right Pain Intervention(s) 1: Repositioned  Post Session:  9 Assessment/Reassessment only, no treatment provided today Braces/Orthotics/Lines/Etc:  
O2 Device: Nasal cannula Treatment/Session Assessment:   
Response to Treatment:  see above. Interdisciplinary Collaboration:  
Physical Therapist 
Registered Nurse After treatment position/precautions:  
Supine in bed Bed/Chair-wheels locked Call light within reach Compliance with Program/Exercises: Will assess as treatment progresses Recommendations/Intent for next treatment session: \"Next visit will focus on advancements to more challenging activities and reduction in assistance provided\". Total Treatment Duration: PT Patient Time In/Time Out Time In: 9980 Time Out: 1150 A [place] Rogel, PT, DPT

## 2020-04-28 NOTE — WOUND CARE
Patient has venous stasis and chronic Cor Pulmonale. Concern patient will not be able to tolerate compression secondary to Cor Pulmonale. Ulcers of bilateral lower legs with erythema and 2+ pitting edema. Pulses are difficult to palpate however all available by doppler. Tips of toes are dusky, however capillary refill is brisk. Will use Aquaphor, xeroform, abd, kerlex and ace daily for now.

## 2020-04-28 NOTE — ROUTINE PROCESS
Attempted to complete PTA medication list in admission database. Patient is unsure about meds. Patient instructed me to contact his wife for this information. Patient's wife, Nik, contacted via phone. Patient's wife requested that I call back tomorrow morning as she has been in dialysis today and is not feeling up to a long phone conversation. Will attempt in AM.

## 2020-04-28 NOTE — PROGRESS NOTES
Potential HF bundle. Patient currently coding for acute CHF, CTN to follow and enroll or remove from enrollment based off of final DRG.

## 2020-04-28 NOTE — PROGRESS NOTES
END OF SHIFT NOTE: 
 
INTAKE/OUTPUT 
04/27 0701 - 04/28 0700 In: -  
Out: 383 [[id number]] Voiding: YES Catheter: NO 
Drain:   
 
 
 
 
 
Flatus: Patient does have flatus present. Stool:  1 occurrences. Characteristics: 
Stool Assessment Stool Color: Madie Dollar Stool Appearance: Soft Stool Amount: Large Stool Source/Status: Rectum Emesis: 0 occurrences. Characteristics: VITAL SIGNS Patient Vitals for the past 12 hrs: 
 Temp Pulse Resp BP SpO2  
04/28/20 1926    95/56   
04/28/20 1701  81  97/59   
04/28/20 1636    97/50   
04/28/20 1543 97.2 °F (36.2 °C) 83 18 100/57 95 % 04/28/20 1429     94 % 04/28/20 1320    101/60   
04/28/20 1054 98 °F (36.7 °C) 88 18 108/63 98 % 04/28/20 0915  94  132/63  Pain Assessment Pain Intensity 1: 8 (04/28/20 1543) Pain Location 1: Leg 
Pain Intervention(s) 1: Nurse notified Patient Stated Pain Goal: 0 Ambulating Yes Shift report given to oncoming nurse at the bedside.  
 
Karena Gould RN

## 2020-04-28 NOTE — PROGRESS NOTES
1. Jean-Pierre Cifuentes will be modified independent with functional mobility for ADL within 4 - 7 visits. 2. Jean-Pierre Cifuentes will complete modified independent with total body bathing and dressing within 4 - 7 visits. 3. Jean-Pierre Cifuentes will state and demonstrate at least 5 energy conservation techniques during ADL/therapeutic activities within 4 - 7 visits. Franki will voice a plan for 3 appropriate home modifications for home safety and fall prevention within 7 visits. Franki will participate at least 30 minutes of ADL with 3 or less rest breaks within 7 visits. 6. Jean-Pierre Cifuentes will complete a toilet transfer with modified independence within 7 visits. OCCUPATIONAL THERAPY: Initial Assessment and Daily Note 4/28/2020 INPATIENT:   
[place] / Plan[place] B / Product Type: Medicare /  
  
NAME/AGE/GENDER: Jean-Pierre Cifuentes is a 79 y.o. male PRIMARY DIAGNOSIS:  Lower extremity cellulitis [G82.824] Cor pulmonale ([facility] [address] [I27.81] Bilateral lower extremity edema [R60.0] Lower extremity cellulitis Lower extremity cellulitis ICD-10: Treatment Diagnosis:  
 Generalized Muscle Weakness (M62.81) Precautions/Allergies: 
   Celexa [citalopram]; Cymbalta [duloxetine]; Gabapentin; Lidocaine; Sertraline; and Sulfa (sulfonamide antibiotics) ASSESSMENT:  
 
Mr. Rachael Bazan presents for the above currently wrapped with ACE bandage and complaining of 8/10 B LE pain. Mr. Rachael Bazan states he is typically modified independent with ADL. States he has a power w/c he uses and lives with his debilitated wife as well. He required contact guard to minimal assistance for bed mobility and stood from edge of bed with contact guard assistance tolerating standing for ~5 minutes and taking sidesteps.   Jean-Pierre Cifuentes presents with the following problems and would benefit from occupational therapy to maximize independence with self-care,instrumental activities of daily living, and functional transfers/mobility for activities of daily living/instrumental activities of daily living via the stated goals. This section established at most recent assessment PROBLEM LIST (Impairments causing functional limitations): 
Decreased Strength Decreased ADL/Functional Activities Decreased Transfer Abilities Decreased Balance Increased Pain Decreased Activity Tolerance Decreased Flexibility/Joint Mobility Edema/Girth Decreased [place] with Home Exercise Program 
 INTERVENTIONS PLANNED: (Benefits and precautions of occupational therapy have been discussed with the patient.) Activities of daily living training Neuromuscular re-eduation Therapeutic activity Therapeutic exercise TREATMENT PLAN: Frequency/Duration: Follow patient 3 times/week to address above goals. Rehabilitation Potential For Stated Goals: Good REHAB RECOMMENDATIONS (at time of discharge pending progress):   
Placement: It is my opinion, based on this patient's performance to date, that Mr. Florinda Peña may benefit from participating in 1-2 additional therapy sessions in order to continue to assess for rehab potential and then make recommendation for disposition at discharge. Equipment:  
None at this time OCCUPATIONAL PROFILE AND HISTORY:  
History of Present Injury/Illness (Reason for Referral): 
Per MD H & P: Jo Ann Nuñez is a 79year old CM with a PMH of chronic cor pulmonale due to COPD & MONICA, chronic BLE stasis dermatitis, atrial fibrillation, DM2, CAD s/p CABG, GERD, and hypothyroidism who presented to the ER on 4/27/20 due to malodorous, excoriated, draining, painful lower extremities with erythema and swelling, orthopnea and epigastric pain. He also has had subjective fevers and a dry cough for a week.  He states that he was seen on 4/20/20 for similar issues and his symptoms have not improved despite being compliant with home medications. He is supposed to be on 3LNC at home but he has not been wearing his oxygen. Denies N/V. Denies CP. Past Medical History/Comorbidities:  
Mr. Lesley Arce  has a past medical history of Acquired hypothyroidism (10/28/2019), Arrhythmia, Arthritis, CAD (coronary artery disease) (2009), Chronic pain, COPD (chronic obstructive pulmonary disease) ([address] (10/28/2019), Diabetes ([address] (2009), GERD (gastroesophageal reflux disease), Heart failure ([address] Hyperlipidemia, Hypertension, Moderate aortic stenosis (echo 9/27/13), Psychiatric disorder, PUD (peptic ulcer disease) (1970's), and Unspecified sleep apnea. He also has no past medical history of Aneurysm ([address] Asthma, Autoimmune disease ([address] Cancer ([address] Chronic kidney disease, Coagulation disorder ([address] Liver disease, Seizures ([address] Stroke ([address] or Thromboembolus ([address] Mr. Lesley Arce  has a past surgical history that includes pr cardiac surg procedure unlist (2009); pr abdomen surgery proc unlisted (2003); hx other surgical (2011); hx knee arthroscopy; hx orthopaedic (Right, 2002); hx heent (Bilateral, 2004); hx heent; hx prostatectomy (2011); hx gi; hx appendectomy; and hx heart valve surgery (2011). Social History/Living Environment:  
Home Environment: Private residence Wheelchair Ramp: Yes One/Two Story Residence: Two story, live on 1st floor Living Alone: No 
Support Systems: Spouse/Significant Other/Partner Patient Expects to be Discharged to[de-identified] Unknown Current DME Used/Available at Home: [address], [place], straight, Shower chair, Walker, rolling, Wheelchair, Wheelchair, power Tub or Shower Type: Shower(\"handicapped\") Prior Level of Function/Work/Activity: 
Lives with debilitated wife. Modified independent. Has a power w/c. Number of Personal Factors/Comorbidities that affect the Plan of Care: Brief history (0):  LOW COMPLEXITY ASSESSMENT OF OCCUPATIONAL PERFORMANCE[de-identified]  
Activities of Daily Living: Basic ADLs (From Assessment) Complex ADLs (From Assessment) Feeding: Supervision Oral Facial Hygiene/Grooming: Supervision Bathing: Moderate assistance Upper Body Dressing: Minimum assistance Lower Body Dressing: Moderate assistance Toileting: Moderate assistance Grooming/Bathing/Dressing Activities of Daily Living Cognitive Retraining Safety/Judgement: Awareness of environment Bed/Mat Mobility Rolling: Contact guard assistance Supine to Sit: Contact guard assistance Sit to Supine: Minimum assistance Sit to Stand: Contact guard assistance Stand to Sit: Contact guard assistance Scooting: Contact guard assistance Most Recent Physical Functioning:  
Gross Assessment: 
AROM: Generally decreased, functional 
Strength: Generally decreased, functional 
         
  
Posture: 
Posture (WDL): Exceptions to [facility] Posture Assessment: Forward head, Rounded shoulders Balance: 
Sitting: Intact Sitting - Static: Good (unsupported) Sitting - Dynamic: Good (unsupported) Standing: Impaired Standing - Static: Constant support Standing - Dynamic : Constant support Bed Mobility: 
Rolling: Contact guard assistance Supine to Sit: Contact guard assistance Sit to Supine: Minimum assistance Scooting: Contact guard assistance Wheelchair Mobility: 
  
Transfers: 
Sit to Stand: Contact guard assistance Stand to Sit: Contact guard assistance Patient Vitals for the past 6 hrs: 
 BP BP Patient Position SpO2 O2 Flow Rate (L/min) Pulse 04/28/20 1054 108/63  98 %  88  
04/28/20 1320 101/60 At rest     
04/28/20 1429   94 % 3 l/min   
04/28/20 1543 100/57  95 %  83 Mental Status Neurologic State: Alert Orientation Level: Oriented to person, Oriented to place, Oriented to situation Cognition: Follows commands Perception: Appears intact Perseveration: No perseveration noted Safety/Judgement: Awareness of environment Physical Skills Involved: Range of Motion Balance Strength Activity Tolerance Pain (Chronic) Edema Skin Integrity Cognitive Skills Affected (resulting in the inability to perform in a timely and safe manner): No overt deficits noted. Psychosocial Skills Affected: 
Habits/Routines Environmental Adaptation Social Interaction Social Roles Number of elements that affect the Plan of Care: 3-5:  MODERATE COMPLEXITY CLINICAL DECISION MAKIN[address] [place] [address] AM-PAC 6 Clicks Daily Activity Inpatient Short Form How much help from another person does the patient currently need. .. Total A Lot A Little None 1. Putting on and taking off regular lower body clothing? [] 1   [x] 2   [] 3   [] 4  
2. Bathing (including washing, rinsing, drying)? [] 1   [x] 2   [] 3   [] 4  
3. Toileting, which includes using toilet, bedpan or urinal?   [] 1   [x] 2   [] 3   [] 4  
4. Putting on and taking off regular upper body clothing? [] 1   [] 2   [x] 3   [] 4  
5. Taking care of personal grooming such as brushing teeth? [] 1   [] 2   [x] 3   [] 4  
6. Eating meals? [] 1   [] 2   [x] 3   [] 4  
© , Trustees of [address] [place] [address], under license to [employer]. All rights reserved Score:  Initial: 15 Most Recent: X (Date: -- ) Interpretation of Tool:  Represents activities that are increasingly more difficult (i.e. Bed mobility, Transfers, Gait). Medical Necessity:    
Patient demonstrates  
good 
 rehab potential due to higher previous functional level. Reason for Services/Other Comments: 
Patient continues to require skilled intervention due to Decreased independence with ADL and functional mobility for ADL. Domitila Hollywood Use of outcome tool(s) and clinical judgement create a POC that gives a: LOW COMPLEXITY  
 
 
 
TREATMENT:  
(In addition to Assessment/Re-Assessment sessions the following treatments were rendered) Pre-treatment Symptoms/Complaints:   
Pain: Initial:  
Pain Intensity 1: 8 Pain Location 1: Leg 
 Pain Orientation 1: Left, Right Pain Intervention(s) 1: Nurse notified  Post Session:  same Therapeutic Activity: (    9 minutes): Therapeutic activities including Bed transfers to improve mobility, strength, and balance. Required minimal   to promote static and dynamic balance in standing. Braces/Orthotics/Lines/Etc:  
IV 
O2 Device: Nasal cannula Treatment/Session Assessment:   
Response to Treatment:  Tolerated well without complications. Interdisciplinary Collaboration: Occupational Therapist 
Registered Nurse After treatment position/precautions:  
Supine in bed Bed/Chair-wheels locked Bed in low position Call light within reach RN notified Side rails x 2 Compliance with Program/Exercises: Will assess as treatment progresses. Recommendations/Intent for next treatment session: \"Next visit will focus on advancements to more challenging activities\". Total Treatment Duration: OT Patient Time In/Time Out Time In: 1540 Time Out: 1600 Michael Mace, OTD, OTR/L

## 2020-04-29 NOTE — PROGRESS NOTES
END OF SHIFT NOTE: 
 
INTAKE/OUTPUT 
04/28 0701 - 04/29 0700 In: 240 [P.O.:240] Out: 1625 [CAFKV:[id number]] Voiding: YES Catheter: NO 
Drain:   
 
 
 
 
 
Flatus: Patient does have flatus present. Stool:  0 occurrences. Characteristics: 
 
Emesis: 0 occurrences. Characteristics: VITAL SIGNS Patient Vitals for the past 12 hrs: 
 Temp Pulse Resp BP SpO2  
04/29/20 0410    111/54   
04/29/20 0309 98.2 °F (36.8 °C) 86 19 176/42 92 % 04/28/20 2259 98.4 °F (36.9 °C) 78 18 96/50 98 % 04/28/20 2016 97.8 °F (36.6 °C) 71 18 99/52 92 % 04/28/20 1942     95 % 04/28/20 [address] Pain Assessment Pain Intensity 1: 0 (04/29/20 0020) Pain Location 1: Leg 
Pain Intervention(s) 1: Elevation, Repositioned, Rest 
Patient Stated Pain Goal: 0 Ambulating No 
 
Shift report given to oncoming nurse at the bedside. [address] [facility] [address]

## 2020-04-29 NOTE — PROGRESS NOTES
Spoke with wife regarding case and she is updated on his care, she requests patient come home and not go to SNF for her care needs, will discuss with case management Brandy Carlton MD

## 2020-04-29 NOTE — PROGRESS NOTES
Pharmacokinetic Consult to Pharmacist 
 
Alexandria Vane is a 79 y.o. male being treated for SSTI with pip/tazo and vancomycin. Height: 5' 8\" (172.7 cm)  Weight: 92.4 kg (203 lb 12.8 oz) Lab Results Component Value Date/Time BUN 21 04/29/2020 04:08 AM  
 Creatinine 1.08 04/29/2020 04:08 AM  
 WBC 7.3 04/29/2020 04:08 AM  
 Lactic acid 1.6 04/27/2020 02:28 PM  
 Lactic acid 1.0 09/27/2013 03:42 PM  
  
Estimated Creatinine Clearance: 59.2 mL/min (based on SCr of 1.08 mg/dL). CULTURES: 
4/27 - wound drainage - Heavy Gram neg rods, Heavy staph aureus 4/27 - Blood x 2 - NGTD Day 1 of vancomycin. Goal trough is 10-20. Will initiate 1000 mg every 18 hours for now. Will continue to follow patient. Thank you, Akira Hendrix, PharmD, BCPS Clinical Pharmacy Specialist 
[phone]

## 2020-04-29 NOTE — ROUTINE PROCESS
Called patient's wife to review med list. No answer. Left message for patient's wife concerning meds.

## 2020-04-29 NOTE — PROGRESS NOTES
This CM met with pt at bedside this day. Pt states he spoke with his wife about discharge planning and that he has decided he needs to go to a SNF for STR. This CM informed pt that [address] declined bed offer. Pt would like referral sent to [facility] and [facility]. This CM sent referral this day. Pt states that there is food in the house for his wife and that people are available to assist get her to dialysis. He confirms that [facility] has been checking in on them and will continue to do so. He was not concerned about her at this time and feels she is fine. He understands he needs rehab in order to get better. No additional CM needs at this time. Will continue to follow.

## 2020-04-29 NOTE — PROGRESS NOTES
Warfarin dosing per pharmacist 
 
Dorina Cuevas is a 79 y.o. male. Height: 5' 8\" (172.7 cm)    Weight: 92.4 kg (203 lb 12.8 oz) Indication:  Afib Goal INR:  2-3 Home dose:  2.5 mg every Sunday and 5 mg all remaining days of the week Risk factors or significant drug interactions:  None for now Other anticoagulants:  none Daily Monitoring Date  INR     Warfarin dose HGB              Notes 4/27  2.0  5 mg  16.7  
4/28  2.4  5 mg  14.5  
4/29  3.3  Hold  14 Pharmacy consulted for warfarin dosing, home dose is 2.5 mg on Sunday and 5 mg all remaining days of the week. INR up to 3.3 today. Will hold dose tonight given the sharp increase last evening. Daily INRs, pharmacy will continue to follow. Thank you, Kamila Winston, PharmD Clinical Pharmacist 
[phone]

## 2020-04-29 NOTE — ROUTINE PROCESS
END OF SHIFT NOTE: 
 
INTAKE/OUTPUT 
04/28 0701 - 04/29 0700 In: 240 [P.O.:240] Out: 1625 [[facility]:[id number]] Voiding: YES Flatus: Patient does have flatus present. Stool:  0 occurrences. Emesis: 0 occurrences. VITAL SIGNS Patient Vitals for the past 12 hrs: 
 Temp Pulse Resp BP SpO2  
04/29/20 1628  70  101/54   
04/29/20 1627  70  101/54   
04/29/20 1507 97.7 °F (36.5 °C) 70 18 101/54 95 % 04/29/20 1300     98 % 04/29/20 1132 97.9 °F (36.6 °C) 79 18 115/54 98 % 04/29/20 0759 97.9 °F (36.6 °C) 79 19 116/58 97 % 04/29/20 0702     92 % Pain Assessment Pain Intensity 1: 9 (04/29/20 1300) Pain Location 1: Leg 
Pain Intervention(s) 1: (pre-medicated) Patient Stated Pain Goal: 0 Ambulating Pt assisted in chair with PT/OT, took a few steps in room also. Dressing change done to lower  Legs, elevated on pillow. Shift report given to oncoming nurse at the bedside.  
 
Jamee Black RN

## 2020-04-29 NOTE — PROGRESS NOTES
1. Parisa Landin will be modified independent with functional mobility for ADL within 4 - 7 visits. 2. Parisa Landin will complete modified independent with total body bathing and dressing within 4 - 7 visits. 3. Parisa Landin will state and demonstrate at least 5 energy conservation techniques during ADL/therapeutic activities within 4 - 7 visits. Franki will voice a plan for 3 appropriate home modifications for home safety and fall prevention within 7 visits. Franki will participate at least 30 minutes of ADL with 3 or less rest breaks within 7 visits. 6. Parisa Landin will complete a toilet transfer with modified independence within 7 visits. OCCUPATIONAL THERAPY: Daily Note 4/29/2020 INPATIENT: OT Visit Days: 2 [place] / [place] / Product Type: Medicare /  
  
NAME/AGE/GENDER: Parisa Landin is a 79 y.o. male PRIMARY DIAGNOSIS:  Lower extremity cellulitis [Y20.770] Cor pulmonale ([facility] [address] [I27.81] Bilateral lower extremity edema [R60.0] Lower extremity cellulitis Lower extremity cellulitis ICD-10: Treatment Diagnosis:  
 · Generalized Muscle Weakness (M62.81) Precautions/Allergies: 
   Celexa [citalopram]; Cymbalta [duloxetine]; Gabapentin; Lidocaine; Sertraline; and Sulfa (sulfonamide antibiotics) ASSESSMENT:  
 
Mr. Brigid Espinoza presents for the above currently wrapped with ACE bandage. Mr. Brigid Espinoza states he is typically modified independent with ADL. States he has a power w/c he uses and lives with his debilitated wife as well. This session, pt greeted seated in chair, pleasant and agreeable to OT session. Pt endorsed 9/10 BLE pain, pre-medicated. Pt completed UB bathing with set up, LB bathing with min A, changed gown with set up. Pt required min A to stand and for dynamic standing balance during ADLs.  Pt stood several times and tolerated standing for several minutes to promote > endurance for ADLs. Pt is progressing towards goals, continue POC. This section established at most recent assessment PROBLEM LIST (Impairments causing functional limitations): 1. Decreased Strength 2. Decreased ADL/Functional Activities 3. Decreased Transfer Abilities 4. Decreased Balance 5. Increased Pain 6. Decreased Activity Tolerance 7. Decreased Flexibility/Joint Mobility 8. Edema/Girth 9. Decreased [place] with Home Exercise Program 
 INTERVENTIONS PLANNED: (Benefits and precautions of occupational therapy have been discussed with the patient.) 1. Activities of daily living training 2. Neuromuscular re-eduation 3. Therapeutic activity 4. Therapeutic exercise TREATMENT PLAN: Frequency/Duration: Follow patient 3 times/week to address above goals. Rehabilitation Potential For Stated Goals: Good REHAB RECOMMENDATIONS (at time of discharge pending progress):   
Placement: It is my opinion, based on this patient's performance to date, that Mr. Diego Herrmann may benefit from intensive therapy at a [address] [place] [address] after discharge due to the functional deficits listed above that are likely to improve with skilled rehabilitation and inability to mobilize or complete ADLs at level necessary to safely return home. Equipment:  
? None at this time OCCUPATIONAL PROFILE AND HISTORY:  
History of Present Injury/Illness (Reason for Referral): 
Per MD H & P: Keshav Awan is a 79year old CM with a PMH of chronic cor pulmonale due to COPD & MONICA, chronic BLE stasis dermatitis, atrial fibrillation, DM2, CAD s/p CABG, GERD, and hypothyroidism who presented to the ER on 4/27/20 due to malodorous, excoriated, draining, painful lower extremities with erythema and swelling, orthopnea and epigastric pain. He also has had subjective fevers and a dry cough for a week.  He states that he was seen on 4/20/20 for similar issues and his symptoms have not improved despite being compliant with home medications. He is supposed to be on 3LNC at home but he has not been wearing his oxygen. Denies N/V. Denies CP. Past Medical History/Comorbidities:  
Mr. Almeida Records  has a past medical history of Acquired hypothyroidism (10/28/2019), Arrhythmia, Arthritis, CAD (coronary artery disease) (2009), Chronic pain, COPD (chronic obstructive pulmonary disease) ([address] (10/28/2019), Diabetes ([address] (2009), GERD (gastroesophageal reflux disease), Heart failure ([address] Hyperlipidemia, Hypertension, Moderate aortic stenosis (echo 9/27/13), Psychiatric disorder, PUD (peptic ulcer disease) (1970's), and Unspecified sleep apnea. He also has no past medical history of Aneurysm ([address] Asthma, Autoimmune disease ([address] Cancer ([address] Chronic kidney disease, Coagulation disorder ([address] Liver disease, Seizures ([address] Stroke ([address] or Thromboembolus ([address] Mr. Almeida Records  has a past surgical history that includes pr cardiac surg procedure unlist (2009); pr abdomen surgery proc unlisted (2003); hx other surgical (2011); hx knee arthroscopy; hx orthopaedic (Right, 2002); hx heent (Bilateral, 2004); hx heent; hx prostatectomy (2011); hx gi; hx appendectomy; and hx heart valve surgery (2011). Social History/Living Environment:  
Home Environment: Private residence Wheelchair Ramp: Yes One/Two Story Residence: Two story, live on 1st floor Living Alone: No 
Support Systems: Spouse/Significant Other/Partner Patient Expects to be Discharged to[de-identified] Unknown Current DME Used/Available at Home: Henretta Vonda, straight, Shower chair, Walker, rolling, Wheelchair, Wheelchair, power Tub or Shower Type: Shower(\"handicapped\") Prior Level of Function/Work/Activity: 
Lives with debilitated wife. Modified independent. Has a power w/c. Number of Personal Factors/Comorbidities that affect the Plan of Care: Brief history (0):  LOW COMPLEXITY ASSESSMENT OF OCCUPATIONAL PERFORMANCE[de-identified]  
Activities of Daily Living: Basic ADLs (From Assessment) Complex ADLs (From Assessment) Feeding: Supervision Oral Facial Hygiene/Grooming: Supervision Bathing: Moderate assistance Upper Body Dressing: Minimum assistance Lower Body Dressing: Moderate assistance Toileting: Moderate assistance Grooming/Bathing/Dressing Activities of Daily Living Grooming Grooming Assistance: Set-up Upper Body Bathing Bathing Assistance: Stand-by assistance Lower Body Bathing Bathing Assistance: Minimum assistance Upper Body Dressing Assistance Hospital Gown: Stand-by assistance Bed/Mat Mobility Supine to Sit: Contact guard assistance Sit to Stand: Minimum assistance Stand to Sit: Minimum assistance Bed to Chair: Minimum assistance Scooting: Contact guard assistance Most Recent Physical Functioning:  
Gross Assessment: 
  
         
  
Posture: 
Posture (WDL): Exceptions to [facility] Posture Assessment: Forward head, Rounded shoulders Balance: 
Sitting: Impaired Sitting - Static: Good (unsupported) Sitting - Dynamic: Prop sitting Standing: Impaired Standing - Static: Constant support Standing - Dynamic : Constant support Bed Mobility: 
Supine to Sit: Contact guard assistance Scooting: Contact guard assistance Duration: 23 Minutes Wheelchair Mobility: 
  
Transfers: 
Sit to Stand: Minimum assistance Stand to Sit: Minimum assistance Bed to Chair: Minimum assistance Patient Vitals for the past 6 hrs: 
 BP SpO2 O2 Flow Rate (L/min) Pulse 04/29/20 0759 116/58 97 %  79  
04/29/20 1132 115/54 98 %  79  
04/29/20 1300  98 % 3 l/min  Mental Status Neurologic State: Alert Orientation Level: Oriented X4 Cognition: Follows commands Perception: Appears intact Perseveration: No perseveration noted Safety/Judgement: Awareness of environment Physical Skills Involved: 1. Range of Motion 2. Balance 3. Strength 4. Activity Tolerance 5. Pain (Chronic) 6. Edema 7. Skin Integrity Cognitive Skills Affected (resulting in the inability to perform in a timely and safe manner): 1. No overt deficits noted. Psychosocial Skills Affected: 1. Habits/Routines 2. Environmental Adaptation 3. Social Interaction 4. Social Roles Number of elements that affect the Plan of Care: 3-5:  MODERATE COMPLEXITY CLINICAL DECISION MAKING:  
[facility] AM-PAC 6 Clicks Daily Activity Inpatient Short Form How much help from another person does the patient currently need. .. Total A Lot A Little None 1. Putting on and taking off regular lower body clothing? [] 1   [x] 2   [] 3   [] 4  
2. Bathing (including washing, rinsing, drying)? [] 1   [x] 2   [] 3   [] 4  
3. Toileting, which includes using toilet, bedpan or urinal?   [] 1   [x] 2   [] 3   [] 4  
4. Putting on and taking off regular upper body clothing? [] 1   [] 2   [x] 3   [] 4  
5. Taking care of personal grooming such as brushing teeth? [] 1   [] 2   [x] 3   [] 4  
6. Eating meals? [] 1   [] 2   [x] 3   [] 4  
© 2007, Trustees of [facility], under license to [employer]. All rights reserved Score:  Initial: 15 Most Recent: X (Date: -- ) Interpretation of Tool:  Represents activities that are increasingly more difficult (i.e. Bed mobility, Transfers, Gait). Medical Necessity:    
· Patient demonstrates · good ·  rehab potential due to higher previous functional level. Reason for Services/Other Comments: 
· Patient continues to require skilled intervention due to · Decreased independence with ADL and functional mobility for ADL. · . Use of outcome tool(s) and clinical judgement create a POC that gives a: LOW COMPLEXITY  
 
 
 
TREATMENT:  
(In addition to Assessment/Re-Assessment sessions the following treatments were rendered) Pre-treatment Symptoms/Complaints:   
Pain: Initial:  
Pain Intensity 1: 9 Pain Location 1: Leg 
Pain Orientation 1: Left, Right Pain Intervention(s) 1: (pre-medicated)  Post Session:  same Self Care: (26 min): Procedure(s) (per grid) utilized to improve and/or restore self-care/home management as related to dressing, bathing and grooming. Required minimal   cueing to facilitate activities of daily living skills and compensatory activities. Braces/Orthotics/Lines/Etc:  
· IV 
· O2 Device: Nasal cannula Treatment/Session Assessment:   
· Response to Treatment:  Tolerated well without complications. · Interdisciplinary Collaboration:  
o Occupational Therapist 
o Registered Nurse · After treatment position/precautions:  
o Up in chair 
o Bed/Chair-wheels locked 
o Bed in low position 
o Call light within reach 
o RN notified 
o Side rails x 2  
· Compliance with Program/Exercises: Will assess as treatment progresses. · Recommendations/Intent for next treatment session: \"Next visit will focus on advancements to more challenging activities\". Total Treatment Duration: OT Patient Time In/Time Out Time In: 9581 Time Out: 1200 Juan A Estrada OT

## 2020-04-29 NOTE — PROGRESS NOTES
Hospitalist Note Admit Date:  2020 11:28 AM  
Name:  Gabriela Parks Age:  79 y.o. 
:  1940 MRN:  [id number] PCP:  Emilio Tejeda MD 
Treatment Team: Attending Provider: Mimi Teran MD; Physician: Petty Schaffer MD; Utilization Review: Mindy Hendrix; Care Manager: Fernando Gandhi; Utilization Review: Diya Sanchez; Staff Nurse: Alonzo Ortiz RN; Physical Therapist: Mello Can, PT, DPT; Occupational Therapist: Danny Walton, MARSHALL 
 
HPI/Subjective:  
 
 
Mr. Ela Murray is a 78 yo male with PMH of cor pulmonale with COPD/ MONICA-noncompliant with home O2, LE venous stasis dermatitis, AFIB on coumadin, s/p AVR, DM2, CAD s/p CABG, admitted with painful/ erythematous/ weeping bilateral LE with concern for cellulitis and volume overload/ acute on chronic diastolic CHF exacerbation. His cellulitis is managed with vancomycin/zosyn, BC NGTD. Wound care consulted and wrapped LE. Cardiology has evaluated his volume overload and has continued to diurese with IV lasix. He has mildly elevated bilirubin and ABD US shows mild ascites. He admits to RUQ pain and notes a recent fall. Discharge plans pending. 20 feels some better, LE painful, ate ok, no rectal bleeding, no anorexia, has ongoing edema and redness to LE 
 
Objective:  
 
Patient Vitals for the past 24 hrs: 
 Temp Pulse Resp BP SpO2  
20 1300     98 % 20 1132 97.9 °F (36.6 °C) 79 18 115/54 98 % 20 0759 97.9 °F (36.6 °C) 79 19 116/58 97 % 20 0702     92 % 20 0410    111/54   
20 0309 98.2 °F (36.8 °C) 86 19 176/42 92 % 20 2259 98.4 °F (36.9 °C) 78 18 96/50 98 % 20 97.8 °F (36.6 °C) 71 18 99/52 92 % 20 1942     95 % 20 1926    95/56   
04/28/20 1701  81  97/59   
20 1636    97/50   
20 1543 97.2 °F (36.2 °C) 83 18 100/57 95 % 20 1429     94 % Oxygen Therapy O2 Sat (%): 98 % (04/29/20 1300) Pulse via Oximetry: 73 beats per minute (04/29/20 1300) O2 Device: Nasal cannula (04/29/20 0702) O2 Flow Rate (L/min): 3 l/min (04/29/20 1300) Estimated body mass index is 30.99 kg/m² as calculated from the following: 
  Height as of this encounter: 5' 8\" (1.727 m). Weight as of this encounter: 92.4 kg (203 lb 12.8 oz). Intake/Output Summary (Last 24 hours) at 4/29/2020 1400 Last data filed at 4/29/2020 0788 Gross per 24 hour Intake  Output 750 ml Net -750 ml *Note that automatically entered I/Os may not be accurate; dependent on patient compliance with collection and accurate  by techs. General:    Well nourished. Alert. No distress, elderly CV:   RRR. III/VI BREE LUSB, rub, or gallop. 2+ edema/wrapped Lungs:   CTAB. No wheezing, rhonchi, or rales. anterior Abdomen:   Soft,tender right lower ribs, nondistended. Decreased BS Skin:     LE venous stasis with erythema/wrapped Neuro:  No gross focal deficits Data Review: 
I have reviewed all labs, meds, and studies from the last 24 hours: 
 
Recent Results (from the past 24 hour(s)) GLUCOSE, POC Collection Time: 04/28/20  4:09 PM  
Result Value Ref Range Glucose (POC) 118 (H) 65 - 100 mg/dL PLEASE READ & DOCUMENT PPD TEST IN 24 HRS Collection Time: 04/28/20  7:48 PM  
Result Value Ref Range PPD Negative Negative  
 mm Induration 0 0 - 5 mm GLUCOSE, POC Collection Time: 04/28/20  8:35 PM  
Result Value Ref Range Glucose (POC) 117 (H) 65 - 100 mg/dL CBC W/O DIFF Collection Time: 04/29/20  4:08 AM  
Result Value Ref Range WBC 7.3 4.3 - 11.1 K/uL  
 RBC 4.63 4.23 - 5.6 M/uL  
 HGB 14.0 13.6 - 17.2 g/dL HCT 43.0 41.1 - 50.3 % MCV 92.9 79.6 - 97.8 FL  
 MCH 30.2 26.1 - 32.9 PG  
 MCHC 32.6 31.4 - 35.0 g/dL  
 RDW 16.0 (H) 11.9 - 14.6 % PLATELET 525 205 - 863 K/uL MPV 11.9 9.4 - 12.3 FL ABSOLUTE NRBC 0.00 0.0 - 0.2 K/uL PROTHROMBIN TIME + INR  
 Collection Time: 04/29/20  4:08 AM  
Result Value Ref Range Prothrombin time 34.3 (H) 12.0 - 14.7 sec INR 3.3 METABOLIC PANEL, BASIC Collection Time: 04/29/20  4:08 AM  
Result Value Ref Range Sodium 138 136 - 145 mmol/L Potassium 3.1 (L) 3.5 - 5.1 mmol/L Chloride 99 98 - 107 mmol/L  
 CO2 34 (H) 21 - 32 mmol/L Anion gap 5 (L) 7 - 16 mmol/L Glucose 136 (H) 65 - 100 mg/dL BUN 21 8 - 23 MG/DL Creatinine 1.08 0.8 - 1.5 MG/DL  
 GFR est AA >60 >60 ml/min/1.73m2 GFR est non-AA >60 >60 ml/min/1.73m2 Calcium 8.1 (L) 8.3 - 10.4 MG/DL MAGNESIUM Collection Time: 04/29/20  4:08 AM  
Result Value Ref Range Magnesium 2.1 1.8 - 2.4 mg/dL TSH 3RD GENERATION Collection Time: 04/29/20  4:08 AM  
Result Value Ref Range TSH 3.830 (H) 0.358 - 3.740 uIU/mL BILIRUBIN, FRACTIONATED Collection Time: 04/29/20  4:08 AM  
Result Value Ref Range Bilirubin, total 1.2 (H) 0.2 - 1.1 MG/DL Bilirubin, direct 0.6 (H) <0.4 MG/DL Bilirubin, indirect 0.6 0.0 - 1.1 MG/DL  
GLUCOSE, POC Collection Time: 04/29/20  7:58 AM  
Result Value Ref Range Glucose (POC) 108 (H) 65 - 100 mg/dL GLUCOSE, POC Collection Time: 04/29/20 11:35 AM  
Result Value Ref Range Glucose (POC) 161 (H) 65 - 100 mg/dL All Micro Results Procedure Component Value Units Date/Time CULTURE, BLOOD [[id number]] Collected:  04/27/20 1445 Order Status:  Completed Specimen:  Blood Updated:  04/29/20 1124 Special Requests: --     
  RIGHT ANTERIOR Culture result: NO GROWTH 2 DAYS     
 CULTURE, BLOOD [[id number]] Collected:  04/27/20 1618 Order Status:  Completed Specimen:  Blood Updated:  04/29/20 1124 Special Requests: --     
  LEFT Antecubital 
  
  Culture result: NO GROWTH 2 DAYS     
 CULTURE, WOUND [[id number]]  (Abnormal) Collected:  04/27/20 2040 Order Status:  Completed Specimen:  Wound Drainage Updated:  04/29/20 6365 Special Requests: NO SPECIAL REQUESTS     
  GRAM STAIN 0 TO 12 WBC'S SEEN PER OIF  
   MANY GRAM NEGATIVE RODS     
   FEW GRAM POSITIVE COCCI Culture result:    
  HEAVY GRAM NEGATIVE RODS SUBCULTURE IN PROGRESS  
     
      
  HEAVY STAPHYLOCOCCUS AUREUS SUBCULTURE IN PROGRESS  
     
      
  CULTURE IN PROGRESS,FURTHER UPDATES TO FOLLOW Current Meds: 
Current Facility-Administered Medications Medication Dose Route Frequency  [START ON 4/30/2020] levothyroxine (SYNTHROID) tablet 100 mcg  100 mcg Oral ACB  vancomycin (VANCOCIN) 1,000 mg in 0.9% sodium chloride (MBP/ADV) 250 mL  1,000 mg IntraVENous Q18H  
 alcohol 62% (NOZIN) nasal  1 Ampule  1 Ampule Topical Q12H  pantoprazole (PROTONIX) tablet 40 mg  40 mg Oral ACB  docusate sodium (COLACE) capsule 100 mg  100 mg Oral BID  furosemide (LASIX) injection 40 mg  40 mg IntraVENous BID  acetaminophen (TYLENOL) tablet 650 mg  650 mg Oral Q6H PRN  
 albuterol (PROVENTIL VENTOLIN) nebulizer solution 2.5 mg  2.5 mg Nebulization TID RT  
 aspirin delayed-release tablet 81 mg  81 mg Oral DAILY  atorvastatin (LIPITOR) tablet 20 mg  20 mg Oral DAILY  carvediloL (COREG) tablet 12.5 mg  12.5 mg Oral BID WITH MEALS  ferrous sulfate tablet 325 mg  1 Tab Oral DAILY WITH BREAKFAST  lisinopriL (PRINIVIL, ZESTRIL) tablet 10 mg  10 mg Oral DAILY  magnesium oxide (MAG-OX) tablet 400 mg  400 mg Oral DAILY  memantine (NAMENDA) tablet 10 mg  10 mg Oral BID  multivitamin, tx-iron-ca-min (THERA-M w/ IRON) tablet 1 Tab  1 Tab Oral DAILY  oxyCODONE IR (ROXICODONE) tablet 10 mg  10 mg Oral Q8H PRN  
 sodium chloride (NS) flush 5-40 mL  5-40 mL IntraVENous Q8H  
 sodium chloride (NS) flush 5-40 mL  5-40 mL IntraVENous PRN  
 morphine injection 2 mg  2 mg IntraVENous Q4H PRN  
 insulin lispro (HUMALOG) injection   SubCUTAneous AC&HS  [Held by provider] warfarin (COUMADIN) tablet 5 mg  5 mg Oral QPM  
  polyethylene glycol (MIRALAX) packet 17 g  17 g Oral DAILY  piperacillin-tazobactam (ZOSYN) 3.375 g in 0.9% sodium chloride (MBP/ADV) 100 mL  3.375 g IntraVENous Q8H  
 pantothenic ac-min oil-pet,hyd (AQUAPHOR) 41 % ointment   Topical PRN Other Studies: No results found for this visit on 04/27/20. No results found. SARS-CoV-2 LAB Results \"Novel Coronavirus\" Test: No results found for: COV2NT \"Emergent Disease\" Test: No results found for: EDPR As of: 1:56 PM on 4/29/2020 Assessment and Plan:  
 
Hospital Problems as of 4/29/2020 Date Reviewed: 3/11/2020 Codes Class Noted - Resolved POA Do not resuscitate (Chronic) ICD-10-CM: V21 ICD-9-CM: V49.86 Chronic 10/30/2019 - Present Yes Chronic atrial fibrillation (Chronic) ICD-10-CM: Q31.23 ICD-9-CM: 427.31  4/28/2020 - Present Yes Volume overload ICD-10-CM: E87.70 ICD-9-CM: 276.69  4/27/2020 - Present Yes Cellulitis ICD-10-CM: L03.90 ICD-9-CM: 682.9  4/27/2020 - Present Cor pulmonale (HCC) ICD-10-CM: A13.62 ICD-9-CM: 416.9  4/27/2020 - Present Yes * (Principal) Lower extremity cellulitis ICD-10-CM: L03.119 ICD-9-CM: 682.6  4/27/2020 - Present Yes Bilateral lower extremity edema ICD-10-CM: R60.0 ICD-9-CM: 782.3  4/27/2020 - Present Yes Elevated liver enzymes ICD-10-CM: R74.8 ICD-9-CM: 790.5  4/27/2020 - Present Yes Epigastric pain ICD-10-CM: R10.13 ICD-9-CM: 789.06  4/27/2020 - Present Yes Bilateral pleural effusion ICD-10-CM: J90 ICD-9-CM: 511.9  4/27/2020 - Present Yes 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) ICD-10-CM: I27.29, I50.810 ICD-9-CM: 428.0, 416.8  1/6/2020 - Present Yes Venous stasis dermatitis of both lower extremities (Chronic) ICD-10-CM: B52.4 ICD-9-CM: 454.1  10/29/2019 - Present Yes COPD (chronic obstructive pulmonary disease) (HCC) (Chronic) ICD-10-CM: J44.9 ICD-9-CM: 795  10/28/2019 - Present Yes S/P AVR (Chronic) ICD-10-CM: Z95.2 ICD-9-CM: V43.3  10/28/2019 - Present Yes Acquired hypothyroidism (Chronic) ICD-10-CM: E03.9 ICD-9-CM: 244.9  10/28/2019 - Present Yes Palliative care patient ICD-10-CM: Z51.5 ICD-9-CM: V66.7  10/3/2017 - Present Yes Restrictive lung disease ICD-10-CM: J98.4 ICD-9-CM: 518.89  10/3/2017 - Present Yes Overview Signed 10/3/2017  3:54 PM by Pavan Ge NP  
  9/29/2017 10:15 AM - Yeny Santos RT Narrative This is complete pulmonary function performed on 9/28/2017. 
 Patient with history of restrictive lung disease and dyspnea on exertion. SPIROMETRY: 
 
FVC 2.43 l and 52 % of predicted. FEV1 1.88 l and 54 % of predicted. Ratio 77 %. There was no clinically relevant improvement with bronchodilator. LUNG VOLUMES: 
 
TLC 5.35 l and 72 % of predicted. RV 2.91 l and 107 % of predicted. DIFFUSION: 
 
The diffusing capacity was corrected for hemoglobin of 14.2 g/dL and was 12.0 mL/mmHg/minute and 37 % of predicted. IMPRESSION: 
 
Spirometry is consistent with moderately severe restrictive defect, there is no improvement with bronchodilator.  Lung volume analysis reveals mild restrictive defect concomitantly and gas trapping.  The diffusion capacity was corrected for hemoglobin and was decreased.  Overall this is consistent with a mixed ventilatory defect with both restriction which is predominant and concomitant obstruction and decreased diffusion capacity. Beny Kruse MD  
 
 
  
  
   
 MONICA (obstructive sleep apnea) (Chronic) ICD-10-CM: L58.53 
ICD-9-CM: 327.23  12/16/2013 - Present Yes Diastolic CHF, acute on chronic (HCC) ICD-10-CM: I50.33 ICD-9-CM: 428.33, 428.0  9/30/2013 - Present Yes Chronic respiratory failure with hypoxia (HCC) (Chronic) ICD-10-CM: J96.11 
ICD-9-CM: 518.83, 799.02  9/30/2013 - Present Yes Overview Addendum 10/30/2019 11:01 AM by Cheryl Almeida MD  
  5L NC continuously Atrial fibrillation (HCC) (Chronic) ICD-10-CM: I48.91 
ICD-9-CM: 427.31  9/27/2013 - Present Yes Type 2 diabetes mellitus (HCC) (Chronic) ICD-10-CM: E11.9 ICD-9-CM: 250.00  9/27/2013 - Present Yes CAD (coronary artery disease) (Chronic) ICD-10-CM: I25.10 ICD-9-CM: 414.00  9/27/2013 - Present Yes Plan: · Bilateral LE cellulitis with venous stasis: · Diuretics per cardiology · Continued vancomycin/zosyn and followup BC · Wound care consulted / LE wrapped · Cor pulmonale, acute on chronic diastolic CHF exacerbation, with COPD/ MONICA/ chronic hypoxia: 
· Wean O2 as tolerant · Diuretics per cardiology · CAD: 
· Continued asa, lipitor · HTN: 
· Continued coreg, lisinopril · DM2: 
· SSI · Chronic AFIB: 
· Continued on coumadin with daily INR checks · Elevated LFTS and abdominal pain: · Added protonix · followup clinically as diet tolerant, no immediate GI needs · Treating constipation · Suspect elevated bilirubin due to hepatic congestion with right sided CHF · Suspect right rib pain is trauma related to recent fall · Hypokalemia: 
· Replace and repeat labs DC planning/Dispo:  Pending, PT/OT, PPD Diet:  DIET DIABETIC CONSISTENT CARB 
DVT ppx:  INR 3.3 on coumadin Signed: Eduardo Dudley MD

## 2020-04-29 NOTE — PROGRESS NOTES
Problem: Pressure Injury - Risk of 
Goal: *Prevention of pressure injury Description: Document Elvis Scale and appropriate interventions in the flowsheet. Outcome: Progressing Towards Goal 
Note: Pressure Injury Interventions: 
Sensory Interventions: Assess changes in LOC Moisture Interventions: Absorbent underpads Activity Interventions: PT/OT evaluation Mobility Interventions: Assess need for specialty bed, PT/OT evaluation Nutrition Interventions: Document food/fluid/supplement intake Friction and Shear Interventions: Foam dressings/transparent film/skin sealants, Minimize layers Problem: Patient Education: Go to Patient Education Activity Goal: Patient/Family Education Outcome: Progressing Towards Goal 
  
Problem: Patient Education: Go to Patient Education Activity Goal: Patient/Family Education Outcome: Progressing Towards Goal 
  
Problem: Falls - Risk of 
Goal: *Absence of Falls Description: Document Clydene Bone Fall Risk and appropriate interventions in the flowsheet. Outcome: Progressing Towards Goal 
Note: Fall Risk Interventions: 
Mobility Interventions: Communicate number of staff needed for ambulation/transfer Medication Interventions: Evaluate medications/consider consulting pharmacy, Patient to call before getting OOB, Teach patient to arise slowly Elimination Interventions: Call light in reach, Toilet paper/wipes in reach, Toileting schedule/hourly rounds Problem: Patient Education: Go to Patient Education Activity Goal: Patient/Family Education Outcome: Progressing Towards Goal 
  
Problem: Patient Education: Go to Patient Education Activity Goal: Patient/Family Education Outcome: Progressing Towards Goal

## 2020-04-29 NOTE — PROGRESS NOTES
Problem: Mobility Impaired (Adult and Pediatric) Goal: *Acute Goals and Plan of Care (Insert Text) Description: LTG: 
(1.)Mr. Tim Ann will move from supine to sit and sit to supine , scoot up and down and roll side to side in bed with INDEPENDENT within 7 treatment day(s). (2.)Mr. Tim Ann will transfer from bed to chair and chair to bed with CGA using the least restrictive device within 7 treatment day(s). (3.)Mr. Tim Ann will ambulate with CONTACT GUARD ASSIST for 100 feet with the least restrictive device within 7 treatment day(s). ________________________________________________________________________________________________ Outcome: Progressing Towards Goal 
  
PHYSICAL THERAPY: Initial Assessment and Daily Note 4/29/2020 INPATIENT:  
Premedicate PT Visit Days : 2 [place] / [place] / Product Type: Medicare /   
  
NAME/AGE/GENDER: Tarun Regan is a 79 y.o. male PRIMARY DIAGNOSIS: Lower extremity cellulitis [R70.480] Cor pulmonale ([facility] [address] [I27.81] Bilateral lower extremity edema [R60.0] Lower extremity cellulitis Lower extremity cellulitis ICD-10: Treatment Diagnosis:  
 · Other abnormalities of gait and mobility (R26.89) Precaution/Allergies: 
Celexa [citalopram]; Cymbalta [duloxetine]; Gabapentin; Lidocaine; Sertraline; and Sulfa (sulfonamide antibiotics) ASSESSMENT:  
 
Mr. Tim Ann reports living with his spouse and ambulates with cane or RW as needed independently. Patient transitioned to sit with CGA and performed ex's in sit. Patient stood with min A then performed SPT to chair with RW and CGA. Rested, then ambulated 28' with RW and CGA. Great progress with trf, and gait-premedication made a big difference!!   Mr. Tim Ann would benefit from skilled physical therapy (medically necessary) to address his deficits and maximize his function. This section established at most recent assessment PROBLEM LIST (Impairments causing functional limitations): 1. Decreased Strength 2. Decreased ADL/Functional Activities 3. Decreased Transfer Abilities 4. Decreased Ambulation Ability/Technique 5. Increased Pain 6. Decreased Activity Tolerance INTERVENTIONS PLANNED: (Benefits and precautions of physical therapy have been discussed with the patient.) 1. Balance Exercise 2. Bed Mobility 3. Gait Training 4. Home Exercise Program (HEP) 5. Therapeutic Activites 6. Therapeutic Exercise/Strengthening 7. Transfer Training 8. education TREATMENT PLAN: Frequency/Duration: 5 times a week for duration of hospital stay Rehabilitation Potential For Stated Goals: Good REHAB RECOMMENDATIONS (at time of discharge pending progress):   
Placement: It is my opinion, based on this patient's performance to date, that Mr. Rachael Bazan may benefit from intensive therapy at a [address] [place] [address] after discharge due to the functional deficits listed above that are likely to improve with skilled rehabilitation and concerns that he/she may be unsafe to be unsupervised at home due to decline in functional mobility. Equipment:  
? None at this time HISTORY:  
History of Present Injury/Illness (Reason for Referral): 
Per MD note, Ana Laura Stevens is a 79year old CM with a PMH of chronic cor pulmonale due to COPD & MONICA, chronic BLE stasis dermatitis, atrial fibrillation, DM2, CAD s/p CABG, GERD, and hypothyroidism who presented to the ER on 4/27/20 due to malodorous, excoriated, draining, painful lower extremities with erythema and swelling, orthopnea and epigastric pain. He also has had subjective fevers and a dry cough for a week. He states that he was seen on 4/20/20 for similar issues and his symptoms have not improved despite being compliant with home medications. He is supposed to be on 3LNC at home but he has not been wearing his oxygen. Denies N/V. Denies CP. \" Past Medical History/Comorbidities:  
Mr. Asif Zaldivar  has a past medical history of Acquired hypothyroidism (10/28/2019), Arrhythmia, Arthritis, CAD (coronary artery disease) (2009), Chronic pain, COPD (chronic obstructive pulmonary disease) ([address] (10/28/2019), Diabetes ([address] (2009), GERD (gastroesophageal reflux disease), Heart failure ([address] Hyperlipidemia, Hypertension, Moderate aortic stenosis (echo 9/27/13), Psychiatric disorder, PUD (peptic ulcer disease) (1970's), and Unspecified sleep apnea. He also has no past medical history of Aneurysm ([address] Asthma, Autoimmune disease ([address] Cancer ([address] Chronic kidney disease, Coagulation disorder ([address] Liver disease, Seizures ([address] Stroke ([address] or Thromboembolus ([address] Mr. Asif Zaldivar  has a past surgical history that includes pr cardiac surg procedure unlist (2009); pr abdomen surgery proc unlisted (2003); hx other surgical (2011); hx knee arthroscopy; hx orthopaedic (Right, 2002); hx heent (Bilateral, 2004); hx heent; hx prostatectomy (2011); hx gi; hx appendectomy; and hx heart valve surgery (2011). Social History/Living Environment:  
Home Environment: Private residence Wheelchair Ramp: Yes One/Two Story Residence: Two story, live on 1st floor Living Alone: No 
Support Systems: Spouse/Significant Other/Partner Patient Expects to be Discharged to[de-identified] Unknown Current DME Used/Available at Home: [employer], straight, Shower chair, Walker, rolling, Wheelchair, Wheelchair, power Tub or Shower Type: Shower(\"handicapped\") Prior Level of Function/Work/Activity: 
Patient reports living with his spouse and ambulates with cane or RW as needed independently. Number of Personal Factors/Comorbidities that affect the Plan of Care: 1-2: MODERATE COMPLEXITY EXAMINATION:  
Most Recent Physical Functioning:  
Gross Assessment: 
AROM: Generally decreased, functional 
Strength: Generally decreased, functional 
         
  
Posture: 
Posture (WDL): Exceptions to [facility] Posture Assessment: Forward head, Rounded shoulders Balance: 
Sitting: Impaired Sitting - Static: Good (unsupported) Sitting - Dynamic: Prop sitting Standing: Impaired Standing - Static: Constant support Standing - Dynamic : Constant support Bed Mobility: 
Supine to Sit: Contact guard assistance Scooting: Contact guard assistance Duration: 23 Minutes Wheelchair Mobility: 
  
Transfers: 
Sit to Stand: Minimum assistance Stand to Sit: Minimum assistance Bed to Chair: Minimum assistance Gait: refused Base of Support: Widened Speed/Lidia: Slow Step Length: Right shortened;Left shortened Distance (ft): 35 Feet (ft) Assistive Device: Walker, rolling;Gait belt Ambulation - Level of Assistance: Contact guard assistance Body Structures Involved: 1. skin  Body Functions Affected: 1. Sensory/Pain 2. Movement Related 3. Skin Related Activities and Participation Affected: 1. Mobility 2. Self Care 3. Domestic Life 4. Community, Social and [place] Number of elements that affect the Plan of Care: 4+: HIGH COMPLEXITY CLINICAL PRESENTATION:  
Presentation: Evolving clinical presentation with changing clinical characteristics: MODERATE COMPLEXITY CLINICAL DECISION MAKIN[address] [place], [place] [id number] AM-PAC 6 Clicks Basic Mobility Inpatient Short Form How much difficulty does the patient currently have. .. Unable A Lot A Little None 1. Turning over in bed (including adjusting bedclothes, sheets and blankets)? [] 1   [] 2   [x] 3   [] 4  
2. Sitting down on and standing up from a chair with arms ( e.g., wheelchair, bedside commode, etc.)   [] 1   [] 2   [x] 3   [] 4  
3. Moving from lying on back to sitting on the side of the bed? [] 1   [] 2   [x] 3   [] 4 How much help from another person does the patient currently need. .. Total A Lot A Little None 4. Moving to and from a bed to a chair (including a wheelchair)?    [] 1   [] 2   [x] 3   [] 4  
 5.  Need to walk in hospital room? [] 1   [] 2   [x] 3   [] 4  
6. Climbing 3-5 steps with a railing? [] 1   [x] 2   [] 3   [] 4  
© 2007, Trustees of [address] [place] [address] [address] [address], under license to [employer]. All rights reserved Score:  Initial: 17 Most Recent: X (Date: -- ) Interpretation of Tool:  Represents activities that are increasingly more difficult (i.e. Bed mobility, Transfers, Gait). Medical Necessity:    
· Patient is expected to demonstrate progress in  
· strength, range of motion, balance, and functional technique ·  to  
· increase independence with   and improve safety during all functional mobility · . · Patient demonstrates · good ·  rehab potential due to higher previous functional level. Reason for Services/Other Comments: 
· Patient continues to require skilled intervention due to · medical complications and patient unable to attend/participate in therapy as expected · . Use of outcome tool(s) and clinical judgement create a POC that gives a: Clear prediction of patient's progress: LOW COMPLEXITY  
  
 
 
 
TREATMENT:  
(In addition to Assessment/Re-Assessment sessions the following treatments were rendered) Pre-treatment Symptoms/Complaints:  none. Pain: Initial:  
Pain Intensity 1: 7 Pain Location 1: Leg 
Pain Orientation 1: Left, Right Pain Intervention(s) 1: Repositioned  Post Session:  7 Therapeutic Activity: (23 Minutes   ):  Therapeutic activities including Bed transfers, Chair transfers, Ambulation on level ground and LE ex's in sit to improve mobility, strength and balance. Required moderate cueing   to promote correct technique with sit to/from stand. DATE: 4/29/20 Ambulation Hip Flexion x10 AB Long Arc Quads 2x10 AB Hip ab/ad Heel Raises 2x10 AB Toe Raises x10 AB Key:  A=active, AA=active assisted, P=passive, B=bilaterally, R=right, L=left DF=dorsiflexion, PF=plantarflexion Braces/Orthotics/Lines/Etc:  
· O2 Device: Nasal cannula Treatment/Session Assessment:   
· Response to Treatment:  see above. · Interdisciplinary Collaboration:  
o Physical Therapist 
o Registered Nurse 
o Rehabilitation Attendant 
o Certified Nursing Assistant/Patient Care Technician · After treatment position/precautions:  
o Up in chair 
o Bed/Chair-wheels locked 
o Call light within reach 
o PCT notified · Compliance with Program/Exercises: Will assess as treatment progresses · Recommendations/Intent for next treatment session: \"Next visit will focus on advancements to more challenging activities and reduction in assistance provided\". Total Treatment Duration: PT Patient Time In/Time Out Time In: 5985 Time Out: 1138 A Aniyah Zee, PT, DPT

## 2020-04-29 NOTE — PROGRESS NOTES
[facility] CARDIOLOGY PROGRESS NOTE 
 
4/29/2020 8:13 AM 
 
Admit Date: 4/27/2020 Subjective:  
Stable overnight without angina, CHF, or palpitations. Breathing improved, edema in LE's improved, wrapped bilaterally now. Vitals stable and controlled. No other complaints overnight. Tolerating meds well. Objective:  
  
Vitals:  
 04/29/20 9067 04/29/20 0410 04/29/20 6841 04/29/20 0688 BP: 176/42 111/54  116/58 Pulse: 86   79 Resp: 19   19 Temp: 98.2 °F (36.8 °C)   97.9 °F (36.6 °C) SpO2: 92%  92% 97% Weight:      
Height:      
 
 
Physical Exam: 
Neck- supple, 8-10cm JVD 
CV- irregular rate and rhythm no MRG Lung- clear bilaterally apically, decreased bibasilar Abd- soft, nontender, nondistended Ext- trace LE edema, wrapped BL Skin- warm and dry Data Review:  
Recent Labs  
  04/29/20 
0408 04/28/20 
0448  138  
K 3.1* 3.6 MG 2.1 2.2 BUN 21 17 CREA 1.08 0.95 * 167* WBC 7.3 8.8 HGB 14.0 14.5 HCT 43.0 42.7  192 INR 3.3 2.4 Assessment and Plan:  
 
  
Principal Problem: 
  Cellulitis (4/27/2020)- per primary team; likely contributing to his edema. ABX/wound care. Improved.  
  
Active Problems: 
  Atrial fibrillation (HCC) (9/27/2013)- rate controlled; continue coreg and coumadin as tolerated, INR a bit high today, hold coumadin today, resume tomorrow, INR in AM 
  
  CAD (coronary artery disease) (9/27/2013)- trop neg in ED, no angina overnight, continue BB, ACE and statin  
  
  COPD (chronic obstructive pulmonary disease) ([facility] [address] (10/28/2019)- per primary team - no wheezing, SOB improving with nebs at present, follow 
  
  Volume overload (4/27/2020)- Cautious diuresis with right sided heart failure;  changed po lasix to IV and added daily labs. Will follow along with you. 
  
CBC, BMP, Mg in AM. Elevate legs, ABX per primary MD's  
Hold coumadin x 1, recheck in AM 
Replete K today, recheck in AM 
  
 
A.  Doug Marcelo MD 
 [facility] Cardiology Pager [phone]

## 2020-04-30 NOTE — PROGRESS NOTES
Hospitalist Note Admit Date:  2020 11:28 AM  
Name:  Sami Barajas Age:  79 y.o. 
:  1940 MRN:  [id number] PCP:  Nikhil, MD Emilio 
Treatment Team: Attending Provider: Bill Deluna MD; Physician: Debra Barnes MD; Utilization Review: Georgette Nieto; Care Manager: Gerard Gupta; Utilization Review: Leighann Bazan; Primary Nurse: Tessa Patricia RN; Physical Therapist: Jessi Greer, PT, DPT; Occupational Therapist: Lisa Ring OT 
 
HPI/Subjective:  
 
 
Mr. Richard Mendez is a 78 yo male with PMH of cor pulmonale with COPD/ MONICA-noncompliant with home O2, LE venous stasis dermatitis, AFIB on coumadin, s/p AVR, DM2, CAD s/p CABG, admitted with painful/ erythematous/ weeping bilateral LE with concern for cellulitis and volume overload/ acute on chronic diastolic CHF exacerbation. His cellulitis is managed with vancomycin/zosyn, BC NGTD. Wound care consulted and wrapped LE. Cardiology has evaluated his volume overload and has continued to diurese with IV lasix. He has mildly elevated bilirubin and ABD US shows mild ascites. He admits to RUQ pain and notes a recent fall thus likely rib pain related. Discharge plans pending to SNF. 20 up to bedside chair, less short of breath, less edema with wraps, still some pain, eating ok, had BM, really wants to go to rehab Objective:  
 
Patient Vitals for the past 24 hrs: 
 Temp Pulse Resp BP SpO2  
20 0732     98 % 20 0705 97.7 °F (36.5 °C) 70 18 122/74 97 % 20 0306 98 °F (36.7 °C) 76 18 100/66 98 % 20 2306 97.5 °F (36.4 °C) 71 18 98/60 96 % 20     96 % 20 2000 97.9 °F (36.6 °C) 62 18 100/61 98 % 20 1628  70  101/54   
20 1627  70  101/54   
20 1507 97.7 °F (36.5 °C) 70 18 101/54 95 % 20 1300     98 % Oxygen Therapy O2 Sat (%): 98 % (20) Pulse via Oximetry: 69 beats per minute (20) O2 Device: Nasal cannula (04/30/20 0732) O2 Flow Rate (L/min): 3 l/min (04/30/20 0732) Estimated body mass index is 31.17 kg/m² as calculated from the following: 
  Height as of this encounter: 5' 8\" (1.727 m). Weight as of this encounter: 93 kg (205 lb). Intake/Output Summary (Last 24 hours) at 4/30/2020 1217 Last data filed at 4/29/2020 2014 Gross per 24 hour Intake  Output 220 ml Net -220 ml  
   
*Note that automatically entered I/Os may not be accurate; dependent on patient compliance with collection and accurate  by techs. General:    Well nourished. Alert. No distress, elderly CV:   RRR. III/VI BREE LUSB, rub, or gallop. 2+ edema/wrapped Lungs:   CTAB. No wheezing, rhonchi, or rales. Abdomen:   Soft, nontender, nondistended. Decreased BS Skin:     LE venous stasis with erythema/wrapped Neuro:  No gross focal deficits Data Review: 
I have reviewed all labs, meds, and studies from the last 24 hours: 
 
Recent Results (from the past 24 hour(s)) GLUCOSE, POC Collection Time: 04/29/20  4:24 PM  
Result Value Ref Range Glucose (POC) 144 (H) 65 - 100 mg/dL GLUCOSE, POC Collection Time: 04/29/20  9:57 PM  
Result Value Ref Range Glucose (POC) 145 (H) 65 - 100 mg/dL CBC W/O DIFF Collection Time: 04/30/20  4:17 AM  
Result Value Ref Range WBC 6.5 4.3 - 11.1 K/uL  
 RBC 4.66 4.23 - 5.6 M/uL  
 HGB 14.4 13.6 - 17.2 g/dL HCT 44.4 41.1 - 50.3 % MCV 95.3 79.6 - 97.8 FL  
 MCH 30.9 26.1 - 32.9 PG  
 MCHC 32.4 31.4 - 35.0 g/dL  
 RDW 16.0 (H) 11.9 - 14.6 % PLATELET 248 301 - 940 K/uL MPV 12.0 9.4 - 12.3 FL ABSOLUTE NRBC 0.00 0.0 - 0.2 K/uL PROTHROMBIN TIME + INR Collection Time: 04/30/20  4:17 AM  
Result Value Ref Range Prothrombin time 35.5 (H) 12.0 - 14.7 sec INR 3.4 METABOLIC PANEL, BASIC Collection Time: 04/30/20  4:17 AM  
Result Value Ref Range Sodium 140 136 - 145 mmol/L  Potassium 4.0 3.5 - 5.1 mmol/L  
 Chloride 101 98 - 107 mmol/L  
 CO2 36 (H) 21 - 32 mmol/L Anion gap 3 (L) 7 - 16 mmol/L Glucose 112 (H) 65 - 100 mg/dL BUN 24 (H) 8 - 23 MG/DL Creatinine 1.07 0.8 - 1.5 MG/DL  
 GFR est AA >60 >60 ml/min/1.73m2 GFR est non-AA >60 >60 ml/min/1.73m2 Calcium 8.2 (L) 8.3 - 10.4 MG/DL MAGNESIUM Collection Time: 04/30/20  4:17 AM  
Result Value Ref Range Magnesium 2.2 1.8 - 2.4 mg/dL GLUCOSE, POC Collection Time: 04/30/20  8:02 AM  
Result Value Ref Range Glucose (POC) 97 65 - 100 mg/dL GLUCOSE, POC Collection Time: 04/30/20 11:45 AM  
Result Value Ref Range Glucose (POC) 155 (H) 65 - 100 mg/dL All Micro Results Procedure Component Value Units Date/Time CULTURE, WOUND [[id number]]  (Abnormal) Collected:  04/27/20 2040 Order Status:  Completed Specimen:  Wound Drainage Updated:  04/30/20 3455 Special Requests: NO SPECIAL REQUESTS     
  GRAM STAIN 0 TO 12 WBC'S SEEN PER OIF  
   MANY GRAM NEGATIVE RODS     
   FEW GRAM POSITIVE COCCI Culture result:    
  HEAVY GRAM NEGATIVE RODS IDENTIFICATION AND SUSCEPTIBILITY TO FOLLOW  
     
      
  HEAVY SECOND GRAM NEGATIVE RODS IDENTIFICATION AND SUSCEPTIBILITY TO FOLLOW  
     
      
  HEAVY STAPHYLOCOCCUS AUREUS SENSITIVITY TO FOLLOW  
     
      
  PRESUMPTIVE ENTEROCOCCUS SPECIES ISOLATED FROM BROTH ONLY IDENTIFICATION AND SUSCEPTIBILITY TO FOLLOW CULTURE IN PROGRESS,FURTHER UPDATES TO FOLLOW CULTURE, BLOOD [[id number]] Collected:  04/27/20 1618 Order Status:  Completed Specimen:  Blood Updated:  04/30/20 0467 Special Requests: --     
  LEFT Antecubital 
  
  Culture result: NO GROWTH 3 DAYS     
 CULTURE, BLOOD [[id number]] Collected:  04/27/20 1445 Order Status:  Completed Specimen:  Blood Updated:  04/30/20 7392 Special Requests: --     
  RIGHT ANTERIOR Culture result: NO GROWTH 3 DAYS Current Meds: 
Current Facility-Administered Medications Medication Dose Route Frequency  levothyroxine (SYNTHROID) tablet 100 mcg  100 mcg Oral ACB  vancomycin (VANCOCIN) 1,000 mg in 0.9% sodium chloride (MBP/ADV) 250 mL  1,000 mg IntraVENous Q18H  
 alcohol 62% (NOZIN) nasal  1 Ampule  1 Ampule Topical Q12H  pantoprazole (PROTONIX) tablet 40 mg  40 mg Oral ACB  docusate sodium (COLACE) capsule 100 mg  100 mg Oral BID  furosemide (LASIX) injection 40 mg  40 mg IntraVENous BID  acetaminophen (TYLENOL) tablet 650 mg  650 mg Oral Q6H PRN  
 albuterol (PROVENTIL VENTOLIN) nebulizer solution 2.5 mg  2.5 mg Nebulization TID RT  
 aspirin delayed-release tablet 81 mg  81 mg Oral DAILY  atorvastatin (LIPITOR) tablet 20 mg  20 mg Oral DAILY  carvediloL (COREG) tablet 12.5 mg  12.5 mg Oral BID WITH MEALS  ferrous sulfate tablet 325 mg  1 Tab Oral DAILY WITH BREAKFAST  lisinopriL (PRINIVIL, ZESTRIL) tablet 10 mg  10 mg Oral DAILY  magnesium oxide (MAG-OX) tablet 400 mg  400 mg Oral DAILY  memantine (NAMENDA) tablet 10 mg  10 mg Oral BID  multivitamin, tx-iron-ca-min (THERA-M w/ IRON) tablet 1 Tab  1 Tab Oral DAILY  oxyCODONE IR (ROXICODONE) tablet 10 mg  10 mg Oral Q8H PRN  
 sodium chloride (NS) flush 5-40 mL  5-40 mL IntraVENous Q8H  
 sodium chloride (NS) flush 5-40 mL  5-40 mL IntraVENous PRN  
 insulin lispro (HUMALOG) injection   SubCUTAneous AC&HS  [Held by provider] warfarin (COUMADIN) tablet 5 mg  5 mg Oral QPM  
 polyethylene glycol (MIRALAX) packet 17 g  17 g Oral DAILY  piperacillin-tazobactam (ZOSYN) 3.375 g in 0.9% sodium chloride (MBP/ADV) 100 mL  3.375 g IntraVENous Q8H  
 pantothenic ac-min oil-pet,hyd (AQUAPHOR) 41 % ointment   Topical PRN Other Studies: No results found for this visit on 04/27/20. No results found. SARS-CoV-2 LAB Results \"Novel Coronavirus\" Test: No results found for: COV2NT \"Emergent Disease\" Test: No results found for: EDPR As of: 1:56 PM on 4/30/2020 Assessment and Plan:  
 
Hospital Problems as of 4/30/2020 Date Reviewed: 3/11/2020 Codes Class Noted - Resolved POA Do not resuscitate (Chronic) ICD-10-CM: A33 ICD-9-CM: V49.86 Chronic 10/30/2019 - Present Yes Chronic atrial fibrillation (Chronic) ICD-10-CM: G15.17 ICD-9-CM: 427.31  4/28/2020 - Present Yes Volume overload ICD-10-CM: E87.70 ICD-9-CM: 276.69  4/27/2020 - Present Yes Cellulitis ICD-10-CM: L03.90 ICD-9-CM: 682.9  4/27/2020 - Present Cor pulmonale (HCC) ICD-10-CM: M23.49 ICD-9-CM: 416.9  4/27/2020 - Present Yes * (Principal) Lower extremity cellulitis ICD-10-CM: L03.119 ICD-9-CM: 682.6  4/27/2020 - Present Yes Bilateral lower extremity edema ICD-10-CM: R60.0 ICD-9-CM: 782.3  4/27/2020 - Present Yes Elevated liver enzymes ICD-10-CM: R74.8 ICD-9-CM: 790.5  4/27/2020 - Present Yes Epigastric pain ICD-10-CM: R10.13 ICD-9-CM: 789.06  4/27/2020 - Present Yes Bilateral pleural effusion ICD-10-CM: J90 ICD-9-CM: 511.9  4/27/2020 - Present Yes 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) ICD-10-CM: I27.29, I50.810 ICD-9-CM: 428.0, 416.8  1/6/2020 - Present Yes Venous stasis dermatitis of both lower extremities (Chronic) ICD-10-CM: R48.7 ICD-9-CM: 454.1  10/29/2019 - Present Yes COPD (chronic obstructive pulmonary disease) (HCC) (Chronic) ICD-10-CM: J44.9 ICD-9-CM: 374  10/28/2019 - Present Yes S/P AVR (Chronic) ICD-10-CM: Z95.2 ICD-9-CM: V43.3  10/28/2019 - Present Yes Acquired hypothyroidism (Chronic) ICD-10-CM: E03.9 ICD-9-CM: 244.9  10/28/2019 - Present Yes Palliative care patient ICD-10-CM: Z51.5 ICD-9-CM: V66.7  10/3/2017 - Present Yes Restrictive lung disease ICD-10-CM: J98.4 ICD-9-CM: 518.89  10/3/2017 - Present Yes Overview Signed 10/3/2017  3:54 PM by Yesenia Calderón NP  
  9/29/2017 10:15 AM - Amy Quick, RT Narrative This is complete pulmonary function performed on 9/28/2017. 
 Patient with history of restrictive lung disease and dyspnea on exertion. SPIROMETRY: 
 
FVC 2.43 l and 52 % of predicted. FEV1 1.88 l and 54 % of predicted. Ratio 77 %. There was no clinically relevant improvement with bronchodilator. LUNG VOLUMES: 
 
TLC 5.35 l and 72 % of predicted. RV 2.91 l and 107 % of predicted. DIFFUSION: 
 
The diffusing capacity was corrected for hemoglobin of 14.2 g/dL and was 12.0 mL/mmHg/minute and 37 % of predicted. IMPRESSION: 
 
Spirometry is consistent with moderately severe restrictive defect, there is no improvement with bronchodilator.  Lung volume analysis reveals mild restrictive defect concomitantly and gas trapping.  The diffusion capacity was corrected for hemoglobin and was decreased.  Overall this is consistent with a mixed ventilatory defect with both restriction which is predominant and concomitant obstruction and decreased diffusion capacity. Donavan Hammond MD  
 
 
  
  
   
 MONICA (obstructive sleep apnea) (Chronic) ICD-10-CM: M82.43 
ICD-9-CM: 327.23  12/16/2013 - Present Yes Diastolic CHF, acute on chronic (HCC) ICD-10-CM: I50.33 ICD-9-CM: 428.33, 428.0  9/30/2013 - Present Yes Chronic respiratory failure with hypoxia (HCC) (Chronic) ICD-10-CM: J96.11 
ICD-9-CM: 518.83, 799.02  9/30/2013 - Present Yes Overview Addendum 10/30/2019 11:01 AM by Kristen Sanchez MD  
  5L NC continuously Atrial fibrillation (HCC) (Chronic) ICD-10-CM: I48.91 
ICD-9-CM: 427.31  9/27/2013 - Present Yes Type 2 diabetes mellitus (HCC) (Chronic) ICD-10-CM: E11.9 ICD-9-CM: 250.00  9/27/2013 - Present Yes CAD (coronary artery disease) (Chronic) ICD-10-CM: I25.10 ICD-9-CM: 414.00  9/27/2013 - Present Yes Plan: · Bilateral LE cellulitis with venous stasis: · Diuretics per cardiology · Continued vancomycin/zosyn and followup BC/ wound cx · Wound care consulted / LE wrapped · Cor pulmonale, acute on chronic diastolic CHF exacerbation, with COPD/ MONICA/ chronic hypoxia: 
· Wean O2 as tolerant · Diuretics per cardiology · CAD: 
· Continued asa, lipitor · HTN: 
· Continued coreg, lisinopril · DM2: 
· SSI · Chronic AFIB: 
· Continued on coumadin with daily INR checks and dosing per pharmacy · Elevated LFTS and abdominal pain: · Added protonix · followup clinically as diet tolerant, no immediate GI needs · Treating constipation- resolved · Suspect elevated bilirubin due to hepatic congestion with right sided CHF · Suspect right rib pain is trauma related to recent fall · Hypokalemia: 
· Replaced and repeat labs DC planning/Dispo:  Pending, PT/OT, PPD Diet:  DIET DIABETIC CONSISTENT CARB 
DVT ppx:  INR 3.4 on coumadin Signed: Lauryn Krishnan MD

## 2020-04-30 NOTE — PROGRESS NOTES
[facility] CARDIOLOGY PROGRESS NOTE 
 
4/30/2020 8:12 AM 
 
Admit Date: 4/27/2020 Subjective:  
Stable overnight without angina, CHF, or palpitations. Vitals stable and controlled. No other complaints overnight. Tolerating meds well. Objective:  
  
Vitals:  
 04/30/20 7029 04/30/20 9472 04/30/20 1451 04/30/20 1821 BP: 100/66  122/74 Pulse: 76  70 Resp: 18  18 Temp: 98 °F (36.7 °C)  97.7 °F (36.5 °C) SpO2: 98%  97% 98% Weight:  205 lb (93 kg) Height:      
 
 
Physical Exam: 
Neck- supple, no JVD 
CV- regular rate and rhythm no MRG Lung- clear bilaterally anteriorly, decreased lateral bases but no wheezing Abd- soft, nontender, nondistended Ext- + mild LE edema, wrapped BL, cellulitis improving above knees Skin- warm and dry Data Review:  
Recent Labs 04/30/20 
2294 04/29/20 
0408  138  
K 4.0 3.1*  
MG 2.2 2.1 BUN 24* 21  
CREA 1.07 1.08  
* 136* WBC 6.5 7.3 HGB 14.4 14.0  
HCT 44.4 43.0  183 INR 3.4 3.3 Assessment and Plan:  
 
Principal Problem: 
  Cellulitis (4/27/2020)- per primary team; likely contributing to his edema.  ABX/wound care. Improved.  
  
Active Problems: 
  Atrial fibrillation (HCC) (9/27/2013)- rate controlled; continue coreg and coumadin as tolerated, INR a bit high today, hold coumadin again today, resume tomorrow?, INR in AM 
  
  CAD (coronary artery disease) (9/27/2013)- trop neg in ED, no angina overnight, continue BB, ACE and statin  
  
  COPD (chronic obstructive pulmonary disease) ([facility] [address] (10/28/2019)- per primary team - no wheezing, SOB improving with nebs at present, follow 
  
  Volume overload (4/27/2020)- Cautious diuresis with right sided heart failure;  changed po lasix to IV and added daily labs. Will follow along with you. 
  
CBC, BMP, INR in AM. Elevate legs, ABX per primary MD's  
Hold coumadin x 1 again today, recheck in AM 
Replete K/Mg as needed, recheck in AM 
 
A.  Doug Marcelo MD 
 [facility] Cardiology Pager [phone]

## 2020-04-30 NOTE — PROGRESS NOTES
This CM met with pt at bedside this day to inform pt that [facility] and [facility] both offered STR beds to pt. Pt reports he would like to accept [facility] bed offer for STR. Selected [facility] in [facility]. Hospitalist notes conversation with spouse yesterday that Mrs. Tim Ann states pt is returning home, however, pt is alert and oriented and voiced decision for himself that he will be going to SNF for STR. No additional CM needs at this time. Will continue to follow.

## 2020-04-30 NOTE — PROGRESS NOTES
END OF SHIFT NOTE: 
 
INTAKE/OUTPUT 
04/29 0701 - 04/30 0700 In: -  
Out: 420 [Urine:420] Voiding: YES Catheter: NO 
Drain:   
 
 
 
 
 
Flatus: Patient does have flatus present. Stool:  0 occurrences. Characteristics: 
 
Emesis: 0 occurrences. Characteristics: VITAL SIGNS Patient Vitals for the past 12 hrs: 
 Temp Pulse Resp BP SpO2  
04/30/20 0306 98 °F (36.7 °C) 76 18 100/66 98 % 04/29/20 2306 97.5 °F (36.4 °C) 71 18 98/60 96 % 04/29/20 2008     96 % 04/29/20 2000 97.9 °F (36.6 °C) 62 18 100/61 98 % Pain Assessment Pain Intensity 1: 0 (04/30/20 0030) Pain Location 1: Leg 
Pain Intervention(s) 1: Medication (see MAR) Patient Stated Pain Goal: 0 Ambulating Yes Shift report given to oncoming nurse at the bedside. [address]

## 2020-04-30 NOTE — PROGRESS NOTES
Problem: Mobility Impaired (Adult and Pediatric) Goal: *Acute Goals and Plan of Care (Insert Text) Description: LTG: 
(1.)Mr. Nestor Tamayo will move from supine to sit and sit to supine , scoot up and down and roll side to side in bed with INDEPENDENT within 7 treatment day(s). (2.)Mr. Nestor Tamayo will transfer from bed to chair and chair to bed with CGA using the least restrictive device within 7 treatment day(s). (3.)Mr. Nestor Tamayo will ambulate with CONTACT GUARD ASSIST for 100 feet with the least restrictive device within 7 treatment day(s). ________________________________________________________________________________________________ Outcome: Progressing Towards Goal 
  
PHYSICAL THERAPY: Daily Note 4/30/2020 INPATIENT:  
PT Visit Days : 3 Premed[place] / [place] B / Product Type: Medicare /   
  
NAME/AGE/GENDER: Breanne Caicedo is a 79 y.o. male PRIMARY DIAGNOSIS: Lower extremity cellulitis [T98.286] Cor pulmonale ([facility] [I27.81] Bilateral lower extremity edema [R60.0] Lower extremity cellulitis Lower extremity cellulitis ICD-10: Treatment Diagnosis:  
 · Other abnormalities of gait and mobility (R26.89) Precaution/Allergies: 
Celexa [citalopram]; Cymbalta [duloxetine]; Gabapentin; Lidocaine; Sertraline; and Sulfa (sulfonamide antibiotics) ASSESSMENT:  
 
Mr. Nestor Tamayo reports living with his spouse and ambulates with cane or RW as needed independently. Patient transitioned to sit with CGA and performed ex's in sit. Patient stood with min A then performed SPT to chair with RW and CGA. Rested, then ambulated 28' with RW and CGA. Great progress with trf, and gait-premedication made a big difference!!   Mr. Nestor Tamayo would benefit from skilled physical therapy (medically necessary) to address his deficits and maximize his function. This section established at most recent assessment PROBLEM LIST (Impairments causing functional limitations): 1. Decreased Strength 2. Decreased ADL/Functional Activities 3. Decreased Transfer Abilities 4. Decreased Ambulation Ability/Technique 5. Increased Pain 6. Decreased Activity Tolerance INTERVENTIONS PLANNED: (Benefits and precautions of physical therapy have been discussed with the patient.) 1. Balance Exercise 2. Bed Mobility 3. Gait Training 4. Home Exercise Program (HEP) 5. Therapeutic Activites 6. Therapeutic Exercise/Strengthening 7. Transfer Training 8. education TREATMENT PLAN: Frequency/Duration: 5 times a week for duration of hospital stay Rehabilitation Potential For Stated Goals: Good REHAB RECOMMENDATIONS (at time of discharge pending progress):   
Placement: It is my opinion, based on this patient's performance to date, that Mr. Yeison Pelaez may benefit from intensive therapy at a [address] [place] [address] after discharge due to the functional deficits listed above that are likely to improve with skilled rehabilitation and concerns that he/she may be unsafe to be unsupervised at home due to decline in functional mobility. Equipment:  
? None at this time HISTORY:  
History of Present Injury/Illness (Reason for Referral): 
Per MD note, Bere Burch is a 79year old CM with a PMH of chronic cor pulmonale due to COPD & MONICA, chronic BLE stasis dermatitis, atrial fibrillation, DM2, CAD s/p CABG, GERD, and hypothyroidism who presented to the ER on 4/27/20 due to malodorous, excoriated, draining, painful lower extremities with erythema and swelling, orthopnea and epigastric pain. He also has had subjective fevers and a dry cough for a week. He states that he was seen on 4/20/20 for similar issues and his symptoms have not improved despite being compliant with home medications. He is supposed to be on 3LNC at home but he has not been wearing his oxygen. Denies N/V. Denies CP. \" Past Medical History/Comorbidities:  
Mr. Clint Perdomo  has a past medical history of Acquired hypothyroidism (10/28/2019), Arrhythmia, Arthritis, CAD (coronary artery disease) (2009), Chronic pain, COPD (chronic obstructive pulmonary disease) ([address] (10/28/2019), Diabetes ([address] (2009), GERD (gastroesophageal reflux disease), Heart failure ([address] Hyperlipidemia, Hypertension, Moderate aortic stenosis (echo 9/27/13), Psychiatric disorder, PUD (peptic ulcer disease) (1970's), and Unspecified sleep apnea. He also has no past medical history of Aneurysm ([address] Asthma, Autoimmune disease ([address] Cancer ([address] Chronic kidney disease, Coagulation disorder ([address] Liver disease, Seizures ([address] Stroke ([address] or Thromboembolus ([address] Mr. Clint Perdomo  has a past surgical history that includes pr cardiac surg procedure unlist (2009); pr abdomen surgery proc unlisted (2003); hx other surgical (2011); hx knee arthroscopy; hx orthopaedic (Right, 2002); hx heent (Bilateral, 2004); hx heent; hx prostatectomy (2011); hx gi; hx appendectomy; and hx heart valve surgery (2011). Social History/Living Environment:  
Home Environment: Private residence Wheelchair Ramp: Yes One/Two Story Residence: Two story, live on 1st floor Living Alone: No 
Support Systems: Spouse/Significant Other/Partner Patient Expects to be Discharged to[de-identified] Unknown Current DME Used/Available at Home: Minta Ruffing, straight, Shower chair, Walker, rolling, Wheelchair, Wheelchair, power Tub or Shower Type: Shower(\"handicapped\") Prior Level of Function/Work/Activity: 
Patient reports living with his spouse and ambulates with cane or RW as needed independently. Number of Personal Factors/Comorbidities that affect the Plan of Care: 1-2: MODERATE COMPLEXITY EXAMINATION:  
Most Recent Physical Functioning:  
Gross Assessment: 
AROM: Generally decreased, functional 
Strength: Generally decreased, functional 
         
  
Posture: 
Posture (WDL): Exceptions to [facility] Posture Assessment: Forward head, Rounded shoulders Balance: 
Standing: Impaired Standing - Static: Constant support Standing - Dynamic : Constant support Bed Mobility: 
  
Wheelchair Mobility: 
  
Transfers: 
Sit to Stand: Contact guard assistance Stand to Sit: Contact guard assistance Duration: 24 Minutes Gait: refused Base of Support: Widened Speed/Lidia: Slow Step Length: Left shortened;Right shortened Distance (ft): 65 Feet (ft) Assistive Device: Walker, rolling;Gait belt Ambulation - Level of Assistance: Contact guard assistance Body Structures Involved: 1. skin  Body Functions Affected: 1. Sensory/Pain 2. Movement Related 3. Skin Related Activities and Participation Affected: 1. Mobility 2. Self Care 3. Domestic Life 4. Community, Social and [place] Number of elements that affect the Plan of Care: 4+: HIGH COMPLEXITY CLINICAL PRESENTATION:  
Presentation: Evolving clinical presentation with changing clinical characteristics: MODERATE COMPLEXITY CLINICAL DECISION MAKIN[address] [place] [id number] AM-PAC 6 Clicks Basic Mobility Inpatient Short Form How much difficulty does the patient currently have. .. Unable A Lot A Little None 1. Turning over in bed (including adjusting bedclothes, sheets and blankets)? [] 1   [] 2   [x] 3   [] 4  
2. Sitting down on and standing up from a chair with arms ( e.g., wheelchair, bedside commode, etc.)   [] 1   [] 2   [x] 3   [] 4  
3. Moving from lying on back to sitting on the side of the bed? [] 1   [] 2   [x] 3   [] 4 How much help from another person does the patient currently need. .. Total A Lot A Little None 4. Moving to and from a bed to a chair (including a wheelchair)? [] 1   [] 2   [x] 3   [] 4  
5. Need to walk in hospital room? [] 1   [] 2   [x] 3   [] 4  
6. Climbing 3-5 steps with a railing? [] 1   [x] 2   [] 3   [] 4  
© , Trustees of [address] [place] [id number], under license to [employer].  All rights reserved Score:  Initial: 17 Most Recent: X (Date: -- ) Interpretation of Tool:  Represents activities that are increasingly more difficult (i.e. Bed mobility, Transfers, Gait). Medical Necessity:    
· Patient is expected to demonstrate progress in  
· strength, range of motion, balance, and functional technique ·  to  
· increase independence with   and improve safety during all functional mobility · . · Patient demonstrates · good ·  rehab potential due to higher previous functional level. Reason for Services/Other Comments: 
· Patient continues to require skilled intervention due to · medical complications and patient unable to attend/participate in therapy as expected · . Use of outcome tool(s) and clinical judgement create a POC that gives a: Clear prediction of patient's progress: LOW COMPLEXITY  
  
 
 
 
TREATMENT:  
(In addition to Assessment/Re-Assessment sessions the following treatments were rendered) Pre-treatment Symptoms/Complaints:  none. Pain: Initial:  
Pain Intensity 1: 6 Pain Location 1: Leg 
Pain Orientation 1: Left, Right Pain Intervention(s) 1: Repositioned  Post Session:  7 Therapeutic Activity: (  24 Minutes ):  Therapeutic activities including  Chair transfers, Ambulation on level ground and LE ex's in sit to improve mobility, strength and balance. Required minimal cueing   to promote correct technique with sit to/from stand. DATE: 4/29/20 4/30/20 Ambulation Hip Flexion x10 AB 2x10 AB Long Arc Quads 2x10 AB 2x10 AB Hip ab/ad  x10 AB Heel Raises 2x10 AB x20 AB Toe Raises x10 AB Key:  A=active, AA=active assisted, P=passive, B=bilaterally, R=right, L=left DF=dorsiflexion, PF=plantarflexion Braces/Orthotics/Lines/Etc:  
· none Treatment/Session Assessment:   
· Response to Treatment:  see above. · Interdisciplinary Collaboration:  
o Physical Therapist 
o Registered Nurse · After treatment position/precautions:  
o Up in chair 
o Bed/Chair-wheels locked 
o Call light within reach 
o PCT notified · Compliance with Program/Exercises: Will assess as treatment progresses · Recommendations/Intent for next treatment session: \"Next visit will focus on advancements to more challenging activities and reduction in assistance provided\". Total Treatment Duration: PT Patient Time In/Time Out Time In: 7431 Time Out: 1014 A Tara Orellana, PT, DPT

## 2020-05-01 NOTE — PROGRESS NOTES
[facility] CARDIOLOGY PROGRESS NOTE 
 
5/1/2020 7:32 AM 
 
Admit Date: 4/27/2020 Subjective:  
Stable overnight without angina, CHF, or palpitations. Vitals stable and controlled. No other complaints overnight. Tolerating meds well. Tolerating IV lasix well thus far, cellulitis and breathing improving. BP stable, careful with diuresis with RV failure. Objective:  
  
Vitals:  
 05/01/20 0018 05/01/20 7687 05/01/20 7505 05/01/20 2627 BP: 96/55 110/69  106/64 Pulse: 72 72  71 Resp: 18 18  18 Temp: 97.7 °F (36.5 °C) 97.9 °F (36.6 °C)  98.2 °F (36.8 °C) SpO2: 90% 95%  95% Weight:   93 kg (205 lb) Height:      
 
 
Physical Exam: 
Neck- supple, no JVD sitting upright CV- regular rate and rhythm no MRG Lung- clear bilaterally, mildly decreased bibasilar but no wheeze or crackles Abd- soft, nontender, nondistended Ext- trace LE edema/wrapped, cellulitis BL LE's improving Skin- warm and dry Data Review:  
Recent Labs 05/01/20 
4656 04/30/20 
9188 04/29/20 
0408 NA  --  140 138 K  --  4.0 3.1*  
MG 2.3 2.2 2.1 BUN  --  24* 21  
CREA  --  1.07 1.08  
GLU  --  112* 136* WBC 7.4 6.5 7.3 HGB 14.0 14.4 14.0  
HCT 42.4 44.4 43.0  188 183 INR 2.8 3.4 3.3 Assessment and Plan:  
 
  
Principal Problem: 
  Cellulitis (4/27/2020)- per primary team; likely contributing to his edema.  ABX/wound care. Improved, continue current therapy.  
  
Active Problems: 
  Atrial fibrillation ([address] (9/27/2013)- rate controlled; continue coreg and coumadin as tolerated, INR a bit high today, hold coumadin again today, resume tomorrow or when INR<2.5, check INR in AM 
  
  CAD (coronary artery disease) (9/27/2013)- trop neg in ED, no angina overnight, continue BB, ACE and statin  
  
  COPD (chronic obstructive pulmonary disease) ([facility] (10/28/2019)- per primary team - no wheezing, SOB improving with nebs at present, follow 
  
   Volume overload (4/27/2020)- Cautious diuresis with right sided heart failure;  change to 40mg po BID lasix today, recheck in AM. Decrease as needed for hypotension, worsening azotemia, etc.  
  
CBC, BMP, INR in AM. Elevate legs, ABX per primary MD's  
Convert to lasix 40mg BID PO today Hold coumadin x 1 again today, recheck in AM and resume coumadin when INR <2.5 Replete K/Mg as needed, recheck in AM 
 
We will be on standby and follow from a distance. Please call if any further assistance is needed or if any new questions arise. Thanks for the consult.  
  
 
NICOLE Hawkins MD 
[facility] Cardiology Pager [phone]

## 2020-05-01 NOTE — WOUND CARE
Bilateral lower leg with improvement from last assessment, Aquaphor xeroform and dry dressing with compression should be continued. May consider coban instead of ace as acute symptoms continue to improve. Noted plans for discharge to rehab, pending Covid test per request rehab facility.

## 2020-05-01 NOTE — PROGRESS NOTES
UPDATE 3:00PM: This CM received call from [facility] reporting that pt's travel screening states \"yes\" that pt has been in contact with someone with confirmed or suspected COVID-19 in the past month. Additionally, the travel screening documents that pt has the following symptoms upon admission: SOB and cough. Due to these answers completed by [facility] nursing staff in the emergency department upon pt admission [facility] will need a COVID-19 test completed with a negative result prior to being able to admit pt into the hospital.  This CM spoke with pt about exposure to someone with COVID-19 and he adamantly denies it so unsure if pt was confused when answered question originally or some clerical issues. This CM relayed information to hospitalist and COVID-19 test ordered. Discharge order cancelled at this time and will plan on admission tentatively Monday, 5/4 pending negative test result. This CM updated pt and he is agreeable with no voiced concerns. Will continue to follow. Pt with discharge orders this day. Pt to discharge to [facility] for [address]. This CM met with pt at bedside this day to confirm discharge plans - pt remains agreeable with no voiced concerns. Pt to discharge via [facility] with  at 1:30pm.  
 
No additional CM needs at time of discharge. Milestones met. Care Management Interventions PCP Verified by CM: Yes Mode of Transport at Discharge: BLS([facility]) Transition of Care Consult (CM Consult): Discharge Planning, SNF Discharge Durable Medical Equipment: No 
Physical Therapy Consult: Yes Occupational Therapy Consult: Yes Speech Therapy Consult: No 
Current Support Network: Own Home, Lives with Spouse Confirm Follow Up Transport: Other (see comment)([facility] ) The Plan for Transition of Care is Related to the Following Treatment Goals : SNF for STR 
 The Patient and/or Patient Representative was Provided with a Choice of Provider and Agrees with the Discharge Plan?: Yes Name of the Patient Representative Who was Provided with a Choice of Provider and Agrees with the Discharge Plan: Mr. Yeny Lundberg Freedom of Choice List was Provided with Basic Dialogue that Supports the Patient's Individualized Plan of Care/Goals, Treatment Preferences and Shares the Quality Data Associated with the Providers?: Yes [employer] Information Provided?: No 
Discharge Location Discharge Placement: Skilled nursing facility

## 2020-05-01 NOTE — PROGRESS NOTES
Patient's IV leaking/infiltrated. Attempted to obtain IV twice before calling ICU [facility]. [facility] was unable to obtain an IV as well. Will call [facility] and leave message for the AM. 18- 2nd floor nurse was able to obtain an IV. Will restart abx.

## 2020-05-01 NOTE — PROGRESS NOTES
END OF SHIFT NOTE: 
 
INTAKE/OUTPUT 
04/29 0701 - 04/30 0700 In: -  
Out: 420 [Urine:420] Voiding: YES Catheter: NO 
Drain:   
 
Flatus: Patient does have flatus present. Stool:  1 occurrences. Characteristics: 
Stool Assessment Stool Color: Susan Singleton Stool Appearance: Soft, Formed Stool Amount: Medium Stool Source/Status: Rectum Emesis: 0 occurrences. Characteristics: VITAL SIGNS Patient Vitals for the past 12 hrs: 
 Temp Pulse Resp BP SpO2  
04/30/20 1554 98.3 °F (36.8 °C) 76 18 105/66 95 % 04/30/20 1305     97 % 04/30/20 1146 97.8 °F (36.6 °C) 82 18 122/71 100 % Pain Assessment Pain Intensity 1: 5 (04/30/20 1327) Pain Location 1: Coccyx, Leg 
Pain Intervention(s) 1: Medication (see MAR) Patient Stated Pain Goal: 0 Ambulating Yes Shift report given to oncoming nurse at the bedside.  
 
Tamica Richard RN

## 2020-05-01 NOTE — PROGRESS NOTES
Problem: Mobility Impaired (Adult and Pediatric) Goal: *Acute Goals and Plan of Care (Insert Text) Description: LTG: 
(1.)Mr. Katie Ling will move from supine to sit and sit to supine , scoot up and down and roll side to side in bed with INDEPENDENT within 7 treatment day(s). (2.)Mr. Katie Ling will transfer from bed to chair and chair to bed with CGA using the least restrictive device within 7 treatment day(s). (3.)Mr. Katie Ling will ambulate with CONTACT GUARD ASSIST for 100 feet with the least restrictive device within 7 treatment day(s). ________________________________________________________________________________________________ Outcome: Progressing Towards Goal 
  
PHYSICAL THERAPY: Daily Note 5/1/2020 INPATIENT:  
PT Visit Days : 4 Premedicate [place] / [place] B / Product Type: Medicare /   
  
NAME/AGE/GENDER: Rachael Aldana is a 79 y.o. male PRIMARY DIAGNOSIS: Lower extremity cellulitis [Z82.020] [facility]ale [facility] [I27.81] Bilateral lower extremity edema [R60.0] Lower extremity cellulitis Lower extremity cellulitis ICD-10: Treatment Diagnosis:  
 · Other abnormalities of gait and mobility (R26.89) Precaution/Allergies: 
Celexa [citalopram]; Cymbalta [duloxetine]; Gabapentin; Lidocaine; Sertraline; and Sulfa (sulfonamide antibiotics) ASSESSMENT:  
 
Mr. Katie Ling reports living with his spouse and ambulates with cane or RW as needed independently. Patient transitioned to sit with SBA and performed ex's in sit. Patient stood with SB A and ambulated 150' with RW and CGA. Great progress with trf, and gait-premedication made a big difference!!   Mr. Katie Ling would benefit from skilled physical therapy (medically necessary) to address his deficits and maximize his function. This section established at most recent assessment PROBLEM LIST (Impairments causing functional limitations): 1. Decreased Strength 2. Decreased ADL/Functional Activities 3. Decreased Transfer Abilities 4. Decreased Ambulation Ability/Technique 5. Increased Pain 6. Decreased Activity Tolerance INTERVENTIONS PLANNED: (Benefits and precautions of physical therapy have been discussed with the patient.) 1. Balance Exercise 2. Bed Mobility 3. Gait Training 4. Home Exercise Program (HEP) 5. Therapeutic Activites 6. Therapeutic Exercise/Strengthening 7. Transfer Training 8. education TREATMENT PLAN: Frequency/Duration: 5 times a week for duration of hospital stay Rehabilitation Potential For Stated Goals: Good REHAB RECOMMENDATIONS (at time of discharge pending progress):   
Placement: It is my opinion, based on this patient's performance to date, that Mr. Nestor Tamayo may benefit from intensive therapy at a [address] [place] [address] after discharge due to the functional deficits listed above that are likely to improve with skilled rehabilitation and concerns that he/she may be unsafe to be unsupervised at home due to decline in functional mobility. Equipment:  
? None at this time HISTORY:  
History of Present Injury/Illness (Reason for Referral): 
Per MD note, Glendy Dotson is a 79year old CM with a PMH of chronic cor pulmonale due to COPD & MONICA, chronic BLE stasis dermatitis, atrial fibrillation, DM2, CAD s/p CABG, GERD, and hypothyroidism who presented to the ER on 4/27/20 due to malodorous, excoriated, draining, painful lower extremities with erythema and swelling, orthopnea and epigastric pain. He also has had subjective fevers and a dry cough for a week. He states that he was seen on 4/20/20 for similar issues and his symptoms have not improved despite being compliant with home medications. He is supposed to be on 3LNC at home but he has not been wearing his oxygen. Denies N/V. Denies CP. \" 
Past Medical History/Comorbidities:  
Mr. Nestor Tamayo  has a past medical history of Acquired hypothyroidism (10/28/2019), Arrhythmia, Arthritis, CAD (coronary artery disease) (2009), Chronic pain, COPD (chronic obstructive pulmonary disease) ([address] (10/28/2019), Diabetes ([address] (2009), GERD (gastroesophageal reflux disease), Heart failure ([address] Hyperlipidemia, Hypertension, Moderate aortic stenosis (echo 9/27/13), Psychiatric disorder, PUD (peptic ulcer disease) (1970's), and Unspecified sleep apnea. He also has no past medical history of Aneurysm ([address] Asthma, Autoimmune disease ([address] Cancer ([address] Chronic kidney disease, Coagulation disorder ([address] Liver disease, Seizures ([address] Stroke ([address] or Thromboembolus ([address] Mr. Naeem Holt  has a past surgical history that includes pr cardiac surg procedure unlist (2009); pr abdomen surgery proc unlisted (2003); hx other surgical (2011); hx knee arthroscopy; hx orthopaedic (Right, 2002); hx heent (Bilateral, 2004); hx heent; hx prostatectomy (2011); hx gi; hx appendectomy; and hx heart valve surgery (2011). Social History/Living Environment:  
Home Environment: Private residence Wheelchair Ramp: Yes One/Two Story Residence: Two story, live on 1st floor Living Alone: No 
Support Systems: Spouse/Significant Other/Partner Patient Expects to be Discharged to[de-identified] Unknown Current DME Used/Available at Home: [employer], straight, Shower chair, Walker, rolling, Wheelchair, Wheelchair, power Tub or Shower Type: Shower(\"handicapped\") Prior Level of Function/Work/Activity: 
Patient reports living with his spouse and ambulates with cane or RW as needed independently. Number of Personal Factors/Comorbidities that affect the Plan of Care: 1-2: MODERATE COMPLEXITY EXAMINATION:  
Most Recent Physical Functioning:  
Gross Assessment: 
AROM: Generally decreased, functional 
Strength: Generally decreased, functional 
         
  
Posture: 
Posture (WDL): Exceptions to [facility] Posture Assessment: Forward head, Rounded shoulders Balance: 
Sitting: Intact Standing: Impaired Standing - Static: Constant support Standing - Dynamic : Constant support Bed Mobility: 
Supine to Sit: Stand-by assistance Scooting: Stand-by assistance Duration: 28 Minutes Wheelchair Mobility: 
  
Transfers: 
Sit to Stand: Stand-by assistance Stand to Sit: Stand-by assistance Gait: refused Base of Support: Widened Speed/Lidia: Slow Step Length: Right shortened;Left shortened Distance (ft): 150 Feet (ft) Assistive Device: Walker, rolling;Gait belt Ambulation - Level of Assistance: Contact guard assistance Body Structures Involved: 1. skin  Body Functions Affected: 1. Sensory/Pain 2. Movement Related 3. Skin Related Activities and Participation Affected: 1. Mobility 2. Self Care 3. Domestic Life 4. Community, Social and Marland Decker Number of elements that affect the Plan of Care: 4+: HIGH COMPLEXITY CLINICAL PRESENTATION:  
Presentation: Evolving clinical presentation with changing clinical characteristics: MODERATE COMPLEXITY CLINICAL DECISION MAKIN[address] [place] [address] AM-PAC 6 Clicks Basic Mobility Inpatient Short Form How much difficulty does the patient currently have. .. Unable A Lot A Little None 1. Turning over in bed (including adjusting bedclothes, sheets and blankets)? [] 1   [] 2   [x] 3   [] 4  
2. Sitting down on and standing up from a chair with arms ( e.g., wheelchair, bedside commode, etc.)   [] 1   [] 2   [x] 3   [] 4  
3. Moving from lying on back to sitting on the side of the bed? [] 1   [] 2   [x] 3   [] 4 How much help from another person does the patient currently need. .. Total A Lot A Little None 4. Moving to and from a bed to a chair (including a wheelchair)? [] 1   [] 2   [x] 3   [] 4  
5. Need to walk in hospital room? [] 1   [] 2   [x] 3   [] 4  
6. Climbing 3-5 steps with a railing? [] 1   [x] 2   [] 3   [] 4  
© , Trustees of [address] [place] [id number], under license to [employer]. All rights reserved Score:  Initial: 17 Most Recent: X (Date: -- ) Interpretation of Tool:  Represents activities that are increasingly more difficult (i.e. Bed mobility, Transfers, Gait). Medical Necessity:    
· Patient is expected to demonstrate progress in  
· strength, range of motion, balance, and functional technique ·  to  
· increase independence with   and improve safety during all functional mobility · . · Patient demonstrates · good ·  rehab potential due to higher previous functional level. Reason for Services/Other Comments: 
· Patient continues to require skilled intervention due to · medical complications and patient unable to attend/participate in therapy as expected · . Use of outcome tool(s) and clinical judgement create a POC that gives a: Clear prediction of patient's progress: LOW COMPLEXITY  
  
 
 
 
TREATMENT:  
(In addition to Assessment/Re-Assessment sessions the following treatments were rendered) Pre-treatment Symptoms/Complaints:  none. Pain: Initial:  
Pain Intensity 1: 0 Pain Location 1: Leg 
Pain Orientation 1: Left, Right Pain Intervention(s) 1: Repositioned  Post Session: 0 Therapeutic Activity: (28 Minutes   ):  Therapeutic activities including  Chair transfers, Ambulation on level ground and LE ex's in sit to improve mobility, strength and balance. Required minimal cueing   to promote correct technique with sit to/from stand. DATE: 4/29/20 4/30/20 5/1/20 Ambulation Hip Flexion x10 AB 2x10 AB x30 AB Long Arc Quads 2x10 AB 2x10 AB x30 AB Hip ab/ad  x10 AB Heel Raises 2x10 AB x20 AB x30 AB Toe Raises x10 AB  x30 AB Key:  A=active, AA=active assisted, P=passive, B=bilaterally, R=right, L=left DF=dorsiflexion, PF=plantarflexion Braces/Orthotics/Lines/Etc:  
· O2 Device: Nasal cannula Treatment/Session Assessment:   
· Response to Treatment:  see above. · Interdisciplinary Collaboration: o Physical Therapist 
o Registered Nurse · After treatment position/precautions:  
o Up in chair 
o Bed/Chair-wheels locked 
o Call light within reach 
o PCT notified · Compliance with Program/Exercises: Will assess as treatment progresses · Recommendations/Intent for next treatment session: \"Next visit will focus on advancements to more challenging activities and reduction in assistance provided\". Total Treatment Duration: PT Patient Time In/Time Out Time In: 1055 Time Out: 1123 A Candace Ace PT, DPT

## 2020-05-01 NOTE — PROGRESS NOTES
Pharmacokinetic Consult to Pharmacist 
 
Linda Lutz is a 79 y.o. male being treated for SSTI with pip/tazo and vancomycin. Height: 5' 8\" (172.7 cm)  Weight: 93 kg (205 lb) Lab Results Component Value Date/Time BUN 24 (H) 04/30/2020 04:17 AM  
 Creatinine 1.07 04/30/2020 04:17 AM  
 WBC 7.4 05/01/2020 04:16 AM  
 Lactic acid 1.6 04/27/2020 02:28 PM  
 Lactic acid 1.0 09/27/2013 03:42 PM  
  
Estimated Creatinine Clearance: 59.9 mL/min (based on SCr of 1.07 mg/dL). CULTURES: 
4/27 - wound drainage - Heavy Gram neg rods, Heavy staph aureus 4/27 - Blood x 2 - NGTD Lab Results Component Value Date/Time Vancomycin,trough 10.8 05/01/2020 05:22 PM  
 
 
Day 3 of vancomycin. Goal trough is 10-20. Trough therapeutic. Will continue 1000 mg every 18 hours. Pharmacy will continue to follow patient and check levels when clinically indicated. Thank you, Maria Luisa Omalley, Pharm. D. 
PGY1 Pharmacy Resident [phone]

## 2020-05-01 NOTE — PROGRESS NOTES
Carl Milligan from [facility] two patient identifiers confirmed. Notified Suleiman Chase she just needs to plug device into wall with in 10 feet of the patient. Suleiman Chase  verbalized understanding of information discussed w/ no further questions at this time. Warfarin dosing per pharmacist 
 
Flavia Del Rio is a 79 y.o. male. Height: 5' 8\" (172.7 cm)    Weight: 93 kg (205 lb) Indication:  Afib Goal INR:  2-3 Home dose:  2.5 mg every Sunday and 5 mg all remaining days of the week Risk factors or significant drug interactions:  None for now Other anticoagulants:  none Daily Monitoring Date  INR     Warfarin dose HGB              Notes 4/27  2.0  5 mg  16.7  
4/28  2.4  5 mg  14.5  
4/29  3.3  Hold  14 
4/30  3.4  Hold  14.4 
5/1  2.8  3 mg  14.0 Pharmacy consulted for warfarin dosing, home dose is 2.5 mg on Sunday and 5 mg all remaining days of the week. INR down to 2.8 today. Will resume warfarin therapy, giving a reduced dose of 3 mg tonight. Daily INRs, pharmacy will continue to follow. Thank you, Unruly Mancilla, PharmD, BCPS Clinical Pharmacy Specialist 
[phone]

## 2020-05-01 NOTE — PROGRESS NOTES
END OF SHIFT NOTE: 
 
INTAKE/OUTPUT 
04/30 0701 - 05/01 0700 In: 1041 [I.V.:1738] Out: 2130 [Urine:2130] Voiding: YES Catheter: NO 
Drain:   
 
 
 
 
 
Flatus: Patient does have flatus present. Stool:  0 occurrences. Characteristics: 
 
Emesis: 0 occurrences. Characteristics: VITAL SIGNS Patient Vitals for the past 12 hrs: 
 Temp Pulse Resp BP SpO2  
05/01/20 0416 97.9 °F (36.6 °C) 72 18 110/69 95 % 05/01/20 0018 97.7 °F (36.5 °C) 72 18 96/55 90 % 04/30/20 2034     96 % 04/30/20 1935 98.2 °F (36.8 °C) 75 18 113/73 97 % Pain Assessment Pain Intensity 1: 9 (05/01/20 0051) Pain Location 1: Leg 
Pain Intervention(s) 1: Medication (see MAR) Patient Stated Pain Goal: 0 Ambulating Yes Shift report given to oncoming nurse at the bedside. [address]

## 2020-05-01 NOTE — PROGRESS NOTES
Discharge held due to need for COVID screening in light of his responses on the travel screen, marked as positive for travel and positive for COVID 19. Patient denies COVID exposure and travel history.   
Brandy Carlton MD

## 2020-05-02 NOTE — PROGRESS NOTES
Shift assessment completed. See flow sheet for details. A/O x 3 with periods of forgetfulness. No acute distress noted. On O2 via nc. Legs are wrapped with left one noted to be weeping. Will redressed later. All safety measures in place. Bed in low and locked position, alarm on. Call light within reach and pt is instructed to call for assistance.

## 2020-05-02 NOTE — PROGRESS NOTES
Warfarin dosing per pharmacist 
 
Marisol Stone is a 79 y.o. male. Height: 5' 8\" (172.7 cm)    Weight: 93 kg (205 lb) Indication:  Afib Goal INR:  2-3 Home dose:  2.5 mg every Sunday and Thursday, then 5 mg all remaining days of the week Risk factors or significant drug interactions:  ciprofloxacin Other anticoagulants:  none Daily Monitoring Date  INR     Warfarin dose HGB              Notes 4/27  2.0  5 mg  16.7  
4/28  2.4  5 mg  14.5  
4/29  3.3  Hold  14 
4/30  3.4  Hold  14.4 
5/1  2.8  3 mg  14.0 
5/2  2.2  4 mg  14.6 Pharmacy consulted for warfarin dosing, telephone anticoag visit on 4/24/20 notes home dose is 2.5 mg on Sunday and Thursday then 5 mg all remaining days of the week. INR has continued to fall, at  2.2 today. Patient to be started on ciprofloxacin. Will dose patient with 4 mg this evening. Pharmacy will continue to follow patient and monitor INR daily. Thank you, Maria Luisa Omalley, Pharm. D. 
PGY1 Pharmacy Resident [phone]

## 2020-05-02 NOTE — PROGRESS NOTES
END OF SHIFT NOTE: 
 
INTAKE/OUTPUT 
05/01 0701 - 05/02 0700 In: -  
Out: 750 [Urine:750] Voiding: YES Catheter: NO 
Drain:   
 
 
 
 
 
Flatus: Patient does have flatus present. Stool:  0 occurrences. Characteristics: 
Stool Assessment Stool Color: Armida Goldberg Stool Appearance: Formed Stool Amount: Medium Stool Source/Status: Rectum Emesis: 0 occurrences. Characteristics: VITAL SIGNS Patient Vitals for the past 12 hrs: 
 Temp Pulse Resp BP SpO2  
05/02/20 1619 98.3 °F (36.8 °C) 77 18 116/66 97 % 05/02/20 1317     94 % 05/02/20 1140 98.3 °F (36.8 °C) 80 18 116/64 94 % 05/02/20 0920     96 % 05/02/20 0904     (!) 83 % 05/02/20 0813 98 °F (36.7 °C) 83 18 127/74 90 % Pain Assessment Pain Intensity 1: 6 (05/02/20 1258) Pain Location 1: Leg 
Pain Intervention(s) 1: Medication (see MAR) Patient Stated Pain Goal: 0 Ambulating Yes, patient sat up in chair for 6 hours. Shift report given to oncoming nurse at the bedside.  
 
Yasir Henson RN

## 2020-05-02 NOTE — PROGRESS NOTES
Hospitalist Note Admit Date:  2020 11:28 AM  
Name:  Linda Lutz Age:  79 y.o. 
:  1940 MRN:  [id number] PCP:  Nikhil, MD Emilio 
Treatment Team: Attending Provider: Graciela Sanchez MD; Utilization Review: Rebecca Renteria; Care Manager: Dayana Hernandez; Utilization Review: Jose Gee HPI/Subjective:  
 
 
 
  
Mr. Kelly Joe is a 80 yo male with PMH of cor pulmonale with COPD/ MONICA-noncompliant with home O2 but states uses 3 L NC, LE venous stasis dermatitis, AFIB on coumadin, s/p AVR, DM2, CAD s/p CABG, admitted with painful/ erythematous/ weeping bilateral LE with concern for cellulitis and volume overload/ acute on chronic diastolic CHF exacerbation. His cellulitis is managed with vancomycin/zosyn, BC NGTD. Wound care consulted and wrapped LE. Wound culture was polymicrobial. He will need to complete a course with oral doxycycline and cipro. Cardiology has evaluated his volume overload and has continued to diurese with  lasix. He has mildly elevated bilirubin and ABD US shows mild ascites. He admits to RUQ pain and notes a recent fall thus likely rib pain related. He takes chronic oxycodone for pain. His coumadin has been adjusted and current INR 2.8. he will need INR checks going forward. Discharge plans are to SNF but required COVID testing prior to his admit- marked as positive on admit for travel history and exposure though he denies this. 5-2-20 sad about situation, LE feel better and less edematous, less short of breath, had BM, still some rib pain, some anorexia Objective:  
 
Patient Vitals for the past 24 hrs: 
 Temp Pulse Resp BP SpO2  
20 0348 98 °F (36.7 °C) 76 18 116/67 90 % 20     95 % 20 1932 97.8 °F (36.6 °C) 71 18 101/61 93 % 20 1547 97.9 °F (36.6 °C) 79 18 142/64 93 % 20 1107 97.8 °F (36.6 °C) 71 18 129/72 93 % Oxygen Therapy O2 Sat (%): 90 % (05/02/20 0773) Pulse via Oximetry: 70 beats per minute (05/01/20 2014) O2 Device: Nasal cannula (05/01/20 2014) O2 Flow Rate (L/min): 3 l/min (05/01/20 2014) Estimated body mass index is 31.17 kg/m² as calculated from the following: 
  Height as of this encounter: 5' 8\" (1.727 m). Weight as of this encounter: 93 kg (205 lb). Intake/Output Summary (Last 24 hours) at 5/2/2020 4720 Last data filed at 5/2/2020 0710 Gross per 24 hour Intake  Output 875 ml Net -875 ml *Note that automatically entered I/Os may not be accurate; dependent on patient compliance with collection and accurate  by techs. General:    Well nourished. Alert. No distress, elderly CV:   RRR. III/VI BREE LUSB, rub, or gallop. 1+ edema/wrapped Lungs:   CTAB. No wheezing, rhonchi, or rales. anterior Abdomen:   Soft, nontender, nondistended. Decreased BS Skin:     LE venous stasis with erythema/wrapped Neuro:  No gross focal deficits Data Review: 
I have reviewed all labs, meds, and studies from the last 24 hours: 
 
Recent Results (from the past 24 hour(s)) GLUCOSE, POC Collection Time: 05/01/20 11:53 AM  
Result Value Ref Range Glucose (POC) 121 (H) 65 - 100 mg/dL Yfn Hands Collection Time: 05/01/20  5:22 PM  
Result Value Ref Range Vancomycin,trough 10.8 5 - 20 ug/mL GLUCOSE, POC Collection Time: 05/01/20  6:00 PM  
Result Value Ref Range Glucose (POC) 166 (H) 65 - 100 mg/dL GLUCOSE, POC Collection Time: 05/01/20  8:48 PM  
Result Value Ref Range Glucose (POC) 152 (H) 65 - 100 mg/dL CBC W/O DIFF Collection Time: 05/02/20  5:37 AM  
Result Value Ref Range WBC 7.2 4.3 - 11.1 K/uL  
 RBC 4.88 4.23 - 5.6 M/uL  
 HGB 14.6 13.6 - 17.2 g/dL HCT 46.2 41.1 - 50.3 % MCV 94.7 79.6 - 97.8 FL  
 MCH 29.9 26.1 - 32.9 PG  
 MCHC 31.6 31.4 - 35.0 g/dL  
 RDW 15.8 (H) 11.9 - 14.6 % PLATELET 029 271 - 219 K/uL MPV 12.0 9.4 - 12.3 FL ABSOLUTE NRBC 0.00 0.0 - 0.2 K/uL PROTHROMBIN TIME + INR Collection Time: 05/02/20  5:37 AM  
Result Value Ref Range Prothrombin time 25.5 (H) 12.0 - 14.7 sec INR 2.2 METABOLIC PANEL, BASIC Collection Time: 05/02/20  5:37 AM  
Result Value Ref Range Sodium 139 136 - 145 mmol/L Potassium 3.9 3.5 - 5.1 mmol/L Chloride 101 98 - 107 mmol/L  
 CO2 38 (H) 21 - 32 mmol/L Anion gap 0 (L) 7 - 16 mmol/L Glucose 136 (H) 65 - 100 mg/dL BUN 16 8 - 23 MG/DL Creatinine 0.81 0.8 - 1.5 MG/DL  
 GFR est AA >60 >60 ml/min/1.73m2 GFR est non-AA >60 >60 ml/min/1.73m2 Calcium 8.6 8.3 - 10.4 MG/DL MAGNESIUM Collection Time: 05/02/20  5:37 AM  
Result Value Ref Range Magnesium 2.3 1.8 - 2.4 mg/dL GLUCOSE, POC Collection Time: 05/02/20  8:14 AM  
Result Value Ref Range Glucose (POC) 152 (H) 65 - 100 mg/dL All Micro Results Procedure Component Value Units Date/Time CULTURE, BLOOD [[id number]] Collected:  04/27/20 1445 Order Status:  Completed Specimen:  Blood Updated:  05/02/20 7866 Special Requests: --     
  RIGHT ANTERIOR Culture result: NO GROWTH 5 DAYS     
 CULTURE, BLOOD [[id number]] Collected:  04/27/20 1618 Order Status:  Completed Specimen:  Blood Updated:  05/02/20 8055 Special Requests: --     
  LEFT Antecubital 
  
  Culture result: NO GROWTH 5 DAYS     
 EMERGENT DISEASE PANEL [[id number]] Collected:  05/01/20 1412 Order Status:  Completed Updated:  05/01/20 1436 CULTURE, WOUND [[id number]]  (Abnormal)  (Susceptibility) Collected:  04/27/20 2040 Order Status:  Completed Specimen:  Wound Drainage Updated:  05/01/20 1922 Special Requests: NO SPECIAL REQUESTS     
  GRAM STAIN 0 TO 12 WBC'S SEEN PER [facility]  
   MANY GRAM NEGATIVE RODS     
   FEW GRAM POSITIVE COCCI Culture result: HEAVY ESCHERICHIA COLI     
   HEAVY PROTEUS MIRABILIS     
      
  HEAVY * METHICILLIN RESISTANT STAPHYLOCOCCUS AUREUS * ENTEROCOCCUS FAECALIS GROUP D ISOLATED FROM BROTH ONLY RESULTS VERIFIED, PHONED TO AND READ BACK BY 
MERCY JACOBSEN RN ON 5/1/20 @0735, TA Current Meds: 
Current Facility-Administered Medications Medication Dose Route Frequency  doxycycline (VIBRAMYCIN) capsule 100 mg  100 mg Oral Q12H  ciprofloxacin HCl (CIPRO) tablet 500 mg  500 mg Oral Q12H  warfarin (COUMADIN) tablet 4 mg  4 mg Oral QPM  
 furosemide (LASIX) tablet 40 mg  40 mg Oral ACB&D  
 oxyCODONE IR (ROXICODONE) tablet 10 mg  10 mg Oral Q6H PRN  
 levothyroxine (SYNTHROID) tablet 100 mcg  100 mcg Oral ACB  alcohol 62% (NOZIN) nasal  1 Ampule  1 Ampule Topical Q12H  pantoprazole (PROTONIX) tablet 40 mg  40 mg Oral ACB  docusate sodium (COLACE) capsule 100 mg  100 mg Oral BID  acetaminophen (TYLENOL) tablet 650 mg  650 mg Oral Q6H PRN  
 albuterol (PROVENTIL VENTOLIN) nebulizer solution 2.5 mg  2.5 mg Nebulization TID RT  
 aspirin delayed-release tablet 81 mg  81 mg Oral DAILY  atorvastatin (LIPITOR) tablet 20 mg  20 mg Oral DAILY  carvediloL (COREG) tablet 12.5 mg  12.5 mg Oral BID WITH MEALS  ferrous sulfate tablet 325 mg  1 Tab Oral DAILY WITH BREAKFAST  lisinopriL (PRINIVIL, ZESTRIL) tablet 10 mg  10 mg Oral DAILY  magnesium oxide (MAG-OX) tablet 400 mg  400 mg Oral DAILY  memantine (NAMENDA) tablet 10 mg  10 mg Oral BID  multivitamin, tx-iron-ca-min (THERA-M w/ IRON) tablet 1 Tab  1 Tab Oral DAILY  sodium chloride (NS) flush 5-40 mL  5-40 mL IntraVENous Q8H  
 sodium chloride (NS) flush 5-40 mL  5-40 mL IntraVENous PRN  
 insulin lispro (HUMALOG) injection   SubCUTAneous AC&HS  polyethylene glycol (MIRALAX) packet 17 g  17 g Oral DAILY  pantothenic ac-min oil-pet,hyd (AQUAPHOR) 41 % ointment   Topical PRN Other Studies: No results found for this visit on 04/27/20. No results found. SARS-CoV-2 LAB Results \"Novel Coronavirus\" Test: No results found for: COV2NT \"Emergent Disease\" Test: No results found for: EDPR As of: 1:56 PM on 5/2/2020 Assessment and Plan:  
 
Hospital Problems as of 5/2/2020 Date Reviewed: 3/11/2020 Codes Class Noted - Resolved POA Do not resuscitate (Chronic) ICD-10-CM: P00 ICD-9-CM: V49.86 Chronic 10/30/2019 - Present Yes Chronic atrial fibrillation (Chronic) ICD-10-CM: I32.44 ICD-9-CM: 427.31  4/28/2020 - Present Yes Volume overload ICD-10-CM: E87.70 ICD-9-CM: 276.69  4/27/2020 - Present Yes Cellulitis ICD-10-CM: L03.90 ICD-9-CM: 682.9  4/27/2020 - Present Cor pulmonale (HCC) ICD-10-CM: W99.42 ICD-9-CM: 416.9  4/27/2020 - Present Yes * (Principal) Lower extremity cellulitis ICD-10-CM: L03.119 ICD-9-CM: 682.6  4/27/2020 - Present Yes Bilateral lower extremity edema ICD-10-CM: R60.0 ICD-9-CM: 782.3  4/27/2020 - Present Yes Elevated liver enzymes ICD-10-CM: R74.8 ICD-9-CM: 790.5  4/27/2020 - Present Yes Epigastric pain ICD-10-CM: R10.13 ICD-9-CM: 789.06  4/27/2020 - Present Yes Bilateral pleural effusion ICD-10-CM: J90 ICD-9-CM: 511.9  4/27/2020 - Present Yes 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) ICD-10-CM: I27.29, I50.810 ICD-9-CM: 428.0, 416.8  1/6/2020 - Present Yes Venous stasis dermatitis of both lower extremities (Chronic) ICD-10-CM: C05.9 ICD-9-CM: 454.1  10/29/2019 - Present Yes COPD (chronic obstructive pulmonary disease) (HCC) (Chronic) ICD-10-CM: J44.9 ICD-9-CM: 748  10/28/2019 - Present Yes S/P AVR (Chronic) ICD-10-CM: Z95.2 ICD-9-CM: V43.3  10/28/2019 - Present Yes Acquired hypothyroidism (Chronic) ICD-10-CM: E03.9 ICD-9-CM: 244.9  10/28/2019 - Present Yes Palliative care patient ICD-10-CM: Z51.5 ICD-9-CM: V66.7  10/3/2017 - Present Yes Restrictive lung disease ICD-10-CM: J98.4 ICD-9-CM: 518.89  10/3/2017 - Present Yes Overview Signed 10/3/2017  3:54 PM by Yesenia Calderón NP  
  9/29/2017 10:15 AM - RT Radha Narrative This is complete pulmonary function performed on 9/28/2017. 
 Patient with history of restrictive lung disease and dyspnea on exertion. SPIROMETRY: 
 
FVC 2.43 l and 52 % of predicted. FEV1 1.88 l and 54 % of predicted. Ratio 77 %. There was no clinically relevant improvement with bronchodilator. LUNG VOLUMES: 
 
TLC 5.35 l and 72 % of predicted. RV 2.91 l and 107 % of predicted. DIFFUSION: 
 
The diffusing capacity was corrected for hemoglobin of 14.2 g/dL and was 12.0 mL/mmHg/minute and 37 % of predicted. IMPRESSION: 
 
Spirometry is consistent with moderately severe restrictive defect, there is no improvement with bronchodilator.  Lung volume analysis reveals mild restrictive defect concomitantly and gas trapping.  The diffusion capacity was corrected for hemoglobin and was decreased.  Overall this is consistent with a mixed ventilatory defect with both restriction which is predominant and concomitant obstruction and decreased diffusion capacity. Robin Vela MD  
 
 
  
  
   
 MONICA (obstructive sleep apnea) (Chronic) ICD-10-CM: H36.24 
ICD-9-CM: 327.23  12/16/2013 - Present Yes Diastolic CHF, acute on chronic (HCC) ICD-10-CM: I50.33 ICD-9-CM: 428.33, 428.0  9/30/2013 - Present Yes Chronic respiratory failure with hypoxia (HCC) (Chronic) ICD-10-CM: J96.11 
ICD-9-CM: 518.83, 799.02  9/30/2013 - Present Yes Overview Addendum 10/30/2019 11:01 AM by Jeanne Rodriguez MD  
  5L NC continuously Atrial fibrillation (HCC) (Chronic) ICD-10-CM: I48.91 
ICD-9-CM: 427.31  9/27/2013 - Present Yes Type 2 diabetes mellitus (HCC) (Chronic) ICD-10-CM: E11.9 ICD-9-CM: 250.00  9/27/2013 - Present Yes CAD (coronary artery disease) (Chronic) ICD-10-CM: I25.10 ICD-9-CM: 414.00  9/27/2013 - Present Yes Plan: · Bilateral LE cellulitis with venous stasis: · Now on oral Diuretics per cardiology · change vancomycin/zosyn to doxycycline/ cipro · Wound care consulted / LE wrapped · Cor pulmonale, acute on chronic diastolic CHF exacerbation, with COPD/ MONICA/ chronic hypoxia: 
· Wean O2 as tolerant, at baseline 3 L NC 
· Diuretics per cardiology · CAD: 
· Continued asa, lipitor · HTN: 
· Continued coreg, lisinopril · DM2: 
· SSI · Chronic AFIB: 
· Continued on coumadin with daily INR checks and dosing per pharmacy · Elevated LFTS and abdominal pain: · Added protonix · followup clinically as diet tolerant, no immediate GI needs · Treating constipation- resolved · Suspect elevated bilirubin due to hepatic congestion with right sided CHF · Suspect right rib pain is trauma related to recent fall · Hypokalemia: 
· Replaced and repeat labs DC planning/Dispo:  Pending, PT/OT, PPD Diet:  DIET DIABETIC CONSISTENT CARB 
DVT ppx:  INR 2.2 on coumadin Signed: Lesvia Longoria MD

## 2020-05-02 NOTE — PROGRESS NOTES
END OF SHIFT NOTE: 
 
INTAKE/OUTPUT 
04/30 0701 - 05/01 0700 In: 2850 [I.V.:1738] Out: 2130 [Urine:2130] Voiding: YES Catheter: NO 
Drain:   
 
 
Flatus: Patient does have flatus present. Stool:  1 occurrences. Characteristics: 
Stool Assessment Stool Color: Scottie Mayo Stool Appearance: Formed, Soft Stool Amount: Medium Stool Source/Status: Rectum Emesis: 0 occurrences. Characteristics: VITAL SIGNS Patient Vitals for the past 12 hrs: 
 Temp Pulse Resp BP SpO2  
05/01/20 2014     95 % 05/01/20 1932 97.8 °F (36.6 °C) 71 18 101/61 93 % 05/01/20 1547 97.9 °F (36.6 °C) 79 18 142/64 93 % 05/01/20 1107 97.8 °F (36.6 °C) 71 18 129/72 93 % Pain Assessment Pain Intensity 1: 8 (05/01/20 2015) Pain Location 1: Leg 
Pain Intervention(s) 1: Medication (see MAR) Patient Stated Pain Goal: 0 Ambulating Yes Shift report given to oncoming nurse at the bedside.  
 
Shilpa Osborne RN

## 2020-05-02 NOTE — PROGRESS NOTES
Problem: Pressure Injury - Risk of 
Goal: *Prevention of pressure injury Description: Document Elvis Scale and appropriate interventions in the flowsheet. Outcome: Progressing Towards Goal 
Note: Pressure Injury Interventions: 
Sensory Interventions: Assess changes in LOC Moisture Interventions: Absorbent underpads Activity Interventions: Increase time out of bed, Pressure redistribution bed/mattress(bed type) Mobility Interventions: HOB 30 degrees or less, Pressure redistribution bed/mattress (bed type) Nutrition Interventions: Document food/fluid/supplement intake Friction and Shear Interventions: Minimize layers, Apply protective barrier, creams and emollients Problem: Patient Education: Go to Patient Education Activity Goal: Patient/Family Education Outcome: Progressing Towards Goal 
  
Problem: Patient Education: Go to Patient Education Activity Goal: Patient/Family Education Outcome: Progressing Towards Goal 
  
Problem: Falls - Risk of 
Goal: *Absence of Falls Description: Document Sienna Blood Fall Risk and appropriate interventions in the flowsheet. Outcome: Progressing Towards Goal 
Note: Fall Risk Interventions: 
Mobility Interventions: Bed/chair exit alarm, Patient to call before getting OOB Medication Interventions: Patient to call before getting OOB Elimination Interventions: Call light in reach, Patient to call for help with toileting needs Problem: Patient Education: Go to Patient Education Activity Goal: Patient/Family Education Outcome: Progressing Towards Goal 
  
Problem: Patient Education: Go to Patient Education Activity Goal: Patient/Family Education Outcome: Progressing Towards Goal

## 2020-05-03 NOTE — PROGRESS NOTES
Hospitalist Note Admit Date:  2020 11:28 AM  
Name:  Theophilus Essex Age:  79 y.o. 
:  1940 MRN:  [id number] PCP:  Nikhil, MD Emilio 
Treatment Team: Attending Provider: Herve Mcdonough MD; Utilization Review: Con Blind; Care Manager: Esha Nam; Utilization Review: Lita Stephens; Primary Nurse: Vishal Pineda RN 
 
HPI/Subjective:  
 
 
 
  
Mr. Jimy Rios is a 80 yo male with PMH of cor pulmonale with COPD/ MONICA-noncompliant with home O2 but states uses 3 L NC, LE venous stasis dermatitis, AFIB on coumadin, s/p AVR, DM2, CAD s/p CABG, admitted with painful/ erythematous/ weeping bilateral LE with concern for cellulitis and volume overload/ acute on chronic diastolic CHF exacerbation. His cellulitis is managed with vancomycin/zosyn, BC NGTD. Wound care consulted and wrapped LE. Wound culture was polymicrobial. He will need to complete a course with oral doxycycline and cipro. Cardiology has evaluated his volume overload and has continued to diurese with  lasix. He has mildly elevated bilirubin and ABD US shows mild ascites. He admits to RUQ pain and notes a recent fall thus likely rib pain related. He takes chronic oxycodone for pain. His coumadin has been adjusted and current INR 2.8. he will need INR checks going forward. Discharge plans are to SNF but required COVID testing prior to his admit- marked as positive on admit for travel history and exposure though he denies this. 5-3-20 feeling better today, up to bedside chair, diet tolerant, less dyspnea and LE edema/ pain, still right rib pain Objective:  
 
Patient Vitals for the past 24 hrs: 
 Temp Pulse Resp BP SpO2  
20 0833     94 % 20 0750 98.2 °F (36.8 °C) 71 18 137/64 97 % 20 0308 98.2 °F (36.8 °C) 89 18 130/71 99 % 20 2303 98.5 °F (36.9 °C) 80 18 104/58 90 % 20     93 % 20 1937 98.2 °F (36.8 °C) 76 18 109/63 90 % 05/02/20 1619 98.3 °F (36.8 °C) 77 18 116/66 97 % 05/02/20 1317     94 % 05/02/20 1140 98.3 °F (36.8 °C) 80 18 116/64 94 % Oxygen Therapy O2 Sat (%): 94 % (05/03/20 0833) Pulse via Oximetry: 77 beats per minute (05/03/20 8751) O2 Device: Nasal cannula (05/03/20 3710) O2 Flow Rate (L/min): 3 l/min (05/03/20 2380) Estimated body mass index is 31.17 kg/m² as calculated from the following: 
  Height as of this encounter: 5' 8\" (1.727 m). Weight as of this encounter: 93 kg (205 lb). Intake/Output Summary (Last 24 hours) at 5/3/2020 1009 Last data filed at [address] per 24 hour Intake  Output 600 ml Net -600 ml *Note that automatically entered I/Os may not be accurate; dependent on patient compliance with collection and accurate  by techs. General:    Well nourished. Alert. No distress, elderly CV:   RRR. III/VI BREE LUSB, rub, or gallop. 1+ edema/wrapped Lungs:   CTAB. No wheezing, rhonchi, or rales. anterior Abdomen:   Soft, nontender, nondistended. Decreased BS Skin:     LE venous stasis with erythema/wrapped Neuro:  No gross focal deficits Data Review: 
I have reviewed all labs, meds, and studies from the last 24 hours: 
 
Recent Results (from the past 24 hour(s)) GLUCOSE, POC Collection Time: 05/02/20 11:43 AM  
Result Value Ref Range Glucose (POC) 174 (H) 65 - 100 mg/dL GLUCOSE, POC Collection Time: 05/02/20  5:04 PM  
Result Value Ref Range Glucose (POC) 83 65 - 100 mg/dL GLUCOSE, POC Collection Time: 05/02/20  9:06 PM  
Result Value Ref Range Glucose (POC) 121 (H) 65 - 100 mg/dL CBC W/O DIFF Collection Time: 05/03/20  4:31 AM  
Result Value Ref Range WBC 7.5 4.3 - 11.1 K/uL  
 RBC 4.66 4.23 - 5.6 M/uL  
 HGB 13.9 13.6 - 17.2 g/dL HCT 44.0 41.1 - 50.3 % MCV 94.4 79.6 - 97.8 FL  
 MCH 29.8 26.1 - 32.9 PG  
 MCHC 31.6 31.4 - 35.0 g/dL  
 RDW 15.6 (H) 11.9 - 14.6 % PLATELET 445 649 - 807 K/uL MPV 12.1 9.4 - 12.3 FL ABSOLUTE NRBC 0.00 0.0 - 0.2 K/uL PROTHROMBIN TIME + INR Collection Time: 05/03/20  4:31 AM  
Result Value Ref Range Prothrombin time 21.3 (H) 12.0 - 14.7 sec INR 1.8 METABOLIC PANEL, BASIC Collection Time: 05/03/20  4:31 AM  
Result Value Ref Range Sodium 141 136 - 145 mmol/L Potassium 3.7 3.5 - 5.1 mmol/L Chloride 100 98 - 107 mmol/L  
 CO2 33 (H) 21 - 32 mmol/L Anion gap 8 7 - 16 mmol/L Glucose 114 (H) 65 - 100 mg/dL BUN 18 8 - 23 MG/DL Creatinine 0.79 (L) 0.8 - 1.5 MG/DL  
 GFR est AA >60 >60 ml/min/1.73m2 GFR est non-AA >60 >60 ml/min/1.73m2 Calcium 8.3 8.3 - 10.4 MG/DL MAGNESIUM Collection Time: 05/03/20  4:31 AM  
Result Value Ref Range Magnesium 1.7 (L) 1.8 - 2.4 mg/dL GLUCOSE, POC Collection Time: 05/03/20  7:53 AM  
Result Value Ref Range Glucose (POC) 103 (H) 65 - 100 mg/dL All Micro Results Procedure Component Value Units Date/Time CULTURE, BLOOD [[id number]] Collected:  04/27/20 1445 Order Status:  Completed Specimen:  Blood Updated:  05/02/20 1178 Special Requests: --     
  RIGHT ANTERIOR Culture result: NO GROWTH 5 DAYS     
 CULTURE, BLOOD [[id number]] Collected:  04/27/20 1618 Order Status:  Completed Specimen:  Blood Updated:  05/02/20 2866 Special Requests: --     
  LEFT Antecubital 
  
  Culture result: NO GROWTH 5 DAYS     
 EMERGENT DISEASE PANEL [[id number]] Collected:  05/01/20 1412 Order Status:  Completed Updated:  05/01/20 1436 CULTURE, WOUND [[id number]]  (Abnormal)  (Susceptibility) Collected:  04/27/20 2040 Order Status:  Completed Specimen:  Wound Drainage Updated:  05/01/20 1516 Special Requests: NO SPECIAL REQUESTS     
  GRAM STAIN 0 TO 12 WBC'S SEEN PER OIF  
   MANY GRAM NEGATIVE RODS     
   FEW GRAM POSITIVE COCCI Culture result: HEAVY ESCHERICHIA COLI     
   HEAVY PROTEUS MIRABILIS HEAVY * METHICILLIN RESISTANT STAPHYLOCOCCUS AUREUS * ENTEROCOCCUS FAECALIS GROUP D ISOLATED FROM BROTH ONLY RESULTS VERIFIED, PHONED TO AND READ BACK BY 
MERCY JACOBSEN RN ON 5/1/20 @0735, TA Current Meds: 
Current Facility-Administered Medications Medication Dose Route Frequency  alum-mag hydroxide-simeth (MYLANTA) oral suspension 30 mL  30 mL Oral Q4H PRN  
 warfarin (COUMADIN) tablet 5 mg  5 mg Oral QPM  
 doxycycline (VIBRAMYCIN) capsule 100 mg  100 mg Oral Q12H  ciprofloxacin HCl (CIPRO) tablet 500 mg  500 mg Oral Q12H  furosemide (LASIX) tablet 40 mg  40 mg Oral ACB&D  
 oxyCODONE IR (ROXICODONE) tablet 10 mg  10 mg Oral Q6H PRN  
 levothyroxine (SYNTHROID) tablet 100 mcg  100 mcg Oral ACB  alcohol 62% (NOZIN) nasal  1 Ampule  1 Ampule Topical Q12H  pantoprazole (PROTONIX) tablet 40 mg  40 mg Oral ACB  docusate sodium (COLACE) capsule 100 mg  100 mg Oral BID  acetaminophen (TYLENOL) tablet 650 mg  650 mg Oral Q6H PRN  
 albuterol (PROVENTIL VENTOLIN) nebulizer solution 2.5 mg  2.5 mg Nebulization TID RT  
 aspirin delayed-release tablet 81 mg  81 mg Oral DAILY  atorvastatin (LIPITOR) tablet 20 mg  20 mg Oral DAILY  carvediloL (COREG) tablet 12.5 mg  12.5 mg Oral BID WITH MEALS  ferrous sulfate tablet 325 mg  1 Tab Oral DAILY WITH BREAKFAST  lisinopriL (PRINIVIL, ZESTRIL) tablet 10 mg  10 mg Oral DAILY  magnesium oxide (MAG-OX) tablet 400 mg  400 mg Oral DAILY  memantine (NAMENDA) tablet 10 mg  10 mg Oral BID  multivitamin, tx-iron-ca-min (THERA-M w/ IRON) tablet 1 Tab  1 Tab Oral DAILY  sodium chloride (NS) flush 5-40 mL  5-40 mL IntraVENous Q8H  
 sodium chloride (NS) flush 5-40 mL  5-40 mL IntraVENous PRN  
 insulin lispro (HUMALOG) injection   SubCUTAneous AC&HS  polyethylene glycol (MIRALAX) packet 17 g  17 g Oral DAILY  pantothenic ac-min oil-pet,hyd (AQUAPHOR) 41 % ointment   Topical PRN Other Studies: No results found for this visit on 04/27/20. No results found. SARS-CoV-2 LAB Results \"Novel Coronavirus\" Test: No results found for: COV2NT \"Emergent Disease\" Test: No results found for: EDPR As of: 1:56 PM on 5/3/2020 Assessment and Plan:  
 
Hospital Problems as of 5/3/2020 Date Reviewed: 3/11/2020 Codes Class Noted - Resolved POA Do not resuscitate (Chronic) ICD-10-CM: N08 ICD-9-CM: V49.86 Chronic 10/30/2019 - Present Yes Chronic atrial fibrillation (Chronic) ICD-10-CM: M16.01 ICD-9-CM: 427.31  4/28/2020 - Present Yes Volume overload ICD-10-CM: E87.70 ICD-9-CM: 276.69  4/27/2020 - Present Yes Cellulitis ICD-10-CM: L03.90 ICD-9-CM: 682.9  4/27/2020 - Present Cor pulmonale (HCC) ICD-10-CM: K20.91 ICD-9-CM: 416.9  4/27/2020 - Present Yes * (Principal) Lower extremity cellulitis ICD-10-CM: L03.119 ICD-9-CM: 682.6  4/27/2020 - Present Yes Bilateral lower extremity edema ICD-10-CM: R60.0 ICD-9-CM: 782.3  4/27/2020 - Present Yes Elevated liver enzymes ICD-10-CM: R74.8 ICD-9-CM: 790.5  4/27/2020 - Present Yes Epigastric pain ICD-10-CM: R10.13 ICD-9-CM: 789.06  4/27/2020 - Present Yes Bilateral pleural effusion ICD-10-CM: J90 ICD-9-CM: 511.9  4/27/2020 - Present Yes 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) ICD-10-CM: I27.29, I50.810 ICD-9-CM: 428.0, 416.8  1/6/2020 - Present Yes Venous stasis dermatitis of both lower extremities (Chronic) ICD-10-CM: G43.7 ICD-9-CM: 454.1  10/29/2019 - Present Yes COPD (chronic obstructive pulmonary disease) (HCC) (Chronic) ICD-10-CM: J44.9 ICD-9-CM: 767  10/28/2019 - Present Yes S/P AVR (Chronic) ICD-10-CM: Z95.2 ICD-9-CM: V43.3  10/28/2019 - Present Yes Acquired hypothyroidism (Chronic) ICD-10-CM: E03.9 ICD-9-CM: 244.9  10/28/2019 - Present Yes Palliative care patient ICD-10-CM: Z51.5 ICD-9-CM: V66.7  10/3/2017 - Present Yes Restrictive lung disease ICD-10-CM: J98.4 ICD-9-CM: 518.89  10/3/2017 - Present Yes Overview Signed 10/3/2017  3:54 PM by Saumya Martinez NP  
  9/29/2017 10:15 AM - Raymond Terry, RT Narrative This is complete pulmonary function performed on 9/28/2017. 
 Patient with history of restrictive lung disease and dyspnea on exertion. SPIROMETRY: 
 
FVC 2.43 l and 52 % of predicted. FEV1 1.88 l and 54 % of predicted. Ratio 77 %. There was no clinically relevant improvement with bronchodilator. LUNG VOLUMES: 
 
TLC 5.35 l and 72 % of predicted. RV 2.91 l and 107 % of predicted. DIFFUSION: 
 
The diffusing capacity was corrected for hemoglobin of 14.2 g/dL and was 12.0 mL/mmHg/minute and 37 % of predicted. IMPRESSION: 
 
Spirometry is consistent with moderately severe restrictive defect, there is no improvement with bronchodilator.  Lung volume analysis reveals mild restrictive defect concomitantly and gas trapping.  The diffusion capacity was corrected for hemoglobin and was decreased.  Overall this is consistent with a mixed ventilatory defect with both restriction which is predominant and concomitant obstruction and decreased diffusion capacity. Edelmira Anton MD  
 
 
  
  
   
 MONICA (obstructive sleep apnea) (Chronic) ICD-10-CM: V12.31 
ICD-9-CM: 327.23  12/16/2013 - Present Yes Diastolic CHF, acute on chronic (HCC) ICD-10-CM: I50.33 ICD-9-CM: 428.33, 428.0  9/30/2013 - Present Yes Chronic respiratory failure with hypoxia (HCC) (Chronic) ICD-10-CM: J96.11 
ICD-9-CM: 518.83, 799.02  9/30/2013 - Present Yes Overview Addendum 10/30/2019 11:01 AM by Raul Bullard MD  
  5L NC continuously Atrial fibrillation (HCC) (Chronic) ICD-10-CM: I48.91 
ICD-9-CM: 427.31  9/27/2013 - Present Yes Type 2 diabetes mellitus (HCC) (Chronic) ICD-10-CM: E11.9 ICD-9-CM: 250.00  9/27/2013 - Present Yes CAD (coronary artery disease) (Chronic) ICD-10-CM: I25.10 ICD-9-CM: 414.00  9/27/2013 - Present Yes Plan: · Bilateral LE cellulitis with venous stasis: · Now on oral Diuretics per cardiology · changed vancomycin/zosyn to doxycycline/ cipro EOT 5-8-20 · Wound care consulted / LE wrapped · Cor pulmonale, acute on chronic diastolic CHF exacerbation, with COPD/ MONICA/ chronic hypoxia: 
· Wean O2 as tolerant, at baseline 3 L NC 
· Diuretics per cardiology · CAD: 
· Continued asa, lipitor · HTN: 
· Continued coreg, lisinopril · DM2: 
· SSI · Chronic AFIB: 
· Continued on coumadin with daily INR checks and dosing per pharmacy · Elevated LFTS and abdominal pain: · Added protonix · followup clinically as diet tolerant, no immediate GI needs · Treating constipation- resolved · Suspect elevated bilirubin due to hepatic congestion with right sided CHF · Suspect right rib pain is trauma related to recent fall · Hypokalemia: 
· Replaced and repeat labs DC planning/Dispo:  Pending, PT/OT, PPD Diet:  DIET DIABETIC CONSISTENT CARB 
DVT ppx:  INR 1.8  on coumadin Signed: Claude Mcclellan MD

## 2020-05-03 NOTE — PROGRESS NOTES
END OF SHIFT NOTE: 
 
INTAKE/OUTPUT 
05/02 0701 - 05/03 0700 In: -  
Out: [address] Voiding: YES Catheter: NO 
Drain:   
 
 
 
 
 
Flatus: Patient does have flatus present. Stool:  0 occurrences. Characteristics: 
Stool Assessment Stool Color: [place] Dew Stool Appearance: Formed Stool Amount: Medium Stool Source/Status: Rectum Emesis: 0 occurrences. Characteristics: VITAL SIGNS Patient Vitals for the past 12 hrs: 
 Temp Pulse Resp BP SpO2  
05/03/20 0308 98.2 °F (36.8 °C) 89 18 130/71 99 % 05/02/20 2303 98.5 °F (36.9 °C) 80 18 104/58 90 % 05/02/20 2017     93 % 05/02/20 1937 98.2 °F (36.8 °C) 76 18 109/63 90 % Pain Assessment Pain Intensity 1: 8 (05/03/20 0311) Pain Location 1: Leg 
Pain Intervention(s) 1: Medication (see MAR) Patient Stated Pain Goal: 0 Ambulating Yes Shift report given to oncoming nurse at the bedside.  
 
Flo Puentes RN

## 2020-05-03 NOTE — PROGRESS NOTES
Warfarin dosing per pharmacist 
 
Kamille Hodges is a 79 y.o. male. Height: 5' 8\" (172.7 cm)    Weight: 93 kg (205 lb) Indication:  Afib Goal INR:  2-3 Home dose:  2.5 mg every Sunday and Thursday, then 5 mg all remaining days of the week Risk factors or significant drug interactions:  ciprofloxacin Other anticoagulants:  none Daily Monitoring Date  INR     Warfarin dose HGB              Notes 4/27  2.0  5 mg  16.7  
4/28  2.4  5 mg  14.5  
4/29  3.3  Hold  14 
4/30  3.4  Hold  14.4 
5/1  2.8  3 mg  14.0 
5/2  2.2  4 mg  14.6 
5/3  1.8  5 mg  13.9 Pharmacy consulted for warfarin dosing, telephone anticoag visit on 4/24/20 notes home dose is 2.5 mg on Sunday and Thursday then 5 mg all remaining days of the week. INR has continued to fall, at  1.8 today. Will dose patient with 5 mg this evening. Pharmacy will continue to follow patient and monitor INR daily. Thank you, Maria Luisa Omalley, Pharm. D. 
PGY1 Pharmacy Resident [phone]

## 2020-05-03 NOTE — PROGRESS NOTES
Pt complaining of indigestion and heart burn, hospitalist paged via [employer] and new orders received.

## 2020-05-04 NOTE — PROGRESS NOTES
UPDATE 2:52PM: Per nursing, 2 nurses present in pt room while he was talking to his wife, explaining his need and wish for STR. Pt confirms he would like to transition to [facility] for [address]. Per MD, pt is alert and competent to make his own decisions and his wish is to discharge to [facility] for [address]. [facility] reselected in [facility]. This CM informed Annmarie with [facility] that pt is going to STR. No additional CM needs at discharge. Milestones met. Pt with discharge orders this day. Pt tentative plan was to discharge to SNF for STR, however, pt has changed his mind and would like to return home with home health. This CM spoke with spouse who is agreeable to plan as well. Pt agreeable to [facility] referral being made to home health. [facility] scheduled for 2:00pm . No additional CM needs at time of discharge. Milestones met. Care Management Interventions PCP Verified by CM: Yes Mode of Transport at Discharge: BLS([facility]) Transition of Care Consult (CM Consult): Discharge Planning,  [address]: Yes Discharge Durable Medical Equipment: No 
Physical Therapy Consult: Yes Occupational Therapy Consult: Yes Speech Therapy Consult: No 
Current Support Network: Own Home, Lives with Spouse Confirm Follow Up Transport: Other (see comment)([facility] ) The Plan for Transition of Care is Related to the Following Treatment Goals : SNF for STR The Patient and/or Patient Representative was Provided with a Choice of Provider and Agrees with the Discharge Plan?: Yes Name of the Patient Representative Who was Provided with a Choice of Provider and Agrees with the Discharge Plan: Mr. Almeida Records [place] of Choice List was Provided with Basic Dialogue that Supports the Patient's Individualized Plan of Care/Goals, Treatment Preferences and Shares the Quality Data Associated with the Providers?: Yes [employer] Information Provided?: No 
Discharge Location Discharge Placement: Skilled nursing facility
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
## 2020-05-04 NOTE — ROUTINE PROCESS
Report called to [facility], given to Marc Hutton RN. Pt going to [facility] per assignment. Medical Report and history given, pt's current status in addition to dilemma with wife of pt being tx to [facility] as opposed to him coming home to [facility] for her\". Pt transported via [employer].

## 2020-05-04 NOTE — PROGRESS NOTES
END OF SHIFT NOTE: 
 
INTAKE/OUTPUT 
05/03 0701 - 05/04 0700 In: -  
Out: [address] [facility] Floor [[id number]] Voiding: YES Catheter: NO 
Drain:   
 
 
 
 
 
Flatus: Patient does have flatus present. Stool:  0 occurrences. Characteristics: 
 
Emesis: 0 occurrences. Characteristics: VITAL SIGNS Patient Vitals for the past 12 hrs: 
 Temp Pulse Resp BP SpO2  
05/04/20 0314 99.2 °F (37.3 °C) 77 17 124/71 93 % 05/03/20 2341 97.9 °F (36.6 °C) 69 17 110/65 92 % 05/03/20 1942     94 % 05/03/20 1916 98.8 °F (37.1 °C) 81 18 112/66 93 % Pain Assessment Pain Intensity 1: 8 (05/04/20 0539) Pain Location 1: Leg 
Pain Intervention(s) 1: Medication (see MAR) Patient Stated Pain Goal: 0 Ambulating Yes Shift report given to oncoming nurse at the bedside.  
 
Giuliana Vazquez RN

## 2020-05-04 NOTE — PROGRESS NOTES
Warfarin dosing per pharmacist 
 
Angelina Pierson is a 79 y.o. male. Height: 5' 8\" (172.7 cm)    Weight: 92.6 kg (204 lb 2.3 oz) Indication:  Afib Goal INR:  2-3 Home dose:  2.5 mg every Sunday and Thursday, then 5 mg all remaining days of the week Risk factors or significant drug interactions:  ciprofloxacin Other anticoagulants:  none Daily Monitoring Date  INR     Warfarin dose HGB              Notes 4/27  2.0  5 mg  16.7  
4/28  2.4  5 mg  14.5  
4/29  3.3  Hold  14 
4/30  3.4  Hold  14.4 
5/1  2.8  3 mg  14.0 
5/2  2.2  4 mg  14.6 
5/3  1.8  5 mg  13.9 
5/4  1.7  5 mg  14.4 Pharmacy consulted for warfarin dosing, telephone anticoag visit on 4/24/20 notes home dose is 2.5 mg on Sunday and Thursday then 5 mg all remaining days of the week. INR subtherapeutic today at 1.7.. Will dose patient with 5 mg this evening. Pharmacy will continue to follow patient and monitor INR daily. Thank you, Martin Solares, PharmD Clinical Pharmacy PGY1 Resident [phone]

## 2020-05-04 NOTE — PROGRESS NOTES
Problem: Pressure Injury - Risk of 
Goal: *Prevention of pressure injury Description: Document Elvis Scale and appropriate interventions in the flowsheet. Outcome: Progressing Towards Goal 
Note: Pressure Injury Interventions: 
Sensory Interventions: Assess changes in LOC Moisture Interventions: Absorbent underpads Activity Interventions: Increase time out of bed Mobility Interventions: Pressure redistribution bed/mattress (bed type) Nutrition Interventions: Document food/fluid/supplement intake, Offer support with meals,snacks and hydration Friction and Shear Interventions: Feet elevated on foot rest, Foam dressings/transparent film/skin sealants, Minimize layers Problem: Patient Education: Go to Patient Education Activity Goal: Patient/Family Education Outcome: Progressing Towards Goal 
  
Problem: Patient Education: Go to Patient Education Activity Goal: Patient/Family Education Outcome: Progressing Towards Goal 
  
Problem: Falls - Risk of 
Goal: *Absence of Falls Description: Document Pricilla Camacho Fall Risk and appropriate interventions in the flowsheet. Outcome: Progressing Towards Goal 
Note: Fall Risk Interventions: 
Mobility Interventions: Patient to call before getting OOB, PT Consult for mobility concerns, Strengthening exercises (ROM-active/passive), Utilize walker, cane, or other assistive device, Utilize gait belt for transfers/ambulation Mentation Interventions: Adequate sleep, hydration, pain control, Evaluate medications/consider consulting pharmacy, Increase mobility, More frequent rounding, Reorient patient Medication Interventions: Patient to call before getting OOB, Teach patient to arise slowly Elimination Interventions: Call light in reach, Toilet paper/wipes in reach, Toileting schedule/hourly rounds Problem: Patient Education: Go to Patient Education Activity Goal: Patient/Family Education Outcome: Progressing Towards Goal 
  
 Problem: Patient Education: Go to Patient Education Activity Goal: Patient/Family Education Outcome: Progressing Towards Goal
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
## 2020-05-04 NOTE — DISCHARGE SUMMARY
Hospitalist Discharge Summary Admit Date:  2020 11:28 AM  
Name:  Leslie Edmond Age:  79 y.o. 
:  1940 MRN:  [id number] PCP:  Emilio Tejeda MD 
Treatment Team: Attending Provider: Karla Cronin MD; Utilization Review: Ajit Yoon; Care Manager: [employer]; Utilization Review: Trejo Nails; Staff Nurse: Lilibeth Monte RN; Physical Therapy Assistant: Colette Jones; Occupational Therapist: Jennifer Bernal OT Problem List for this Hospitalization: 
Hospital Problems as of 2020 Date Reviewed: 3/11/2020 Codes Class Noted - Resolved POA Do not resuscitate (Chronic) ICD-10-CM: V76 ICD-9-CM: V49.86 Chronic 10/30/2019 - Present Yes Chronic atrial fibrillation (Chronic) ICD-10-CM: E20.18 ICD-9-CM: 427.31  2020 - Present Yes Volume overload ICD-10-CM: E87.70 ICD-9-CM: 276.69  2020 - Present Yes Cellulitis ICD-10-CM: L03.90 ICD-9-CM: 682.9  2020 - Present Cor pulmonale (HCC) ICD-10-CM: F61.59 ICD-9-CM: 416.9  2020 - Present Yes * (Principal) Lower extremity cellulitis ICD-10-CM: L03.119 ICD-9-CM: 682.6  2020 - Present Yes Bilateral lower extremity edema ICD-10-CM: R60.0 ICD-9-CM: 782.3  2020 - Present Yes Elevated liver enzymes ICD-10-CM: R74.8 ICD-9-CM: 790.5  2020 - Present Yes Epigastric pain ICD-10-CM: R10.13 ICD-9-CM: 789.06  2020 - Present Yes Bilateral pleural effusion ICD-10-CM: J90 ICD-9-CM: 511.9  2020 - Present Yes 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) ICD-10-CM: I27.29, I50.810 ICD-9-CM: 428.0, 416.8  2020 - Present Yes Venous stasis dermatitis of both lower extremities (Chronic) ICD-10-CM: I39.8 ICD-9-CM: 454.1  10/29/2019 - Present Yes COPD (chronic obstructive pulmonary disease) (HCC) (Chronic) ICD-10-CM: J44.9 ICD-9-CM: 125  10/28/2019 - Present Yes S/P AVR (Chronic) ICD-10-CM: Z95.2 ICD-9-CM: V43.3  10/28/2019 - Present Yes Acquired hypothyroidism (Chronic) ICD-10-CM: E03.9 ICD-9-CM: 244.9  10/28/2019 - Present Yes Palliative care patient ICD-10-CM: Z51.5 ICD-9-CM: V66.7  10/3/2017 - Present Yes Restrictive lung disease ICD-10-CM: J98.4 ICD-9-CM: 518.89  10/3/2017 - Present Yes Overview Signed 10/3/2017  3:54 PM by Katia Lyons NP  
  9/29/2017 10:15 AM - RT Suzanne Narrative This is complete pulmonary function performed on 9/28/2017. 
 Patient with history of restrictive lung disease and dyspnea on exertion. SPIROMETRY: 
 
FVC 2.43 l and 52 % of predicted. FEV1 1.88 l and 54 % of predicted. Ratio 77 %. There was no clinically relevant improvement with bronchodilator. LUNG VOLUMES: 
 
TLC 5.35 l and 72 % of predicted. RV 2.91 l and 107 % of predicted. DIFFUSION: 
 
The diffusing capacity was corrected for hemoglobin of 14.2 g/dL and was 12.0 mL/mmHg/minute and 37 % of predicted. IMPRESSION: 
 
Spirometry is consistent with moderately severe restrictive defect, there is no improvement with bronchodilator.  Lung volume analysis reveals mild restrictive defect concomitantly and gas trapping.  The diffusion capacity was corrected for hemoglobin and was decreased.  Overall this is consistent with a mixed ventilatory defect with both restriction which is predominant and concomitant obstruction and decreased diffusion capacity. Delores Martinez MD  
 
 
  
  
   
 MONICA (obstructive sleep apnea) (Chronic) ICD-10-CM: B61.77 
ICD-9-CM: 327.23  12/16/2013 - Present Yes Diastolic CHF, acute on chronic (HCC) ICD-10-CM: I50.33 ICD-9-CM: 428.33, 428.0  9/30/2013 - Present Yes Chronic respiratory failure with hypoxia (HCC) (Chronic) ICD-10-CM: J96.11 
ICD-9-CM: 518.83, 799.02  9/30/2013 - Present Yes Overview Addendum 10/30/2019 11:01 AM by Kae Cowart MD  
  5L NC continuously Atrial fibrillation (HCC) (Chronic) ICD-10-CM: I48.91 
ICD-9-CM: 427.31  9/27/2013 - Present Yes Type 2 diabetes mellitus (HCC) (Chronic) ICD-10-CM: E11.9 ICD-9-CM: 250.00  9/27/2013 - Present Yes CAD (coronary artery disease) (Chronic) ICD-10-CM: I25.10 ICD-9-CM: 414.00  9/27/2013 - Present Yes Hospital Course: 
 
 
Mr. Katie Ling is a 78 yo male with PMH of cor pulmonale with COPD/ MONICA-noncompliant with home O2 but states uses 3 L NC, LE venous stasis dermatitis, AFIB on coumadin, s/p AVR, DM2, CAD s/p CABG, admitted with painful/ erythematous/ weeping bilateral LE with concern for cellulitis and volume overload/ acute on chronic diastolic CHF exacerbation. His cellulitis is managed with vancomycin/zosyn, BC NGTD. Wound care consulted and wrapped LE. Wound culture was polymicrobial. He will need to complete a course with oral doxycycline and cipro. Cardiology has evaluated his volume overload and has continued to diurese with  lasix. He has mildly elevated bilirubin and ABD US shows mild ascites. He admits to RUQ pain and notes a recent fall thus likely rib pain related. He takes chronic oxycodone for pain. His coumadin has been adjusted and current INR 1.7- he will need INR checks going forward. Discharge plans are to SNF but required COVID testing prior to his admit there since it was marked as positive on admit for travel history and exposure though he denies this . 
-the emergent disease panel was negative for COVID testing. He is stable for discharge. Disposition: [facility] Activity: Activity as tolerated Diet: DIET DIABETIC CONSISTENT CARB Regular; 2 GM NA (House Low NA) Code Status: DNR Follow Up Orders: Follow-up Appointments Procedures  FOLLOW UP VISIT Appointment in: Other (Specify) After rehab After rehab Standing Status:   Standing Number of Occurrences:   1   Order Specific Question:   Appointment in  
 Answer: Other (Specify) Follow-up Information Follow up With Specialties Details Why Contact Info [address] [facility] ([facility]) [address] [place] [address] Road   [address] [place] [address]. [address] [facility] 
[phone] Other, MD Emilio    Patient can only remember the practice name and not the physician Discharge meds at bottom of this note. Plan was discussed with patient. All questions answered. Patient was stable at time of discharge. Given instructions to call a physician or return if any concerns. Discharge summary and encounter summary was sent to PCP electronically via \"Comm Mgt\" link in [facility], if possible. Diagnostic Imaging/Tests:  
Xr Chest Pa Lat Result Date: 4/27/2020 CHEST X-RAY, 2 views 4/27/2020 History: Shortness of breath. Technique: PA and lateral views of the chest. Comparison: Chest x-ray 4/20/2020 Findings: Improving aeration is seen of the lungs. Stable post surgical changes overlie the mediastinal silhouette. The cardiac silhouette is mildly enlarged although not significant changed from the prior study. The lungs are expanded without evidence for pneumothorax. No evolving consolidation, or evidence of pleural effusion is seen. Prior basilar atelectatic appearing changes appear improved. Additional prior trace effusions at the costophrenic angles appear slightly improved particularly on the right. The bony thorax demonstrates no acute changes. The upper abdomen is unremarkable in appearance. IMPRESSION: 1. No worsening changes. Only improving aeration, improving basilar atelectatic appearing changes, and some improvement in trace basilar effusions are seen. Xr Chest Pa Lat Result Date: 4/20/2020 PA and lateral chest radiographs History:  sob, chf, 79 years Male Comparison: Chest radiograph October 29, 2019 Findings:  Normal cardiomediastinal silhouette with evidence of aortic valve replacement and CABG. Persistent low lung volumes with trace bilateral pleural effusions and associated mild bibasilar atelectasis and or consolidation. Probable mild interstitial edema unchanged. No evidence of pneumothorax. Visualized soft tissue and osseous structures otherwise unremarkable. Impression:   No significant interval change. Xr Forearm Rt Ap/lat Result Date: 4/20/2020 Exam:  Right forearm radiographs History:  pain, forearm pain,injury, 79 years Male Comparison: None available Findings:  No evidence of acute fracture or dislocation. Normal alignment, joint spaces preserved. Normal mineralization. Visualized soft tissues otherwise unremarkable. Impression:  No evidence of acute injury. [address] [facility] Result Date: 4/28/2020 Right Upper Quadrant Ultrasound INDICATION:  Elevated liver function tests with low albumin, hyperbilirubinemia, increased alkaline phosphatase, prior cholecystectomy. Bilateral lower extremity swelling and erythema, chronic right second just of heart failure, hyperlipidemia, diabetes, or artery disease, atrial fibrillation, hypertension, peptic ulcer disease, GERD, aortic stenosis, hypothyroidism. COMPARISON: Correlation with CT chest 10/25/2017 (no prior abdominal imaging is available at this facility) TECHNIQUE:  Sonographic gray scale and Doppler images were obtained of the right upper quadrant of the abdomen. FINDINGS: The hepatic parenchymal echotexture is markedly heterogeneous and elevated throughout, limiting sensitivity for masses. The left lobe cannot be well visualized evaluated due to overlying bowel and rib artifact. There are no discrete lesions in the visualized portions of the liver. Liver size is 15.8 cm, within normal limits. Main portal vein is patent on Doppler imaging. There is no visualized intrahepatic bile duct dilatation.  The common bile duct diameter is 8 mm, a common finding after cholecystectomy. The gallbladder is surgically absent. Gallbladder fossa is unremarkable grossly. Sonographic Wen's sign is not seen. There are no discrete lesions in the limited visualized portions of the pancreas, which is almost entirely obscured by overlying bowel artifact. The right kidney measures 12.8 cm in length. There is no hydronephrosis. There is no evidence of a solid renal mass. Color Doppler demonstrates expected right renal flow. There is small volume abdominal ascites, and a right pleural effusion is partially seen. The aorta is not well seen or evaluated due to overlying artifact. IVC appears patent on spectral Doppler. IMPRESSION: 1. Echogenic liver parenchyma, nonspecific but most often steatosis. 2. Cholecystectomy. 3. Small volume ascites, and right pleural effusion. Echocardiogram results: No results found for this visit on 04/27/20. Procedures done this admission: * No surgery found * All Micro Results Procedure Component Value Units Date/Time EMERGENT DISEASE PANEL [[id number]] Collected:  05/01/20 1412 Order Status:  Completed Specimen:  Not Specified Updated:  05/04/20 9038 Emergent disease panel Not detected Comment: Performed at [facility] RESULTS SCANNED IN [facility] CARE 
05/04/20, ADS 
  
  
 CULTURE, BLOOD [[id number]] Collected:  04/27/20 1445 Order Status:  Completed Specimen:  Blood Updated:  05/02/20 1003 Special Requests: --     
  RIGHT ANTERIOR Culture result: NO GROWTH 5 DAYS     
 CULTURE, BLOOD [[id number]] Collected:  04/27/20 1618 Order Status:  Completed Specimen:  Blood Updated:  05/02/20 7315 Special Requests: --     
  LEFT Antecubital 
  
  Culture result: NO GROWTH 5 DAYS     
 CULTURE, WOUND [[id number]]  (Abnormal)  (Susceptibility) Collected:  04/27/20 2040 Order Status:  Completed Specimen:  Wound Drainage Updated:  05/01/20 0322 Special Requests: NO SPECIAL REQUESTS     
  GRAM STAIN 0 TO 12 WBC'S SEEN PER OIF  
   MANY GRAM NEGATIVE RODS     
   FEW GRAM POSITIVE COCCI Culture result: HEAVY ESCHERICHIA COLI     
   HEAVY PROTEUS MIRABILIS     
      
  HEAVY * METHICILLIN RESISTANT STAPHYLOCOCCUS AUREUS * ENTEROCOCCUS FAECALIS GROUP D ISOLATED FROM BROTH ONLY RESULTS VERIFIED, PHONED TO AND READ BACK BY 
MERCY JACOBSEN RN ON 5/1/20 @0735, TA Labs: Results:  
   
BMP, Mg, Phos Recent Labs 05/04/20 
7209 05/03/20 
5337 05/02/20 
9794  141 139  
K 3.7 3.7 3.9 CL 97* 100 101 CO2 37* 33* 38* AGAP 6* 8 0* BUN 15 18 16 CREA 0.79* 0.79* 0.81 CA 8.6 8.3 8.6 * 114* 136* MG 2.2 1.7* 2.3  
  
CBC Recent Labs 05/04/20 
7598 05/03/20 
2801 05/02/20 
5088 WBC 7.6 7.5 7.2 RBC 4.75 4.66 4.88  
HGB 14.4 13.9 14.6 HCT 44.1 44.0 46.2  195 221 LFT No results for input(s): SGOT, ALT, TBIL, AP, TP, ALB, GLOB, AGRAT, GPT in the last 72 hours. Cardiac Testing Lab Results Component Value Date/Time  (H) 10/28/2019 01:24 PM  
  (H) 04/30/2019 10:34 AM  
  (H) 01/02/2019 11:40 AM  
 BNP 30 09/30/2013 05:17 AM  
  09/28/2013 06:48 AM  
  09/27/2013 12:10 PM  
 Troponin-I, Qt. 0.02 04/27/2020 02:28 PM  
 Troponin-I, Qt. 0.02 04/20/2020 04:02 PM  
 Troponin-I, Qt. <0.02 (L) 10/29/2019 01:47 PM  
  
Coagulation Tests Lab Results Component Value Date/Time Prothrombin time 20.1 (H) 05/04/2020 04:28 AM  
 Prothrombin time 21.3 (H) 05/03/2020 04:31 AM  
 Prothrombin time 25.5 (H) 05/02/2020 05:37 AM  
 INR 1.7 05/04/2020 04:28 AM  
 INR 1.8 05/03/2020 04:31 AM  
 INR 2.2 05/02/2020 05:37 AM  
  
A1c Lab Results Component Value Date/Time Hemoglobin A1c 7.0 (H) 10/30/2019 07:28 AM  
 Hemoglobin A1c 6.4 (H) 06/02/2018 06:58 AM  
 Hemoglobin A1c 6.6 (H) 06/22/2017 07:45 AM  
  
Lipid Panel Lab Results Component Value Date/Time Cholesterol, total 167 03/21/2019 09:21 AM  
 HDL Cholesterol 33 (L) 03/21/2019 09:21 AM  
 LDL, calculated 92 03/21/2019 09:21 AM  
 VLDL, calculated 42 (H) 03/21/2019 09:21 AM  
 Triglyceride 210 (H) 03/21/2019 09:21 AM  
 CHOL/HDL Ratio 5.1 03/21/2019 09:21 AM  
  
Thyroid Panel Lab Results Component Value Date/Time TSH 3.830 (H) 04/29/2020 04:08 AM  
 TSH 3.210 11/06/2019 02:34 PM  
 TSH 2.560 06/02/2018 11:45 AM  
 T4, Total 13.6 06/20/2017 06:04 PM  
    
Most Recent UA No results found for: COLOR, APPRN, REFSG, MARGIE, PROTU, GLUCU, KETU, BILU, BLDU, UROU, BRYAN, LEUKU, WBCU, RBCU, UEPI, BACTU, CASTS, UCRY, MUCUS, UCOM Allergies Allergen Reactions  Celexa [Citalopram] Other (comments) Ineffective per pt  Cymbalta [Duloxetine] Other (comments) Altered mental state  Gabapentin Other (comments) Altered mental status  Lidocaine Unknown (comments)  Sertraline Unknown (comments)  Sulfa (Sulfonamide Antibiotics) Unknown (comments) Immunization History Administered Date(s) Administered  Influenza High Dose Vaccine PF 09/28/2015, 09/25/2017, 09/10/2018  Influenza Vaccine 10/15/2013, 10/06/2014, 10/01/2017  Influenza Vaccine (Tri) Adjuvanted 09/25/2017  Pneumococcal Conjugate (PCV-13) 09/28/2015  Pneumococcal Polysaccharide (PPSV-23) 01/04/2010, 01/13/2015, 01/01/2016  TB Skin Test (PPD) 06/12/2017, 06/18/2017, 04/18/2018  TB Skin Test (PPD) Intradermal 06/12/2017, 06/18/2017, 04/18/2018, 06/02/2018, 01/05/2019, 10/28/2019, 04/27/2020  Tdap 10/12/2016  Zoster Vaccine, Live 01/10/2012 All Labs from Last 24 Hrs: 
Recent Results (from the past 24 hour(s)) GLUCOSE, POC Collection Time: 05/03/20 12:00 PM  
Result Value Ref Range Glucose (POC) 101 (H) 65 - 100 mg/dL GLUCOSE, POC Collection Time: 05/03/20  4:52 PM  
Result Value Ref Range Glucose (POC) 126 (H) 65 - 100 mg/dL GLUCOSE, POC  
 Collection Time: 05/03/20  8:57 PM  
Result Value Ref Range Glucose (POC) 189 (H) 65 - 100 mg/dL CBC W/O DIFF Collection Time: 05/04/20  4:28 AM  
Result Value Ref Range WBC 7.6 4.3 - 11.1 K/uL  
 RBC 4.75 4.23 - 5.6 M/uL  
 HGB 14.4 13.6 - 17.2 g/dL HCT 44.1 41.1 - 50.3 % MCV 92.8 79.6 - 97.8 FL  
 MCH 30.3 26.1 - 32.9 PG  
 MCHC 32.7 31.4 - 35.0 g/dL  
 RDW 15.7 (H) 11.9 - 14.6 % PLATELET 445 363 - 421 K/uL MPV 11.9 9.4 - 12.3 FL ABSOLUTE NRBC 0.00 0.0 - 0.2 K/uL PROTHROMBIN TIME + INR Collection Time: 05/04/20  4:28 AM  
Result Value Ref Range Prothrombin time 20.1 (H) 12.0 - 14.7 sec INR 1.7 METABOLIC PANEL, BASIC Collection Time: 05/04/20  4:28 AM  
Result Value Ref Range Sodium 140 136 - 145 mmol/L Potassium 3.7 3.5 - 5.1 mmol/L Chloride 97 (L) 98 - 107 mmol/L  
 CO2 37 (H) 21 - 32 mmol/L Anion gap 6 (L) 7 - 16 mmol/L Glucose 102 (H) 65 - 100 mg/dL BUN 15 8 - 23 MG/DL Creatinine 0.79 (L) 0.8 - 1.5 MG/DL  
 GFR est AA >60 >60 ml/min/1.73m2 GFR est non-AA >60 >60 ml/min/1.73m2 Calcium 8.6 8.3 - 10.4 MG/DL MAGNESIUM Collection Time: 05/04/20  4:28 AM  
Result Value Ref Range Magnesium 2.2 1.8 - 2.4 mg/dL GLUCOSE, POC Collection Time: 05/04/20  7:40 AM  
Result Value Ref Range Glucose (POC) 88 65 - 100 mg/dL Discharge Exam: 
Patient Vitals for the past 24 hrs: 
 Temp Pulse Resp BP SpO2  
05/04/20 0912  79  146/74   
05/04/20 0910  79  146/74   
05/04/20 0730 98.3 °F (36.8 °C) 80 16 146/74 93 % 05/04/20 0658 98.3 °F (36.8 °C) 79 16 146/74 90 % 05/04/20 0314 99.2 °F (37.3 °C) 77 17 124/71 93 % 05/03/20 2341 97.9 °F (36.6 °C) 69 17 110/65 92 % 05/03/20 1942     94 % 05/03/20 1916 98.8 °F (37.1 °C) 81 18 112/66 93 % 05/03/20 1529 98.1 °F (36.7 °C) 70 18 112/70 95 % 05/03/20 1510     95 % 05/03/20 1156 98 °F (36.7 °C) 75 18 128/69 93 % Oxygen Therapy O2 Sat (%): 93 % (05/04/20 0730) Pulse via Oximetry: 93 beats per minute (05/04/20 0730) O2 Device: Nasal cannula (05/04/20 0730) O2 Flow Rate (L/min): 3 l/min (05/04/20 0730) Estimated body mass index is 31.04 kg/m² as calculated from the following: 
  Height as of this encounter: 5' 8\" (1.727 m). Weight as of this encounter: 92.6 kg (204 lb 2.3 oz). Intake/Output Summary (Last 24 hours) at 5/4/2020 1138 Last data filed at 5/4/2020 1127 Gross per 24 hour Intake  Output 1525 ml Net -1525 ml *Note that automatically entered I/Os may not be accurate; dependent on patient compliance with collection and accurate  by assistants. General:          Well nourished. Alert. No distress, elderly CV:                  irregular. III/VI BREE LUSB, rub, or gallop. 1+ edema/wrapped Lungs:             CTAB. No wheezing, rhonchi, or rales. anterior Abdomen:        Soft, nontender, nondistended. Decreased BS Skin:                LE venous stasis with erythema/wrapped Neuro:             No gross focal deficits Current Med List in Hospital:  
Current Facility-Administered Medications Medication Dose Route Frequency  alum-mag hydroxide-simeth (MYLANTA) oral suspension 30 mL  30 mL Oral Q4H PRN  
 warfarin (COUMADIN) tablet 5 mg  5 mg Oral QPM  
 doxycycline (VIBRAMYCIN) capsule 100 mg  100 mg Oral Q12H  ciprofloxacin HCl (CIPRO) tablet 500 mg  500 mg Oral Q12H  furosemide (LASIX) tablet 40 mg  40 mg Oral ACB&D  
 oxyCODONE IR (ROXICODONE) tablet 10 mg  10 mg Oral Q6H PRN  
 levothyroxine (SYNTHROID) tablet 100 mcg  100 mcg Oral ACB  alcohol 62% (NOZIN) nasal  1 Ampule  1 Ampule Topical Q12H  pantoprazole (PROTONIX) tablet 40 mg  40 mg Oral ACB  docusate sodium (COLACE) capsule 100 mg  100 mg Oral BID  acetaminophen (TYLENOL) tablet 650 mg  650 mg Oral Q6H PRN  
 albuterol (PROVENTIL VENTOLIN) nebulizer solution 2.5 mg  2.5 mg Nebulization TID RT  
  aspirin delayed-release tablet 81 mg  81 mg Oral DAILY  atorvastatin (LIPITOR) tablet 20 mg  20 mg Oral DAILY  carvediloL (COREG) tablet 12.5 mg  12.5 mg Oral BID WITH MEALS  ferrous sulfate tablet 325 mg  1 Tab Oral DAILY WITH BREAKFAST  lisinopriL (PRINIVIL, ZESTRIL) tablet 10 mg  10 mg Oral DAILY  magnesium oxide (MAG-OX) tablet 400 mg  400 mg Oral DAILY  memantine (NAMENDA) tablet 10 mg  10 mg Oral BID  multivitamin, tx-iron-ca-min (THERA-M w/ IRON) tablet 1 Tab  1 Tab Oral DAILY  sodium chloride (NS) flush 5-40 mL  5-40 mL IntraVENous Q8H  
 sodium chloride (NS) flush 5-40 mL  5-40 mL IntraVENous PRN  
 insulin lispro (HUMALOG) injection   SubCUTAneous AC&HS  polyethylene glycol (MIRALAX) packet 17 g  17 g Oral DAILY  pantothenic ac-min oil-pet,hyd (AQUAPHOR) 41 % ointment   Topical PRN Discharge Info:  
Current Discharge Medication List  
  
START taking these medications Details  
pantoprazole (PROTONIX) 40 mg tablet Take 1 Tab by mouth Daily (before breakfast). Qty: 30 Tab, Refills: 0  
  
insulin lispro (HUMALOG) 100 unit/mL injection Per sliding scale prior to meals and bedtime 
Qty: 1 Vial, Refills: 0  
  
docusate sodium (COLACE) 100 mg capsule Take 1 Cap by mouth two (2) times a day for 90 days. Qty: 60 Cap, Refills: 0  
  
doxycycline (ADOXA) 100 mg tablet Take 1 Tab by mouth two (2) times a day for 7 days. Qty: 14 Tab, Refills: 0  
  
ciprofloxacin HCl (CIPRO) 500 mg tablet Take 1 Tab by mouth two (2) times a day for 7 days. Qty: 14 Tab, Refills: 0 CONTINUE these medications which have CHANGED Details  
furosemide (LASIX) 40 mg tablet Take 1 Tab by mouth every twelve (12) hours. Qty: 60 Tab, Refills: 0  
  
levothyroxine (SYNTHROID) 100 mcg tablet Take 1 Tab by mouth Daily (before breakfast).  
Qty: 30 Tab, Refills: 0  
  
oxyCODONE IR (ROXICODONE) 10 mg tab immediate release tablet Take 1 Tab by mouth every eight (8) hours as needed for Pain for up to 3 days. Max Daily Amount: 30 mg. 
Qty: 10 Tab, Refills: 0 Associated Diagnoses: Cellulitis of left lower extremity CONTINUE these medications which have NOT CHANGED Details  
cyanocobalamin (VITAMIN B-12) 1,000 mcg/mL injection 1,000 mcg by IntraMUSCular route every thirty (30) days. Jardiance 10 mg tablet Take 1 Tab by mouth daily. Qty: 90 Tab, Refills: 3 Associated Diagnoses: Type 2 diabetes mellitus without complication, unspecified whether long term insulin use ([facility] [address] Diastolic CHF, acute on chronic ([facility] [address] Coronary artery disease involving coronary bypass graft of native heart without angina pectoris  
  
atorvastatin (LIPITOR) 40 mg tablet Take 20 mg by mouth daily. lisinopril (PRINIVIL, ZESTRIL) 20 mg tablet Take 10 mg by mouth daily. warfarin (COUMADIN) 5 mg tablet Take 5 mg by mouth daily. Or as directed  
  
mineral oil-hydrophil petrolat (AQUAPHOR) ointment Apply 1 Each to affected area as needed for Dry Skin or Itching. apply daily as needed to any areas of dry/itchy skin  
  
polyethylene glycol (MIRALAX) 17 gram/dose powder Take 17 g by mouth daily as needed for Other (constipation). inhalational spacing device Use as directed with MDI Qty: 1 Device, Refills: 1  
  
carvedilol (COREG) 12.5 mg tablet Take 12.5 mg by mouth two (2) times daily (with meals). has 25mg tablets  
  
diclofenac (VOLTAREN) 1 % gel Apply  to affected area four (4) times daily. albuterol (PROVENTIL VENTOLIN) 2.5 mg /3 mL (0.083 %) nebulizer solution 3 mL by Nebulization route every six (6) hours. Qty: 120 Each, Refills: 0  
  
magnesium oxide (MAG-OX) 400 mg tablet Take 400 mg by mouth daily. potassium chloride (K-DUR, KLOR-CON) 20 mEq tablet Take 1 Tab by mouth daily. Qty: 30 Tab, Refills: 0  
  
aspirin delayed-release 81 mg tablet Take 81 mg by mouth daily. memantine (NAMENDA) 10 mg tablet Take 10 mg by mouth two (2) times a day. ferrous sulfate 324 mg (65 mg iron) tablet Take 324 mg by mouth daily. ascorbic acid, vitamin C, (VITAMIN C) 500 mg tablet Take 500 mg by mouth two (2) times a day. multivitamin (ONE A DAY) tablet Take 1 Tab by mouth daily. nitroglycerin (NITROSTAT) 0.4 mg SL tablet 0.4 mg by SubLINGual route every five (5) minutes as needed for Chest Pain. STOP taking these medications  
  
 acetaminophen (TYLENOL) 500 mg tablet Comments:  
Reason for Stopping:   
   
 metOLazone (ZAROXOLYN) 5 mg tablet Comments:  
Reason for Stopping:   
   
 senna-docusate (SENNA PLUS) 8.6-50 mg per tablet Comments:  
Reason for Stopping:   
   
 albuterol (PROVENTIL HFA, VENTOLIN HFA, PROAIR HFA) 90 mcg/actuation inhaler Comments:  
Reason for Stopping:   
   
  
 
 
 
Time spent in patient discharge planning and coordination 45 minutes. Signed: Ezequiel Celestin MD

## 2020-05-04 NOTE — PROGRESS NOTES
END OF SHIFT NOTE: 
 
INTAKE/OUTPUT 
05/02 0701 - 05/03 0700 In: -  
Out: [address] Voiding: YES Catheter: NO 
Drain:   
 
 
Flatus: Patient does have flatus present. Stool:  0 occurrences. Characteristics: 
Stool Assessment Stool Color: Yuli Dally Stool Appearance: Formed(per patient) Stool Amount: Medium Stool Source/Status: Rectum Emesis: 0 occurrences. Characteristics: VITAL SIGNS Patient Vitals for the past 12 hrs: 
 Temp Pulse Resp BP SpO2  
05/03/20 1942     94 % 05/03/20 1916 98.8 °F (37.1 °C) 81 18 112/66 93 % 05/03/20 1529 98.1 °F (36.7 °C) 70 18 112/70 95 % 05/03/20 1510     95 % 05/03/20 1156 98 °F (36.7 °C) 75 18 128/69 93 % 05/03/20 0833     94 % Pain Assessment Pain Intensity 1: 8 (05/03/20 1725) Pain Location 1: Leg 
Pain Intervention(s) 1: Medication (see MAR) Patient Stated Pain Goal: 0 Ambulating Yes; in room to chair and bed. Shift report given to oncoming nurse at the bedside.  
 
Daryle Loss, RN

## 2020-05-04 NOTE — DISCHARGE INSTRUCTIONS
Activity: Activity as tolerated  Diet: DIET DIABETIC CONSISTENT CARB Regular; 2 GM NA (House Low NA)     Cellulitis: Care Instructions  Your Care Instructions    Cellulitis is a skin infection caused by bacteria, most often strep or staph. It often occurs after a break in the skin from a scrape, cut, bite, or puncture, or after a rash. Cellulitis may be treated without doing tests to find out what caused it. But your doctor may do tests, if needed, to look for a specific bacteria, like methicillin-resistant Staphylococcus aureus (MRSA). The doctor has checked you carefully, but problems can develop later. If you notice any problems or new symptoms, get medical treatment right away. Follow-up care is a key part of your treatment and safety. Be sure to make and go to all appointments, and call your doctor if you are having problems. It's also a good idea to know your test results and keep a list of the medicines you take. How can you care for yourself at home? · Take your antibiotics as directed. Do not stop taking them just because you feel better. You need to take the full course of antibiotics. · Prop up the infected area on pillows to reduce pain and swelling. Try to keep the area above the level of your heart as often as you can. · If your doctor told you how to care for your wound, follow your doctor's instructions. If you did not get instructions, follow this general advice:  ? Wash the wound with clean water 2 times a day. Don't use hydrogen peroxide or alcohol, which can slow healing. ? You may cover the wound with a thin layer of petroleum jelly, such as Vaseline, and a nonstick bandage. ? Apply more petroleum jelly and replace the bandage as needed. · Be safe with medicines. Take pain medicines exactly as directed. ? If the doctor gave you a prescription medicine for pain, take it as prescribed.   ? If you are not taking a prescription pain medicine, ask your doctor if you can take an over-the-counter medicine. To prevent cellulitis in the future  · Try to prevent cuts, scrapes, or other injuries to your skin. Cellulitis most often occurs where there is a break in the skin. · If you get a scrape, cut, mild burn, or bite, wash the wound with clean water as soon as you can to help avoid infection. Don't use hydrogen peroxide or alcohol, which can slow healing. · If you have swelling in your legs (edema), support stockings and good skin care may help prevent leg sores and cellulitis. · Take care of your feet, especially if you have diabetes or other conditions that increase the risk of infection. Wear shoes and socks. Do not go barefoot. If you have athlete's foot or other skin problems on your feet, talk to your doctor about how to treat them. When should you call for help? Call your doctor now or seek immediate medical care if:    · You have signs that your infection is getting worse, such as:  ? Increased pain, swelling, warmth, or redness. ? Red streaks leading from the area. ? Pus draining from the area. ? A fever.     · You get a rash.    Watch closely for changes in your health, and be sure to contact your doctor if:    · You do not get better as expected. Where can you learn more? Go to http://karma-jane.info/  Enter X309 in the search box to learn more about \"Cellulitis: Care Instructions. \"  Current as of: October 30, 2019Content Version: 12.4  © 1225-6079 [employer]. Care instructions adapted under license by [employer] (which disclaims liability or warranty for this information). If you have questions about a medical condition or this instruction, always ask your healthcare professional. [place] [address] any warranty or liability for your use of this information.          DISCHARGE SUMMARY from Nurse    PATIENT INSTRUCTIONS:    After general anesthesia or intravenous sedation, for 24 hours or while taking prescription Narcotics:  · Limit your activities  · Do not drive and operate hazardous machinery  · Do not make important personal or business decisions  · Do  not drink alcoholic beverages  · If you have not urinated within 8 hours after discharge, please contact your surgeon on call. Report the following to your surgeon:  · Excessive pain, swelling, redness or odor of or around the surgical area  · Temperature over 100.5  · Nausea and vomiting lasting longer than 4 hours or if unable to take medications  · Any signs of decreased circulation or nerve impairment to extremity: change in color, persistent  numbness, tingling, coldness or increase pain  · Any questions    What to do at Home:  Recommended activity: Activity as tolerated. If you experience any of the following symptoms:  Increased redness/ swelling,  please follow up with your doctor. *  Please give a list of your current medications to your Primary Care Provider. *  Please update this list whenever your medications are discontinued, doses are      changed, or new medications (including over-the-counter products) are added. *  Please carry medication information at all times in case of emergency situations. These are general instructions for a healthy lifestyle:    No smoking/ No tobacco products/ Avoid exposure to second hand smoke  Surgeon General's Warning:  Quitting smoking now greatly reduces serious risk to your health. Obesity, smoking, and sedentary lifestyle greatly increases your risk for illness    A healthy diet, regular physical exercise & weight monitoring are important for maintaining a healthy lifestyle    You may be retaining fluid if you have a history of heart failure or if you experience any of the following symptoms:  Weight gain of 3 pounds or more overnight or 5 pounds in a week, increased swelling in our hands or feet or shortness of breath while lying flat in bed.   Please call your doctor as soon as you notice any of these symptoms; do not wait until your next office visit. The discharge information has been reviewed with the patient. The patient verbalized understanding. Discharge medications reviewed with the patient and appropriate educational materials and side effects teaching were provided.   ___________________________________________________________________________________________________________________________________

## 2020-05-04 NOTE — PROGRESS NOTES
Problem: Mobility Impaired (Adult and Pediatric) Goal: *Acute Goals and Plan of Care (Insert Text) Description: LTG: 
(1.)Mr. Randall Kaufman will move from supine to sit and sit to supine , scoot up and down and roll side to side in bed with INDEPENDENT within 7 treatment day(s). (2.)Mr. Ranadll Kaufman will transfer from bed to chair and chair to bed with CGA using the least restrictive device within 7 treatment day(s). (3.)Mr. Randall Kaufman will ambulate with CONTACT GUARD ASSIST for 100 feet with the least restrictive device within 7 treatment day(s). ________________________________________________________________________________________________ Outcome: Progressing Towards Goal 
  
PHYSICAL THERAPY: Daily Note and AM 5/4/2020 INPATIENT:  
PT Visit Days : 5 Premedicate [place] / [place] PART A AND B / Product Type: Medicare /   
  
NAME/AGE/GENDER: Becka Aguilar is a 79 y.o. male PRIMARY DIAGNOSIS: Lower extremity cellulitis [O40.870] Cor pulmonale ([facility] [address] [I27.81] Bilateral lower extremity edema [R60.0] Lower extremity cellulitis Lower extremity cellulitis ICD-10: Treatment Diagnosis:  
 · Other abnormalities of gait and mobility (R26.89) Precaution/Allergies: 
Celexa [citalopram]; Cymbalta [duloxetine]; Gabapentin; Lidocaine; Sertraline; and Sulfa (sulfonamide antibiotics) ASSESSMENT:  
 
Mr. Randall Kaufman is supine in bed and agreeable to therapy. He reports living with his spouse and ambulates with cane or RW as needed independently. Bed mobility is modified  Independent with additional time for completion. Sitting balance is good. Sit to stand with stand by assist.  Standing balance fair. Patient has O2 @ 3L. Gait training with rolling walker x 75 feet with slow ramo.   As the patient began gait training the phone rang and it was answered by this writer with the patients permission and it was his wife who states that the patient is to be discharge to home and if the hospital sent him to a SNF she would rachel the hospital.  The wife was told that she needed to talk to the CM and she stated \" Have them call me right now\"  This writer did alert the CM of the wife request.   MD and CM talked with the patient. Patient is returned to the recliner and positioned for comfort with needs within reach as his lunch had arrived. Feet elevated. Good session and progress is demonstrated however the patient states that he takes care of his wife and has to pick her up and transfer her and that she is on dialysis,  This writer does not think that this patient is able to do these type of things for his wife at this time and a short stay at rehab would be beneficial to the patient. Mr. Adrian Camp would benefit from skilled physical therapy (medically necessary) to address his deficits and maximize his function. Will continue PT efforts. This section established at most recent assessment PROBLEM LIST (Impairments causing functional limitations): 1. Decreased Strength 2. Decreased ADL/Functional Activities 3. Decreased Transfer Abilities 4. Decreased Ambulation Ability/Technique 5. Increased Pain 6. Decreased Activity Tolerance INTERVENTIONS PLANNED: (Benefits and precautions of physical therapy have been discussed with the patient.) 1. Balance Exercise 2. Bed Mobility 3. Gait Training 4. Home Exercise Program (HEP) 5. Therapeutic Activites 6. Therapeutic Exercise/Strengthening 7. Transfer Training 8. education TREATMENT PLAN: Frequency/Duration: 5 times a week for duration of hospital stay Rehabilitation Potential For Stated Goals: Good REHAB RECOMMENDATIONS (at time of discharge pending progress):   
Placement: It is my opinion, based on this patient's performance to date, that Mr. Adrian Camp may benefit from intensive therapy at a [address] [place] [address] after discharge due to the functional deficits listed above that are likely to improve with skilled rehabilitation and concerns that he/she may be unsafe to be unsupervised at home due to decline in functional mobility. Equipment:  
? None at this time HISTORY:  
History of Present Injury/Illness (Reason for Referral): 
Per MD note, Sarah Cervantes is a 79year old CM with a PMH of chronic cor pulmonale due to COPD & MONICA, chronic BLE stasis dermatitis, atrial fibrillation, DM2, CAD s/p CABG, GERD, and hypothyroidism who presented to the ER on 4/27/20 due to malodorous, excoriated, draining, painful lower extremities with erythema and swelling, orthopnea and epigastric pain. He also has had subjective fevers and a dry cough for a week. He states that he was seen on 4/20/20 for similar issues and his symptoms have not improved despite being compliant with home medications. He is supposed to be on 3LNC at home but he has not been wearing his oxygen. Denies N/V. Denies CP. \" 
Past Medical History/Comorbidities:  
Mr. Kiki Berrios  has a past medical history of Acquired hypothyroidism (10/28/2019), Arrhythmia, Arthritis, CAD (coronary artery disease) (2009), Chronic pain, COPD (chronic obstructive pulmonary disease) ([address] (10/28/2019), Diabetes ([address] (2009), GERD (gastroesophageal reflux disease), Heart failure ([address] Hyperlipidemia, Hypertension, Moderate aortic stenosis (echo 9/27/13), Psychiatric disorder, PUD (peptic ulcer disease) (1970's), and Unspecified sleep apnea. He also has no past medical history of Aneurysm ([address] Asthma, Autoimmune disease ([address] Cancer ([address] Chronic kidney disease, Coagulation disorder ([address] Liver disease, Seizures ([address] Stroke ([address] or Thromboembolus ([address]   Mr. Kiki Berrios  has a past surgical history that includes pr cardiac surg procedure unlist (2009); pr abdomen surgery proc unlisted (2003); hx other surgical (2011); hx knee arthroscopy; hx orthopaedic (Right, 2002); hx heent (Bilateral, 2004); hx heent; hx prostatectomy (2011); hx gi; hx appendectomy; and hx heart valve surgery (2011). Social History/Living Environment:  
Home Environment: Private residence Wheelchair Ramp: Yes One/Two Story Residence: Two story, live on 1st floor Living Alone: No 
Support Systems: Spouse/Significant Other/Partner Patient Expects to be Discharged to[de-identified] Unknown Current DME Used/Available at Home: Igor Millin, straight, Shower chair, Walker, rolling, Wheelchair, Wheelchair, power Tub or Shower Type: Shower(\"handicapped\") Prior Level of Function/Work/Activity: 
Patient reports living with his spouse and ambulates with cane or RW as needed independently. Number of Personal Factors/Comorbidities that affect the Plan of Care: 1-2: MODERATE COMPLEXITY EXAMINATION:  
Most Recent Physical Functioning:  
Gross Assessment: 
  
         
  
Posture: 
  
Balance: 
Sitting: Intact Sitting - Static: Good (unsupported) Sitting - Dynamic: Fair (occasional) Standing: Impaired Standing - Static: Fair Standing - Dynamic : Fair Bed Mobility: 
Rolling: Modified independent; Additional time Supine to Sit: Modified independent; Additional time Scooting: Modified independent Wheelchair Mobility: 
  
Transfers: 
Sit to Stand: Stand-by assistance Stand to Sit: Stand-by assistance Gait: refused Base of Support: Widened Speed/Lidia: Slow Step Length: Left shortened;Right shortened Distance (ft): 75 Feet (ft) Assistive Device: Walker, rolling Ambulation - Level of Assistance: Contact guard assistance Body Structures Involved: 1. skin  Body Functions Affected: 1. Sensory/Pain 2. Movement Related 3. Skin Related Activities and Participation Affected: 1. Mobility 2. Self Care 3. Domestic Life 4. Community, Social and [place] Number of elements that affect the Plan of Care: 4+: HIGH COMPLEXITY CLINICAL PRESENTATION:  
Presentation: Evolving clinical presentation with changing clinical characteristics: MODERATE COMPLEXITY CLINICAL DECISION MAKING:  
 [facility] Basic Mobility Inpatient Short Form How much difficulty does the patient currently have. .. Unable A Lot A Little None 1. Turning over in bed (including adjusting bedclothes, sheets and blankets)? [] 1   [] 2   [x] 3   [] 4  
2. Sitting down on and standing up from a chair with arms ( e.g., wheelchair, bedside commode, etc.)   [] 1   [] 2   [x] 3   [] 4  
3. Moving from lying on back to sitting on the side of the bed? [] 1   [] 2   [x] 3   [] 4 How much help from another person does the patient currently need. .. Total A Lot A Little None 4. Moving to and from a bed to a chair (including a wheelchair)? [] 1   [] 2   [x] 3   [] 4  
5. Need to walk in hospital room? [] 1   [] 2   [x] 3   [] 4  
6. Climbing 3-5 steps with a railing? [] 1   [x] 2   [] 3   [] 4  
© 2007, Trustees of [address] [place] [address], under license to [employer]. All rights reserved Score:  Initial: 17 Most Recent: X (Date: -- ) Interpretation of Tool:  Represents activities that are increasingly more difficult (i.e. Bed mobility, Transfers, Gait). Medical Necessity:    
· Patient is expected to demonstrate progress in  
· strength, range of motion, balance, and functional technique ·  to  
· increase independence with   and improve safety during all functional mobility · . · Patient demonstrates · good ·  rehab potential due to higher previous functional level. Reason for Services/Other Comments: 
· Patient continues to require skilled intervention due to · medical complications and patient unable to attend/participate in therapy as expected · . Use of outcome tool(s) and clinical judgement create a POC that gives a: Clear prediction of patient's progress: LOW COMPLEXITY  
  
 
 
 
TREATMENT:  
(In addition to Assessment/Re-Assessment sessions the following treatments were rendered) Pre-treatment Symptoms/Complaints: \"Hello\" Pain: Initial: Pain Intensity 1: 0  Post Session: 0/10 Therapeutic Activity: (    23 minutes): Therapeutic activities including  Chair transfers, Ambulation on level ground and LE ex's in sit to improve mobility, strength and balance. [facility] to supervision    to promote correct technique with sit to/from stand. DATE: 4/29/20 4/30/20 5/1/20 Ambulation Hip Flexion x10 AB 2x10 AB x30 AB Long Arc Quads 2x10 AB 2x10 AB x30 AB Hip ab/ad  x10 AB Heel Raises 2x10 AB x20 AB x30 AB Toe Raises x10 AB  x30 AB Key:  A=active, AA=active assisted, P=passive, B=bilaterally, R=right, L=left DF=dorsiflexion, PF=plantarflexion Braces/Orthotics/Lines/Etc:  
· O2 Device: Nasal cannula 3L Treatment/Session Assessment:   
· Response to Treatment: bed mobility is good however mobility is slow · Interdisciplinary Collaboration:  
o Physical Therapy Assistant 
o Registered Nurse · After treatment position/precautions:  
o Up in chair 
o Bed/Chair-wheels locked 
o Call light within reach 
o RN notified · Compliance with Program/Exercises: Will assess as treatment progresses · Recommendations/Intent for next treatment session: \"Next visit will focus on advancements to more challenging activities and reduction in assistance provided\". Total Treatment Duration: PT Patient Time In/Time Out Time In: 1150 Time Out: 1213 Damian Olivas PTA

## 2020-05-05 NOTE — PROGRESS NOTES
Patient enroled in HF bundle with anchor date of 5/4/20. 90 day bundle enrollment ends August 2.  
Patient d/c to , [facility]

## 2020-05-19 NOTE — PROGRESS NOTES
CTN made LUCILA call after patient d/c to SNF. CTN spoke with patients wife. Patient remains at SNF and uncertain of d/c date. Spouse has had very little communication with [facility] and was not pleased with the lack of updates/communications from [facility]. Patient needs follow up with PCP and cardiology. Spouse stated we can set up once he is d/c home. CTN to follow up next week.

## 2020-05-22 NOTE — PROGRESS NOTES
Community Care Team documentation for patient in [facility]    The information below provided by:[facility]    PT Update: SBA-Min A Bed Mob; SBA-Mod A transfers; SBA- Min A AMb60 ft RW; SBA-Min A LB ADL's; fluctuates dt B knee pain        Nursing Update:Medically stable, no new orders      Discharge Date:  5/21/2020 per request [facility]    Assign to [address]/Harpal Arreola

## 2020-05-27 NOTE — PROGRESS NOTES
Patient d/c from [address] [place] [address] after recent admission. Patient enrolled in HF bundle. CTN attempted follow up after SNF d/c and Voice mailbox was full, unable to leave message, CTN to attempt at later date.

## 2020-06-01 NOTE — PROGRESS NOTES
CTN with numerous attempts for follow up after recent snf d/c. Patient enrolled in HF bundle. CTN has been unsuccessful in reaching patient and VM box is full and unable to leave .

## 2023-07-12 NOTE — PROGRESS NOTES
This note will not be viewable in [address] LUCILA follow up call S/P rehab discharge. Spoke with patient's spouse, who reports patient is doing well. Denies any swelling, reports minimal SOB with exertion but better than prior to hospitalization.  is still seeing patient and he is progressing with goals. No problems with obtaining medications or transportation. Patient has follow up scheduled as directed at DC from SNF. No concerns or needs identified, will close case as patient is progessing well with no needs identified and no further LUCILA outreach indicated. No